# Patient Record
Sex: MALE | Race: WHITE | NOT HISPANIC OR LATINO | Employment: OTHER | URBAN - METROPOLITAN AREA
[De-identification: names, ages, dates, MRNs, and addresses within clinical notes are randomized per-mention and may not be internally consistent; named-entity substitution may affect disease eponyms.]

---

## 2018-12-14 ENCOUNTER — CONVERSION ENCOUNTER (OUTPATIENT)
Dept: FAMILY MEDICINE CLINIC | Facility: CLINIC | Age: 63
End: 2018-12-14

## 2018-12-14 ENCOUNTER — OFFICE VISIT CONVERTED (OUTPATIENT)
Dept: FAMILY MEDICINE CLINIC | Facility: CLINIC | Age: 63
End: 2018-12-14
Attending: NURSE PRACTITIONER

## 2019-05-02 ENCOUNTER — OFFICE VISIT CONVERTED (OUTPATIENT)
Dept: FAMILY MEDICINE CLINIC | Facility: CLINIC | Age: 64
End: 2019-05-02
Attending: NURSE PRACTITIONER

## 2019-05-02 ENCOUNTER — HOSPITAL ENCOUNTER (OUTPATIENT)
Dept: FAMILY MEDICINE CLINIC | Facility: CLINIC | Age: 64
Discharge: HOME | End: 2019-05-02
Attending: NURSE PRACTITIONER

## 2019-05-02 LAB
ALBUMIN SERPL-MCNC: 4.6 G/DL (ref 3.5–5)
ALBUMIN/GLOB SERPL: 1.5 {RATIO} (ref 1.4–2.6)
ALP SERPL-CCNC: 76 U/L (ref 56–155)
ALT SERPL-CCNC: 34 U/L (ref 10–40)
ANION GAP SERPL CALC-SCNC: 25 MMOL/L (ref 8–19)
APPEARANCE UR: CLEAR
AST SERPL-CCNC: 34 U/L (ref 15–50)
BASOPHILS # BLD AUTO: 0.06 10*3/UL (ref 0–0.2)
BASOPHILS NFR BLD AUTO: 0.8 % (ref 0–3)
BILIRUB SERPL-MCNC: 0.81 MG/DL (ref 0.2–1.3)
BILIRUB UR QL: NEGATIVE
BUN SERPL-MCNC: 10 MG/DL (ref 5–25)
BUN/CREAT SERPL: 12 {RATIO} (ref 6–20)
CALCIUM SERPL-MCNC: 9.9 MG/DL (ref 8.7–10.4)
CHLORIDE SERPL-SCNC: 98 MMOL/L (ref 99–111)
CHOLEST SERPL-MCNC: 245 MG/DL (ref 107–200)
CHOLEST/HDLC SERPL: 2.5 {RATIO} (ref 3–6)
COLOR UR: YELLOW
CONV ABS IMM GRAN: 0.05 10*3/UL (ref 0–0.2)
CONV CO2: 19 MMOL/L (ref 22–32)
CONV COLLECTION SOURCE (UA): NORMAL
CONV IMMATURE GRAN: 0.7 % (ref 0–1.8)
CONV TOTAL PROTEIN: 7.7 G/DL (ref 6.3–8.2)
CONV UROBILINOGEN IN URINE BY AUTOMATED TEST STRIP: 0.2 {EHRLICHU}/DL (ref 0.1–1)
CREAT UR-MCNC: 0.85 MG/DL (ref 0.7–1.2)
DEPRECATED RDW RBC AUTO: 47.8 FL (ref 35.1–43.9)
EOSINOPHIL # BLD AUTO: 0.14 10*3/UL (ref 0–0.7)
EOSINOPHIL # BLD AUTO: 1.8 % (ref 0–7)
ERYTHROCYTE [DISTWIDTH] IN BLOOD BY AUTOMATED COUNT: 14 % (ref 11.6–14.4)
GFR SERPLBLD BASED ON 1.73 SQ M-ARVRAT: >60 ML/MIN/{1.73_M2}
GLOBULIN UR ELPH-MCNC: 3.1 G/DL (ref 2–3.5)
GLUCOSE SERPL-MCNC: 101 MG/DL (ref 70–99)
GLUCOSE UR QL: NEGATIVE MG/DL
HBA1C MFR BLD: 14.7 G/DL (ref 14–18)
HCT VFR BLD AUTO: 44 % (ref 42–52)
HDLC SERPL-MCNC: 97 MG/DL (ref 40–60)
HGB UR QL STRIP: NEGATIVE
KETONES UR QL STRIP: NEGATIVE MG/DL
LDLC SERPL CALC-MCNC: 131 MG/DL (ref 70–100)
LEUKOCYTE ESTERASE UR QL STRIP: NEGATIVE
LYMPHOCYTES # BLD AUTO: 2.01 10*3/UL (ref 1–5)
MCH RBC QN AUTO: 31 PG (ref 27–31)
MCHC RBC AUTO-ENTMCNC: 33.4 G/DL (ref 33–37)
MCV RBC AUTO: 92.8 FL (ref 80–96)
MONOCYTES # BLD AUTO: 0.78 10*3/UL (ref 0.2–1.2)
MONOCYTES NFR BLD AUTO: 10.2 % (ref 3–10)
NEUTROPHILS # BLD AUTO: 4.64 10*3/UL (ref 2–8)
NEUTROPHILS NFR BLD AUTO: 60.3 % (ref 30–85)
NITRITE UR QL STRIP: NEGATIVE
NRBC CBCN: 0 % (ref 0–0.7)
OSMOLALITY SERPL CALC.SUM OF ELEC: 283 MOSM/KG (ref 273–304)
PH UR STRIP.AUTO: 6 [PH] (ref 5–8)
PLATELET # BLD AUTO: 295 10*3/UL (ref 130–400)
PMV BLD AUTO: 11.5 FL (ref 9.4–12.4)
POTASSIUM SERPL-SCNC: 4.8 MMOL/L (ref 3.5–5.3)
PROT UR QL: NEGATIVE MG/DL
PSA SERPL-MCNC: 1.43 NG/ML (ref 0–4)
RBC # BLD AUTO: 4.74 10*6/UL (ref 4.7–6.1)
SODIUM SERPL-SCNC: 137 MMOL/L (ref 135–147)
SP GR UR: 1.01 (ref 1–1.03)
TRIGL SERPL-MCNC: 86 MG/DL (ref 40–150)
VARIANT LYMPHS NFR BLD MANUAL: 26.2 % (ref 20–45)
VLDLC SERPL-MCNC: 17 MG/DL (ref 5–37)
WBC # BLD AUTO: 7.68 10*3/UL (ref 4.8–10.8)

## 2019-06-26 ENCOUNTER — OFFICE VISIT CONVERTED (OUTPATIENT)
Dept: FAMILY MEDICINE CLINIC | Facility: CLINIC | Age: 64
End: 2019-06-26
Attending: NURSE PRACTITIONER

## 2019-06-26 ENCOUNTER — CONVERSION ENCOUNTER (OUTPATIENT)
Dept: FAMILY MEDICINE CLINIC | Facility: CLINIC | Age: 64
End: 2019-06-26

## 2019-09-10 ENCOUNTER — OFFICE VISIT CONVERTED (OUTPATIENT)
Dept: FAMILY MEDICINE CLINIC | Facility: CLINIC | Age: 64
End: 2019-09-10
Attending: NURSE PRACTITIONER

## 2019-09-10 ENCOUNTER — HOSPITAL ENCOUNTER (OUTPATIENT)
Dept: FAMILY MEDICINE CLINIC | Facility: CLINIC | Age: 64
Discharge: HOME | End: 2019-09-10
Attending: NURSE PRACTITIONER

## 2019-09-10 LAB
ALBUMIN SERPL-MCNC: 4.6 G/DL (ref 3.5–5)
ALBUMIN/GLOB SERPL: 1.7 {RATIO} (ref 1.4–2.6)
ALP SERPL-CCNC: 76 U/L (ref 56–155)
ALT SERPL-CCNC: 50 U/L (ref 10–40)
ANION GAP SERPL CALC-SCNC: 19 MMOL/L (ref 8–19)
AST SERPL-CCNC: 51 U/L (ref 15–50)
BILIRUB SERPL-MCNC: 0.99 MG/DL (ref 0.2–1.3)
BUN SERPL-MCNC: 9 MG/DL (ref 5–25)
BUN/CREAT SERPL: 9 {RATIO} (ref 6–20)
CALCIUM SERPL-MCNC: 10.1 MG/DL (ref 8.7–10.4)
CHLORIDE SERPL-SCNC: 95 MMOL/L (ref 99–111)
CONV CO2: 26 MMOL/L (ref 22–32)
CONV TOTAL PROTEIN: 7.3 G/DL (ref 6.3–8.2)
CREAT UR-MCNC: 1.03 MG/DL (ref 0.7–1.2)
GFR SERPLBLD BASED ON 1.73 SQ M-ARVRAT: >60 ML/MIN/{1.73_M2}
GLOBULIN UR ELPH-MCNC: 2.7 G/DL (ref 2–3.5)
GLUCOSE SERPL-MCNC: 110 MG/DL (ref 70–99)
OSMOLALITY SERPL CALC.SUM OF ELEC: 279 MOSM/KG (ref 273–304)
POTASSIUM SERPL-SCNC: 4.7 MMOL/L (ref 3.5–5.3)
SODIUM SERPL-SCNC: 135 MMOL/L (ref 135–147)
TSH SERPL-ACNC: 0.82 M[IU]/L (ref 0.27–4.2)

## 2019-10-28 ENCOUNTER — HOSPITAL ENCOUNTER (OUTPATIENT)
Dept: FAMILY MEDICINE CLINIC | Facility: CLINIC | Age: 64
Discharge: HOME | End: 2019-10-28
Attending: NURSE PRACTITIONER

## 2019-10-28 ENCOUNTER — CONVERSION ENCOUNTER (OUTPATIENT)
Dept: FAMILY MEDICINE CLINIC | Facility: CLINIC | Age: 64
End: 2019-10-28

## 2019-10-28 ENCOUNTER — OFFICE VISIT CONVERTED (OUTPATIENT)
Dept: FAMILY MEDICINE CLINIC | Facility: CLINIC | Age: 64
End: 2019-10-28
Attending: NURSE PRACTITIONER

## 2019-10-28 LAB
ALBUMIN SERPL-MCNC: 4.7 G/DL (ref 3.5–5)
ALBUMIN/GLOB SERPL: 1.9 {RATIO} (ref 1.4–2.6)
ALP SERPL-CCNC: 75 U/L (ref 56–155)
ALT SERPL-CCNC: 45 U/L (ref 10–40)
ANION GAP SERPL CALC-SCNC: 21 MMOL/L (ref 8–19)
AST SERPL-CCNC: 41 U/L (ref 15–50)
BASOPHILS # BLD AUTO: 0.09 10*3/UL (ref 0–0.2)
BASOPHILS NFR BLD AUTO: 1.2 % (ref 0–3)
BILIRUB SERPL-MCNC: 0.96 MG/DL (ref 0.2–1.3)
BUN SERPL-MCNC: 9 MG/DL (ref 5–25)
BUN/CREAT SERPL: 12 {RATIO} (ref 6–20)
CALCIUM SERPL-MCNC: 9.9 MG/DL (ref 8.7–10.4)
CHLORIDE SERPL-SCNC: 91 MMOL/L (ref 99–111)
CHOLEST SERPL-MCNC: 216 MG/DL (ref 107–200)
CHOLEST/HDLC SERPL: 2.5 {RATIO} (ref 3–6)
CONV ABS IMM GRAN: 0.07 10*3/UL (ref 0–0.2)
CONV CO2: 24 MMOL/L (ref 22–32)
CONV IMMATURE GRAN: 0.9 % (ref 0–1.8)
CONV TOTAL PROTEIN: 7.2 G/DL (ref 6.3–8.2)
CREAT UR-MCNC: 0.76 MG/DL (ref 0.7–1.2)
DEPRECATED RDW RBC AUTO: 45.9 FL (ref 35.1–43.9)
EOSINOPHIL # BLD AUTO: 0.12 10*3/UL (ref 0–0.7)
EOSINOPHIL # BLD AUTO: 1.5 % (ref 0–7)
ERYTHROCYTE [DISTWIDTH] IN BLOOD BY AUTOMATED COUNT: 13.5 % (ref 11.6–14.4)
GFR SERPLBLD BASED ON 1.73 SQ M-ARVRAT: >60 ML/MIN/{1.73_M2}
GLOBULIN UR ELPH-MCNC: 2.5 G/DL (ref 2–3.5)
GLUCOSE SERPL-MCNC: 96 MG/DL (ref 70–99)
HCT VFR BLD AUTO: 43.8 % (ref 42–52)
HDLC SERPL-MCNC: 87 MG/DL (ref 40–60)
HGB BLD-MCNC: 15.1 G/DL (ref 14–18)
LDLC SERPL CALC-MCNC: 115 MG/DL (ref 70–100)
LYMPHOCYTES # BLD AUTO: 1.72 10*3/UL (ref 1–5)
LYMPHOCYTES NFR BLD AUTO: 22.1 % (ref 20–45)
MCH RBC QN AUTO: 31.5 PG (ref 27–31)
MCHC RBC AUTO-ENTMCNC: 34.5 G/DL (ref 33–37)
MCV RBC AUTO: 91.3 FL (ref 80–96)
MONOCYTES # BLD AUTO: 0.75 10*3/UL (ref 0.2–1.2)
MONOCYTES NFR BLD AUTO: 9.6 % (ref 3–10)
NEUTROPHILS # BLD AUTO: 5.03 10*3/UL (ref 2–8)
NEUTROPHILS NFR BLD AUTO: 64.7 % (ref 30–85)
NRBC CBCN: 0 % (ref 0–0.7)
OSMOLALITY SERPL CALC.SUM OF ELEC: 271 MOSM/KG (ref 273–304)
PLATELET # BLD AUTO: 307 10*3/UL (ref 130–400)
PMV BLD AUTO: 11 FL (ref 9.4–12.4)
POTASSIUM SERPL-SCNC: 4.6 MMOL/L (ref 3.5–5.3)
RBC # BLD AUTO: 4.8 10*6/UL (ref 4.7–6.1)
SODIUM SERPL-SCNC: 131 MMOL/L (ref 135–147)
TRIGL SERPL-MCNC: 68 MG/DL (ref 40–150)
VLDLC SERPL-MCNC: 14 MG/DL (ref 5–37)
WBC # BLD AUTO: 7.78 10*3/UL (ref 4.8–10.8)

## 2019-10-29 LAB
APPEARANCE UR: CLEAR
BILIRUB UR QL: NEGATIVE
COLOR UR: YELLOW
CONV COLLECTION SOURCE (UA): ABNORMAL
CONV UROBILINOGEN IN URINE BY AUTOMATED TEST STRIP: 0.2 {EHRLICHU}/DL (ref 0.1–1)
GLUCOSE UR QL: NEGATIVE MG/DL
HGB UR QL STRIP: NEGATIVE
KETONES UR QL STRIP: 15 MG/DL
LEUKOCYTE ESTERASE UR QL STRIP: NEGATIVE
NITRITE UR QL STRIP: NEGATIVE
PH UR STRIP.AUTO: 7 [PH] (ref 5–8)
PROT UR QL: NEGATIVE MG/DL
SP GR UR: 1.01 (ref 1–1.03)

## 2019-12-05 ENCOUNTER — OFFICE VISIT CONVERTED (OUTPATIENT)
Dept: CARDIOLOGY | Facility: CLINIC | Age: 64
End: 2019-12-05
Attending: INTERNAL MEDICINE

## 2019-12-12 ENCOUNTER — CONVERSION ENCOUNTER (OUTPATIENT)
Dept: CARDIOLOGY | Facility: CLINIC | Age: 64
End: 2019-12-12
Attending: INTERNAL MEDICINE

## 2020-04-15 ENCOUNTER — TELEPHONE CONVERTED (OUTPATIENT)
Dept: FAMILY MEDICINE CLINIC | Facility: CLINIC | Age: 65
End: 2020-04-15
Attending: NURSE PRACTITIONER

## 2020-06-02 ENCOUNTER — HOSPITAL ENCOUNTER (OUTPATIENT)
Dept: FAMILY MEDICINE CLINIC | Facility: CLINIC | Age: 65
Discharge: HOME | End: 2020-06-02
Attending: NURSE PRACTITIONER

## 2020-06-02 LAB
25(OH)D3 SERPL-MCNC: 76.2 NG/ML (ref 30–100)
ALBUMIN SERPL-MCNC: 4.8 G/DL (ref 3.5–5)
ALBUMIN/GLOB SERPL: 1.8 {RATIO} (ref 1.4–2.6)
ALP SERPL-CCNC: 81 U/L (ref 56–155)
ALT SERPL-CCNC: 46 U/L (ref 10–40)
ANION GAP SERPL CALC-SCNC: 25 MMOL/L (ref 8–19)
APPEARANCE UR: CLEAR
AST SERPL-CCNC: 45 U/L (ref 15–50)
BASOPHILS # BLD AUTO: 0.07 10*3/UL (ref 0–0.2)
BASOPHILS NFR BLD AUTO: 0.9 % (ref 0–3)
BILIRUB SERPL-MCNC: 0.69 MG/DL (ref 0.2–1.3)
BILIRUB UR QL: NEGATIVE
BUN SERPL-MCNC: 10 MG/DL (ref 5–25)
BUN/CREAT SERPL: 11 {RATIO} (ref 6–20)
CALCIUM SERPL-MCNC: 10.5 MG/DL (ref 8.7–10.4)
CHLORIDE SERPL-SCNC: 97 MMOL/L (ref 99–111)
CHOLEST SERPL-MCNC: 221 MG/DL (ref 107–200)
CHOLEST/HDLC SERPL: 2.1 {RATIO} (ref 3–6)
COLOR UR: YELLOW
CONV ABS IMM GRAN: 0.05 10*3/UL (ref 0–0.2)
CONV CO2: 20 MMOL/L (ref 22–32)
CONV COLLECTION SOURCE (UA): NORMAL
CONV IMMATURE GRAN: 0.6 % (ref 0–1.8)
CONV TOTAL PROTEIN: 7.4 G/DL (ref 6.3–8.2)
CONV UROBILINOGEN IN URINE BY AUTOMATED TEST STRIP: 0.2 {EHRLICHU}/DL (ref 0.1–1)
CREAT UR-MCNC: 0.88 MG/DL (ref 0.7–1.2)
DEPRECATED RDW RBC AUTO: 52.2 FL (ref 35.1–43.9)
EOSINOPHIL # BLD AUTO: 0.27 10*3/UL (ref 0–0.7)
EOSINOPHIL # BLD AUTO: 3.4 % (ref 0–7)
ERYTHROCYTE [DISTWIDTH] IN BLOOD BY AUTOMATED COUNT: 15 % (ref 11.6–14.4)
GFR SERPLBLD BASED ON 1.73 SQ M-ARVRAT: >60 ML/MIN/{1.73_M2}
GLOBULIN UR ELPH-MCNC: 2.6 G/DL (ref 2–3.5)
GLUCOSE SERPL-MCNC: 86 MG/DL (ref 70–99)
GLUCOSE UR QL: NEGATIVE MG/DL
HCT VFR BLD AUTO: 44.6 % (ref 42–52)
HDLC SERPL-MCNC: 104 MG/DL (ref 40–60)
HGB BLD-MCNC: 14.8 G/DL (ref 14–18)
HGB UR QL STRIP: NEGATIVE
KETONES UR QL STRIP: NEGATIVE MG/DL
LDLC SERPL CALC-MCNC: 101 MG/DL (ref 70–100)
LEUKOCYTE ESTERASE UR QL STRIP: NEGATIVE
LYMPHOCYTES # BLD AUTO: 2.39 10*3/UL (ref 1–5)
LYMPHOCYTES NFR BLD AUTO: 29.9 % (ref 20–45)
MCH RBC QN AUTO: 31.4 PG (ref 27–31)
MCHC RBC AUTO-ENTMCNC: 33.2 G/DL (ref 33–37)
MCV RBC AUTO: 94.5 FL (ref 80–96)
MONOCYTES # BLD AUTO: 0.92 10*3/UL (ref 0.2–1.2)
MONOCYTES NFR BLD AUTO: 11.5 % (ref 3–10)
NEUTROPHILS # BLD AUTO: 4.29 10*3/UL (ref 2–8)
NEUTROPHILS NFR BLD AUTO: 53.7 % (ref 30–85)
NITRITE UR QL STRIP: NEGATIVE
NRBC CBCN: 0 % (ref 0–0.7)
OSMOLALITY SERPL CALC.SUM OF ELEC: 282 MOSM/KG (ref 273–304)
PH UR STRIP.AUTO: 6.5 [PH] (ref 5–8)
PLATELET # BLD AUTO: 303 10*3/UL (ref 130–400)
PMV BLD AUTO: 11 FL (ref 9.4–12.4)
POTASSIUM SERPL-SCNC: 5.1 MMOL/L (ref 3.5–5.3)
PROT UR QL: NEGATIVE MG/DL
RBC # BLD AUTO: 4.72 10*6/UL (ref 4.7–6.1)
SODIUM SERPL-SCNC: 137 MMOL/L (ref 135–147)
SP GR UR: 1.01 (ref 1–1.03)
TRIGL SERPL-MCNC: 81 MG/DL (ref 40–150)
VLDLC SERPL-MCNC: 16 MG/DL (ref 5–37)
WBC # BLD AUTO: 7.99 10*3/UL (ref 4.8–10.8)

## 2020-07-30 ENCOUNTER — OFFICE VISIT CONVERTED (OUTPATIENT)
Dept: FAMILY MEDICINE CLINIC | Facility: CLINIC | Age: 65
End: 2020-07-30
Attending: NURSE PRACTITIONER

## 2020-12-29 ENCOUNTER — OFFICE VISIT CONVERTED (OUTPATIENT)
Dept: FAMILY MEDICINE CLINIC | Facility: CLINIC | Age: 65
End: 2020-12-29
Attending: NURSE PRACTITIONER

## 2020-12-29 ENCOUNTER — HOSPITAL ENCOUNTER (OUTPATIENT)
Dept: FAMILY MEDICINE CLINIC | Facility: CLINIC | Age: 65
Discharge: HOME | End: 2020-12-29
Attending: NURSE PRACTITIONER

## 2020-12-29 LAB
ALBUMIN SERPL-MCNC: 4.6 G/DL (ref 3.5–5)
ALBUMIN/GLOB SERPL: 1.7 {RATIO} (ref 1.4–2.6)
ALP SERPL-CCNC: 70 U/L (ref 56–155)
ALT SERPL-CCNC: 72 U/L (ref 10–40)
ANION GAP SERPL CALC-SCNC: 18 MMOL/L (ref 8–19)
AST SERPL-CCNC: 51 U/L (ref 15–50)
BASOPHILS # BLD AUTO: 0.09 10*3/UL (ref 0–0.2)
BASOPHILS NFR BLD AUTO: 1 % (ref 0–3)
BILIRUB SERPL-MCNC: 1.05 MG/DL (ref 0.2–1.3)
BUN SERPL-MCNC: 8 MG/DL (ref 5–25)
BUN/CREAT SERPL: 8 {RATIO} (ref 6–20)
CALCIUM SERPL-MCNC: 9.9 MG/DL (ref 8.7–10.4)
CHLORIDE SERPL-SCNC: 88 MMOL/L (ref 99–111)
CONV ABS IMM GRAN: 0.07 10*3/UL (ref 0–0.2)
CONV CO2: 24 MMOL/L (ref 22–32)
CONV IMMATURE GRAN: 0.8 % (ref 0–1.8)
CONV TOTAL PROTEIN: 7.3 G/DL (ref 6.3–8.2)
CREAT UR-MCNC: 0.96 MG/DL (ref 0.7–1.2)
DEPRECATED RDW RBC AUTO: 45.7 FL (ref 35.1–43.9)
EOSINOPHIL # BLD AUTO: 0.09 10*3/UL (ref 0–0.7)
EOSINOPHIL # BLD AUTO: 1 % (ref 0–7)
ERYTHROCYTE [DISTWIDTH] IN BLOOD BY AUTOMATED COUNT: 14.1 % (ref 11.6–14.4)
FERRITIN SERPL-MCNC: 509 NG/ML (ref 30–300)
GFR SERPLBLD BASED ON 1.73 SQ M-ARVRAT: >60 ML/MIN/{1.73_M2}
GLOBULIN UR ELPH-MCNC: 2.7 G/DL (ref 2–3.5)
GLUCOSE SERPL-MCNC: 101 MG/DL (ref 70–99)
HCT VFR BLD AUTO: 41.8 % (ref 42–52)
HGB BLD-MCNC: 14.7 G/DL (ref 14–18)
IRON SATN MFR SERPL: 31 % (ref 20–55)
IRON SERPL-MCNC: 106 UG/DL (ref 70–180)
LYMPHOCYTES # BLD AUTO: 1.51 10*3/UL (ref 1–5)
LYMPHOCYTES NFR BLD AUTO: 16.3 % (ref 20–45)
MCH RBC QN AUTO: 31.2 PG (ref 27–31)
MCHC RBC AUTO-ENTMCNC: 35.2 G/DL (ref 33–37)
MCV RBC AUTO: 88.7 FL (ref 80–96)
MONOCYTES # BLD AUTO: 0.87 10*3/UL (ref 0.2–1.2)
MONOCYTES NFR BLD AUTO: 9.4 % (ref 3–10)
NEUTROPHILS # BLD AUTO: 6.64 10*3/UL (ref 2–8)
NEUTROPHILS NFR BLD AUTO: 71.5 % (ref 30–85)
NRBC CBCN: 0 % (ref 0–0.7)
OSMOLALITY SERPL CALC.SUM OF ELEC: 260 MOSM/KG (ref 273–304)
PLATELET # BLD AUTO: 277 10*3/UL (ref 130–400)
PMV BLD AUTO: 10.5 FL (ref 9.4–12.4)
POTASSIUM SERPL-SCNC: 4.4 MMOL/L (ref 3.5–5.3)
RBC # BLD AUTO: 4.71 10*6/UL (ref 4.7–6.1)
SODIUM SERPL-SCNC: 126 MMOL/L (ref 135–147)
TIBC SERPL-MCNC: 343 UG/DL (ref 245–450)
TRANSFERRIN SERPL-MCNC: 240 MG/DL (ref 215–365)
WBC # BLD AUTO: 9.27 10*3/UL (ref 4.8–10.8)

## 2020-12-30 LAB
CERULOPLASMIN SERPL-MCNC: 17 MG/DL (ref 16–31)
CONV HEPATITIS B SURFACE AG W CONFIRMATION RE: NEGATIVE
DEPRECATED MITOCHONDRIA M2 IGG SER-ACNC: <20 UNITS (ref 0–20)
DSDNA AB SER-ACNC: NEGATIVE [IU]/ML
ENA AB SER IA-ACNC: NEGATIVE {RATIO}
HAV IGM SERPL QL IA: NEGATIVE
HBV CORE IGM SERPL QL IA: NEGATIVE
HCV AB SER DONR QL: <0.1 S/CO RATIO (ref 0–0.9)
SMOOTH MUSCLE F-ACTIN AB IGG: 5 UNITS (ref 0–19)

## 2021-01-05 LAB
A1AT SERPL-MCNC: 129 MG/DL (ref 101–187)
PHENOTYPE: NORMAL

## 2021-01-11 ENCOUNTER — HOSPITAL ENCOUNTER (OUTPATIENT)
Dept: OTHER | Facility: HOSPITAL | Age: 66
Discharge: HOME | End: 2021-01-11
Attending: NURSE PRACTITIONER

## 2021-02-17 ENCOUNTER — OFFICE VISIT CONVERTED (OUTPATIENT)
Dept: FAMILY MEDICINE CLINIC | Facility: CLINIC | Age: 66
End: 2021-02-17
Attending: NURSE PRACTITIONER

## 2021-02-17 ENCOUNTER — HOSPITAL ENCOUNTER (OUTPATIENT)
Dept: FAMILY MEDICINE CLINIC | Facility: CLINIC | Age: 66
Discharge: HOME | End: 2021-02-17
Attending: NURSE PRACTITIONER

## 2021-02-17 LAB
ALBUMIN SERPL-MCNC: 4.2 G/DL (ref 3.5–5)
ALBUMIN/GLOB SERPL: 1.7 {RATIO} (ref 1.4–2.6)
ALP SERPL-CCNC: 89 U/L (ref 56–155)
ALT SERPL-CCNC: 33 U/L (ref 10–40)
ANION GAP SERPL CALC-SCNC: 15 MMOL/L (ref 8–19)
APPEARANCE UR: CLEAR
AST SERPL-CCNC: 29 U/L (ref 15–50)
BASOPHILS # BLD AUTO: 0.09 10*3/UL (ref 0–0.2)
BASOPHILS NFR BLD AUTO: 1.1 % (ref 0–3)
BILIRUB SERPL-MCNC: 0.84 MG/DL (ref 0.2–1.3)
BILIRUB UR QL: NEGATIVE
BUN SERPL-MCNC: 6 MG/DL (ref 5–25)
BUN/CREAT SERPL: 8 {RATIO} (ref 6–20)
CALCIUM SERPL-MCNC: 9.2 MG/DL (ref 8.7–10.4)
CHLORIDE SERPL-SCNC: 94 MMOL/L (ref 99–111)
CHOLEST SERPL-MCNC: 250 MG/DL (ref 107–200)
CHOLEST/HDLC SERPL: 3.2 {RATIO} (ref 3–6)
COLOR UR: YELLOW
CONV ABS IMM GRAN: 0.06 10*3/UL (ref 0–0.2)
CONV BACTERIA: NEGATIVE
CONV CO2: 25 MMOL/L (ref 22–32)
CONV COLLECTION SOURCE (UA): ABNORMAL
CONV IMMATURE GRAN: 0.7 % (ref 0–1.8)
CONV TOTAL PROTEIN: 6.7 G/DL (ref 6.3–8.2)
CONV UROBILINOGEN IN URINE BY AUTOMATED TEST STRIP: 0.2 {EHRLICHU}/DL (ref 0.1–1)
CREAT UR-MCNC: 0.75 MG/DL (ref 0.7–1.2)
DEPRECATED RDW RBC AUTO: 48.7 FL (ref 35.1–43.9)
EOSINOPHIL # BLD AUTO: 0.13 10*3/UL (ref 0–0.7)
EOSINOPHIL # BLD AUTO: 1.6 % (ref 0–7)
ERYTHROCYTE [DISTWIDTH] IN BLOOD BY AUTOMATED COUNT: 14.4 % (ref 11.6–14.4)
GFR SERPLBLD BASED ON 1.73 SQ M-ARVRAT: >60 ML/MIN/{1.73_M2}
GLOBULIN UR ELPH-MCNC: 2.5 G/DL (ref 2–3.5)
GLUCOSE SERPL-MCNC: 101 MG/DL (ref 70–99)
GLUCOSE UR QL: NEGATIVE MG/DL
HCT VFR BLD AUTO: 42.6 % (ref 42–52)
HDLC SERPL-MCNC: 77 MG/DL (ref 40–60)
HGB BLD-MCNC: 14.8 G/DL (ref 14–18)
HGB UR QL STRIP: NEGATIVE
KETONES UR QL STRIP: NEGATIVE MG/DL
LDLC SERPL CALC-MCNC: 147 MG/DL (ref 70–100)
LEUKOCYTE ESTERASE UR QL STRIP: ABNORMAL
LYMPHOCYTES # BLD AUTO: 2.07 10*3/UL (ref 1–5)
LYMPHOCYTES NFR BLD AUTO: 24.8 % (ref 20–45)
MCH RBC QN AUTO: 32.2 PG (ref 27–31)
MCHC RBC AUTO-ENTMCNC: 34.7 G/DL (ref 33–37)
MCV RBC AUTO: 92.6 FL (ref 80–96)
MONOCYTES # BLD AUTO: 0.86 10*3/UL (ref 0.2–1.2)
MONOCYTES NFR BLD AUTO: 10.3 % (ref 3–10)
NEUTROPHILS # BLD AUTO: 5.14 10*3/UL (ref 2–8)
NEUTROPHILS NFR BLD AUTO: 61.5 % (ref 30–85)
NITRITE UR QL STRIP: NEGATIVE
NRBC CBCN: 0 % (ref 0–0.7)
OSMOLALITY SERPL CALC.SUM OF ELEC: 268 MOSM/KG (ref 273–304)
PH UR STRIP.AUTO: 7.5 [PH] (ref 5–8)
PLATELET # BLD AUTO: 355 10*3/UL (ref 130–400)
PMV BLD AUTO: 10.7 FL (ref 9.4–12.4)
POTASSIUM SERPL-SCNC: 4.4 MMOL/L (ref 3.5–5.3)
PROT UR QL: NEGATIVE MG/DL
RBC # BLD AUTO: 4.6 10*6/UL (ref 4.7–6.1)
RBC #/AREA URNS HPF: ABNORMAL /[HPF]
SODIUM SERPL-SCNC: 130 MMOL/L (ref 135–147)
SP GR UR: 1.01 (ref 1–1.03)
TRIGL SERPL-MCNC: 128 MG/DL (ref 40–150)
TSH SERPL-ACNC: 1.14 M[IU]/L (ref 0.27–4.2)
VLDLC SERPL-MCNC: 26 MG/DL (ref 5–37)
WBC # BLD AUTO: 8.35 10*3/UL (ref 4.8–10.8)
WBC #/AREA URNS HPF: ABNORMAL /[HPF]

## 2021-04-27 ENCOUNTER — OFFICE VISIT CONVERTED (OUTPATIENT)
Dept: FAMILY MEDICINE CLINIC | Facility: CLINIC | Age: 66
End: 2021-04-27
Attending: FAMILY MEDICINE

## 2021-04-27 ENCOUNTER — CONVERSION ENCOUNTER (OUTPATIENT)
Dept: FAMILY MEDICINE CLINIC | Facility: CLINIC | Age: 66
End: 2021-04-27

## 2021-04-30 ENCOUNTER — OFFICE VISIT CONVERTED (OUTPATIENT)
Dept: FAMILY MEDICINE CLINIC | Facility: CLINIC | Age: 66
End: 2021-04-30
Attending: NURSE PRACTITIONER

## 2021-05-07 ENCOUNTER — OFFICE VISIT CONVERTED (OUTPATIENT)
Dept: FAMILY MEDICINE CLINIC | Facility: CLINIC | Age: 66
End: 2021-05-07
Attending: NURSE PRACTITIONER

## 2021-05-07 ENCOUNTER — CONVERSION ENCOUNTER (OUTPATIENT)
Dept: FAMILY MEDICINE CLINIC | Facility: CLINIC | Age: 66
End: 2021-05-07

## 2021-05-09 VITALS
DIASTOLIC BLOOD PRESSURE: 80 MMHG | HEIGHT: 71 IN | HEART RATE: 114 BPM | SYSTOLIC BLOOD PRESSURE: 140 MMHG | WEIGHT: 226.25 LBS | OXYGEN SATURATION: 97 % | TEMPERATURE: 98 F | BODY MASS INDEX: 31.68 KG/M2

## 2021-05-09 VITALS
OXYGEN SATURATION: 95 % | HEIGHT: 71 IN | HEART RATE: 110 BPM | TEMPERATURE: 97.6 F | WEIGHT: 238 LBS | SYSTOLIC BLOOD PRESSURE: 130 MMHG | DIASTOLIC BLOOD PRESSURE: 80 MMHG | BODY MASS INDEX: 33.32 KG/M2

## 2021-05-09 VITALS
DIASTOLIC BLOOD PRESSURE: 76 MMHG | TEMPERATURE: 97.8 F | OXYGEN SATURATION: 93 % | SYSTOLIC BLOOD PRESSURE: 122 MMHG | WEIGHT: 237.37 LBS | HEART RATE: 111 BPM

## 2021-05-09 VITALS
SYSTOLIC BLOOD PRESSURE: 150 MMHG | HEART RATE: 118 BPM | BODY MASS INDEX: 32.2 KG/M2 | DIASTOLIC BLOOD PRESSURE: 80 MMHG | WEIGHT: 230 LBS | HEIGHT: 71 IN | OXYGEN SATURATION: 95 % | TEMPERATURE: 98 F

## 2021-05-09 VITALS
HEART RATE: 102 BPM | BODY MASS INDEX: 31.4 KG/M2 | TEMPERATURE: 97.7 F | OXYGEN SATURATION: 96 % | SYSTOLIC BLOOD PRESSURE: 140 MMHG | DIASTOLIC BLOOD PRESSURE: 72 MMHG | WEIGHT: 224.31 LBS | HEIGHT: 71 IN

## 2021-05-09 VITALS
DIASTOLIC BLOOD PRESSURE: 78 MMHG | BODY MASS INDEX: 32.35 KG/M2 | SYSTOLIC BLOOD PRESSURE: 138 MMHG | HEART RATE: 124 BPM | WEIGHT: 231.12 LBS | OXYGEN SATURATION: 96 % | HEIGHT: 71 IN | TEMPERATURE: 96.9 F

## 2021-05-09 VITALS
BODY MASS INDEX: 33.06 KG/M2 | TEMPERATURE: 98.2 F | DIASTOLIC BLOOD PRESSURE: 70 MMHG | HEART RATE: 57 BPM | SYSTOLIC BLOOD PRESSURE: 122 MMHG | HEIGHT: 71 IN | WEIGHT: 236.12 LBS | OXYGEN SATURATION: 96 %

## 2021-05-09 VITALS
HEART RATE: 111 BPM | HEIGHT: 71 IN | WEIGHT: 239.25 LBS | OXYGEN SATURATION: 93 % | TEMPERATURE: 98 F | BODY MASS INDEX: 33.49 KG/M2 | SYSTOLIC BLOOD PRESSURE: 134 MMHG | DIASTOLIC BLOOD PRESSURE: 72 MMHG

## 2021-05-09 VITALS
BODY MASS INDEX: 31.66 KG/M2 | TEMPERATURE: 97.9 F | SYSTOLIC BLOOD PRESSURE: 140 MMHG | HEIGHT: 71 IN | DIASTOLIC BLOOD PRESSURE: 74 MMHG | HEART RATE: 122 BPM | WEIGHT: 226.12 LBS | OXYGEN SATURATION: 95 %

## 2021-05-09 VITALS
TEMPERATURE: 98.8 F | WEIGHT: 239.12 LBS | HEIGHT: 71 IN | BODY MASS INDEX: 33.48 KG/M2 | HEART RATE: 128 BPM | DIASTOLIC BLOOD PRESSURE: 70 MMHG | OXYGEN SATURATION: 94 % | SYSTOLIC BLOOD PRESSURE: 120 MMHG

## 2021-05-15 VITALS
SYSTOLIC BLOOD PRESSURE: 148 MMHG | BODY MASS INDEX: 33.74 KG/M2 | HEART RATE: 110 BPM | WEIGHT: 241 LBS | HEIGHT: 71 IN | DIASTOLIC BLOOD PRESSURE: 86 MMHG

## 2021-06-05 NOTE — PROGRESS NOTES
Progress Note      Patient Name: Paulo Leiva   Patient ID: [id number]   Sex: Male   YOB: 1955    Primary Care Provider: Vilma VELÁZQUEZ   Referring Provider: Vilma VELÁZQUEZ    Visit Date: May 7, 2021    Provider: MELANIA Sunshine   Location: [facility]   Location Address: [address] [place] [address]   [place]ON[place] Medina  [id number]   Location Phone: [phone]          Chief Complaint     follow up on arm laceration       History Of Present Illness  Paulo Leiva is a 65 year old /White male who presents for evaluation and treatment of:      laceration to left forearm as noted in previous note - here for suture removed 10 days post injury/placement of sutures       Past Medical History  Disease Name Date Onset Notes   COPD (chronic obstructive pulmonary disease) 12/14/2018 --    Essential hypertension 12/14/2018 --    Hyperlipidemia --  --    Hyperlipidemia 12/14/2018 --    Hypertension, Benign Essential --  --    Hyponatremia --  --    Vertigo 02/17/2021 --          Past Surgical History  Procedure Name Date Notes   *Denies any surgical procedures --  --          Medication List  Name Date Started Instructions   hydrochlorothiazide 12.5 mg oral tablet 02/17/2021 take 1 tablet (12.5 mg) by oral route once daily for 90 days   Lotrel 5-20 mg oral capsule 02/17/2021 take 1 capsule by oral route once daily for 90 days   meclizine 25 mg oral tablet 04/22/2021 take 1 tablet by oral route 4 times a day as needed for 90 days   meloxicam 15 mg oral tablet 02/17/2021 take 1 tablet (15 mg) by oral route once daily for 90 days   simvastatin 20 mg oral tablet 02/17/2021 take 1 tablet (20 mg) by oral route once daily in the evening for 90 days   Spiriva Respimat 1.25 mcg/actuation inhalation mist 02/17/2021 inhale 2 puffs (2.5 mcg) by inhalation route once daily for 90 days         Allergy List  Allergen Name Date Reaction Notes   NO KNOWN DRUG ALLERGIES --  --  --        Allergies  Reconciled  Family Medical History  Disease Name Relative/Age Notes   *No Significant Family Medical History  --          Social History  Finding Status Start/Stop Quantity Notes   Alcohol Current every day --/-- --  drinks alcohol, 8-14 drinks per week   Exercises regularly --  --/-- --  occasionally   Recreational Drug Use Never --/-- --  never used   Second hand smoke exposure Unknown --/-- --  no   Tobacco Former --/-- 2.5 PPD former smoker, smokes more than 2 packs per day, for 35 years, never uses other tobacco products   Uses seatbelts --  --/-- --  yes         Immunizations  NameDate Admin Mfg Trade Name Lot Number Route Inj VIS Given VIS Publication   Zfbmcmtef89/03/2020 PMC FLUZONE [id number] IM LD 10/03/2020 08/15/2019   Comments: [id number]#[id number]   Qpuzwrg8071/04/2014 [facility] PREVNAR 13 [id number] NE NE 02/17/2021    Comments:    Gufbndep94/04/2014 NE Not Entered  NE NE 02/17/2021    Comments:    Tdap03/14/2017 [facility] ADACEL [id number] NE NE 02/17/2021    Comments:          Review of Systems  · Constitutional  o Denies  o : fever, chills  · Cardiovascular  o Denies  o : chest pain  · Integument  o * See HPI      Vitals  Date Time BP Position Site L\R Cuff Size HR RR TEMP (F) WT  HT  BMI kg/m2 BSA m2 O2 Sat FR L/min FiO2 HC       05/07/2021 02:04 /90 Sitting    116 - R  98 227lbs 0oz    95 %            Physical Examination  · Constitutional  o Appearance  o : well developed, well-nourished, in no acute distress  · Eyes  o Conjunctivae  o : conjunctivae normal  · Musculoskeletal  o General  o :   § General Musculoskeletal  § : No joint swelling or deformity. Muscle tone, strength, and development grossly normal.  · Skin and Subcutaneous Tissue  o General Inspection  o : linear laceration to the left forearm near elbow with sutures noted and well approximated without s/s of infection; v/x shaped laceration to left lateral forearm with multiple interrupted and running sutures with appropriate healing - sutures cleansed  and multiple sutures removed without complication - steri-strips applied  · Neurologic  o Gait and Station  o :   § Gait Screening  § : normal gait  · Psychiatric  o Mood and Affect  o : mood normal, affect appropriate              Assessment  · Encounter for removal of sutures     V58.32/Z48.02      Plan  · Orders  o ACO-39: Current medications updated and reviewed (1159F, ) - - 05/07/2021  · Medications  o Medications have been Reconciled  o Transition of Care or Provider Policy  · Instructions  o Patient was educated/instructed on their diagnosis, treatment and medications prior to discharge from the clinic today.  o May allow smaller linear laceration to be open to air and apply abx ointment daily. Keep v/x shaped laceration covered and steri-strips in place until fall off - monitor for s/s of infection.   · Disposition  o Follow up as needed.            Electronically Signed by: MELANIA Sunshine -Author on May 7, 2021 04:36:10 PM

## 2021-07-15 VITALS
HEART RATE: 116 BPM | WEIGHT: 227 LBS | DIASTOLIC BLOOD PRESSURE: 90 MMHG | BODY MASS INDEX: 31.66 KG/M2 | SYSTOLIC BLOOD PRESSURE: 160 MMHG | TEMPERATURE: 98 F | OXYGEN SATURATION: 95 %

## 2021-08-25 ENCOUNTER — OFFICE VISIT (OUTPATIENT)
Dept: FAMILY MEDICINE CLINIC | Facility: CLINIC | Age: 66
End: 2021-08-25

## 2021-08-25 VITALS
WEIGHT: 228 LBS | DIASTOLIC BLOOD PRESSURE: 78 MMHG | TEMPERATURE: 97.3 F | SYSTOLIC BLOOD PRESSURE: 141 MMHG | HEIGHT: 70 IN | OXYGEN SATURATION: 96 % | HEART RATE: 100 BPM | BODY MASS INDEX: 32.64 KG/M2

## 2021-08-25 DIAGNOSIS — J44.9 CHRONIC OBSTRUCTIVE PULMONARY DISEASE, UNSPECIFIED COPD TYPE (HCC): ICD-10-CM

## 2021-08-25 DIAGNOSIS — I10 ESSENTIAL HYPERTENSION: Primary | ICD-10-CM

## 2021-08-25 DIAGNOSIS — E78.5 HYPERLIPIDEMIA, UNSPECIFIED HYPERLIPIDEMIA TYPE: ICD-10-CM

## 2021-08-25 DIAGNOSIS — M19.90 ARTHRITIS: ICD-10-CM

## 2021-08-25 DIAGNOSIS — R42 VERTIGO: ICD-10-CM

## 2021-08-25 PROBLEM — E87.1 HYPONATREMIA: Status: ACTIVE | Noted: 2021-08-25

## 2021-08-25 LAB
BASOPHILS # BLD AUTO: 0.09 10*3/MM3 (ref 0–0.2)
BASOPHILS NFR BLD AUTO: 1.2 % (ref 0–1.5)
BILIRUB UR QL STRIP: NEGATIVE
CHOLEST SERPL-MCNC: 221 MG/DL (ref 0–200)
CLARITY UR: CLEAR
COLOR UR: YELLOW
DEPRECATED RDW RBC AUTO: 43 FL (ref 37–54)
EOSINOPHIL # BLD AUTO: 0.11 10*3/MM3 (ref 0–0.4)
EOSINOPHIL NFR BLD AUTO: 1.5 % (ref 0.3–6.2)
ERYTHROCYTE [DISTWIDTH] IN BLOOD BY AUTOMATED COUNT: 13 % (ref 12.3–15.4)
GLUCOSE UR STRIP-MCNC: NEGATIVE MG/DL
HCT VFR BLD AUTO: 42.2 % (ref 37.5–51)
HDLC SERPL-MCNC: 123 MG/DL (ref 40–60)
HGB BLD-MCNC: 14.8 G/DL (ref 13–17.7)
HGB UR QL STRIP.AUTO: NEGATIVE
IMM GRANULOCYTES # BLD AUTO: 0.05 10*3/MM3 (ref 0–0.05)
IMM GRANULOCYTES NFR BLD AUTO: 0.7 % (ref 0–0.5)
KETONES UR QL STRIP: NEGATIVE
LDLC SERPL CALC-MCNC: 90 MG/DL (ref 0–100)
LDLC/HDLC SERPL: 0.72 {RATIO}
LEUKOCYTE ESTERASE UR QL STRIP.AUTO: NEGATIVE
LYMPHOCYTES # BLD AUTO: 1.43 10*3/MM3 (ref 0.7–3.1)
LYMPHOCYTES NFR BLD AUTO: 19.3 % (ref 19.6–45.3)
MCH RBC QN AUTO: 32.2 PG (ref 26.6–33)
MCHC RBC AUTO-ENTMCNC: 35.1 G/DL (ref 31.5–35.7)
MCV RBC AUTO: 91.9 FL (ref 79–97)
MONOCYTES # BLD AUTO: 1 10*3/MM3 (ref 0.1–0.9)
MONOCYTES NFR BLD AUTO: 13.5 % (ref 5–12)
NEUTROPHILS NFR BLD AUTO: 4.72 10*3/MM3 (ref 1.7–7)
NEUTROPHILS NFR BLD AUTO: 63.8 % (ref 42.7–76)
NITRITE UR QL STRIP: NEGATIVE
NRBC BLD AUTO-RTO: 0 /100 WBC (ref 0–0.2)
PH UR STRIP.AUTO: 7 [PH] (ref 5–8)
PLATELET # BLD AUTO: 337 10*3/MM3 (ref 140–450)
PMV BLD AUTO: 10.3 FL (ref 6–12)
PROT UR QL STRIP: NEGATIVE
RBC # BLD AUTO: 4.59 10*6/MM3 (ref 4.14–5.8)
SP GR UR STRIP: 1.01 (ref 1–1.03)
TRIGL SERPL-MCNC: 46 MG/DL (ref 0–150)
UROBILINOGEN UR QL STRIP: NORMAL
VLDLC SERPL-MCNC: 8 MG/DL (ref 5–40)
WBC # BLD AUTO: 7.4 10*3/MM3 (ref 3.4–10.8)

## 2021-08-25 PROCEDURE — 81003 URINALYSIS AUTO W/O SCOPE: CPT | Performed by: NURSE PRACTITIONER

## 2021-08-25 PROCEDURE — 85025 COMPLETE CBC W/AUTO DIFF WBC: CPT | Performed by: NURSE PRACTITIONER

## 2021-08-25 PROCEDURE — 80061 LIPID PANEL: CPT | Performed by: NURSE PRACTITIONER

## 2021-08-25 PROCEDURE — 99214 OFFICE O/P EST MOD 30 MIN: CPT | Performed by: NURSE PRACTITIONER

## 2021-08-25 PROCEDURE — 80053 COMPREHEN METABOLIC PANEL: CPT | Performed by: NURSE PRACTITIONER

## 2021-08-25 RX ORDER — HYDROCHLOROTHIAZIDE 12.5 MG/1
12.5 TABLET ORAL DAILY
Qty: 90 TABLET | Refills: 1 | Status: SHIPPED | OUTPATIENT
Start: 2021-08-25 | End: 2022-04-04 | Stop reason: SDUPTHER

## 2021-08-25 RX ORDER — MECLIZINE HYDROCHLORIDE 25 MG/1
1 TABLET ORAL DAILY
COMMUNITY
Start: 2021-04-22 | End: 2021-08-25 | Stop reason: SDUPTHER

## 2021-08-25 RX ORDER — SIMVASTATIN 20 MG
20 TABLET ORAL DAILY
Qty: 90 TABLET | Refills: 1 | Status: SHIPPED | OUTPATIENT
Start: 2021-08-25 | End: 2022-04-04 | Stop reason: SDUPTHER

## 2021-08-25 RX ORDER — AMLODIPINE BESYLATE AND BENAZEPRIL HYDROCHLORIDE 5; 20 MG/1; MG/1
1 CAPSULE ORAL DAILY
Qty: 90 CAPSULE | Refills: 1 | Status: SHIPPED | OUTPATIENT
Start: 2021-08-25 | End: 2022-04-04 | Stop reason: SDUPTHER

## 2021-08-25 RX ORDER — MECLIZINE HYDROCHLORIDE 25 MG/1
50 TABLET ORAL DAILY
Qty: 180 TABLET | Refills: 1 | Status: SHIPPED | OUTPATIENT
Start: 2021-08-25 | End: 2022-04-04 | Stop reason: SDUPTHER

## 2021-08-25 RX ORDER — SIMVASTATIN 20 MG
1 TABLET ORAL DAILY
COMMUNITY
Start: 2021-05-26 | End: 2021-08-25 | Stop reason: SDUPTHER

## 2021-08-25 RX ORDER — MELOXICAM 15 MG/1
1 TABLET ORAL DAILY
COMMUNITY
Start: 2021-05-26 | End: 2021-08-25 | Stop reason: SDUPTHER

## 2021-08-25 RX ORDER — AMLODIPINE BESYLATE AND BENAZEPRIL HYDROCHLORIDE 5; 20 MG/1; MG/1
1 CAPSULE ORAL DAILY
COMMUNITY
Start: 2021-05-26 | End: 2021-08-25 | Stop reason: SDUPTHER

## 2021-08-25 RX ORDER — TIOTROPIUM BROMIDE INHALATION SPRAY 1.56 UG/1
2 SPRAY, METERED RESPIRATORY (INHALATION) DAILY
Qty: 3 EACH | Refills: 1 | Status: SHIPPED | OUTPATIENT
Start: 2021-08-25 | End: 2022-04-04 | Stop reason: SDUPTHER

## 2021-08-25 RX ORDER — MELOXICAM 15 MG/1
15 TABLET ORAL DAILY
Qty: 90 TABLET | Refills: 1 | Status: SHIPPED | OUTPATIENT
Start: 2021-08-25 | End: 2022-04-04

## 2021-08-25 RX ORDER — TIOTROPIUM BROMIDE INHALATION SPRAY 1.56 UG/1
2 SPRAY, METERED RESPIRATORY (INHALATION) DAILY
COMMUNITY
Start: 2021-05-26 | End: 2021-08-25 | Stop reason: SDUPTHER

## 2021-08-25 RX ORDER — HYDROCHLOROTHIAZIDE 12.5 MG/1
1 TABLET ORAL DAILY
COMMUNITY
Start: 2021-05-26 | End: 2021-08-25 | Stop reason: SDUPTHER

## 2021-08-25 NOTE — PROGRESS NOTES
Chief Complaint  Hyperlipidemia (Refill meds and fasting labs ) and Hypertension    PHQ-2 Total Score: 0    Subjective        Past Medical History:   Diagnosis Date   • Benign essential hypertension    • COPD (chronic obstructive pulmonary disease) (CMS/[facility])    • Essential hypertension    • Hyperlipidemia 12/14/2018   • Hyponatremia    • Vertigo 02/17/2021     Social History     Tobacco Use   • Smoking status: Former Smoker   • Tobacco comment: SMOKES MORE THAN 2 PPD FOR 35 YEARS   Substance Use Topics   • Alcohol use: Yes     Comment: 8-14 DRINKS PER WEEK   • Drug use: Never      Current Outpatient Medications on File Prior to Visit   Medication Sig   • [DISCONTINUED] amLODIPine-benazepril (LOTREL 5-20) 5-20 MG per capsule Take 1 capsule by mouth Daily.   • [DISCONTINUED] hydroCHLOROthiazide (HYDRODIURIL) 12.5 MG tablet Take 1 tablet by mouth Daily.   • [DISCONTINUED] meclizine (ANTIVERT) 25 MG tablet Take 1 tablet by mouth Daily.   • [DISCONTINUED] meloxicam (MOBIC) 15 MG tablet Take 1 tablet by mouth Daily.   • [DISCONTINUED] simvastatin (ZOCOR) 20 MG tablet Take 1 tablet by mouth Daily.   • [DISCONTINUED] Spiriva Respimat 1.25 MCG/ACT aerosol solution inhaler Inhale 2 puffs Daily.     No current facility-administered medications on file prior to visit.      No Known Allergies   Health Maintenance Due   Topic Date Due   • COLORECTAL CANCER SCREENING  Never done   • ANNUAL PHYSICAL  Never done   • COVID-19 Vaccine (1) Never done   • Pneumococcal Vaccine 0-64 (1 of 2 - PPSV23) 01/29/2015   • ZOSTER VACCINE (2 of 2) 01/29/2015      Paulo A Cali presents to [facility]  HTN: slightly elevated in office at 141/78 - monitors at home and usually similar readings to in office - follows an unrestricted diet, tries to avoid large portions    Hyperlipidemia: denies muscle aches or cramps    Arthritis: pain is tolerable with Mobic    Vertigo: takes once in the morning to keep symptoms from  "occurring    COPD: denies coughing or wheezing, symptoms are well controlled      Objective   Vital Signs:   /78   Pulse 100   Temp 97.3 °F (36.3 °C)   Ht 177.8 cm (70\")   Wt 103 kg (228 lb)   SpO2 96%   BMI 32.71 kg/m²     Review of Systems   Cardiovascular: Positive for leg swelling. Negative for chest pain and palpitations (intermittent).   Neurological: Negative for headache.      Physical Exam  Vitals reviewed.   Constitutional:       General: He is not in acute distress.     Appearance: Normal appearance. He is well-developed.   HENT:      Head: Normocephalic and atraumatic.      Right Ear: External ear normal.      Left Ear: External ear normal.   Eyes:      Conjunctiva/sclera: Conjunctivae normal.      Pupils: Pupils are equal, round, and reactive to light.   Cardiovascular:      Rate and Rhythm: Normal rate and regular rhythm.      Heart sounds: Normal heart sounds. No murmur heard.     Pulmonary:      Effort: Pulmonary effort is normal.      Breath sounds: Normal breath sounds. No wheezing or rhonchi.   Musculoskeletal:      Cervical back: Neck supple.      Right lower leg: No edema.      Left lower leg: No edema.   Skin:     General: Skin is warm and dry.   Neurological:      Mental Status: He is alert and oriented to person, place, and time.   Psychiatric:         Mood and Affect: Mood and affect normal.         Behavior: Behavior normal.         Thought Content: Thought content normal.         Judgment: Judgment normal.        Result Review :   The following data was reviewed by: MELANIA Santiago on 08/25/2021:  CMP    CMP 12/24/20 12/29/20 2/17/21   Glucose 116 (A) 101 (A) 101 (A)   BUN 8 8 6   Creatinine 1.06 0.96 0.75   Sodium 125 (A) 126 (A) 130 (A)   Potassium 4.3 4.4 4.4   Chloride 88 (A) 88 (A) 94 (A)   Calcium 9.3 9.9 9.2   Albumin 4.2 4.6 4.2   Total Bilirubin 0.80 1.05 0.84   Alkaline Phosphatase 76 70 89   AST (SGOT) 87 (A) 51 (A) 29   ALT (SGPT) 100 (A) 72 (A) 33   (A) " Abnormal value            Lipid Panel    Lipid Panel 2/17/21   Total Cholesterol 250 (A)   Triglycerides 128   HDL Cholesterol 77 (A)   VLDL Cholesterol 26   LDL Cholesterol  147 (A)   (A) Abnormal value       Comments are available for some flowsheets but are not being displayed.                     Assessment and Plan    Diagnoses and all orders for this visit:    1. Essential hypertension (Primary)  -     CBC & Differential  -     Comprehensive Metabolic Panel  -     Urinalysis With Culture If Indicated - Urine, Clean Catch  -     Lipid Panel  -     amLODIPine-benazepril (LOTREL 5-20) 5-20 MG per capsule; Take 1 capsule by mouth Daily.  Dispense: 90 capsule; Refill: 1  -     hydroCHLOROthiazide (HYDRODIURIL) 12.5 MG tablet; Take 1 tablet by mouth Daily.  Dispense: 90 tablet; Refill: 1    2. Hyperlipidemia, unspecified hyperlipidemia type  -     simvastatin (ZOCOR) 20 MG tablet; Take 1 tablet by mouth Daily.  Dispense: 90 tablet; Refill: 1    3. Vertigo  -     meclizine (ANTIVERT) 25 MG tablet; Take 2 tablets by mouth Daily.  Dispense: 180 tablet; Refill: 1    4. Chronic obstructive pulmonary disease, unspecified COPD type (CMS/[facility])  -     Spiriva Respimat 1.25 MCG/ACT aerosol solution inhaler; Inhale 2 puffs Daily.  Dispense: 3 each; Refill: 1    5. Arthritis  -     meloxicam (MOBIC) 15 MG tablet; Take 1 tablet by mouth Daily.  Dispense: 90 tablet; Refill: 1        Follow Up   Return in about 6 months (around 2/25/2022) for Next scheduled follow up.  Patient was given instructions and counseling regarding his condition or for health maintenance advice. Please see specific information pulled into the AVS if appropriate.

## 2021-08-26 LAB
ALBUMIN SERPL-MCNC: 5 G/DL (ref 3.5–5.2)
ALBUMIN/GLOB SERPL: 1.9 G/DL
ALP SERPL-CCNC: 68 U/L (ref 39–117)
ALT SERPL W P-5'-P-CCNC: 24 U/L (ref 1–41)
ANION GAP SERPL CALCULATED.3IONS-SCNC: 13.3 MMOL/L (ref 5–15)
AST SERPL-CCNC: 29 U/L (ref 1–40)
BILIRUB SERPL-MCNC: 0.8 MG/DL (ref 0–1.2)
BUN SERPL-MCNC: 10 MG/DL (ref 8–23)
BUN/CREAT SERPL: 10.9 (ref 7–25)
CALCIUM SPEC-SCNC: 10 MG/DL (ref 8.6–10.5)
CHLORIDE SERPL-SCNC: 92 MMOL/L (ref 98–107)
CO2 SERPL-SCNC: 25.7 MMOL/L (ref 22–29)
CREAT SERPL-MCNC: 0.92 MG/DL (ref 0.76–1.27)
GFR SERPL CREATININE-BSD FRML MDRD: 83 ML/MIN/1.73
GLOBULIN UR ELPH-MCNC: 2.7 GM/DL
GLUCOSE SERPL-MCNC: 96 MG/DL (ref 65–99)
POTASSIUM SERPL-SCNC: 4.7 MMOL/L (ref 3.5–5.2)
PROT SERPL-MCNC: 7.7 G/DL (ref 6–8.5)
SODIUM SERPL-SCNC: 131 MMOL/L (ref 136–145)

## 2021-11-05 ENCOUNTER — CLINICAL SUPPORT (OUTPATIENT)
Dept: FAMILY MEDICINE CLINIC | Facility: CLINIC | Age: 66
End: 2021-11-05

## 2021-11-05 DIAGNOSIS — Z23 NEED FOR INFLUENZA VACCINATION: Primary | ICD-10-CM

## 2021-11-05 PROCEDURE — 90471 IMMUNIZATION ADMIN: CPT | Performed by: FAMILY MEDICINE

## 2021-11-05 PROCEDURE — 90686 IIV4 VACC NO PRSV 0.5 ML IM: CPT | Performed by: FAMILY MEDICINE

## 2022-04-04 ENCOUNTER — OFFICE VISIT (OUTPATIENT)
Dept: FAMILY MEDICINE CLINIC | Facility: CLINIC | Age: 67
End: 2022-04-04

## 2022-04-04 VITALS
DIASTOLIC BLOOD PRESSURE: 80 MMHG | HEIGHT: 70 IN | SYSTOLIC BLOOD PRESSURE: 148 MMHG | HEART RATE: 119 BPM | TEMPERATURE: 98.2 F | OXYGEN SATURATION: 96 % | BODY MASS INDEX: 32.21 KG/M2 | WEIGHT: 225 LBS

## 2022-04-04 DIAGNOSIS — I10 ESSENTIAL HYPERTENSION: Primary | ICD-10-CM

## 2022-04-04 DIAGNOSIS — E78.5 HYPERLIPIDEMIA, UNSPECIFIED HYPERLIPIDEMIA TYPE: ICD-10-CM

## 2022-04-04 DIAGNOSIS — Z12.11 ENCOUNTER FOR SCREENING FOR MALIGNANT NEOPLASM OF COLON: ICD-10-CM

## 2022-04-04 DIAGNOSIS — R42 VERTIGO: ICD-10-CM

## 2022-04-04 DIAGNOSIS — J44.9 CHRONIC OBSTRUCTIVE PULMONARY DISEASE, UNSPECIFIED COPD TYPE: ICD-10-CM

## 2022-04-04 LAB
ALBUMIN SERPL-MCNC: 4.9 G/DL (ref 3.5–5.2)
ALBUMIN/GLOB SERPL: 2 G/DL
ALP SERPL-CCNC: 69 U/L (ref 39–117)
ALT SERPL W P-5'-P-CCNC: 42 U/L (ref 1–41)
ANION GAP SERPL CALCULATED.3IONS-SCNC: 12 MMOL/L (ref 5–15)
AST SERPL-CCNC: 39 U/L (ref 1–40)
BASOPHILS # BLD AUTO: 0.1 10*3/MM3 (ref 0–0.2)
BASOPHILS NFR BLD AUTO: 1.5 % (ref 0–1.5)
BILIRUB SERPL-MCNC: 0.6 MG/DL (ref 0–1.2)
BILIRUB UR QL STRIP: NEGATIVE
BUN SERPL-MCNC: 5 MG/DL (ref 8–23)
BUN/CREAT SERPL: 5.6 (ref 7–25)
CALCIUM SPEC-SCNC: 10.3 MG/DL (ref 8.6–10.5)
CHLORIDE SERPL-SCNC: 92 MMOL/L (ref 98–107)
CHOLEST SERPL-MCNC: 297 MG/DL (ref 0–200)
CLARITY UR: CLEAR
CO2 SERPL-SCNC: 27 MMOL/L (ref 22–29)
COLOR UR: YELLOW
CREAT SERPL-MCNC: 0.9 MG/DL (ref 0.76–1.27)
DEPRECATED RDW RBC AUTO: 44.6 FL (ref 37–54)
EGFRCR SERPLBLD CKD-EPI 2021: 94.8 ML/MIN/1.73
EOSINOPHIL # BLD AUTO: 0.1 10*3/MM3 (ref 0–0.4)
EOSINOPHIL NFR BLD AUTO: 1.5 % (ref 0.3–6.2)
ERYTHROCYTE [DISTWIDTH] IN BLOOD BY AUTOMATED COUNT: 13.3 % (ref 12.3–15.4)
GLOBULIN UR ELPH-MCNC: 2.4 GM/DL
GLUCOSE SERPL-MCNC: 99 MG/DL (ref 65–99)
GLUCOSE UR STRIP-MCNC: NEGATIVE MG/DL
HCT VFR BLD AUTO: 42.9 % (ref 37.5–51)
HDLC SERPL-MCNC: 102 MG/DL (ref 40–60)
HGB BLD-MCNC: 14.8 G/DL (ref 13–17.7)
HGB UR QL STRIP.AUTO: NEGATIVE
IMM GRANULOCYTES # BLD AUTO: 0.04 10*3/MM3 (ref 0–0.05)
IMM GRANULOCYTES NFR BLD AUTO: 0.6 % (ref 0–0.5)
KETONES UR QL STRIP: NEGATIVE
LDLC SERPL CALC-MCNC: 187 MG/DL (ref 0–100)
LDLC/HDLC SERPL: 1.8 {RATIO}
LEUKOCYTE ESTERASE UR QL STRIP.AUTO: NEGATIVE
LYMPHOCYTES # BLD AUTO: 1.82 10*3/MM3 (ref 0.7–3.1)
LYMPHOCYTES NFR BLD AUTO: 28.1 % (ref 19.6–45.3)
MCH RBC QN AUTO: 32.2 PG (ref 26.6–33)
MCHC RBC AUTO-ENTMCNC: 34.5 G/DL (ref 31.5–35.7)
MCV RBC AUTO: 93.5 FL (ref 79–97)
MONOCYTES # BLD AUTO: 0.87 10*3/MM3 (ref 0.1–0.9)
MONOCYTES NFR BLD AUTO: 13.4 % (ref 5–12)
NEUTROPHILS NFR BLD AUTO: 3.54 10*3/MM3 (ref 1.7–7)
NEUTROPHILS NFR BLD AUTO: 54.9 % (ref 42.7–76)
NITRITE UR QL STRIP: NEGATIVE
NRBC BLD AUTO-RTO: 0 /100 WBC (ref 0–0.2)
PH UR STRIP.AUTO: 8 [PH] (ref 5–8)
PLATELET # BLD AUTO: 340 10*3/MM3 (ref 140–450)
PMV BLD AUTO: 10.9 FL (ref 6–12)
POTASSIUM SERPL-SCNC: 4.7 MMOL/L (ref 3.5–5.2)
PROT SERPL-MCNC: 7.3 G/DL (ref 6–8.5)
PROT UR QL STRIP: NEGATIVE
RBC # BLD AUTO: 4.59 10*6/MM3 (ref 4.14–5.8)
SODIUM SERPL-SCNC: 131 MMOL/L (ref 136–145)
SP GR UR STRIP: 1.01 (ref 1–1.03)
TRIGL SERPL-MCNC: 58 MG/DL (ref 0–150)
TSH SERPL DL<=0.05 MIU/L-ACNC: 0.75 UIU/ML (ref 0.27–4.2)
UROBILINOGEN UR QL STRIP: NORMAL
VLDLC SERPL-MCNC: 8 MG/DL (ref 5–40)
WBC NRBC COR # BLD: 6.47 10*3/MM3 (ref 3.4–10.8)

## 2022-04-04 PROCEDURE — 36415 COLL VENOUS BLD VENIPUNCTURE: CPT | Performed by: NURSE PRACTITIONER

## 2022-04-04 PROCEDURE — 81003 URINALYSIS AUTO W/O SCOPE: CPT | Performed by: NURSE PRACTITIONER

## 2022-04-04 PROCEDURE — 80061 LIPID PANEL: CPT | Performed by: NURSE PRACTITIONER

## 2022-04-04 PROCEDURE — 84443 ASSAY THYROID STIM HORMONE: CPT | Performed by: NURSE PRACTITIONER

## 2022-04-04 PROCEDURE — 80053 COMPREHEN METABOLIC PANEL: CPT | Performed by: NURSE PRACTITIONER

## 2022-04-04 PROCEDURE — 99214 OFFICE O/P EST MOD 30 MIN: CPT | Performed by: NURSE PRACTITIONER

## 2022-04-04 PROCEDURE — 85025 COMPLETE CBC W/AUTO DIFF WBC: CPT | Performed by: NURSE PRACTITIONER

## 2022-04-04 RX ORDER — SIMVASTATIN 20 MG
20 TABLET ORAL DAILY
Qty: 90 TABLET | Refills: 1 | Status: SHIPPED | OUTPATIENT
Start: 2022-04-04 | End: 2022-10-07 | Stop reason: SDUPTHER

## 2022-04-04 RX ORDER — AMLODIPINE BESYLATE AND BENAZEPRIL HYDROCHLORIDE 5; 20 MG/1; MG/1
1 CAPSULE ORAL DAILY
Qty: 90 CAPSULE | Refills: 1 | Status: SHIPPED | OUTPATIENT
Start: 2022-04-04 | End: 2022-10-07 | Stop reason: SDUPTHER

## 2022-04-04 RX ORDER — HYDROCHLOROTHIAZIDE 12.5 MG/1
12.5 TABLET ORAL DAILY
Qty: 90 TABLET | Refills: 1 | Status: SHIPPED | OUTPATIENT
Start: 2022-04-04 | End: 2022-10-07 | Stop reason: SDUPTHER

## 2022-04-04 RX ORDER — TIOTROPIUM BROMIDE INHALATION SPRAY 1.56 UG/1
2 SPRAY, METERED RESPIRATORY (INHALATION) DAILY
Qty: 3 EACH | Refills: 1 | Status: SHIPPED | OUTPATIENT
Start: 2022-04-04 | End: 2022-10-07 | Stop reason: SDUPTHER

## 2022-04-04 RX ORDER — MECLIZINE HYDROCHLORIDE 25 MG/1
50 TABLET ORAL DAILY
Qty: 180 TABLET | Refills: 1 | Status: SHIPPED | OUTPATIENT
Start: 2022-04-04 | End: 2022-10-07 | Stop reason: SDUPTHER

## 2022-04-04 NOTE — PROGRESS NOTES
Venipuncture Blood Specimen Collection  Venipuncture performed in left arm by Janelle Chen with good hemostasis. Patient tolerated the procedure well without complications.   04/04/22   Janelle Chen

## 2022-04-04 NOTE — PROGRESS NOTES
Chief Complaint  Hyperlipidemia (Refills and labs, patient is fasting) and Hypertension    PHQ-2 Total Score: 0    Subjective        Past Medical History:   Diagnosis Date   • Benign essential hypertension    • COPD (chronic obstructive pulmonary disease) (HCC)    • Essential hypertension    • Hyperlipidemia 12/14/2018   • Hyponatremia    • Vertigo 02/17/2021     Social History     Tobacco Use   • Smoking status: Former Smoker   • Smokeless tobacco: Never Used   • Tobacco comment: SMOKES MORE THAN 2 PPD FOR 35 YEARS   Substance Use Topics   • Alcohol use: Yes     Comment: 8-14 DRINKS PER WEEK   • Drug use: Never      Current Outpatient Medications on File Prior to Visit   Medication Sig   • [DISCONTINUED] amLODIPine-benazepril (LOTREL 5-20) 5-20 MG per capsule Take 1 capsule by mouth Daily.   • [DISCONTINUED] hydroCHLOROthiazide (HYDRODIURIL) 12.5 MG tablet Take 1 tablet by mouth Daily.   • [DISCONTINUED] meclizine (ANTIVERT) 25 MG tablet Take 2 tablets by mouth Daily.   • [DISCONTINUED] simvastatin (ZOCOR) 20 MG tablet Take 1 tablet by mouth Daily.   • [DISCONTINUED] Spiriva Respimat 1.25 MCG/ACT aerosol solution inhaler Inhale 2 puffs Daily.   • [DISCONTINUED] meloxicam (MOBIC) 15 MG tablet Take 1 tablet by mouth Daily.     No current facility-administered medications on file prior to visit.      No Known Allergies   Health Maintenance Due   Topic Date Due   • COLORECTAL CANCER SCREENING  Never done   • ANNUAL PHYSICAL  Never done   • ZOSTER VACCINE (2 of 2) 01/29/2015   • Pneumococcal Vaccine 65+ (1 of 1 - PPSV23) 12/02/2021      Paulo Leiva Jr. presents to [facility]  Here to follow up on chronic health conditions and obtain refills.    Pt recently returned from [place]; he was gone with a group of men for 1 month. Went down for bike week.    HTN: checks BP occasionally at home. Pt states he ran out of medication about 2 days ago. Denies HA, CP, palpitations.  "    Hyperlipidemia: no issues with medication.     COPD: breathing is stable. Avoids over exertion.     Colon cancer screening: willing to have completed.       Objective   Vital Signs:   /80 (BP Location: Left arm)   Pulse 119   Temp 98.2 °F (36.8 °C)   Ht 177.8 cm (70\")   Wt 102 kg (225 lb)   SpO2 96%   BMI 32.28 kg/m²     Review of Systems   Gastrointestinal: Negative for blood in stool.      Physical Exam  Vitals reviewed.   Constitutional:       General: He is not in acute distress.     Appearance: Normal appearance. He is well-developed.   Eyes:      Conjunctiva/sclera: Conjunctivae normal.      Pupils: Pupils are equal, round, and reactive to light.   Neck:      Vascular: No carotid bruit.   Cardiovascular:      Rate and Rhythm: Normal rate and regular rhythm.      Heart sounds: Normal heart sounds.   Pulmonary:      Effort: Pulmonary effort is normal.      Breath sounds: Normal breath sounds.   Musculoskeletal:      Cervical back: Neck supple.      Right lower leg: No edema.      Left lower leg: No edema.   Skin:     General: Skin is warm and dry.   Neurological:      Mental Status: He is alert and oriented to person, place, and time.   Psychiatric:         Mood and Affect: Mood and affect normal.         Behavior: Behavior normal.         Thought Content: Thought content normal.         Judgment: Judgment normal.        Result Review :   The following data was reviewed by: MELANIA Santiago on 04/04/2022:  Common labs    Common Labsle 8/25/21 8/25/21 8/25/21    1148 1148 1148   Glucose   96   BUN   10   Creatinine   0.92   eGFR Non African Am   83   Sodium   131 (A)   Potassium   4.7   Chloride   92 (A)   Calcium   10.0   Albumin   5.00   Total Bilirubin   0.8   Alkaline Phosphatase   68   AST (SGOT)   29   ALT (SGPT)   24   WBC 7.40     Hemoglobin 14.8     Hematocrit 42.2     Platelets 337     Total Cholesterol  221 (A)    Triglycerides  46    HDL Cholesterol  123 (A)    LDL Cholesterol   " 90    (A) Abnormal value                      Assessment and Plan    Diagnoses and all orders for this visit:    1. Essential hypertension (Primary)  -     CBC & Differential  -     Comprehensive Metabolic Panel  -     Urinalysis With Culture If Indicated - Urine, Clean Catch  -     Lipid Panel  -     TSH Rfx On Abnormal To Free T4  -     amLODIPine-benazepril (LOTREL 5-20) 5-20 MG per capsule; Take 1 capsule by mouth Daily.  Dispense: 90 capsule; Refill: 1  -     hydroCHLOROthiazide (HYDRODIURIL) 12.5 MG tablet; Take 1 tablet by mouth Daily.  Dispense: 90 tablet; Refill: 1    2. Hyperlipidemia, unspecified hyperlipidemia type  -     simvastatin (ZOCOR) 20 MG tablet; Take 1 tablet by mouth Daily.  Dispense: 90 tablet; Refill: 1    3. Chronic obstructive pulmonary disease, unspecified COPD type (HCC)  -     Spiriva Respimat 1.25 MCG/ACT aerosol solution inhaler; Inhale 2 puffs Daily.  Dispense: 3 each; Refill: 1    4. Vertigo  -     meclizine (ANTIVERT) 25 MG tablet; Take 2 tablets by mouth Daily.  Dispense: 180 tablet; Refill: 1    5. Encounter for screening for malignant neoplasm of colon  -     Ambulatory Referral For Screening Colonoscopy    Ask pharmacy about Prevnar 13 and Shingrix.     Follow Up   Return in about 6 months (around 10/4/2022) for Refills and fasting labs.  Patient was given instructions and counseling regarding his condition or for health maintenance advice. Please see specific information pulled into the AVS if appropriate.

## 2022-06-27 ENCOUNTER — OFFICE VISIT (OUTPATIENT)
Dept: FAMILY MEDICINE CLINIC | Facility: CLINIC | Age: 67
End: 2022-06-27

## 2022-06-27 VITALS
TEMPERATURE: 98 F | HEART RATE: 110 BPM | OXYGEN SATURATION: 95 % | SYSTOLIC BLOOD PRESSURE: 158 MMHG | DIASTOLIC BLOOD PRESSURE: 90 MMHG | BODY MASS INDEX: 30.92 KG/M2 | WEIGHT: 216 LBS | HEIGHT: 70 IN

## 2022-06-27 DIAGNOSIS — Z00.00 ENCOUNTER FOR SUBSEQUENT ANNUAL WELLNESS VISIT (AWV) IN MEDICARE PATIENT: Primary | ICD-10-CM

## 2022-06-27 DIAGNOSIS — Z71.85 IMMUNIZATION COUNSELING: ICD-10-CM

## 2022-06-27 PROCEDURE — 1126F AMNT PAIN NOTED NONE PRSNT: CPT | Performed by: NURSE PRACTITIONER

## 2022-06-27 PROCEDURE — 1170F FXNL STATUS ASSESSED: CPT | Performed by: NURSE PRACTITIONER

## 2022-06-27 PROCEDURE — 1159F MED LIST DOCD IN RCRD: CPT | Performed by: NURSE PRACTITIONER

## 2022-06-27 PROCEDURE — G0402 INITIAL PREVENTIVE EXAM: HCPCS | Performed by: NURSE PRACTITIONER

## 2022-06-27 NOTE — PROGRESS NOTES
The ABCs of the Annual Wellness Visit  Subsequent Medicare Wellness Visit    Chief Complaint   Patient presents with   • Medicare Wellness-Initial Visit     Medicare wellness      Subjective    History of Present Illness:  Paulo Leiva . is a 66 y.o. male who presents for a Subsequent Medicare Wellness Visit.    The following portions of the patient's history were reviewed and   updated as appropriate: allergies, current medications, past family history, past medical history, past social history, past surgical history and problem list.    Compared to one year ago, the patient feels his physical   health is better.    Compared to one year ago, the patient feels his mental   health is the same.    HPI  Recent Hospitalizations:  He was not admitted to the hospital during the last year.       Current Medical Providers:  Patient Care Team:  Vilma Rolon APRN as PCP - General (Nurse Practitioner)    Outpatient Medications Prior to Visit   Medication Sig Dispense Refill   • amLODIPine-benazepril (LOTREL 5-20) 5-20 MG per capsule Take 1 capsule by mouth Daily. 90 capsule 1   • hydroCHLOROthiazide (HYDRODIURIL) 12.5 MG tablet Take 1 tablet by mouth Daily. 90 tablet 1   • meclizine (ANTIVERT) 25 MG tablet Take 2 tablets by mouth Daily. 180 tablet 1   • simvastatin (ZOCOR) 20 MG tablet Take 1 tablet by mouth Daily. 90 tablet 1   • Spiriva Respimat 1.25 MCG/ACT aerosol solution inhaler Inhale 2 puffs Daily. 3 each 1     No facility-administered medications prior to visit.       No opioid medication identified on active medication list. I have reviewed chart for other potential  high risk medication/s and harmful drug interactions in the elderly.          Aspirin is not on active medication list.  Aspirin use is not indicated based on review of current medical condition/s. Risk of harm outweighs potential benefits.  .    Patient Active Problem List   Diagnosis   • COPD (chronic obstructive pulmonary disease) (HCC)   •  "Essential hypertension   • Hyperlipidemia   • Hyponatremia   • Vertigo     Advance Care Planning  Advance Directive is not on file.  ACP discussion was held with the patient during this visit. Patient has an advance directive (not in EMR), copy requested.          Objective    Vitals:    06/27/22 0957   BP: 158/90   BP Location: Left arm   Pulse: 110   Temp: 98 °F (36.7 °C)   SpO2: 95%   Weight: 98 kg (216 lb)   Height: 177.8 cm (70\")     Estimated body mass index is 30.99 kg/m² as calculated from the following:    Height as of this encounter: 177.8 cm (70\").    Weight as of this encounter: 98 kg (216 lb).    BMI is >= 30 and <35. (Class 1 Obesity). The following options were offered after discussion;: weight loss educational material (shared in after visit summary)  Pt was 240lb about 6 months ago and has been reducing intake and is down to 214. Also reduced alcohol intake, usually no alcohol before 4pm.     Does the patient have evidence of cognitive impairment? No    Physical Exam  Vitals reviewed.   Constitutional:       General: He is not in acute distress.     Appearance: Normal appearance. He is well-developed.   HENT:      Head: Normocephalic and atraumatic.   Eyes:      Conjunctiva/sclera: Conjunctivae normal.      Pupils: Pupils are equal, round, and reactive to light.   Cardiovascular:      Rate and Rhythm: Normal rate and regular rhythm.      Heart sounds: Normal heart sounds.   Pulmonary:      Effort: Pulmonary effort is normal.      Breath sounds: Normal breath sounds.   Musculoskeletal:      Cervical back: Neck supple.   Skin:     General: Skin is warm and dry.   Neurological:      Mental Status: He is alert and oriented to person, place, and time.   Psychiatric:         Mood and Affect: Mood and affect normal.         Behavior: Behavior normal.         Thought Content: Thought content normal.         Judgment: Judgment normal.       Lab Results   Component Value Date    TRIG 58 04/04/2022     " (H) 04/04/2022     (H) 04/04/2022    VLDL 8 04/04/2022            HEALTH RISK ASSESSMENT    Smoking Status:  Social History     Tobacco Use   Smoking Status Former Smoker   Smokeless Tobacco Never Used   Tobacco Comment    SMOKES MORE THAN 2 PPD FOR 35 YEARS     Alcohol Consumption:  Social History     Substance and Sexual Activity   Alcohol Use Yes    Comment: 8-14 DRINKS PER WEEK     Fall Risk Screen:    ARMANIADI Fall Risk Assessment was completed, and patient is at HIGH risk for falls. Assessment completed on:4/4/2022    Depression Screening:  PHQ-2/PHQ-9 Depression Screening 4/4/2022   Retired PHQ-9 Total Score -   Retired Total Score -   Little Interest or Pleasure in Doing Things 0-->not at all   Feeling Down, Depressed or Hopeless 0-->not at all   PHQ-9: Brief Depression Severity Measure Score 0       Health Habits and Functional and Cognitive Screening:  Functional & Cognitive Status 6/27/2022   Do you have difficulty preparing food and eating? No   Do you have difficulty bathing yourself, getting dressed or grooming yourself? No   Do you have difficulty using the toilet? No   Do you have difficulty moving around from place to place? No   Do you have trouble with steps or getting out of a bed or a chair? No   Current Diet Unhealthy Diet   Dental Exam Up to date   Eye Exam Up to date   Exercise (times per week) 0 times per week   Do you need help using the phone?  No   Do you need help with transportation? No   Do you need help shopping? No   Do you need help preparing meals?  No   Do you need help with housework?  No   Do you need help with laundry? No   Do you need help taking your medications? No   Do you need help managing money? No   Do you ever drive or ride in a car without wearing a seat belt? No   Have you felt unusual stress, anger or loneliness in the last month? No   Who do you live with? Spouse   If you need help, do you have trouble finding someone available to you? No   Have you been  bothered in the last four weeks by sexual problems? No   Do you have difficulty concentrating, remembering or making decisions? No       Age-appropriate Screening Schedule:  Refer to the list below for future screening recommendations based on patient's age, sex and/or medical conditions. Orders for these recommended tests are listed in the plan section. The patient has been provided with a written plan.    Health Maintenance   Topic Date Due   • ZOSTER VACCINE (2 of 2) 01/29/2015   • INFLUENZA VACCINE  10/01/2022   • LIPID PANEL  04/04/2023   • TDAP/TD VACCINES (2 - Td or Tdap) 03/14/2027              Assessment & Plan   CMS Preventative Services Quick Reference  Risk Factors Identified During Encounter  Alcohol Misuse  Immunizations Discussed/Encouraged (specific Immunizations; Prevnar 20 (Pneumococcal 20-valent conjugate) and Shingrix  Obesity/Overweight   The above risks/problems have been discussed with the patient.  Follow up actions/plans if indicated are seen below in the Assessment/Plan Section.  Pertinent information has been shared with the patient in the After Visit Summary.    Diagnoses and all orders for this visit:    1. Encounter for subsequent annual wellness visit (AWV) in Medicare patient (Primary)    2. Immunization counseling  Comments:  Prevnar 20, Shingrix        Follow Up:   Return in about 1 year (around 6/27/2023) for Medicare Wellness; 3 months for refills.     An After Visit Summary and PPPS were made available to the patient.

## 2022-07-14 ENCOUNTER — OFFICE VISIT (OUTPATIENT)
Dept: FAMILY MEDICINE CLINIC | Facility: CLINIC | Age: 67
End: 2022-07-14

## 2022-07-14 VITALS
WEIGHT: 209 LBS | DIASTOLIC BLOOD PRESSURE: 80 MMHG | SYSTOLIC BLOOD PRESSURE: 130 MMHG | BODY MASS INDEX: 29.99 KG/M2 | HEART RATE: 140 BPM | TEMPERATURE: 98 F | OXYGEN SATURATION: 95 %

## 2022-07-14 DIAGNOSIS — E87.1 HYPONATREMIA: ICD-10-CM

## 2022-07-14 DIAGNOSIS — R55 NEAR SYNCOPE: Primary | ICD-10-CM

## 2022-07-14 DIAGNOSIS — Z86.16 HISTORY OF COVID-19: ICD-10-CM

## 2022-07-14 PROCEDURE — 36415 COLL VENOUS BLD VENIPUNCTURE: CPT | Performed by: NURSE PRACTITIONER

## 2022-07-14 PROCEDURE — 80053 COMPREHEN METABOLIC PANEL: CPT | Performed by: NURSE PRACTITIONER

## 2022-07-14 PROCEDURE — 99213 OFFICE O/P EST LOW 20 MIN: CPT | Performed by: NURSE PRACTITIONER

## 2022-07-14 NOTE — PROGRESS NOTES
Venipuncture Blood Specimen Collection  Venipuncture performed in left arm by Janelle Chen with good hemostasis. Patient tolerated the procedure well without complications.   07/14/22   Janelle Chen

## 2022-07-14 NOTE — PROGRESS NOTES
Chief Complaint  Hospital Follow Up Visit (Tested positive for covid on July 5th in the ER )    Subjective        Past Medical History:   Diagnosis Date   • Benign essential hypertension    • COPD (chronic obstructive pulmonary disease) (HCC)    • Erectile dysfunction Apprx: 5 yrs ago   • Essential hypertension    • Hyperlipidemia 12/14/2018   • Hyponatremia    • Vertigo 02/17/2021     Social History     Tobacco Use   • Smoking status: Former Smoker     Packs/day: 2.00     Years: 15.00     Pack years: 30.00     Types: Cigarettes   • Smokeless tobacco: Never Used   • Tobacco comment: SMOKES MORE THAN 2 PPD FOR 35 YEARS   Substance Use Topics   • Alcohol use: Yes     Comment: 8-14 DRINKS PER WEEK   • Drug use: Never      Current Outpatient Medications on File Prior to Visit   Medication Sig   • amLODIPine-benazepril (LOTREL 5-20) 5-20 MG per capsule Take 1 capsule by mouth Daily.   • hydroCHLOROthiazide (HYDRODIURIL) 12.5 MG tablet Take 1 tablet by mouth Daily.   • meclizine (ANTIVERT) 25 MG tablet Take 2 tablets by mouth Daily.   • simvastatin (ZOCOR) 20 MG tablet Take 1 tablet by mouth Daily.   • Spiriva Respimat 1.25 MCG/ACT aerosol solution inhaler Inhale 2 puffs Daily.     No current facility-administered medications on file prior to visit.      No Known Allergies   Health Maintenance Due   Topic Date Due   • COLORECTAL CANCER SCREENING  Never done   • ZOSTER VACCINE (2 of 2) 01/29/2015   • Pneumococcal Vaccine 65+ (2 - PPSV23 or PCV20) 12/04/2015      Paulo Leiva Jr. presents to [facility]  Here to follow from ER visit in [place], pt states he had a sore throat and cough x 2 days with negative home Covid swab and then had a near syncope episode where he slumped over the counter was not responding to others. Pt states he had been drinking plenty of water. Pt received antibody infusion while in the ER and started to feel better. He was discharged without any new  prescriptions. Pt states he continues to have a lingering cough. Home test on Monday and yesterday were negative. He went 3 days without alcohol and he has reduced his intake.   Denies worsening of SOB and wheezing. Pt has been resting and no strenuous activity.       Objective   Vital Signs:   /80   Pulse (!) 140   Temp 98 °F (36.7 °C)   Wt 94.8 kg (209 lb)   SpO2 95%   BMI 29.99 kg/m²     Review of Systems   Respiratory: Positive for cough. Negative for shortness of breath and wheezing.    Cardiovascular: Negative for chest pain and palpitations.      Physical Exam  Vitals reviewed.   Constitutional:       General: He is not in acute distress.     Appearance: Normal appearance. He is well-developed.   Eyes:      Conjunctiva/sclera: Conjunctivae normal.      Pupils: Pupils are equal, round, and reactive to light.   Cardiovascular:      Rate and Rhythm: Normal rate and regular rhythm.      Heart sounds: Normal heart sounds.   Pulmonary:      Effort: Pulmonary effort is normal.      Breath sounds: Normal breath sounds.   Musculoskeletal:      Cervical back: Neck supple.   Skin:     General: Skin is warm and dry.   Neurological:      Mental Status: He is alert and oriented to person, place, and time.   Psychiatric:         Mood and Affect: Mood and affect normal.         Behavior: Behavior normal.         Thought Content: Thought content normal.         Judgment: Judgment normal.        Result Review :{Labs  Result Review  Imaging  Med Tab  Media  Procedures :23}   The following data was reviewed by: MELANIA Santiago on 07/14/2022:    Data reviewed: Recent hospitalization notes ER notes from [place]          Assessment and Plan    Diagnoses and all orders for this visit:    1. Near syncope (Primary)  -     Ambulatory Referral to Cardiology  -     MRI Brain With & Without Contrast; Future  -     Comprehensive Metabolic Panel    2. Hyponatremia  -     Comprehensive Metabolic Panel    3. History  of COVID-19    Recommend patient to drink electrolyte infused water, avoid alcohol.    Follow Up   Return if symptoms worsen or fail to improve.  Patient was given instructions and counseling regarding his condition or for health maintenance advice. Please see specific information pulled into the AVS if appropriate.

## 2022-07-15 LAB
ALBUMIN SERPL-MCNC: 4.3 G/DL (ref 3.5–5.2)
ALBUMIN/GLOB SERPL: 1.4 G/DL
ALP SERPL-CCNC: 66 U/L (ref 39–117)
ALT SERPL W P-5'-P-CCNC: 36 U/L (ref 1–41)
ANION GAP SERPL CALCULATED.3IONS-SCNC: 13.6 MMOL/L (ref 5–15)
AST SERPL-CCNC: 27 U/L (ref 1–40)
BILIRUB SERPL-MCNC: 0.8 MG/DL (ref 0–1.2)
BUN SERPL-MCNC: 7 MG/DL (ref 8–23)
BUN/CREAT SERPL: 7.3 (ref 7–25)
CALCIUM SPEC-SCNC: 9.5 MG/DL (ref 8.6–10.5)
CHLORIDE SERPL-SCNC: 90 MMOL/L (ref 98–107)
CO2 SERPL-SCNC: 24.4 MMOL/L (ref 22–29)
CREAT SERPL-MCNC: 0.96 MG/DL (ref 0.76–1.27)
EGFRCR SERPLBLD CKD-EPI 2021: 87.7 ML/MIN/1.73
GLOBULIN UR ELPH-MCNC: 3 GM/DL
GLUCOSE SERPL-MCNC: 138 MG/DL (ref 65–99)
POTASSIUM SERPL-SCNC: 3.8 MMOL/L (ref 3.5–5.2)
PROT SERPL-MCNC: 7.3 G/DL (ref 6–8.5)
SODIUM SERPL-SCNC: 128 MMOL/L (ref 136–145)

## 2022-07-20 ENCOUNTER — OFFICE VISIT (OUTPATIENT)
Dept: CARDIOLOGY | Facility: CLINIC | Age: 67
End: 2022-07-20

## 2022-07-20 VITALS
DIASTOLIC BLOOD PRESSURE: 78 MMHG | WEIGHT: 212 LBS | HEIGHT: 70 IN | HEART RATE: 108 BPM | SYSTOLIC BLOOD PRESSURE: 140 MMHG | BODY MASS INDEX: 30.35 KG/M2

## 2022-07-20 DIAGNOSIS — R55 SYNCOPE AND COLLAPSE: Primary | ICD-10-CM

## 2022-07-20 PROCEDURE — 99213 OFFICE O/P EST LOW 20 MIN: CPT | Performed by: NURSE PRACTITIONER

## 2022-07-20 NOTE — PROGRESS NOTES
"Chief Complaint  Hypertension and Hyperlipidemia    Subjective            History of Present Illness  Paulo ESTHER Leiva Jr. is a 66-year-old white/ male patient who presents to the office today for complaints of syncope.  He saw his PCP on 7/14/2022 with these complaints and was referred to cardiology for follow-up.  He reports that he has been \"passing out for a minute or 2\".  His sister is present today and admits that there have been a few times where he has not been able to respond to her.  He reports that in the last 2 years he has had about 5 episodes and they are all similar in nature.  He reports that a commonality between all the episodes is that he is usually in the heat.  He reports that he gets hot and dizzy before passing out.  He explains that he also has been diagnosed with vertigo and takes meclizine twice daily which for the most part manages his symptoms pretty well. He denies any other associated cardiac symptoms, chest pain, shortness of breath, palpitations, or edema.  He takes medicine for high blood pressure and high cholesterol.  He reports that his PCP has also ordered some labs and an MRI with and without contrast of his brain.    PMH  Past Medical History:   Diagnosis Date   • Benign essential hypertension    • COPD (chronic obstructive pulmonary disease) (HCC)    • Erectile dysfunction Apprx: 5 yrs ago   • Essential hypertension    • Hyperlipidemia 12/14/2018   • Hyponatremia    • Vertigo 02/17/2021         ALLERGY  No Known Allergies       SURGICALHX  History reviewed. No pertinent surgical history.       SOC  Social History     Socioeconomic History   • Marital status:    Tobacco Use   • Smoking status: Former Smoker     Packs/day: 2.00     Years: 15.00     Pack years: 30.00     Types: Cigarettes   • Smokeless tobacco: Never Used   • Tobacco comment: SMOKES MORE THAN 2 PPD FOR 35 YEARS   Substance and Sexual Activity   • Alcohol use: Yes     Comment: 8-14 DRINKS PER WEEK   • " "Drug use: Never   • Sexual activity: Not Currently     Partners: Female         FAMHX  Family History   Problem Relation Age of Onset   • Dementia Mother    • No Known Problems Father           MEDSIGONLY  Current Outpatient Medications on File Prior to Visit   Medication Sig   • amLODIPine-benazepril (LOTREL 5-20) 5-20 MG per capsule Take 1 capsule by mouth Daily.   • hydroCHLOROthiazide (HYDRODIURIL) 12.5 MG tablet Take 1 tablet by mouth Daily.   • meclizine (ANTIVERT) 25 MG tablet Take 2 tablets by mouth Daily.   • simvastatin (ZOCOR) 20 MG tablet Take 1 tablet by mouth Daily.   • Spiriva Respimat 1.25 MCG/ACT aerosol solution inhaler Inhale 2 puffs Daily.     No current facility-administered medications on file prior to visit.       Objective   /78   Pulse 108   Ht 177.8 cm (70\")   Wt 96.2 kg (212 lb)   BMI 30.42 kg/m²       Physical Exam  HENT:      Head: Normocephalic.   Neck:      Vascular: No carotid bruit.   Cardiovascular:      Rate and Rhythm: Normal rate and regular rhythm.      Pulses: Normal pulses.      Heart sounds: Normal heart sounds. No murmur heard.  Pulmonary:      Effort: Pulmonary effort is normal.      Breath sounds: Normal breath sounds.   Musculoskeletal:      Cervical back: Neck supple.      Right lower leg: No edema.      Left lower leg: No edema.   Skin:     General: Skin is dry.      Capillary Refill: Capillary refill takes less than 2 seconds.   Neurological:      Mental Status: He is alert and oriented to person, place, and time.   Psychiatric:         Behavior: Behavior normal.       Result Review :   The following data was reviewed by: MELANIA Bustillos on 07/20/2022:  No results found for: PROBNP  CMP    CMP 7/14/22   Glucose 138 (A)   BUN 7 (A)   Creatinine 0.96   eGFR Non African Am    Sodium 128 (A)   Potassium 3.8   Chloride 90 (A)   Calcium 9.5   Albumin 4.30   Total Bilirubin 0.8   Alkaline Phosphatase 66   AST (SGOT) 27   ALT (SGPT) 36   (A) Abnormal value     "        CBC w/diff    CBC w/Diff 4/4/22   WBC 6.47   RBC 4.59   Hemoglobin 14.8   Hematocrit 42.9   MCV 93.5   MCH 32.2   MCHC 34.5   RDW 13.3   Platelets 340   Neutrophil Rel % 54.9   Immature Granulocyte Rel % 0.6 (A)   Lymphocyte Rel % 28.1   Monocyte Rel % 13.4 (A)   Eosinophil Rel % 1.5   Basophil Rel % 1.5   (A) Abnormal value             Lab Results   Component Value Date    TSH 0.752 04/04/2022      No results found for: FREET4   No results found for: DDIMERQUANT  No results found for: MG   No results found for: DIGOXIN   No results found for: TROPONINT        Lipid Panel    Lipid Panel 4/4/22   Total Cholesterol 297 (A)   Triglycerides 58   HDL Cholesterol 102 (A)   VLDL Cholesterol 8   LDL Cholesterol  187 (A)   LDL/HDL Ratio 1.80   (A) Abnormal value               Assessment and Plan    Diagnoses and all orders for this visit:    1. Syncope and collapse (Primary)  Recurring syncope with collapse over the last 2 years.  Obtain 30-day event monitor to assess for arrhythmia or sinus pause.  Obtain echocardiogram to assess left ventricular systolic function.  Obtain carotid ultrasound to assess for carotid stenosis.  Check BMP to assess sodium level.  His HCTZ may need to be stopped, he said that his PCP also had mentioned this, will wait to see what some of the diagnostic results show. Check blood pressure twice a day for the next two weeks, blood pressure log provided for patient.  -     Cardiac Event Monitor; Future  -     Adult Transthoracic Echo Complete W/ Cont if Necessary Per Protocol; Future  -     Duplex Carotid Ultrasound CAR; Future  -     Basic Metabolic Panel; Future    Follow Up   Return in about 6 months (around 1/20/2023) for Follow up with Dr Casiano.    Patient was given instructions and counseling regarding his condition or for health maintenance advice. Please see specific information pulled into the AVS if appropriate.     Paulo Leiva Jr.  reports that he has quit smoking. His smoking use  included cigarettes. He has a 30.00 pack-year smoking history. He has never used smokeless tobacco.           Jenni Crawley, APRN  07/20/22  15:28 EDT    Dictated Utilizing Dragon Dictation

## 2022-07-22 ENCOUNTER — PREP FOR SURGERY (OUTPATIENT)
Dept: OTHER | Facility: HOSPITAL | Age: 67
End: 2022-07-22

## 2022-07-22 ENCOUNTER — CLINICAL SUPPORT (OUTPATIENT)
Dept: GASTROENTEROLOGY | Facility: CLINIC | Age: 67
End: 2022-07-22

## 2022-07-22 DIAGNOSIS — Z12.11 COLON CANCER SCREENING: Primary | ICD-10-CM

## 2022-07-22 NOTE — PROGRESS NOTES
Paulo Leiva Jr.  REASON FOR CALL: Colon screening call, last colon 2007  SENT IN PREP: [place]   DOS: 10.26.2022    Past Medical History:   Diagnosis Date   • Benign essential hypertension    • COPD (chronic obstructive pulmonary disease) (HCC)    • Erectile dysfunction Apprx: 5 yrs ago   • Essential hypertension    • Hyperlipidemia 12/14/2018   • Hyponatremia    • Vertigo 02/17/2021     No Known Allergies  Past Surgical History:   Procedure Laterality Date   • COLONOSCOPY  2007     Social History     Socioeconomic History   • Marital status:    Tobacco Use   • Smoking status: Former Smoker     Packs/day: 2.00     Years: 15.00     Pack years: 30.00     Types: Cigarettes   • Smokeless tobacco: Never Used   • Tobacco comment: SMOKES MORE THAN 2 PPD FOR 35 YEARS   Vaping Use   • Vaping Use: Never used   Substance and Sexual Activity   • Alcohol use: Yes     Comment: 8-14 DRINKS PER WEEK   • Drug use: Never   • Sexual activity: Not Currently     Partners: Female     Family History   Problem Relation Age of Onset   • Dementia Mother    • No Known Problems Father    • Colon cancer Neg Hx        Current Outpatient Medications:   •  amLODIPine-benazepril (LOTREL 5-20) 5-20 MG per capsule, Take 1 capsule by mouth Daily., Disp: 90 capsule, Rfl: 1  •  hydroCHLOROthiazide (HYDRODIURIL) 12.5 MG tablet, Take 1 tablet by mouth Daily., Disp: 90 tablet, Rfl: 1  •  meclizine (ANTIVERT) 25 MG tablet, Take 2 tablets by mouth Daily., Disp: 180 tablet, Rfl: 1  •  simvastatin (ZOCOR) 20 MG tablet, Take 1 tablet by mouth Daily., Disp: 90 tablet, Rfl: 1  •  Spiriva Respimat 1.25 MCG/ACT aerosol solution inhaler, Inhale 2 puffs Daily., Disp: 3 each, Rfl: 1

## 2022-07-28 ENCOUNTER — TELEPHONE (OUTPATIENT)
Dept: GASTROENTEROLOGY | Facility: CLINIC | Age: 67
End: 2022-07-28

## 2022-07-28 RX ORDER — PEG-3350, SODIUM SULFATE, SODIUM CHLORIDE, POTASSIUM CHLORIDE, SODIUM ASCORBATE AND ASCORBIC ACID 7.5-2.691G
1000 KIT ORAL TAKE AS DIRECTED
Qty: 1000 ML | Refills: 0 | Status: ON HOLD | OUTPATIENT
Start: 2022-07-28 | End: 2022-10-26

## 2022-07-28 NOTE — TELEPHONE ENCOUNTER
I received a vm from Mr Leiva, he went to [facility] to get prep, but it was not there. I sent movi prep to [facility]. Mr Leiva voiced understanding.[email or web address]

## 2022-08-12 ENCOUNTER — APPOINTMENT (OUTPATIENT)
Dept: MRI IMAGING | Facility: HOSPITAL | Age: 67
End: 2022-08-12

## 2022-08-17 ENCOUNTER — TELEPHONE (OUTPATIENT)
Dept: CARDIOLOGY | Facility: CLINIC | Age: 67
End: 2022-08-17

## 2022-08-17 ENCOUNTER — CLINICAL SUPPORT (OUTPATIENT)
Dept: FAMILY MEDICINE CLINIC | Facility: CLINIC | Age: 67
End: 2022-08-17

## 2022-08-17 DIAGNOSIS — I47.20 VENTRICULAR TACHYCARDIA: ICD-10-CM

## 2022-08-17 DIAGNOSIS — I47.20 VENTRICULAR TACHYCARDIA: Primary | ICD-10-CM

## 2022-08-17 LAB — MAGNESIUM SERPL-MCNC: 1.9 MG/DL (ref 1.6–2.4)

## 2022-08-17 PROCEDURE — 80048 BASIC METABOLIC PNL TOTAL CA: CPT | Performed by: NURSE PRACTITIONER

## 2022-08-17 PROCEDURE — 36415 COLL VENOUS BLD VENIPUNCTURE: CPT | Performed by: NURSE PRACTITIONER

## 2022-08-17 PROCEDURE — 83735 ASSAY OF MAGNESIUM: CPT | Performed by: NURSE PRACTITIONER

## 2022-08-17 NOTE — PROGRESS NOTES
Venipuncture Blood Specimen Collection  Venipuncture performed in left arm  by Cadence Mayer with good hemostasis. Patient tolerated the procedure well without complications.   08/17/22   Cadence Mayer

## 2022-08-17 NOTE — TELEPHONE ENCOUNTER
SW patient. Patient denies any symptoms. Patient states he has been sitting in his recliner this AM watching TV feeling fine.

## 2022-08-17 NOTE — TELEPHONE ENCOUNTER
Office received a call from [employer], the company we use for holter monitoring. The said this patient was having increased periods of Vtach and maximum HR of 194. [employer] had tried to reach the patient but they were unsuccessful. I called patient and he answered. I explained the message from [employer] and asked if he was feeling ok. He said he felt perfectly fine and that he was sitting watching TV. I instructed him to call us with any increased symptoms. He was agreeable. No questions or concerns.

## 2022-08-17 NOTE — TELEPHONE ENCOUNTER
"Received phone call with notification of critical event for patient occurring on 8/17/2022 at 8:50 AM. Event is identified as \"Ventricular Tachycardia\" with a duration of 2 minutes, 47 seconds.    Indication for the study is syncope. The patient did not trigger the event or identify any symptoms at the time on the monitor.  "

## 2022-08-18 ENCOUNTER — TELEPHONE (OUTPATIENT)
Dept: CARDIOLOGY | Facility: CLINIC | Age: 67
End: 2022-08-18

## 2022-08-18 LAB
ANION GAP SERPL CALCULATED.3IONS-SCNC: 14 MMOL/L (ref 5–15)
BUN SERPL-MCNC: 6 MG/DL (ref 8–23)
BUN/CREAT SERPL: 7.2 (ref 7–25)
CALCIUM SPEC-SCNC: 9.3 MG/DL (ref 8.6–10.5)
CHLORIDE SERPL-SCNC: 96 MMOL/L (ref 98–107)
CO2 SERPL-SCNC: 24 MMOL/L (ref 22–29)
CREAT SERPL-MCNC: 0.83 MG/DL (ref 0.76–1.27)
EGFRCR SERPLBLD CKD-EPI 2021: 97.1 ML/MIN/1.73
GLUCOSE SERPL-MCNC: 82 MG/DL (ref 65–99)
POTASSIUM SERPL-SCNC: 4.2 MMOL/L (ref 3.5–5.2)
SODIUM SERPL-SCNC: 134 MMOL/L (ref 136–145)

## 2022-08-18 NOTE — TELEPHONE ENCOUNTER
I spoke to patient and gave results. No questions or concerns. He wanted to thank everyone for the great care yesterday!

## 2022-08-18 NOTE — TELEPHONE ENCOUNTER
----- Message from MELANIA Butler sent at 8/18/2022  9:05 AM EDT -----  Notify pt sodium level is improved, renal function is stable and potassium is good, continue current meds

## 2022-08-19 ENCOUNTER — TELEPHONE (OUTPATIENT)
Dept: CARDIOLOGY | Facility: CLINIC | Age: 67
End: 2022-08-19

## 2022-08-19 DIAGNOSIS — I47.20 VENTRICULAR TACHYCARDIA: Primary | ICD-10-CM

## 2022-08-19 DIAGNOSIS — R94.31 ABNORMAL ELECTROCARDIOGRAM (ECG) (EKG): ICD-10-CM

## 2022-08-19 NOTE — TELEPHONE ENCOUNTER
Received notification of serious events for patient. All events occurred on 8/19/2022 and are identified as afib. Longest duration occurred at 10:22 AM, lasting for 1 minute, 28 seconds. Indication for exam is syncope. The events were not patient triggered.

## 2022-08-22 NOTE — TELEPHONE ENCOUNTER
"Discussed with Dr Casiano  He believes a lot of this is \"artifact\". Previous labs were in normal limits. He wants the patient to have a baseline rhythm transmitted and continue to monitor for symptoms. He wants the patient to have a stress test in addition to the already ordered echocardiogram it patient agreeable.   "

## 2022-08-23 ENCOUNTER — TELEPHONE (OUTPATIENT)
Dept: CARDIOLOGY | Facility: CLINIC | Age: 67
End: 2022-08-23

## 2022-09-02 ENCOUNTER — TELEPHONE (OUTPATIENT)
Dept: CARDIOLOGY | Facility: CLINIC | Age: 67
End: 2022-09-02

## 2022-09-02 ENCOUNTER — PREP FOR SURGERY (OUTPATIENT)
Dept: OTHER | Facility: HOSPITAL | Age: 67
End: 2022-09-02

## 2022-09-02 ENCOUNTER — HOSPITAL ENCOUNTER (OUTPATIENT)
Dept: MRI IMAGING | Facility: HOSPITAL | Age: 67
Discharge: HOME | End: 2022-09-02
Admitting: NURSE PRACTITIONER

## 2022-09-02 DIAGNOSIS — R55 SYNCOPE AND COLLAPSE: Primary | ICD-10-CM

## 2022-09-02 DIAGNOSIS — I47.20 VENTRICULAR TACHYCARDIA: ICD-10-CM

## 2022-09-02 DIAGNOSIS — R55 NEAR SYNCOPE: ICD-10-CM

## 2022-09-02 PROCEDURE — A9577 INJ MULTIHANCE: HCPCS | Performed by: NURSE PRACTITIONER

## 2022-09-02 PROCEDURE — 70553 MRI BRAIN STEM W/O & W/DYE: CPT

## 2022-09-02 PROCEDURE — 0 GADOBENATE DIMEGLUMINE 529 MG/ML SOLUTION: Performed by: NURSE PRACTITIONER

## 2022-09-02 RX ORDER — SODIUM CHLORIDE 9 MG/ML
40 INJECTION, SOLUTION INTRAVENOUS AS NEEDED
Status: CANCELLED | OUTPATIENT
Start: 2022-09-02

## 2022-09-02 RX ORDER — SODIUM CHLORIDE 0.9 % (FLUSH) 0.9 %
10 SYRINGE (ML) INJECTION EVERY 12 HOURS SCHEDULED
Status: CANCELLED | OUTPATIENT
Start: 2022-09-02

## 2022-09-02 RX ORDER — SODIUM CHLORIDE 0.9 % (FLUSH) 0.9 %
10 SYRINGE (ML) INJECTION AS NEEDED
Status: CANCELLED | OUTPATIENT
Start: 2022-09-02

## 2022-09-02 RX ADMIN — GADOBENATE DIMEGLUMINE 20 ML: 529 INJECTION, SOLUTION INTRAVENOUS at 08:23

## 2022-09-02 NOTE — TELEPHONE ENCOUNTER
Spoke with patient regarding Dr Casiano recommendations as entailed below. He is agreeable to loop recorder insertion and also agreeable to left cardiac catheterization. Risks and benefits of procedures were discussed with patient and he verbalizes understanding at this time. All questions answered.

## 2022-09-02 NOTE — TELEPHONE ENCOUNTER
----- Message from Randell Casiano MD sent at 9/2/2022 11:01 AM EDT -----  Left message for patient to call back after reviewing his event monitor cannot decide if tachycardic episodes or proximal A. fib, polymorphic ventricular tachycardia or artifact in nature.  Recommend starting on a beta-blocker as well as Eliquis.  In addition would discontinue stress test and does proceed with heart catheterization to exclude ischemic heart disease and recommend proceeding with implantable loop recorder

## 2022-09-07 ENCOUNTER — HOSPITAL ENCOUNTER (OUTPATIENT)
Dept: CARDIOLOGY | Facility: HOSPITAL | Age: 67
Discharge: HOME | End: 2022-09-07

## 2022-09-07 ENCOUNTER — TELEPHONE (OUTPATIENT)
Dept: CARDIOLOGY | Facility: CLINIC | Age: 67
End: 2022-09-07

## 2022-09-07 DIAGNOSIS — R55 SYNCOPE AND COLLAPSE: ICD-10-CM

## 2022-09-07 LAB
BH CV XLRA MEAS LEFT CAROTID BULB EDV: 9 CM/SEC
BH CV XLRA MEAS LEFT CAROTID BULB PSV: 24 CM/SEC
BH CV XLRA MEAS LEFT DIST CCA EDV: 19 CM/SEC
BH CV XLRA MEAS LEFT DIST CCA PSV: 72 CM/SEC
BH CV XLRA MEAS LEFT DIST ICA EDV: 21 CM/SEC
BH CV XLRA MEAS LEFT DIST ICA PSV: 65 CM/SEC
BH CV XLRA MEAS LEFT MID ICA EDV: 26 CM/SEC
BH CV XLRA MEAS LEFT MID ICA PSV: 75 CM/SEC
BH CV XLRA MEAS LEFT PROX CCA EDV: 21 CM/SEC
BH CV XLRA MEAS LEFT PROX CCA PSV: 76 CM/SEC
BH CV XLRA MEAS LEFT PROX ECA EDV: 18 CM/SEC
BH CV XLRA MEAS LEFT PROX ECA PSV: 100 CM/SEC
BH CV XLRA MEAS LEFT PROX ICA EDV: 19 CM/SEC
BH CV XLRA MEAS LEFT PROX ICA PSV: 51 CM/SEC
BH CV XLRA MEAS LEFT VERTEBRAL A EDV: 13 CM/SEC
BH CV XLRA MEAS LEFT VERTEBRAL A PSV: 42 CM/SEC
BH CV XLRA MEAS RIGHT CAROTID BULB EDV: 12 CM/SEC
BH CV XLRA MEAS RIGHT CAROTID BULB PSV: 45 CM/SEC
BH CV XLRA MEAS RIGHT DIST CCA EDV: 19 CM/SEC
BH CV XLRA MEAS RIGHT DIST CCA PSV: 60 CM/SEC
BH CV XLRA MEAS RIGHT DIST ICA EDV: 13 CM/SEC
BH CV XLRA MEAS RIGHT DIST ICA PSV: 41 CM/SEC
BH CV XLRA MEAS RIGHT MID ICA EDV: 16 CM/SEC
BH CV XLRA MEAS RIGHT MID ICA PSV: 60 CM/SEC
BH CV XLRA MEAS RIGHT PROX CCA EDV: 18 CM/SEC
BH CV XLRA MEAS RIGHT PROX CCA PSV: 79 CM/SEC
BH CV XLRA MEAS RIGHT PROX ECA EDV: 21 CM/SEC
BH CV XLRA MEAS RIGHT PROX ECA PSV: 97 CM/SEC
BH CV XLRA MEAS RIGHT PROX ICA EDV: 17 CM/SEC
BH CV XLRA MEAS RIGHT PROX ICA PSV: 51 CM/SEC
BH CV XLRA MEAS RIGHT VERTEBRAL A EDV: 12 CM/SEC
BH CV XLRA MEAS RIGHT VERTEBRAL A PSV: 58 CM/SEC
LEFT ARM BP: NORMAL MMHG
MAXIMAL PREDICTED HEART RATE: 155 BPM
RIGHT ARM BP: NORMAL MMHG
STRESS TARGET HR: 132 BPM

## 2022-09-07 PROCEDURE — 93306 TTE W/DOPPLER COMPLETE: CPT

## 2022-09-07 PROCEDURE — 93880 EXTRACRANIAL BILAT STUDY: CPT | Performed by: SURGERY

## 2022-09-07 PROCEDURE — 93880 EXTRACRANIAL BILAT STUDY: CPT

## 2022-09-07 PROCEDURE — 93306 TTE W/DOPPLER COMPLETE: CPT | Performed by: INTERNAL MEDICINE

## 2022-09-07 NOTE — TELEPHONE ENCOUNTER
----- Message from MELANIA Butler sent at 9/7/2022  1:06 PM EDT -----  Notify pt carotid ultrasound looks good, no blockage noted

## 2022-09-09 ENCOUNTER — TELEPHONE (OUTPATIENT)
Dept: CARDIOLOGY | Facility: CLINIC | Age: 67
End: 2022-09-09

## 2022-09-09 LAB
BH CV ECHO MEAS - AO ROOT DIAM: 2.9 CM
BH CV ECHO MEAS - EF(MOD-BP): 52.3 %
BH CV ECHO MEAS - IVSD: 1.2 CM
BH CV ECHO MEAS - LA DIMENSION: 4.4 CM
BH CV ECHO MEAS - LAT PEAK E' VEL: 11.7 CM/SEC
BH CV ECHO MEAS - LVIDD: 6 CM
BH CV ECHO MEAS - LVIDS: 4.1 CM
BH CV ECHO MEAS - LVOT DIAM: 2.6 CM
BH CV ECHO MEAS - LVPWD: 1.2 CM
BH CV ECHO MEAS - MED PEAK E' VEL: 8.49 CM/SEC
BH CV ECHO MEAS - MV A MAX VEL: 116 CM/SEC
BH CV ECHO MEAS - MV DEC TIME: 172 MSEC
BH CV ECHO MEAS - MV E MAX VEL: 93.3 CM/SEC
BH CV ECHO MEAS - MV E/A: 0.8
BH CV ECHO MEAS - RVDD: 3.7 CM
BH CV ECHO MEAS - TR MAX PG: 18 MMHG
BH CV ECHO MEAS - TR MAX VEL: 210 CM/SEC
BH CV ECHO MEASUREMENTS AVERAGE E/E' RATIO: 9.24
IVRT: 50 MSEC
LEFT ATRIUM VOLUME INDEX: 28 ML/M2
MAXIMAL PREDICTED HEART RATE: 155 BPM
STRESS TARGET HR: 132 BPM

## 2022-09-09 RX ORDER — METOPROLOL SUCCINATE 25 MG/1
25 TABLET, EXTENDED RELEASE ORAL DAILY
Qty: 90 TABLET | Refills: 3 | Status: SHIPPED | OUTPATIENT
Start: 2022-09-09

## 2022-09-09 NOTE — TELEPHONE ENCOUNTER
----- Message from MELANIA Butler sent at 9/9/2022  2:55 PM EDT -----  Notify pt echo result: EF 52% and no valve disease noted. Follow up as scheduled

## 2022-09-09 NOTE — TELEPHONE ENCOUNTER
Patient called regarding if beta blocker had been sent. No prescription sent. Clarified with Jenni. Per Jenni start Toprol 25 mg daily and Eliquis 5 mg BID.  Patient also to stop Eliquis 2 days prior to [facility] on 9/26.    Detailed VM left for patient. Advised to stop Eliquis on 9/24. Requested patient to call back to verify understanding.

## 2022-09-19 ENCOUNTER — APPOINTMENT (OUTPATIENT)
Dept: NUCLEAR MEDICINE | Facility: HOSPITAL | Age: 67
End: 2022-09-19

## 2022-09-26 ENCOUNTER — HOSPITAL ENCOUNTER (OUTPATIENT)
Facility: HOSPITAL | Age: 67
Setting detail: HOSPITAL OUTPATIENT SURGERY
Discharge: HOME | End: 2022-09-26
Attending: INTERNAL MEDICINE | Admitting: INTERNAL MEDICINE

## 2022-09-26 VITALS
WEIGHT: 210 LBS | BODY MASS INDEX: 29.4 KG/M2 | TEMPERATURE: 98.9 F | SYSTOLIC BLOOD PRESSURE: 134 MMHG | RESPIRATION RATE: 16 BRPM | HEART RATE: 98 BPM | DIASTOLIC BLOOD PRESSURE: 77 MMHG | OXYGEN SATURATION: 95 % | HEIGHT: 71 IN

## 2022-09-26 DIAGNOSIS — I47.20 VENTRICULAR TACHYCARDIA: ICD-10-CM

## 2022-09-26 DIAGNOSIS — R55 SYNCOPE AND COLLAPSE: ICD-10-CM

## 2022-09-26 PROBLEM — Z95.818 STATUS POST PLACEMENT OF IMPLANTABLE LOOP RECORDER: Status: ACTIVE | Noted: 2022-09-26

## 2022-09-26 LAB
ANION GAP SERPL CALCULATED.3IONS-SCNC: 12 MMOL/L (ref 5–15)
BUN SERPL-MCNC: 7 MG/DL (ref 8–23)
BUN/CREAT SERPL: 8.9 (ref 7–25)
CALCIUM SPEC-SCNC: 9.3 MG/DL (ref 8.6–10.5)
CHLORIDE SERPL-SCNC: 94 MMOL/L (ref 98–107)
CO2 SERPL-SCNC: 24 MMOL/L (ref 22–29)
CREAT SERPL-MCNC: 0.79 MG/DL (ref 0.76–1.27)
DEPRECATED RDW RBC AUTO: 46.3 FL (ref 37–54)
EGFRCR SERPLBLD CKD-EPI 2021: 98.6 ML/MIN/1.73
ERYTHROCYTE [DISTWIDTH] IN BLOOD BY AUTOMATED COUNT: 13.8 % (ref 12.3–15.4)
GLUCOSE SERPL-MCNC: 107 MG/DL (ref 65–99)
HCT VFR BLD AUTO: 37.8 % (ref 37.5–51)
HGB BLD-MCNC: 13.5 G/DL (ref 13–17.7)
INR PPP: 1 (ref 0.86–1.15)
MCH RBC QN AUTO: 32.3 PG (ref 26.6–33)
MCHC RBC AUTO-ENTMCNC: 35.7 G/DL (ref 31.5–35.7)
MCV RBC AUTO: 90.4 FL (ref 79–97)
PLATELET # BLD AUTO: 288 10*3/MM3 (ref 140–450)
PMV BLD AUTO: 9.9 FL (ref 6–12)
POTASSIUM SERPL-SCNC: 4.4 MMOL/L (ref 3.5–5.2)
PROTHROMBIN TIME: 13.3 SECONDS (ref 11.8–14.9)
QT INTERVAL: 356 MS
RBC # BLD AUTO: 4.18 10*6/MM3 (ref 4.14–5.8)
SODIUM SERPL-SCNC: 130 MMOL/L (ref 136–145)
WBC NRBC COR # BLD: 7.48 10*3/MM3 (ref 3.4–10.8)

## 2022-09-26 PROCEDURE — 93010 ELECTROCARDIOGRAM REPORT: CPT | Performed by: INTERNAL MEDICINE

## 2022-09-26 PROCEDURE — 0 MEPERIDINE PER 100 MG: Performed by: INTERNAL MEDICINE

## 2022-09-26 PROCEDURE — 93458 L HRT ARTERY/VENTRICLE ANGIO: CPT | Performed by: INTERNAL MEDICINE

## 2022-09-26 PROCEDURE — 25010000002 MIDAZOLAM PER 1 MG: Performed by: INTERNAL MEDICINE

## 2022-09-26 PROCEDURE — 99152 MOD SED SAME PHYS/QHP 5/>YRS: CPT | Performed by: INTERNAL MEDICINE

## 2022-09-26 PROCEDURE — C1764 EVENT RECORDER, CARDIAC: HCPCS | Performed by: INTERNAL MEDICINE

## 2022-09-26 PROCEDURE — 25010000002 FENTANYL CITRATE (PF) 50 MCG/ML SOLUTION: Performed by: INTERNAL MEDICINE

## 2022-09-26 PROCEDURE — 0 IOPAMIDOL PER 1 ML: Performed by: INTERNAL MEDICINE

## 2022-09-26 PROCEDURE — C1894 INTRO/SHEATH, NON-LASER: HCPCS | Performed by: INTERNAL MEDICINE

## 2022-09-26 PROCEDURE — 85610 PROTHROMBIN TIME: CPT | Performed by: NURSE PRACTITIONER

## 2022-09-26 PROCEDURE — 33285 INSJ SUBQ CAR RHYTHM MNTR: CPT | Performed by: INTERNAL MEDICINE

## 2022-09-26 PROCEDURE — 25010000002 HEPARIN (PORCINE) PER 1000 UNITS: Performed by: INTERNAL MEDICINE

## 2022-09-26 PROCEDURE — C1769 GUIDE WIRE: HCPCS | Performed by: INTERNAL MEDICINE

## 2022-09-26 PROCEDURE — 80048 BASIC METABOLIC PNL TOTAL CA: CPT | Performed by: NURSE PRACTITIONER

## 2022-09-26 PROCEDURE — 85027 COMPLETE CBC AUTOMATED: CPT | Performed by: NURSE PRACTITIONER

## 2022-09-26 PROCEDURE — 93005 ELECTROCARDIOGRAM TRACING: CPT | Performed by: NURSE PRACTITIONER

## 2022-09-26 DEVICE — LUX-DX™ INSERTABLE CARDIAC MONITOR
Type: IMPLANTABLE DEVICE | Status: FUNCTIONAL
Brand: LUX-DX™ INSERTABLE CARDIAC MONITOR

## 2022-09-26 RX ORDER — MIDAZOLAM HYDROCHLORIDE 1 MG/ML
INJECTION INTRAMUSCULAR; INTRAVENOUS AS NEEDED
Status: DISCONTINUED | OUTPATIENT
Start: 2022-09-26 | End: 2022-09-26 | Stop reason: HOSPADM

## 2022-09-26 RX ORDER — FENTANYL CITRATE 50 UG/ML
INJECTION, SOLUTION INTRAMUSCULAR; INTRAVENOUS AS NEEDED
Status: DISCONTINUED | OUTPATIENT
Start: 2022-09-26 | End: 2022-09-26 | Stop reason: HOSPADM

## 2022-09-26 RX ORDER — SODIUM CHLORIDE 9 MG/ML
40 INJECTION, SOLUTION INTRAVENOUS AS NEEDED
Status: DISCONTINUED | OUTPATIENT
Start: 2022-09-26 | End: 2022-09-26 | Stop reason: HOSPADM

## 2022-09-26 RX ORDER — MEPERIDINE HYDROCHLORIDE 25 MG/ML
INJECTION INTRAMUSCULAR; INTRAVENOUS; SUBCUTANEOUS AS NEEDED
Status: DISCONTINUED | OUTPATIENT
Start: 2022-09-26 | End: 2022-09-26 | Stop reason: HOSPADM

## 2022-09-26 RX ORDER — SODIUM CHLORIDE 0.9 % (FLUSH) 0.9 %
10 SYRINGE (ML) INJECTION EVERY 12 HOURS SCHEDULED
Status: DISCONTINUED | OUTPATIENT
Start: 2022-09-26 | End: 2022-09-26 | Stop reason: HOSPADM

## 2022-09-26 RX ORDER — SODIUM CHLORIDE 0.9 % (FLUSH) 0.9 %
10 SYRINGE (ML) INJECTION AS NEEDED
Status: DISCONTINUED | OUTPATIENT
Start: 2022-09-26 | End: 2022-09-26 | Stop reason: HOSPADM

## 2022-09-26 RX ORDER — HEPARIN SODIUM 1000 [USP'U]/ML
INJECTION, SOLUTION INTRAVENOUS; SUBCUTANEOUS AS NEEDED
Status: DISCONTINUED | OUTPATIENT
Start: 2022-09-26 | End: 2022-09-26 | Stop reason: HOSPADM

## 2022-09-26 RX ORDER — LIDOCAINE HYDROCHLORIDE 20 MG/ML
INJECTION, SOLUTION INFILTRATION; PERINEURAL AS NEEDED
Status: DISCONTINUED | OUTPATIENT
Start: 2022-09-26 | End: 2022-09-26 | Stop reason: HOSPADM

## 2022-10-04 ENCOUNTER — CLINICAL SUPPORT NO REQUIREMENTS (OUTPATIENT)
Dept: CARDIOLOGY | Facility: CLINIC | Age: 67
End: 2022-10-04

## 2022-10-04 DIAGNOSIS — Z95.818 IMPLANTABLE LOOP RECORDER PRESENT: ICD-10-CM

## 2022-10-04 DIAGNOSIS — R55 SYNCOPE AND COLLAPSE: Primary | ICD-10-CM

## 2022-10-04 PROCEDURE — 93285 PRGRMG DEV EVAL SCRMS IP: CPT | Performed by: INTERNAL MEDICINE

## 2022-10-04 NOTE — PROGRESS NOTES
Normal BSC Loop Recorder interrogation and download.  Implanted for Syncope, he has not had any episodes or alerts since implanted.  The incision is intact and healing very well, there are no signs of infection and it is cool to touch.

## 2022-10-07 ENCOUNTER — OFFICE VISIT (OUTPATIENT)
Dept: FAMILY MEDICINE CLINIC | Facility: CLINIC | Age: 67
End: 2022-10-07

## 2022-10-07 VITALS
BODY MASS INDEX: 30.38 KG/M2 | DIASTOLIC BLOOD PRESSURE: 74 MMHG | SYSTOLIC BLOOD PRESSURE: 120 MMHG | HEIGHT: 71 IN | WEIGHT: 217 LBS | HEART RATE: 81 BPM | TEMPERATURE: 97.3 F | OXYGEN SATURATION: 97 %

## 2022-10-07 DIAGNOSIS — I10 ESSENTIAL HYPERTENSION: Primary | ICD-10-CM

## 2022-10-07 DIAGNOSIS — J44.9 CHRONIC OBSTRUCTIVE PULMONARY DISEASE, UNSPECIFIED COPD TYPE: ICD-10-CM

## 2022-10-07 DIAGNOSIS — Z23 FLU VACCINE NEED: ICD-10-CM

## 2022-10-07 DIAGNOSIS — E78.5 HYPERLIPIDEMIA, UNSPECIFIED HYPERLIPIDEMIA TYPE: ICD-10-CM

## 2022-10-07 DIAGNOSIS — R42 VERTIGO: ICD-10-CM

## 2022-10-07 LAB
ALBUMIN SERPL-MCNC: 4.6 G/DL (ref 3.5–5.2)
ALBUMIN/GLOB SERPL: 1.9 G/DL
ALP SERPL-CCNC: 74 U/L (ref 39–117)
ALT SERPL W P-5'-P-CCNC: 20 U/L (ref 1–41)
ANION GAP SERPL CALCULATED.3IONS-SCNC: 12 MMOL/L (ref 5–15)
AST SERPL-CCNC: 26 U/L (ref 1–40)
BASOPHILS # BLD AUTO: 0.09 10*3/MM3 (ref 0–0.2)
BASOPHILS NFR BLD AUTO: 1.2 % (ref 0–1.5)
BILIRUB SERPL-MCNC: 0.9 MG/DL (ref 0–1.2)
BILIRUB UR QL STRIP: NEGATIVE
BUN SERPL-MCNC: 7 MG/DL (ref 8–23)
BUN/CREAT SERPL: 9.6 (ref 7–25)
CALCIUM SPEC-SCNC: 9.6 MG/DL (ref 8.6–10.5)
CHLORIDE SERPL-SCNC: 94 MMOL/L (ref 98–107)
CHOLEST SERPL-MCNC: 221 MG/DL (ref 0–200)
CLARITY UR: CLEAR
CO2 SERPL-SCNC: 25 MMOL/L (ref 22–29)
COLOR UR: YELLOW
CREAT SERPL-MCNC: 0.73 MG/DL (ref 0.76–1.27)
DEPRECATED RDW RBC AUTO: 44.4 FL (ref 37–54)
EGFRCR SERPLBLD CKD-EPI 2021: 101 ML/MIN/1.73
EOSINOPHIL # BLD AUTO: 0.2 10*3/MM3 (ref 0–0.4)
EOSINOPHIL NFR BLD AUTO: 2.6 % (ref 0.3–6.2)
ERYTHROCYTE [DISTWIDTH] IN BLOOD BY AUTOMATED COUNT: 13.3 % (ref 12.3–15.4)
GLOBULIN UR ELPH-MCNC: 2.4 GM/DL
GLUCOSE SERPL-MCNC: 85 MG/DL (ref 65–99)
GLUCOSE UR STRIP-MCNC: NEGATIVE MG/DL
HCT VFR BLD AUTO: 42.1 % (ref 37.5–51)
HDLC SERPL-MCNC: 96 MG/DL (ref 40–60)
HGB BLD-MCNC: 15.1 G/DL (ref 13–17.7)
HGB UR QL STRIP.AUTO: NEGATIVE
IMM GRANULOCYTES # BLD AUTO: 0.07 10*3/MM3 (ref 0–0.05)
IMM GRANULOCYTES NFR BLD AUTO: 0.9 % (ref 0–0.5)
KETONES UR QL STRIP: NEGATIVE
LDLC SERPL CALC-MCNC: 112 MG/DL (ref 0–100)
LDLC/HDLC SERPL: 1.14 {RATIO}
LEUKOCYTE ESTERASE UR QL STRIP.AUTO: NEGATIVE
LYMPHOCYTES # BLD AUTO: 1.65 10*3/MM3 (ref 0.7–3.1)
LYMPHOCYTES NFR BLD AUTO: 21.3 % (ref 19.6–45.3)
MCH RBC QN AUTO: 32.7 PG (ref 26.6–33)
MCHC RBC AUTO-ENTMCNC: 35.9 G/DL (ref 31.5–35.7)
MCV RBC AUTO: 91.1 FL (ref 79–97)
MONOCYTES # BLD AUTO: 0.77 10*3/MM3 (ref 0.1–0.9)
MONOCYTES NFR BLD AUTO: 9.9 % (ref 5–12)
NEUTROPHILS NFR BLD AUTO: 4.96 10*3/MM3 (ref 1.7–7)
NEUTROPHILS NFR BLD AUTO: 64.1 % (ref 42.7–76)
NITRITE UR QL STRIP: NEGATIVE
NRBC BLD AUTO-RTO: 0 /100 WBC (ref 0–0.2)
PH UR STRIP.AUTO: 7 [PH] (ref 5–8)
PLATELET # BLD AUTO: 326 10*3/MM3 (ref 140–450)
PMV BLD AUTO: 10.6 FL (ref 6–12)
POTASSIUM SERPL-SCNC: 4.4 MMOL/L (ref 3.5–5.2)
PROT SERPL-MCNC: 7 G/DL (ref 6–8.5)
PROT UR QL STRIP: NEGATIVE
RBC # BLD AUTO: 4.62 10*6/MM3 (ref 4.14–5.8)
SODIUM SERPL-SCNC: 131 MMOL/L (ref 136–145)
SP GR UR STRIP: 1.01 (ref 1–1.03)
TRIGL SERPL-MCNC: 77 MG/DL (ref 0–150)
UROBILINOGEN UR QL STRIP: NORMAL
VLDLC SERPL-MCNC: 13 MG/DL (ref 5–40)
WBC NRBC COR # BLD: 7.74 10*3/MM3 (ref 3.4–10.8)

## 2022-10-07 PROCEDURE — 99214 OFFICE O/P EST MOD 30 MIN: CPT | Performed by: NURSE PRACTITIONER

## 2022-10-07 PROCEDURE — 90662 IIV NO PRSV INCREASED AG IM: CPT | Performed by: NURSE PRACTITIONER

## 2022-10-07 PROCEDURE — 85025 COMPLETE CBC W/AUTO DIFF WBC: CPT | Performed by: NURSE PRACTITIONER

## 2022-10-07 PROCEDURE — 81003 URINALYSIS AUTO W/O SCOPE: CPT | Performed by: NURSE PRACTITIONER

## 2022-10-07 PROCEDURE — 80053 COMPREHEN METABOLIC PANEL: CPT | Performed by: NURSE PRACTITIONER

## 2022-10-07 PROCEDURE — G0008 ADMIN INFLUENZA VIRUS VAC: HCPCS | Performed by: NURSE PRACTITIONER

## 2022-10-07 PROCEDURE — 80061 LIPID PANEL: CPT | Performed by: NURSE PRACTITIONER

## 2022-10-07 PROCEDURE — 36415 COLL VENOUS BLD VENIPUNCTURE: CPT | Performed by: NURSE PRACTITIONER

## 2022-10-07 RX ORDER — SIMVASTATIN 20 MG
20 TABLET ORAL DAILY
Qty: 90 TABLET | Refills: 1 | Status: SHIPPED | OUTPATIENT
Start: 2022-10-07

## 2022-10-07 RX ORDER — TIOTROPIUM BROMIDE INHALATION SPRAY 1.56 UG/1
2 SPRAY, METERED RESPIRATORY (INHALATION) DAILY
Qty: 3 EACH | Refills: 1 | Status: SHIPPED | OUTPATIENT
Start: 2022-10-07 | End: 2023-03-03

## 2022-10-07 RX ORDER — AMLODIPINE BESYLATE AND BENAZEPRIL HYDROCHLORIDE 5; 20 MG/1; MG/1
1 CAPSULE ORAL DAILY
Qty: 90 CAPSULE | Refills: 1 | Status: SHIPPED | OUTPATIENT
Start: 2022-10-07 | End: 2023-03-21

## 2022-10-07 RX ORDER — MECLIZINE HYDROCHLORIDE 25 MG/1
50 TABLET ORAL DAILY
Qty: 180 TABLET | Refills: 1 | Status: SHIPPED | OUTPATIENT
Start: 2022-10-07

## 2022-10-07 RX ORDER — HYDROCHLOROTHIAZIDE 12.5 MG/1
12.5 TABLET ORAL DAILY
Qty: 90 TABLET | Refills: 1 | Status: SHIPPED | OUTPATIENT
Start: 2022-10-07 | End: 2023-03-21

## 2022-10-07 NOTE — PROGRESS NOTES
Answers for HPI/ROS submitted by the patient on 10/5/2022  What is the primary reason for your visit?: Other  Please describe your symptoms.: med renewal  Have you had these symptoms before?: No  How long have you been having these symptoms?: 1-4 days    Chief Complaint  Hypertension (REFILLS )      Subjective        Past Medical History:   Diagnosis Date   • Benign essential hypertension    • COPD (chronic obstructive pulmonary disease) (HCC)    • Erectile dysfunction Apprx: 5 yrs ago   • Essential hypertension    • Hyperlipidemia 2018   • Hyponatremia    • Vertigo 2021     Social History     Tobacco Use   • Smoking status: Former Smoker     Packs/day: 2.00     Years: 15.00     Pack years: 30.00     Types: Cigarettes     Quit date: 2010     Years since quittin.7   • Smokeless tobacco: Never Used   • Tobacco comment: SMOKES MORE THAN 2 PPD FOR 35 YEARS   Vaping Use   • Vaping Use: Never used   Substance Use Topics   • Alcohol use: Yes     Comment: 8-14 DRINKS PER WEEK   • Drug use: Never      Current Outpatient Medications on File Prior to Visit   Medication Sig   • apixaban (ELIQUIS) 5 MG tablet tablet Take 1 tablet by mouth 2 (Two) Times a Day.   • metoprolol succinate XL (TOPROL-XL) 25 MG 24 hr tablet Take 1 tablet by mouth Daily.   • [DISCONTINUED] amLODIPine-benazepril (LOTREL 5-20) 5-20 MG per capsule Take 1 capsule by mouth Daily.   • [DISCONTINUED] hydroCHLOROthiazide (HYDRODIURIL) 12.5 MG tablet Take 1 tablet by mouth Daily.   • [DISCONTINUED] meclizine (ANTIVERT) 25 MG tablet Take 2 tablets by mouth Daily.   • [DISCONTINUED] simvastatin (ZOCOR) 20 MG tablet Take 1 tablet by mouth Daily.   • [DISCONTINUED] Spiriva Respimat 1.25 MCG/ACT aerosol solution inhaler Inhale 2 puffs Daily.   • PEG-KCl-NaCl-NaSulf-Na Asc-C (MoviPrep) 100 g reconstituted solution powder Take 1,000 mL by mouth Take As Directed.     No current facility-administered medications on file prior to visit.      No Known  "Allergies   Health Maintenance Due   Topic Date Due   • COLORECTAL CANCER SCREENING  Never done   • ZOSTER VACCINE (2 of 2) 01/29/2015   • Pneumococcal Vaccine 65+ (2 - PPSV23 or PCV20) 12/04/2015      Paulo SANTANA Cali Finch presents to [facility]  History of Present Illness  Here to follow up on chronic conditions and obtain refills of medication.     HTN: stable in office. Pt is established with Cardiology and recently had Loop recorder placed. Pt reports no episodes since recorder placed about 9/26/22. Pt was also started on beta blocker due to elevated HR.     Vertigo: takes Meclizine daily to control symptoms.     COPD: stable on current inhalers and will rest as needed with activity to prevent acute symptoms.       Objective   Vital Signs:   /74   Pulse 81   Temp 97.3 °F (36.3 °C)   Ht 180.3 cm (71\")   Wt 98.4 kg (217 lb)   SpO2 97%   BMI 30.27 kg/m²     Review of Systems   Respiratory: Negative for cough, shortness of breath and wheezing.    Cardiovascular: Negative for chest pain and palpitations.   Neurological: Negative for dizziness, light-headedness and headache.      Physical Exam  Vitals reviewed.   Constitutional:       General: He is not in acute distress.     Appearance: Normal appearance. He is well-developed.   Eyes:      Conjunctiva/sclera: Conjunctivae normal.      Pupils: Pupils are equal, round, and reactive to light.   Cardiovascular:      Rate and Rhythm: Normal rate and regular rhythm.      Heart sounds: Normal heart sounds.   Pulmonary:      Effort: Pulmonary effort is normal.      Breath sounds: Normal breath sounds.   Musculoskeletal:      Cervical back: Neck supple.   Skin:     General: Skin is warm and dry.   Neurological:      Mental Status: He is alert and oriented to person, place, and time.   Psychiatric:         Mood and Affect: Mood and affect normal.         Behavior: Behavior normal.         Thought Content: Thought content normal.       "   Judgment: Judgment normal.        Result Review :   The following data was reviewed by: MELANIA Santiago on 10/07/2022:  Common labs    Common Labs 7/14/22 8/17/22 9/26/22 9/26/22      0736 0736   Glucose 138 (A) 82  107 (A)   BUN 7 (A) 6 (A)  7 (A)   Creatinine 0.96 0.83  0.79   Sodium 128 (A) 134 (A)  130 (A)   Potassium 3.8 4.2  4.4   Chloride 90 (A) 96 (A)  94 (A)   Calcium 9.5 9.3  9.3   Albumin 4.30      Total Bilirubin 0.8      Alkaline Phosphatase 66      AST (SGOT) 27      ALT (SGPT) 36      WBC   7.48    Hemoglobin   13.5    Hematocrit   37.8    Platelets   288    (A) Abnormal value                      Assessment and Plan    Diagnoses and all orders for this visit:    1. Essential hypertension (Primary)  -     amLODIPine-benazepril (LOTREL 5-20) 5-20 MG per capsule; Take 1 capsule by mouth Daily.  Dispense: 90 capsule; Refill: 1  -     hydroCHLOROthiazide (HYDRODIURIL) 12.5 MG tablet; Take 1 tablet by mouth Daily.  Dispense: 90 tablet; Refill: 1  -     CBC & Differential  -     Comprehensive Metabolic Panel  -     Urinalysis With Culture If Indicated - Urine, Clean Catch    2. Hyperlipidemia, unspecified hyperlipidemia type  -     simvastatin (ZOCOR) 20 MG tablet; Take 1 tablet by mouth Daily.  Dispense: 90 tablet; Refill: 1  -     Lipid Panel    3. Chronic obstructive pulmonary disease, unspecified COPD type (HCC)  -     Spiriva Respimat 1.25 MCG/ACT aerosol solution inhaler; Inhale 2 puffs Daily.  Dispense: 3 each; Refill: 1    4. Vertigo  -     meclizine (ANTIVERT) 25 MG tablet; Take 2 tablets by mouth Daily.  Dispense: 180 tablet; Refill: 1    5. Flu vaccine need  -     Fluzone High-Dose 65+yrs (0823-1110)        Follow Up   Return in about 6 months (around 4/7/2023) for Refills and fasting labs.  Patient was given instructions and counseling regarding his condition or for health maintenance advice. Please see specific information pulled into the AVS if appropriate.

## 2022-10-19 ENCOUNTER — TELEPHONE (OUTPATIENT)
Dept: GASTROENTEROLOGY | Facility: CLINIC | Age: 67
End: 2022-10-19

## 2022-10-19 NOTE — TELEPHONE ENCOUNTER
Procedure: Colonoscopy    Med Directive: Eliquis    PMH: PAF, HTN, hyperlipidemia    Last Seen: 7/20/22

## 2022-10-19 NOTE — TELEPHONE ENCOUNTER
I left  reminding mr loving of his scheduled colonoscopy on 10/26/22, arrival time of 8:00am.. Reminded of liquid diet the day prior. Reminded of bowel prep and instructions.  Also saw he is on Eliquis, I will also send for cardiac clearance to hold.  Bg    10/19/2022    Dear MELANIA Curran,     Patient: Paulo Loving Jr.   YOB: 1955        This patient is waiting to have a Colonoscopy  which I will perform at [facility] on 10.26.22 .     Our records indicate this patient is currently taking ELIQUIS. This procedure requires the patient to suspend their anticoagulant medication prior to surgery.     Please respond to this request noting your recommendations. You may contact our office at [phone] Option 1 with any questions. I appreciate your prompt response in this matter.     Please return this form to our office as soon as possible to [phone]    ____ I approve my patient to stop taking their ELIQUIS medication 2 days prior to the scheduled procedure.    ____ I do NOT approve my patient to stop taking their ELIQUIS medication at this time.    ____ I approve my patient from a cardiac standpoint.    ____ I do NOT approve my patient from a cardiac standpoint at this time.    Approving physician name (please print):     _____________________________________________    Approving physician signature:     ________________________________    Date:________________      Sincerely,  [facility]   Gastroenterology - Dr. Whitlock

## 2022-10-20 NOTE — TELEPHONE ENCOUNTER
Please nani surgical clearance with moderate perioperative risk.  Hold Eliquis for 2 days prior to procedure.

## 2022-10-21 ENCOUNTER — TELEPHONE (OUTPATIENT)
Dept: GASTROENTEROLOGY | Facility: CLINIC | Age: 67
End: 2022-10-21

## 2022-10-21 NOTE — TELEPHONE ENCOUNTER
I spoke with Mr Cali, informed him we did get the approval to hold his Eliquis for 2 day prior. Voiced understanding. [email or web address]

## 2022-10-24 ENCOUNTER — TELEPHONE (OUTPATIENT)
Dept: CARDIOLOGY | Facility: CLINIC | Age: 67
End: 2022-10-24

## 2022-10-26 ENCOUNTER — HOSPITAL ENCOUNTER (OUTPATIENT)
Facility: HOSPITAL | Age: 67
Setting detail: HOSPITAL OUTPATIENT SURGERY
Discharge: HOME | End: 2022-10-26
Attending: INTERNAL MEDICINE | Admitting: INTERNAL MEDICINE

## 2022-10-26 ENCOUNTER — ANESTHESIA EVENT (OUTPATIENT)
Dept: GASTROENTEROLOGY | Facility: HOSPITAL | Age: 67
End: 2022-10-26

## 2022-10-26 ENCOUNTER — ANESTHESIA (OUTPATIENT)
Dept: GASTROENTEROLOGY | Facility: HOSPITAL | Age: 67
End: 2022-10-26

## 2022-10-26 VITALS
WEIGHT: 212.08 LBS | RESPIRATION RATE: 19 BRPM | HEART RATE: 84 BPM | HEIGHT: 71 IN | OXYGEN SATURATION: 97 % | DIASTOLIC BLOOD PRESSURE: 85 MMHG | SYSTOLIC BLOOD PRESSURE: 154 MMHG | TEMPERATURE: 98.5 F | BODY MASS INDEX: 29.69 KG/M2

## 2022-10-26 PROCEDURE — 25010000002 PROPOFOL 10 MG/ML EMULSION: Performed by: NURSE ANESTHETIST, CERTIFIED REGISTERED

## 2022-10-26 PROCEDURE — G0121 COLON CA SCRN NOT HI RSK IND: HCPCS | Performed by: INTERNAL MEDICINE

## 2022-10-26 RX ORDER — PROPOFOL 10 MG/ML
VIAL (ML) INTRAVENOUS AS NEEDED
Status: DISCONTINUED | OUTPATIENT
Start: 2022-10-26 | End: 2022-10-26 | Stop reason: SURG

## 2022-10-26 RX ORDER — LIDOCAINE HYDROCHLORIDE 20 MG/ML
INJECTION, SOLUTION EPIDURAL; INFILTRATION; INTRACAUDAL; PERINEURAL AS NEEDED
Status: DISCONTINUED | OUTPATIENT
Start: 2022-10-26 | End: 2022-10-26 | Stop reason: SURG

## 2022-10-26 RX ORDER — SODIUM CHLORIDE, SODIUM LACTATE, POTASSIUM CHLORIDE, CALCIUM CHLORIDE 600; 310; 30; 20 MG/100ML; MG/100ML; MG/100ML; MG/100ML
30 INJECTION, SOLUTION INTRAVENOUS CONTINUOUS
Status: DISCONTINUED | OUTPATIENT
Start: 2022-10-26 | End: 2022-10-26 | Stop reason: HOSPADM

## 2022-10-26 RX ADMIN — SODIUM CHLORIDE, POTASSIUM CHLORIDE, SODIUM LACTATE AND CALCIUM CHLORIDE: 600; 310; 30; 20 INJECTION, SOLUTION INTRAVENOUS at 09:05

## 2022-10-26 RX ADMIN — PROPOFOL 50 MG: 10 INJECTION, EMULSION INTRAVENOUS at 09:13

## 2022-10-26 RX ADMIN — LIDOCAINE HYDROCHLORIDE 30 MG: 20 INJECTION, SOLUTION EPIDURAL; INFILTRATION; INTRACAUDAL; PERINEURAL at 09:07

## 2022-10-26 RX ADMIN — PROPOFOL 150 MCG/KG/MIN: 10 INJECTION, EMULSION INTRAVENOUS at 09:07

## 2022-10-26 RX ADMIN — PROPOFOL 100 MG: 10 INJECTION, EMULSION INTRAVENOUS at 09:07

## 2022-10-26 NOTE — ANESTHESIA POSTPROCEDURE EVALUATION
Patient: Paulo Leiva Jr.    Procedure Summary     Date: 10/26/22 Room / Location: [facility] ENDOSCOPY 2 / [facility] ENDOSCOPY    Anesthesia Start: 0906 Anesthesia Stop: 0935    Procedure: COLONOSCOPY FOR SCREENING Diagnosis:       Colon cancer screening      (Colon cancer screening [Z12.11])    Surgeons: Roque Whitlock MD Provider: Reyes, Mirabelle, DO    Anesthesia Type: general ASA Status: 2          Anesthesia Type: general    Vitals  Vitals Value Taken Time   /85 10/26/22 0949   Temp 36.9 °C (98.5 °F) 10/26/22 0949   Pulse 84 10/26/22 0949   Resp 19 10/26/22 0949   SpO2 97 % 10/26/22 0949           Post Anesthesia Care and Evaluation    Patient location during evaluation: bedside  Patient participation: complete - patient participated  Level of consciousness: awake  Pain management: adequate    Airway patency: patent  Anesthetic complications: No anesthetic complications  PONV Status: none  Cardiovascular status: acceptable and stable  Respiratory status: acceptable  Hydration status: acceptable    Comments: An Anesthesiologist personally participated in the most demanding procedures (including induction and emergence if applicable) in the anesthesia plan, monitored the course of anesthesia administration at frequent intervals and remained physically present and available for immediate diagnosis and treatment of emergencies.

## 2022-10-26 NOTE — ANESTHESIA PREPROCEDURE EVALUATION
Anesthesia Evaluation     Patient summary reviewed and Nursing notes reviewed   no history of anesthetic complications:  NPO Solid Status: > 8 hours  NPO Liquid Status: > 2 hours           Airway   Mallampati: II  TM distance: >3 FB  Neck ROM: full  No difficulty expected  Dental    (+) edentulous    Pulmonary - normal exam    breath sounds clear to auscultation  (+) a smoker Former, COPD,   Cardiovascular - normal exam  Exercise tolerance: good (4-7 METS)    ECG reviewed  PT is on anticoagulation therapy  Patient on routine beta blocker and Beta blocker not taken-may be given intraoperatively  Rhythm: regular  Rate: normal    (+) hypertension well controlled 2 medications or greater, hyperlipidemia,     ROS comment: - OTHERWI SE NORMAL ECG -  Sinus rhythm  Borderline low voltage, extremity leads    EF55%    Neuro/Psych  (+) dizziness/light headedness, syncope (possibly cardiogenic- has a loop recorder),    GI/Hepatic/Renal/Endo - negative ROS     Musculoskeletal (-) negative ROS    Abdominal    Substance History - negative use     OB/GYN negative ob/gyn ROS         Other - negative ROS       ROS/Med Hx Other: PAT Nursing Notes unavailable.                 Anesthesia Plan    ASA 2     general     (Patient understands anesthesia not responsible for dental damage.)  intravenous induction     Anesthetic plan, risks, benefits, and alternatives have been provided, discussed and informed consent has been obtained with: patient.  Pre-procedure education provided  Use of blood products discussed with patient .   Plan discussed with CRNA.        CODE STATUS:

## 2022-10-28 ENCOUNTER — TELEPHONE (OUTPATIENT)
Dept: GASTROENTEROLOGY | Facility: CLINIC | Age: 67
End: 2022-10-28

## 2022-10-28 NOTE — TELEPHONE ENCOUNTER
I have reviewed the patients colonoscopy report. The report showed a normal colonoscopy.  No polyps removed or specimens collected.  Repeat colonoscopy in 10 years. Please place in recall.

## 2022-11-07 ENCOUNTER — HOSPITAL ENCOUNTER (EMERGENCY)
Facility: HOSPITAL | Age: 67
Discharge: HOME | End: 2022-11-08
Attending: EMERGENCY MEDICINE | Admitting: EMERGENCY MEDICINE

## 2022-11-07 VITALS
TEMPERATURE: 98 F | SYSTOLIC BLOOD PRESSURE: 151 MMHG | OXYGEN SATURATION: 94 % | HEIGHT: 71 IN | RESPIRATION RATE: 21 BRPM | DIASTOLIC BLOOD PRESSURE: 76 MMHG | BODY MASS INDEX: 31.36 KG/M2 | HEART RATE: 81 BPM | WEIGHT: 223.99 LBS
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
## 2022-11-07 DIAGNOSIS — S09.93XA FACIAL TRAUMA, INITIAL ENCOUNTER: ICD-10-CM

## 2022-11-07 DIAGNOSIS — S01.21XA: ICD-10-CM

## 2022-11-07 DIAGNOSIS — S01.21XA NASAL LACERATION, INITIAL ENCOUNTER: Primary | ICD-10-CM

## 2022-11-07 PROCEDURE — 96365 THER/PROPH/DIAG IV INF INIT: CPT

## 2022-11-07 PROCEDURE — 99284 EMERGENCY DEPT VISIT MOD MDM: CPT

## 2022-11-07 PROCEDURE — 99283 EMERGENCY DEPT VISIT LOW MDM: CPT

## 2022-11-08 ENCOUNTER — APPOINTMENT (OUTPATIENT)
Dept: CT IMAGING | Facility: HOSPITAL | Age: 67
End: 2022-11-08

## 2022-11-08 PROCEDURE — 70450 CT HEAD/BRAIN W/O DYE: CPT

## 2022-11-08 PROCEDURE — 70486 CT MAXILLOFACIAL W/O DYE: CPT

## 2022-11-08 PROCEDURE — 13152 CMPLX RPR E/N/E/L 2.6-7.5 CM: CPT | Performed by: OTOLARYNGOLOGY

## 2022-11-08 PROCEDURE — 99283 EMERGENCY DEPT VISIT LOW MDM: CPT | Performed by: OTOLARYNGOLOGY

## 2022-11-08 PROCEDURE — 25010000002 CEFAZOLIN 1-4 GM/50ML-% SOLUTION

## 2022-11-08 PROCEDURE — 96365 THER/PROPH/DIAG IV INF INIT: CPT

## 2022-11-08 RX ORDER — CIPROFLOXACIN 500 MG/1
500 TABLET, FILM COATED ORAL EVERY 12 HOURS
Qty: 14 TABLET | Refills: 0 | Status: SHIPPED | OUTPATIENT
Start: 2022-11-08 | End: 2023-02-06

## 2022-11-08 RX ORDER — LIDOCAINE HYDROCHLORIDE AND EPINEPHRINE 10; 10 MG/ML; UG/ML
10 INJECTION, SOLUTION INFILTRATION; PERINEURAL ONCE
Status: COMPLETED | OUTPATIENT
Start: 2022-11-08 | End: 2022-11-08

## 2022-11-08 RX ORDER — DIAPER,BRIEF,INFANT-TODD,DISP
1 EACH MISCELLANEOUS EVERY 12 HOURS SCHEDULED
Status: DISCONTINUED | OUTPATIENT
Start: 2022-11-08 | End: 2022-11-08 | Stop reason: HOSPADM

## 2022-11-08 RX ORDER — CEFAZOLIN SODIUM 1 G/50ML
1 INJECTION, SOLUTION INTRAVENOUS ONCE
Status: COMPLETED | OUTPATIENT
Start: 2022-11-08 | End: 2022-11-08

## 2022-11-08 RX ADMIN — CEFAZOLIN SODIUM 1 G: 1 INJECTION, SOLUTION INTRAVENOUS at 02:58

## 2022-11-08 RX ADMIN — Medication 1 APPLICATION: at 02:58

## 2022-11-08 RX ADMIN — LIDOCAINE HYDROCHLORIDE AND EPINEPHRINE 10 ML: 10; 10 INJECTION, SOLUTION INFILTRATION; PERINEURAL at 02:58

## 2022-11-08 NOTE — ED PROVIDER NOTES
"Room number: 16/16    Chief Complaint: Fall/nasal laceration     Time: 11:54 PM EST  Arrived by: EMS  History provided by: Pt  History is limited by: N/A     History of Present Illness:  Patient is a 66 y.o. year old male that presents to the emergency department with facial trauma s/p fall. Pt reports that he tripped in fell smacking the ground with his face.  He denies being dizzy or lightheadedness prior to the fall but admits to drinking alcohol.  Patient states that he is a daily drinker.  When asked how much he drinks he says \" may be 2, 3, 4, 5, 6, 7, or 8 it all depends on what we are doing.\"  Pt is a daily drinker. He denies any loss of consciousness at time of fall.  He did knock the front 2 teeth out of his dentures at time of fall.  He denies any current pain at this time and states that he is up-to-date on his tetanus - last tetanus was administered last year when he received stitches from different fall.      HPI    Similar Symptoms Previously: pt has had previous falls.   Recently seen: Yes      Patient Care Team  Primary Care Provider: Vilma Rolon APRN    Past Medical History:   No Known Allergies  Past Medical History:   Diagnosis Date   • Benign essential hypertension    • COPD (chronic obstructive pulmonary disease) (HCC)    • Erectile dysfunction Apprx: 5 yrs ago   • Hyperlipidemia 12/14/2018   • Hyponatremia    • Vertigo 02/17/2021     Past Surgical History:   Procedure Laterality Date   • CARDIAC CATHETERIZATION N/A 9/26/2022    Procedure: Left Heart Cath;  Surgeon: Randell Casiano MD;  Location: [facility] CATH INVASIVE LOCATION;  Service: Cardiovascular;  Laterality: N/A;   • CARDIAC ELECTROPHYSIOLOGY PROCEDURE N/A 9/26/2022    Procedure: Loop insertion;  Surgeon: Randell Casiano MD;  Location: [facility] CATH INVASIVE LOCATION;  Service: Cardiovascular;  Laterality: N/A;   • COLONOSCOPY  2007   • COLONOSCOPY N/A 10/26/2022    Procedure: COLONOSCOPY FOR SCREENING;  Surgeon: Roque Whitlock, " MD;  Location: [facility] ENDOSCOPY;  Service: Gastroenterology;  Laterality: N/A;  NORMAL COLON     Family History   Problem Relation Age of Onset   • Dementia Mother    • No Known Problems Father    • Colon cancer Neg Hx    • Malig Hyperthermia Neg Hx        Home Medications:  Prior to Admission medications    Medication Sig Start Date End Date Taking? Authorizing Provider   amLODIPine-benazepril (LOTREL 5-20) 5-20 MG per capsule Take 1 capsule by mouth Daily. 10/7/22   Vilma Rolon APRN   apixaban (ELIQUIS) 5 MG tablet tablet Take 1 tablet by mouth 2 (Two) Times a Day. 10/24/22   Randell Casiano MD   hydroCHLOROthiazide (HYDRODIURIL) 12.5 MG tablet Take 1 tablet by mouth Daily. 10/7/22   Vilma Rolon APRN   meclizine (ANTIVERT) 25 MG tablet Take 2 tablets by mouth Daily. 10/7/22   Vilma Rolon APRN   metoprolol succinate XL (TOPROL-XL) 25 MG 24 hr tablet Take 1 tablet by mouth Daily. 22   Jenni Crawley APRN   simvastatin (ZOCOR) 20 MG tablet Take 1 tablet by mouth Daily. 10/7/22   Vilma Rolon APRN   Spiriva Respimat 1.25 MCG/ACT aerosol solution inhaler Inhale 2 puffs Daily. 10/7/22   Vilma Rolon APRN        Social History:   Social History     Tobacco Use   • Smoking status: Former     Packs/day: 2.00     Years: 15.00     Pack years: 30.00     Types: Cigarettes     Quit date: 2010     Years since quittin.8   • Smokeless tobacco: Never   • Tobacco comments:     SMOKES MORE THAN 2 PPD FOR 35 YEARS   Vaping Use   • Vaping Use: Never used   Substance Use Topics   • Alcohol use: Yes     Comment: 8-14 DRINKS PER WEEK   • Drug use: Never       Review of Systems  Review of Systems   Constitutional: Negative for chills and fever.   HENT: Negative for congestion, ear pain and sore throat.    Eyes: Negative for pain.   Respiratory: Negative for cough, chest tightness, shortness of breath, wheezing and stridor.    Cardiovascular: Negative for chest pain.   Gastrointestinal: Negative  "for abdominal pain, diarrhea, nausea and vomiting.   Genitourinary: Negative for flank pain and hematuria.   Musculoskeletal: Negative for joint swelling.   Skin: Positive for wound. Negative for pallor.        Laceration to nose    Neurological: Negative for seizures and headaches.   All other systems reviewed and are negative.       Physical Exam:   /76 (BP Location: Right arm, Patient Position: Lying)   Pulse 81   Temp 98 °F (36.7 °C) (Oral)   Resp 21   Ht 180.3 cm (71\")   Wt 102 kg (223 lb 15.8 oz)   SpO2 94%   BMI 31.24 kg/m²     Physical Exam  Vitals and nursing note reviewed.   Constitutional:       General: He is not in acute distress.     Appearance: Normal appearance. He is not ill-appearing, toxic-appearing or diaphoretic.   HENT:      Head: Normocephalic and atraumatic.      Comments: Pt has upper dentures. Front 2 teeth of dentures were knocked out at time of fall. There is no orbital ecchymosis or step-offs appreciated on exam.     Mouth/Throat:      Mouth: Mucous membranes are moist.   Eyes:      General: No scleral icterus.     Extraocular Movements: Extraocular movements intact.      Pupils: Pupils are equal, round, and reactive to light.   Cardiovascular:      Rate and Rhythm: Normal rate and regular rhythm.      Pulses: Normal pulses.      Heart sounds: Normal heart sounds.   Pulmonary:      Effort: Pulmonary effort is normal. No respiratory distress.      Breath sounds: Normal breath sounds.   Abdominal:      General: Abdomen is flat. There is no distension.      Palpations: Abdomen is soft.      Tenderness: There is no abdominal tenderness.   Musculoskeletal:         General: Deformity and signs of injury (laceration to nose involving septum and upper lip. Please see clinical photos that have been placed in pt's chart with pt's permission. ) present. Normal range of motion.      Cervical back: Normal range of motion and neck supple.   Skin:     General: Skin is warm and dry.      " Coloration: Skin is not jaundiced or pale.      Findings: No bruising, erythema, lesion or rash.   Neurological:      Mental Status: He is alert and oriented to person, place, and time. Mental status is at baseline.      Sensory: No sensory deficit.   Psychiatric:         Mood and Affect: Mood normal.         Behavior: Behavior normal.         Thought Content: Thought content normal.         Judgment: Judgment normal.                Medications in the Emergency Department:  Medications   lidocaine 1% - EPINEPHrine 1:719247 (XYLOCAINE W/EPI) 1 %-1:893445 injection 10 mL (10 mL Injection Given 11/8/22 0258)   ceFAZolin (ANCEF) IVPB 1 g (0 g Intravenous Stopped 11/8/22 0352)        Labs  Lab Results (last 24 hours)     ** No results found for the last 24 hours. **           Imaging:  No Radiology Exams Resulted Within Past 24 Hours    Procedures:  Procedures    Progress  ED Course as of 11/10/22 1518   Tue Nov 08, 2022   0103 Pictures taken of patient's wound using the haiku iwona with patient's permission and placed in patient's chart under media tab [MS]   0103 I have discussed this patient with ER attending Dr. Long who went to bedside to also examine patient [MS]   0104 On-call ENT specialist was consulted by Dr. Long and will be coming to bedside to assess pt.  [MS]      ED Course User Index  [MS] Flaquita Castellano, MELANIA                            Medical Decision Making:  MDM  Number of Diagnoses or Management Options  Facial trauma, initial encounter: new and does not require workup  Fall, initial encounter: new and does not require workup  Laceration of structure of nasal cavity: new and does not require workup  Nasal laceration, initial encounter: new and does not require workup  Diagnosis management comments: Patient is a 66-year-old male that presented to the emergency department with a and nasal laceration and wound.  Bleeding to wound/laceration was minimal and oozing however the size and nature of wound  is extensive.  Head CT as well as facial CT was completed and there were no obvious dislocations or fractures identified.  However patient's wound required extensive repair.  Based on the injury, and the amount of repair needed, ER attending Dr. Long was consulted.  On-call ENT specialist Dr. Carrasco was also consulted who came to bedside to repair the wound.  Please see his note for details.  Pictures prior to repair were taken using the haiku iwona, with patient's permission, and placed in patient's chart under media tab.  Patient had no active uncontrolled bleeding while in the emergency department and there was no airway involvement.  Patient could control his own secretions, there is no swelling of his lips, tongue, or throat.  Additionally patient reported minimal pain while in the emergency department.  He was given IV antibiotics due to nature of injury.  Patient was prescribed antibiotics to take at home and will follow-up with Dr. Carrasco.       Amount and/or Complexity of Data Reviewed  Clinical lab tests: reviewed and ordered  Tests in the radiology section of CPT®: ordered and reviewed  Review and summarize past medical records: yes (I have personally reviewed patient's previous medical encounters.  )  Discuss the patient with other providers: yes (I consulted with ER attending Dr. Long as well as on-call ENT specialist Dr. Carrasco.  Dr. Carrasco came to bedside to complete wound repair and will also follow-up with patient in his clinic.)    Risk of Complications, Morbidity, and/or Mortality  Presenting problems: moderate  Diagnostic procedures: low  Management options: low    Patient Progress  Patient progress: stable       Final diagnoses:   Nasal laceration, initial encounter   Laceration of structure of nasal cavity   Facial trauma, initial encounter   Fall, initial encounter        Disposition:  ED Disposition     ED Disposition   Discharge    Condition   Stable    Comment   --             Prescriptions:        Medication List      START taking these medications    ciprofloxacin 500 MG tablet  Commonly known as: CIPRO  Take 1 tablet by mouth Every 12 (Twelve) Hours.        CONTINUE taking these medications    amLODIPine-benazepril 5-20 MG per capsule  Commonly known as: LOTREL 5-20  Take 1 capsule by mouth Daily.     apixaban 5 MG tablet tablet  Commonly known as: ELIQUIS  Take 1 tablet by mouth 2 (Two) Times a Day.     hydroCHLOROthiazide 12.5 MG tablet  Commonly known as: HYDRODIURIL  Take 1 tablet by mouth Daily.     meclizine 25 MG tablet  Commonly known as: ANTIVERT  Take 2 tablets by mouth Daily.     metoprolol succinate XL 25 MG 24 hr tablet  Commonly known as: TOPROL-XL  Take 1 tablet by mouth Daily.     simvastatin 20 MG tablet  Commonly known as: ZOCOR  Take 1 tablet by mouth Daily.     Spiriva Respimat 1.25 MCG/ACT aerosol solution inhaler  Generic drug: Tiotropium Bromide Monohydrate  Inhale 2 puffs Daily.           Where to Get Your Medications      These medications were sent to [facility] -  [place] - [address] [facility] [address] [facility] - [phone]  - [phone]   [address] [place] [address]    Phone: [phone]   · ciprofloxacin 500 MG tablet         This medical record created using voice recognition software and may contain unintended errors.     Flaquita Castellano, APRN  11/10/22 0156

## 2022-11-08 NOTE — DISCHARGE INSTRUCTIONS
Please complete wound care per Dr. Carrasco's instructions.  You will also need to follow-up with him in the office, his contact information has been provided for you.  Dr. Carrasco's office will call you with an appointment for follow-up and suture removal.  Please take the antibiotics as directed.  You may apply bacitracin ointment to the wound to keep it moist.  Avoid rubbing or scrubbing your nose.  If you develop complications, a fever, or signs symptoms of infection before you are able to follow-up with Dr. Carrasco please return to the ER.

## 2022-11-08 NOTE — ED PROVIDER NOTES
"Patient is 66 y.o. year old male that presents to the ED for evaluation of facial injury with complex nasal laceration    Physical Exam    ED Course:    /76 (BP Location: Right arm, Patient Position: Lying)   Pulse 81   Temp 98 °F (36.7 °C) (Oral)   Resp 21   Ht 180.3 cm (71\")   Wt 102 kg (223 lb 15.8 oz)   SpO2 94%   BMI 31.24 kg/m²   Results for orders placed or performed in visit on 10/07/22   Comprehensive Metabolic Panel    Specimen: Blood   Result Value Ref Range    Glucose 85 65 - 99 mg/dL    BUN 7 (L) 8 - 23 mg/dL    Creatinine 0.73 (L) 0.76 - 1.27 mg/dL    Sodium 131 (L) 136 - 145 mmol/L    Potassium 4.4 3.5 - 5.2 mmol/L    Chloride 94 (L) 98 - 107 mmol/L    CO2 25.0 22.0 - 29.0 mmol/L    Calcium 9.6 8.6 - 10.5 mg/dL    Total Protein 7.0 6.0 - 8.5 g/dL    Albumin 4.60 3.50 - 5.20 g/dL    ALT (SGPT) 20 1 - 41 U/L    AST (SGOT) 26 1 - 40 U/L    Alkaline Phosphatase 74 39 - 117 U/L    Total Bilirubin 0.9 0.0 - 1.2 mg/dL    Globulin 2.4 gm/dL    A/G Ratio 1.9 g/dL    BUN/Creatinine Ratio 9.6 7.0 - 25.0    Anion Gap 12.0 5.0 - 15.0 mmol/L    eGFR 101.0 >60.0 mL/min/1.73   Lipid Panel    Specimen: Blood   Result Value Ref Range    Total Cholesterol 221 (H) 0 - 200 mg/dL    Triglycerides 77 0 - 150 mg/dL    HDL Cholesterol 96 (H) 40 - 60 mg/dL    LDL Cholesterol  112 (H) 0 - 100 mg/dL    VLDL Cholesterol 13 5 - 40 mg/dL    LDL/HDL Ratio 1.14    Urinalysis With Culture If Indicated - Urine, Clean Catch    Specimen: Urine, Clean Catch   Result Value Ref Range    Color, UA Yellow Yellow, Straw    Appearance, UA Clear Clear    pH, UA 7.0 5.0 - 8.0    Specific Gravity, UA 1.006 1.005 - 1.030    Glucose, UA Negative Negative    Ketones, UA Negative Negative    Bilirubin, UA Negative Negative    Blood, UA Negative Negative    Protein, UA Negative Negative    Leuk Esterase, UA Negative Negative    Nitrite, UA Negative Negative    Urobilinogen, UA 0.2 E.U./dL 0.2 - 1.0 E.U./dL   CBC Auto Differential    " Specimen: Blood   Result Value Ref Range    WBC 7.74 3.40 - 10.80 10*3/mm3    RBC 4.62 4.14 - 5.80 10*6/mm3    Hemoglobin 15.1 13.0 - 17.7 g/dL    Hematocrit 42.1 37.5 - 51.0 %    MCV 91.1 79.0 - 97.0 fL    MCH 32.7 26.6 - 33.0 pg    MCHC 35.9 (H) 31.5 - 35.7 g/dL    RDW 13.3 12.3 - 15.4 %    RDW-SD 44.4 37.0 - 54.0 fl    MPV 10.6 6.0 - 12.0 fL    Platelets 326 140 - 450 10*3/mm3    Neutrophil % 64.1 42.7 - 76.0 %    Lymphocyte % 21.3 19.6 - 45.3 %    Monocyte % 9.9 5.0 - 12.0 %    Eosinophil % 2.6 0.3 - 6.2 %    Basophil % 1.2 0.0 - 1.5 %    Immature Grans % 0.9 (H) 0.0 - 0.5 %    Neutrophils, Absolute 4.96 1.70 - 7.00 10*3/mm3    Lymphocytes, Absolute 1.65 0.70 - 3.10 10*3/mm3    Monocytes, Absolute 0.77 0.10 - 0.90 10*3/mm3    Eosinophils, Absolute 0.20 0.00 - 0.40 10*3/mm3    Basophils, Absolute 0.09 0.00 - 0.20 10*3/mm3    Immature Grans, Absolute 0.07 (H) 0.00 - 0.05 10*3/mm3    nRBC 0.0 0.0 - 0.2 /100 WBC     Medications   lidocaine 1% - EPINEPHrine 1:886101 (XYLOCAINE W/EPI) 1 %-1:704456 injection 10 mL (has no administration in time range)     CT Head Without Contrast    Result Date: 11/8/2022  Narrative: PROCEDURE: CT HEAD WO CONTRAST  COMPARISONS: 11/8/2022; 9/2/2022.  INDICATIONS: fall with facial/nose trauma  PROTOCOL:   Standard imaging protocol performed    RADIATION:   MA and/or KV were/was adjusted to minimize radiation dose.    TECHNIQUE: After obtaining the patient's consent, 139 CT images were obtained without non-ionic intravenous contrast material.  DISCUSSION:  A routine nonenhanced head CT was performed. No acute brain abnormality is identified. No acute intracranial hemorrhage. No acute infarction. No acute skull fracture. No midline shift or acute intracranial mass effect is seen.  Minimal chronic small vessel ischemia/infarction is suspected. There are arterial calcifications. The extra-axial spaces and the ventricular system are mildly prominent.  There is a prominent retrocerebellar  cystic structure, which probably represents benign arachnoid cysts, likely of doubtful clinical significance.      Impression:   No acute brain abnormality is seen.  No acute intracranial hemorrhage.  No acute skull fracture.    Please note that portions of this note were completed with a voice recognition program.  ASHLEY ARNETT JR, MD       Electronically Signed and Approved By: ASHLEY ARNETT JR, MD on 11/08/2022 at 1:37              CT Facial Bones Without Contrast    Result Date: 11/8/2022  Narrative: PROCEDURE: CT FACIAL BONES WO CONTRAST  COMPARISONS: 9/2/2022; 11/8/2022.  INDICATIONS: fall with facial trauma  PROTOCOL:   Standard imaging protocol performed    RADIATION:   Automated exposure control was utilized to minimize radiation dose.  TECHNIQUE: After obtaining the patient's consent, 547 CT images were created without non-ionic intravenous contrast.   EXAM FINDINGS: A routine nonenhanced maxillofacial CT was performed.  Sagittal and coronal two-dimensional reformations are provided for review.  No acute displaced fracture or acute malalignment is identified.  Mild acute soft tissue contusions are suspected involving the nasal dorsum, especially on the left, with minimal subcutaneous emphysema, suggesting laceration.  Acute contusion involves the left parasymphyseal region (overlying the left anterior mandible).  There is suspected chronic leftward nasal septal deviation.  There may be mild chronic sinus disease.  No air-fluid interfaces are seen within the imaged paranasal sinuses.  Benign external auditory canal debris is suggested.  There are degenerative changes of partially imaged cervical spine.  Arterial calcifications are present.  No significant acute findings are seen with regard to the imaged brain, orbits, paranasal sinuses, or middle ear clefts.      Impression:   No acute displaced fracture is seen.    Please note that portions of this note were completed with a voice recognition program.   ASHLEY ARNETT JR, MD       Electronically Signed and Approved By: ASHLEY ARNETT JR, MD on 11/08/2022 at 1:28                MDM:  I consult with Dr. Major Carrasco, ENT due to complex laceration.  Patient was seen in the ER by Dr. Carrasco.  Procedures      The case was discussed between the BOO and myself. Patient  care including, but not limited to ordered imaging, medications, and lab results were reviewed. I then performed the substantive portion of the visit including all aspects of the medical decision making.        Vaibhav Long DO  01:45 EST  11/08/22       Vaibhav Long DO  11/08/22 0145

## 2022-11-08 NOTE — CONSULTS
" [facility]   CONSULT NOTE    Patient Name: Paulo Leiva Jr.  : 1955  MRN: [id number]  Primary Care Physician:  Vilma Rolon APRN  Date of admission: 2022    Subjective   Subjective     Chief Complaint: \"I fell earlier\"    HPI:    Paulo Leiva Jr. is a 66 y.o. male who presents to the emergency department after falling at the bar striking his nose.  He tells me that it bled initially and that has been a little sore.  He is breathing well through both sides.  He reports a remote history of nasal trauma but denies any prior nasal surgery.  He has not had any further epistaxis.  He denies any trismus, malocclusion, diplopia, or neck pain.  Smoker who quit approximately 12 years ago.  CT scan of the face without contrast on 2022 was unremarkable aside from mild left nasal septal deviation and nasal soft tissue defect.  He is scheduled to receive a dose of Ancef in the emergency department.    Review of Systems   The following systems were reviewed and negative;  constitution, eyes, ENT, respiratory, cardiovascular, gastrointestinal, genitourinary, integument, hematologic / lymphatic, musculoskeletal, neurological, behavioral/psych, endocrine, and allergies / immunologic.    Personal History     Past Medical History:   Diagnosis Date   • Benign essential hypertension    • COPD (chronic obstructive pulmonary disease) (HCC)    • Erectile dysfunction Apprx: 5 yrs ago   • Hyperlipidemia 2018   • Hyponatremia    • Vertigo 2021       Past Surgical History:   Procedure Laterality Date   • CARDIAC CATHETERIZATION N/A 2022    Procedure: Left Heart Cath;  Surgeon: Randell Casiano MD;  Location: [facility] CATH INVASIVE LOCATION;  Service: Cardiovascular;  Laterality: N/A;   • CARDIAC ELECTROPHYSIOLOGY PROCEDURE N/A 2022    Procedure: Loop insertion;  Surgeon: Randell Casiano MD;  Location: [facility] CATH INVASIVE LOCATION;  Service: Cardiovascular;  Laterality: N/A;   • COLONOSCOPY     • " COLONOSCOPY N/A 10/26/2022    Procedure: COLONOSCOPY FOR SCREENING;  Surgeon: Roque Whitlock MD;  Location: [facility] ENDOSCOPY;  Service: Gastroenterology;  Laterality: N/A;  NORMAL COLON       Family History: family history includes Dementia in his mother; No Known Problems in his father. Otherwise pertinent FHx was reviewed and not pertinent to current issue.    Social History:  reports that he quit smoking about 12 years ago. His smoking use included cigarettes. He has a 30.00 pack-year smoking history. He has never used smokeless tobacco. He reports current alcohol use. He reports that he does not use drugs.    Home Medications:  Tiotropium Bromide Monohydrate, amLODIPine-benazepril, apixaban, hydroCHLOROthiazide, meclizine, metoprolol succinate XL, and simvastatin      Allergies:  No Known Allergies    Objective   Objective     Vitals:   Temp:  [98 °F (36.7 °C)] 98 °F (36.7 °C)  Heart Rate:  [81] 81  Resp:  [21] 21  BP: (151)/(76) 151/76  Physical Exam   General: Well developed, well nourished patient of stated age in no acute distress. Voice is strong and clear.   Head: Normocephalic and atraumatic.   Face: No lesions. House-Brackmann I/VI bilaterally.   Eyes: PERRLA, sclerae anicteric, no conjunctival injection. Extra ocular movements are intact and full.   Ears: Auricles are normal in appearance. Bilateral external auditory canals are unremarkable. Bilateral tympanic membranes are clear and without effusion.   Nose: Starting in the midportion of the nasal dorsum there is a jagged laceration extending through to the nasal septal cartilage and into the left nasal vestibule.  There is evidence of a nasal septal cartilage fracture which is exposed to the wound.  Bilateral nares are patent with normal appearing mucosa. Septum midline. Turbinates are unremarkable. No lesions.   Oral Cavity: Upper lip is edematous.  Lower lip is unremarkable. Oral mucosa is unremarkable. Gingiva is unremarkable.  Edentulous.  Tongue is unremarkable with good movement. Hard palate is unremarkable.   Oropharynx: Soft palate is unremarkable with full movement. Uvula is unremarkable. Bilateral tonsils are unremarkable. Posterior oropharynx is unremarkable.    Neck: Supple, no thyromegaly, no lymphadenopathy, trachea midline   Respiratory: Clear to auscultation bilaterally, nonlabored respirations    Cardiovascular: RRR, no murmurs, rubs, or gallops, palpable pedal pulses bilaterally   Psychiatric: Appropriate affect, cooperative   Neurologic: Oriented x 3, strength symmetric in all extremities, Cranial Nerves II-XII are grossly intact to confrontation   Skin: No rashes       Washout and closure of complex laceration involving the nose measuring 4 cm in length:    The tissue surrounding the laceration was anesthetized through infiltration of 9 mL of 1% lidocaine with 1 100,000 epinephrine.  After giving the medication ample time to take effect the wound was irrigated profusely with sterile saline and prepped in sterile fashion using Betadine.  The wound was explored revealing the above findings.  The complex laceration was closed and layered fashion using 4-0 Vicryl deep sutures and 6-0 Prolene for the skin layer in horizontal mattress fashion.  Bacitracin ointment was applied.  The patient tolerated the procedure well.    Result Review    Result Review:  I have personally reviewed the results from the time of this admission to 11/8/2022 02:51 EST and agree with these findings:  []  Laboratory  []  Microbiology  [x]  Radiology  []  EKG/Telemetry   []  Cardiology/Vascular   []  Pathology  []  Old records  []  Other    Assessment & Plan   Assessment / Plan     Brief Patient Summary and plan:  Paulo Leiva Jr. is a 66 y.o. male who presents with a complex laceration involving the nose which is full-thickness and involving the septal cartilage.  This was cleaned and closed successfully in the emergency department.  We reviewed and discussed the  results from his CT scan of the face without contrast which did not reveal any facial fracture.  We discussed continued postoperative care and he will follow-up with me in 1 week for suture removal.  I have also recommended that he be discharged home on a course of Cipro and he understands to keep the lacerations covered with bacitracin ointment.  He was given ample time to ask questions, all of which were answered to satisfaction.    Active Hospital Problems:  There are no active hospital problems to display for this patient.          CODE STATUS:         Electronically signed by Mal Carrasco MD, 11/08/22, 2:51 AM EST.

## 2022-11-16 ENCOUNTER — OFFICE VISIT (OUTPATIENT)
Dept: OTOLARYNGOLOGY | Facility: CLINIC | Age: 67
End: 2022-11-16

## 2022-11-16 VITALS — HEIGHT: 71 IN | BODY MASS INDEX: 29.82 KG/M2 | WEIGHT: 213 LBS | TEMPERATURE: 98.2 F

## 2022-11-16 DIAGNOSIS — S01.21XD NASAL LACERATION, SUBSEQUENT ENCOUNTER: Primary | ICD-10-CM

## 2022-11-16 PROCEDURE — 99024 POSTOP FOLLOW-UP VISIT: CPT | Performed by: OTOLARYNGOLOGY

## 2022-11-16 NOTE — PROGRESS NOTES
Patient Name: Paulo Leiva Jr.   Visit Date: 11/16/2022   Patient ID: [id number]  Provider: Mal Carrasco MD    Sex: male  Location: [facility]   YOB: 1955  Location Address: [address] [place] [address], [address],?[place]?[id number]    Primary Care Provider Vilma Rolon APRN  Location Phone: [phone]    Referring Provider: GIOVANNY Rock        Chief Complaint  Suture removal-ER follow up    History of Present Illness  Paulo Leiva Jr. is a 66 y.o. male who presents today for follow-up of a complicated nasal laceration.  He was seen in the emergency department on 11/8/2022 where this was cleaned and sutured. He tells me that his pain is improving.  He has not had any bloody or purulent drainage.  He is finishing up an oral antibiotic currently and keeping antibiotic ointment on the laceration.    Past Medical History:   Diagnosis Date   • Benign essential hypertension    • COPD (chronic obstructive pulmonary disease) (HCC)    • Erectile dysfunction Apprx: 5 yrs ago   • Hyperlipidemia 12/14/2018   • Hyponatremia    • Vertigo 02/17/2021       Past Surgical History:   Procedure Laterality Date   • CARDIAC CATHETERIZATION N/A 9/26/2022    Procedure: Left Heart Cath;  Surgeon: Randell Casiano MD;  Location: [facility] CATH INVASIVE LOCATION;  Service: Cardiovascular;  Laterality: N/A;   • CARDIAC ELECTROPHYSIOLOGY PROCEDURE N/A 9/26/2022    Procedure: Loop insertion;  Surgeon: Randell Casiano MD;  Location: [facility] CATH INVASIVE LOCATION;  Service: Cardiovascular;  Laterality: N/A;   • COLONOSCOPY  2007   • COLONOSCOPY N/A 10/26/2022    Procedure: COLONOSCOPY FOR SCREENING;  Surgeon: Roque Whitlock MD;  Location: [facility] ENDOSCOPY;  Service: Gastroenterology;  Laterality: N/A;  NORMAL COLON         Current Outpatient Medications:   •  amLODIPine-benazepril (LOTREL 5-20) 5-20 MG per capsule, Take 1 capsule by mouth Daily., Disp: 90 capsule, Rfl: 1  •  apixaban (ELIQUIS)  "5 MG tablet tablet, Take 1 tablet by mouth 2 (Two) Times a Day., Disp: 180 tablet, Rfl: 3  •  ciprofloxacin (CIPRO) 500 MG tablet, Take 1 tablet by mouth Every 12 (Twelve) Hours., Disp: 14 tablet, Rfl: 0  •  hydroCHLOROthiazide (HYDRODIURIL) 12.5 MG tablet, Take 1 tablet by mouth Daily., Disp: 90 tablet, Rfl: 1  •  meclizine (ANTIVERT) 25 MG tablet, Take 2 tablets by mouth Daily., Disp: 180 tablet, Rfl: 1  •  metoprolol succinate XL (TOPROL-XL) 25 MG 24 hr tablet, Take 1 tablet by mouth Daily., Disp: 90 tablet, Rfl: 3  •  simvastatin (ZOCOR) 20 MG tablet, Take 1 tablet by mouth Daily., Disp: 90 tablet, Rfl: 1  •  Spiriva Respimat 1.25 MCG/ACT aerosol solution inhaler, Inhale 2 puffs Daily., Disp: 3 each, Rfl: 1     No Known Allergies    Social History     Tobacco Use   • Smoking status: Former     Packs/day: 2.00     Years: 15.00     Pack years: 30.00     Types: Cigarettes     Start date: 6/15/1974     Quit date: 10/20/2010     Years since quittin.0   • Smokeless tobacco: Never   • Tobacco comments:     SMOKES MORE THAN 2 PPD FOR 35 YEARS   Vaping Use   • Vaping Use: Never used   Substance Use Topics   • Alcohol use: Yes     Alcohol/week: 6.0 - 8.0 standard drinks     Types: 6 - 8 Cans of beer per week     Comment: 8-14 DRINKS PER WEEK   • Drug use: Not Currently        Objective     Vital Signs:   Temp 98.2 °F (36.8 °C) (Temporal)   Ht 180.3 cm (71\")   Wt 96.6 kg (213 lb)   BMI 29.71 kg/m²       Physical Exam    General: Well developed, well nourished patient of stated age in no acute distress. Voice is strong and clear.   Head: Normocephalic and atraumatic.  Face: No lesions.  Bilateral parotid and submandibular glands are unremarkable.  Stensen's and Warthin's ducts are productive of clear saliva bilaterally.  House-Brackmann I/VI     bilaterally.   muscles and temporomandibular joint nontender to palpation.  No TMJ crepitus.  Eyes: PERRLA, sclerae anicteric, no conjunctival injection. " Extraocular movements are intact and full. No nystagmus.   Ears: Auricles are normal in appearance. Bilateral external auditory canals are unremarkable. Bilateral tympanic membranes are clear and without effusion. Hearing normal to conversational voice.   Nose: Nasal dorsum, tip, and columellar laceration is healing well with sutures in place.  These were removed along with some of the crusting.. Bilateral nares are patent with normal appearing mucosa. Septum midline. Turbinates are unremarkable. No lesions.   Oral Cavity: Lips are normal in appearance. Oral mucosa is unremarkable. Gingiva is unremarkable.  Edentulous. Tongue is unremarkable with good movement. Hard palate is unremarkable.   Oropharynx: Soft palate is unremarkable with full movement. Uvula is unremarkable. Bilateral tonsils are unremarkable. Posterior oropharynx is unremarkable.    Larynx and hypopharynx: Deferred secondary to gag reflex.  Neck: Supple.  No mass.  Nontender to palpation.  Trachea midline. Thyroid normal size and without nodules to palpation.   Lymphatic: No lymphadenopathy upon palpation.   Psychiatric: Appropriate affect, cooperative   Neurologic: Oriented x 3, strength symmetric in all extremities, Cranial Nerves II-XII are grossly intact to confrontation   Skin: Warm and dry. No rashes.    Procedures           Result Review :               Assessment and Plan    Diagnoses and all orders for this visit:    1. Nasal laceration, subsequent encounter (Primary)    Impressions and findings were discussed.  His sutures were removed today and we discussed continued wound care.  He was given ample time to ask questions, all of which were answered to his satisfaction.      Follow Up   Return if symptoms worsen or fail to improve.  Patient was given instructions and counseling regarding his condition or for health maintenance advice. Please see specific information pulled into the AVS if appropriate.

## 2023-01-19 ENCOUNTER — TELEPHONE (OUTPATIENT)
Dept: CARDIOLOGY | Facility: CLINIC | Age: 68
End: 2023-01-19
Payer: MEDICARE

## 2023-01-19 NOTE — TELEPHONE ENCOUNTER
Patient had an episode of SVT on 01/17/23 at 11:54.  Loop Recorder Implanted for Syncope.    His cell phone is not in working order and when I call the [phone] number, someone answers but hangs up before I can speak.  This happened 4 times in a role.  [phone]

## 2023-02-06 ENCOUNTER — OFFICE VISIT (OUTPATIENT)
Dept: CARDIOLOGY | Facility: CLINIC | Age: 68
End: 2023-02-06
Payer: MEDICARE

## 2023-02-06 VITALS
HEIGHT: 71 IN | HEART RATE: 88 BPM | DIASTOLIC BLOOD PRESSURE: 77 MMHG | SYSTOLIC BLOOD PRESSURE: 138 MMHG | WEIGHT: 221 LBS | BODY MASS INDEX: 30.94 KG/M2

## 2023-02-06 DIAGNOSIS — R00.0 WIDE-COMPLEX TACHYCARDIA: ICD-10-CM

## 2023-02-06 DIAGNOSIS — R55 SYNCOPE AND COLLAPSE: Primary | ICD-10-CM

## 2023-02-06 DIAGNOSIS — Z95.818 STATUS POST PLACEMENT OF IMPLANTABLE LOOP RECORDER: ICD-10-CM

## 2023-02-06 PROCEDURE — 99214 OFFICE O/P EST MOD 30 MIN: CPT | Performed by: INTERNAL MEDICINE

## 2023-02-06 NOTE — ASSESSMENT & PLAN NOTE
Patient with no recurrent event since implantable loop recorder was in place continue with remote monitoring

## 2023-02-06 NOTE — ASSESSMENT & PLAN NOTE
Patient previous Ananda seen with wide-complex tachycardic episode on monitor with question of possible artifact, versus ventricular tachycardia versus A-fib with aberrancy.  His heart catheterization did not reveal any ischemic disease and his EF is normal making artifact possibly more likely.  He currently has an implantable loop recorder and has not seen any dysrhythmias so far continue with remote monitoring as well as treatment with Toprol 25 mg once a day

## 2023-02-06 NOTE — PROGRESS NOTES
Chief Complaint  Cardiac implants/ grafts and Loss of Consciousness    Subjective    Patient follows up without any presyncopal or syncopal episodes since last visit.  He denies any kim tachycardic episodes    Past Medical History:   Diagnosis Date   • Benign essential hypertension    • COPD (chronic obstructive pulmonary disease) (HCC)    • Erectile dysfunction Apprx: 5 yrs ago   • Hyperlipidemia 2018   • Hyponatremia    • Vertigo 2021         Current Outpatient Medications:   •  amLODIPine-benazepril (LOTREL 5-20) 5-20 MG per capsule, Take 1 capsule by mouth Daily., Disp: 90 capsule, Rfl: 1  •  apixaban (ELIQUIS) 5 MG tablet tablet, Take 1 tablet by mouth 2 (Two) Times a Day., Disp: 180 tablet, Rfl: 3  •  hydroCHLOROthiazide (HYDRODIURIL) 12.5 MG tablet, Take 1 tablet by mouth Daily., Disp: 90 tablet, Rfl: 1  •  meclizine (ANTIVERT) 25 MG tablet, Take 2 tablets by mouth Daily., Disp: 180 tablet, Rfl: 1  •  metoprolol succinate XL (TOPROL-XL) 25 MG 24 hr tablet, Take 1 tablet by mouth Daily., Disp: 90 tablet, Rfl: 3  •  simvastatin (ZOCOR) 20 MG tablet, Take 1 tablet by mouth Daily., Disp: 90 tablet, Rfl: 1  •  Spiriva Respimat 1.25 MCG/ACT aerosol solution inhaler, Inhale 2 puffs Daily., Disp: 3 each, Rfl: 1    Medications Discontinued During This Encounter   Medication Reason   • ciprofloxacin (CIPRO) 500 MG tablet *Therapy completed     No Known Allergies     Social History     Tobacco Use   • Smoking status: Former     Packs/day: 2.00     Years: 15.00     Pack years: 30.00     Types: Cigarettes     Start date: 6/15/1974     Quit date: 10/20/2010     Years since quittin.3   • Smokeless tobacco: Never   • Tobacco comments:     SMOKES MORE THAN 2 PPD FOR 35 YEARS   Vaping Use   • Vaping Use: Never used   Substance Use Topics   • Alcohol use: Yes     Alcohol/week: 6.0 - 8.0 standard drinks     Types: 6 - 8 Cans of beer per week     Comment: 8-14 DRINKS PER WEEK   • Drug use: Not Currently  "      Family History   Problem Relation Age of Onset   • Dementia Mother    • No Known Problems Father    • Colon cancer Neg Hx    • Malig Hyperthermia Neg Hx         Objective     /77   Pulse 88   Ht 180.3 cm (71\")   Wt 100 kg (221 lb)   BMI 30.82 kg/m²       Physical Exam    General Appearance:   · no acute distress  · Alert and oriented x3  HENT:   · lips not cyanotic  · Atraumatic  Neck:  · No jvd   · supple  Respiratory:  · no respiratory distress  · normal breath sounds  · no rales  Cardiovascular:  · Regular rate and rhythm  · no S3, no S4   · no murmur  · no rub  Extremities  · No cyanosis  · lower extremity edema: none    Skin:   · warm, dry  · No rashes      Result Review :     No results found for: PROBNP  CMP    CMP 8/17/22 9/26/22 10/7/22   Glucose 82 107 (A) 85   BUN 6 (A) 7 (A) 7 (A)   Creatinine 0.83 0.79 0.73 (A)   eGFR 97.1 98.6 101.0   Sodium 134 (A) 130 (A) 131 (A)   Potassium 4.2 4.4 4.4   Chloride 96 (A) 94 (A) 94 (A)   Calcium 9.3 9.3 9.6   Total Protein   7.0   Albumin   4.60   Globulin   2.4   Total Bilirubin   0.9   Alkaline Phosphatase   74   AST (SGOT)   26   ALT (SGPT)   20   Albumin/Globulin Ratio   1.9   BUN/Creatinine Ratio 7.2 8.9 9.6   Anion Gap 14.0 12.0 12.0   (A) Abnormal value       Comments are available for some flowsheets but are not being displayed.           CBC w/diff    CBC w/Diff 4/4/22 9/26/22 10/7/22   WBC 6.47 7.48 7.74   RBC 4.59 4.18 4.62   Hemoglobin 14.8 13.5 15.1   Hematocrit 42.9 37.8 42.1   MCV 93.5 90.4 91.1   MCH 32.2 32.3 32.7   MCHC 34.5 35.7 35.9 (A)   RDW 13.3 13.8 13.3   Platelets 340 288 326   Neutrophil Rel % 54.9  64.1   Immature Granulocyte Rel % 0.6 (A)  0.9 (A)   Lymphocyte Rel % 28.1  21.3   Monocyte Rel % 13.4 (A)  9.9   Eosinophil Rel % 1.5  2.6   Basophil Rel % 1.5  1.2   (A) Abnormal value             Lab Results   Component Value Date    TSH 0.752 04/04/2022      No results found for: FREET4   No results found for: " DDIMERQUANT  Magnesium   Date Value Ref Range Status   08/17/2022 1.9 1.6 - 2.4 mg/dL Final      No results found for: DIGOXIN   No results found for: TROPONINT        Lipid Panel    Lipid Panel 4/4/22 10/7/22   Total Cholesterol 297 (A) 221 (A)   Triglycerides 58 77   HDL Cholesterol 102 (A) 96 (A)   VLDL Cholesterol 8 13   LDL Cholesterol  187 (A) 112 (A)   LDL/HDL Ratio 1.80 1.14   (A) Abnormal value            No results found for: POCTROP    Results for orders placed during the hospital encounter of 09/07/22    Adult Transthoracic Echo Complete W/ Cont if Necessary Per Protocol    Interpretation Summary  · Calculated left ventricular EF = 52.3% Estimated left ventricular EF was in agreement with the calculated left ventricular EF.  · The left ventricular cavity is mildly dilated.  · Left atrial dilation mild to moderate            Implantable loop recorder has just shown/tachycardic spells    Diagnoses and all orders for this visit:    1. Syncope and collapse (Primary)  Assessment & Plan:  Patient with no recurrent event since implantable loop recorder was in place continue with remote monitoring      2. Wide-complex tachycardia  Assessment & Plan:  Patient previous Ananda seen with wide-complex tachycardic episode on monitor with question of possible artifact, versus ventricular tachycardia versus A-fib with aberrancy.  His heart catheterization did not reveal any ischemic disease and his EF is normal making artifact possibly more likely.  He currently has an implantable loop recorder and has not seen any dysrhythmias so far continue with remote monitoring as well as treatment with Toprol 25 mg once a day      3. Status post placement of implantable loop recorder          Follow Up     Return in about 6 months (around 8/6/2023).          Patient was given instructions and counseling regarding his condition or for health maintenance advice. Please see specific information pulled into the AVS if appropriate.

## 2023-02-14 ENCOUNTER — OFFICE VISIT (OUTPATIENT)
Dept: FAMILY MEDICINE CLINIC | Facility: CLINIC | Age: 68
End: 2023-02-14
Payer: MEDICARE

## 2023-02-14 VITALS
DIASTOLIC BLOOD PRESSURE: 84 MMHG | BODY MASS INDEX: 30.82 KG/M2 | TEMPERATURE: 98.2 F | SYSTOLIC BLOOD PRESSURE: 140 MMHG | WEIGHT: 221 LBS | HEART RATE: 87 BPM | OXYGEN SATURATION: 94 %

## 2023-02-14 DIAGNOSIS — D17.0 LIPOMA OF FACE: ICD-10-CM

## 2023-02-14 DIAGNOSIS — D17.24 LIPOMA OF LEFT LOWER EXTREMITY: Primary | ICD-10-CM

## 2023-02-14 PROCEDURE — 99213 OFFICE O/P EST LOW 20 MIN: CPT | Performed by: NURSE PRACTITIONER

## 2023-02-14 NOTE — PROGRESS NOTES
Chief Complaint  Skin Lesion (Has 2 skin tags he wants evaluated)    PHQ-2 Total Score: 0    Subjective        Past Medical History:   Diagnosis Date   • Benign essential hypertension    • COPD (chronic obstructive pulmonary disease) (HCC)    • Erectile dysfunction Apprx: 5 yrs ago   • Hyperlipidemia 2018   • Hyponatremia    • Vertigo 2021     Social History     Tobacco Use   • Smoking status: Former     Packs/day: 2.00     Years: 15.00     Pack years: 30.00     Types: Cigarettes     Start date: 6/15/1974     Quit date: 10/20/2010     Years since quittin.3   • Smokeless tobacco: Never   • Tobacco comments:     SMOKES MORE THAN 2 PPD FOR 35 YEARS   Vaping Use   • Vaping Use: Never used   Substance Use Topics   • Alcohol use: Yes     Alcohol/week: 6.0 - 8.0 standard drinks     Types: 6 - 8 Cans of beer per week     Comment: 8-14 DRINKS PER WEEK   • Drug use: Not Currently      Current Outpatient Medications on File Prior to Visit   Medication Sig   • amLODIPine-benazepril (LOTREL 5-20) 5-20 MG per capsule Take 1 capsule by mouth Daily.   • apixaban (ELIQUIS) 5 MG tablet tablet Take 1 tablet by mouth 2 (Two) Times a Day.   • hydroCHLOROthiazide (HYDRODIURIL) 12.5 MG tablet Take 1 tablet by mouth Daily.   • meclizine (ANTIVERT) 25 MG tablet Take 2 tablets by mouth Daily.   • metoprolol succinate XL (TOPROL-XL) 25 MG 24 hr tablet Take 1 tablet by mouth Daily.   • simvastatin (ZOCOR) 20 MG tablet Take 1 tablet by mouth Daily.   • Spiriva Respimat 1.25 MCG/ACT aerosol solution inhaler Inhale 2 puffs Daily.     No current facility-administered medications on file prior to visit.      No Known Allergies   Health Maintenance Due   Topic Date Due   • LUNG CANCER SCREENING  Never done   • ZOSTER VACCINE (2 of 2) 2015   • Pneumococcal Vaccine 65+ (2 - PPSV23 if available, else PCV20) 2015      Paulo Leiva Jr. presents to [facility]  History of Present Illness  Here  for a skin lesion on the face and to the leg. Pt notes the facial lesion is not enlarging but he can see in his peripheral vision and is bothersome.     Follow up with Cardiology was good and will follow up again in 6 months.       Objective   Vital Signs:   /84   Pulse 87   Temp 98.2 °F (36.8 °C)   Wt 100 kg (221 lb)   SpO2 94%   BMI 30.82 kg/m²     Review of Systems   Physical Exam  Vitals reviewed.   Constitutional:       General: He is not in acute distress.     Appearance: Normal appearance. He is well-developed.   HENT:      Head: Normocephalic and atraumatic.     Eyes:      Conjunctiva/sclera: Conjunctivae normal.      Pupils: Pupils are equal, round, and reactive to light.   Pulmonary:      Effort: Pulmonary effort is normal.      Breath sounds: Normal breath sounds.   Musculoskeletal:      Right lower leg: No edema.      Left lower leg: No edema.   Skin:     General: Skin is warm and dry.   Neurological:      Mental Status: He is alert and oriented to person, place, and time.   Psychiatric:         Mood and Affect: Mood and affect normal.         Behavior: Behavior normal.         Thought Content: Thought content normal.         Judgment: Judgment normal.        Result Review :                 Assessment and Plan    Diagnoses and all orders for this visit:    1. Lipoma of left lower extremity (Primary)  -     Ambulatory Referral to Dermatology    2. Lipoma of face  -     Ambulatory Referral to Dermatology        Follow Up   Return if symptoms worsen or fail to improve.  Patient was given instructions and counseling regarding his condition or for health maintenance advice. Please see specific information pulled into the AVS if appropriate.       Answers for HPI/ROS submitted by the patient on 2/7/2023  What is the primary reason for your visit?: Other  Please describe your symptoms.: Believe it's a syst  on left side of face and left lower leg  Have you had these symptoms before?: No  How long  have you been having these symptoms?: Greater than 2 weeks

## 2023-03-03 DIAGNOSIS — J44.9 CHRONIC OBSTRUCTIVE PULMONARY DISEASE, UNSPECIFIED COPD TYPE: ICD-10-CM

## 2023-03-03 RX ORDER — TIOTROPIUM BROMIDE INHALATION SPRAY 1.56 UG/1
SPRAY, METERED RESPIRATORY (INHALATION)
Qty: 12 G | Refills: 0 | Status: SHIPPED | OUTPATIENT
Start: 2023-03-03

## 2023-03-09 NOTE — PROGRESS NOTES
Venipuncture Blood Specimen Collection  Venipuncture performed in left arm by Janelle Chen with good hemostasis. Patient tolerated the procedure well without complications.   10/07/22   Janelle Chen     Otezla Counseling: The side effects of Otezla were discussed with the patient, including but not limited to worsening or new depression, weight loss, diarrhea, nausea, upper respiratory tract infection, and headache. Patient instructed to call the office should any adverse effect occur.  The patient verbalized understanding of the proper use and possible adverse effects of Otezla.  All the patient's questions and concerns were addressed.

## 2023-03-21 DIAGNOSIS — I10 ESSENTIAL HYPERTENSION: ICD-10-CM

## 2023-03-21 RX ORDER — AMLODIPINE BESYLATE AND BENAZEPRIL HYDROCHLORIDE 5; 20 MG/1; MG/1
CAPSULE ORAL
Qty: 90 CAPSULE | Refills: 3 | Status: SHIPPED | OUTPATIENT
Start: 2023-03-21

## 2023-03-21 RX ORDER — HYDROCHLOROTHIAZIDE 12.5 MG/1
TABLET ORAL
Qty: 90 TABLET | Refills: 3 | Status: SHIPPED | OUTPATIENT
Start: 2023-03-21

## 2023-03-23 ENCOUNTER — TELEPHONE (OUTPATIENT)
Dept: CARDIOLOGY | Facility: CLINIC | Age: 68
End: 2023-03-23
Payer: MEDICARE

## 2023-03-23 NOTE — TELEPHONE ENCOUNTER
I tried to call him again and left message on Home and cell phone.  Just incase he calls back and someone gets the message.

## 2023-03-23 NOTE — TELEPHONE ENCOUNTER
Per Dr Casiano: Lets see if the patient was symptomatic during any of the spells otherwise he can just continue with his current Toprol dose and will continue with monitoring.     SW patient. Patient denies any symptoms. Advised we will continue to monitor per Dr Casiano recommendations.

## 2023-03-23 NOTE — TELEPHONE ENCOUNTER
Patient was implanted with a [employer] loop recorder for Syncope.    He had 1 episodes of SVT, 1 episode was 1 minute 3 seconds and sencond 1 Sinus Tach was 19 seconds.  They were at 10:19 am    I called and left him a message to see if he was symptomatic.

## 2023-04-12 ENCOUNTER — OFFICE VISIT (OUTPATIENT)
Dept: FAMILY MEDICINE CLINIC | Facility: CLINIC | Age: 68
End: 2023-04-12
Payer: MEDICARE

## 2023-04-12 VITALS
DIASTOLIC BLOOD PRESSURE: 72 MMHG | WEIGHT: 213 LBS | BODY MASS INDEX: 29.82 KG/M2 | HEIGHT: 71 IN | HEART RATE: 108 BPM | TEMPERATURE: 97.7 F | SYSTOLIC BLOOD PRESSURE: 132 MMHG | OXYGEN SATURATION: 95 %

## 2023-04-12 DIAGNOSIS — Z87.891 HISTORY OF NICOTINE DEPENDENCE: ICD-10-CM

## 2023-04-12 DIAGNOSIS — R05.8 PRODUCTIVE COUGH: Primary | ICD-10-CM

## 2023-04-12 DIAGNOSIS — J44.1 CHRONIC OBSTRUCTIVE PULMONARY DISEASE WITH ACUTE EXACERBATION: ICD-10-CM

## 2023-04-12 DIAGNOSIS — R73.09 ELEVATED GLUCOSE LEVEL: ICD-10-CM

## 2023-04-12 DIAGNOSIS — E78.5 HYPERLIPIDEMIA, UNSPECIFIED HYPERLIPIDEMIA TYPE: ICD-10-CM

## 2023-04-12 DIAGNOSIS — I10 ESSENTIAL HYPERTENSION: ICD-10-CM

## 2023-04-12 DIAGNOSIS — R42 VERTIGO: ICD-10-CM

## 2023-04-12 DIAGNOSIS — Z12.2 ENCOUNTER FOR SCREENING FOR LUNG CANCER: ICD-10-CM

## 2023-04-12 LAB
ALBUMIN SERPL-MCNC: 4.3 G/DL (ref 3.5–5.2)
ALBUMIN/GLOB SERPL: 1.2 G/DL
ALP SERPL-CCNC: 109 U/L (ref 39–117)
ALT SERPL W P-5'-P-CCNC: 18 U/L (ref 1–41)
ANION GAP SERPL CALCULATED.3IONS-SCNC: 10 MMOL/L (ref 5–15)
AST SERPL-CCNC: 22 U/L (ref 1–40)
BILIRUB SERPL-MCNC: 0.8 MG/DL (ref 0–1.2)
BILIRUB UR QL STRIP: NEGATIVE
BUN SERPL-MCNC: 5 MG/DL (ref 8–23)
BUN/CREAT SERPL: 5.7 (ref 7–25)
BURR CELLS BLD QL SMEAR: ABNORMAL
CALCIUM SPEC-SCNC: 9.8 MG/DL (ref 8.6–10.5)
CHLORIDE SERPL-SCNC: 88 MMOL/L (ref 98–107)
CHOLEST SERPL-MCNC: 153 MG/DL (ref 0–200)
CLARITY UR: CLEAR
CO2 SERPL-SCNC: 28 MMOL/L (ref 22–29)
COLOR UR: YELLOW
CREAT SERPL-MCNC: 0.88 MG/DL (ref 0.76–1.27)
DEPRECATED RDW RBC AUTO: 42.7 FL (ref 37–54)
EGFRCR SERPLBLD CKD-EPI 2021: 94.8 ML/MIN/1.73
ERYTHROCYTE [DISTWIDTH] IN BLOOD BY AUTOMATED COUNT: 12.8 % (ref 12.3–15.4)
GLOBULIN UR ELPH-MCNC: 3.5 GM/DL
GLUCOSE SERPL-MCNC: 103 MG/DL (ref 65–99)
GLUCOSE UR STRIP-MCNC: NEGATIVE MG/DL
HBA1C MFR BLD: 5.7 % (ref 4.8–5.6)
HCT VFR BLD AUTO: 43.6 % (ref 37.5–51)
HDLC SERPL-MCNC: 58 MG/DL (ref 40–60)
HGB BLD-MCNC: 15.3 G/DL (ref 13–17.7)
HGB UR QL STRIP.AUTO: NEGATIVE
KETONES UR QL STRIP: NEGATIVE
LDLC SERPL CALC-MCNC: 76 MG/DL (ref 0–100)
LDLC/HDLC SERPL: 1.29 {RATIO}
LEUKOCYTE ESTERASE UR QL STRIP.AUTO: NEGATIVE
LYMPHOCYTES # BLD MANUAL: 1.88 10*3/MM3 (ref 0.7–3.1)
LYMPHOCYTES NFR BLD MANUAL: 15.2 % (ref 5–12)
MCH RBC QN AUTO: 32.5 PG (ref 26.6–33)
MCHC RBC AUTO-ENTMCNC: 35.1 G/DL (ref 31.5–35.7)
MCV RBC AUTO: 92.6 FL (ref 79–97)
METAMYELOCYTES NFR BLD MANUAL: 1 % (ref 0–0)
MONOCYTES # BLD: 1.88 10*3/MM3 (ref 0.1–0.9)
MYELOCYTES NFR BLD MANUAL: 1 % (ref 0–0)
NEUTROPHILS # BLD AUTO: 8.37 10*3/MM3 (ref 1.7–7)
NEUTROPHILS NFR BLD MANUAL: 67.7 % (ref 42.7–76)
NITRITE UR QL STRIP: NEGATIVE
PH UR STRIP.AUTO: 7.5 [PH] (ref 5–8)
PLAT MORPH BLD: NORMAL
PLATELET # BLD AUTO: 456 10*3/MM3 (ref 140–450)
PMV BLD AUTO: 10 FL (ref 6–12)
POIKILOCYTOSIS BLD QL SMEAR: ABNORMAL
POTASSIUM SERPL-SCNC: 4.1 MMOL/L (ref 3.5–5.2)
PROT SERPL-MCNC: 7.8 G/DL (ref 6–8.5)
PROT UR QL STRIP: NEGATIVE
RBC # BLD AUTO: 4.71 10*6/MM3 (ref 4.14–5.8)
SODIUM SERPL-SCNC: 126 MMOL/L (ref 136–145)
SP GR UR STRIP: 1.01 (ref 1–1.03)
TRIGL SERPL-MCNC: 102 MG/DL (ref 0–150)
TSH SERPL DL<=0.05 MIU/L-ACNC: 0.45 UIU/ML (ref 0.27–4.2)
UROBILINOGEN UR QL STRIP: NORMAL
VARIANT LYMPHS NFR BLD MANUAL: 15.2 % (ref 19.6–45.3)
VLDLC SERPL-MCNC: 19 MG/DL (ref 5–40)
WBC MORPH BLD: NORMAL
WBC NRBC COR # BLD: 12.36 10*3/MM3 (ref 3.4–10.8)

## 2023-04-12 PROCEDURE — 80061 LIPID PANEL: CPT | Performed by: NURSE PRACTITIONER

## 2023-04-12 PROCEDURE — 83036 HEMOGLOBIN GLYCOSYLATED A1C: CPT | Performed by: NURSE PRACTITIONER

## 2023-04-12 PROCEDURE — 81003 URINALYSIS AUTO W/O SCOPE: CPT | Performed by: NURSE PRACTITIONER

## 2023-04-12 PROCEDURE — 3078F DIAST BP <80 MM HG: CPT | Performed by: NURSE PRACTITIONER

## 2023-04-12 PROCEDURE — 85025 COMPLETE CBC W/AUTO DIFF WBC: CPT | Performed by: NURSE PRACTITIONER

## 2023-04-12 PROCEDURE — 84443 ASSAY THYROID STIM HORMONE: CPT | Performed by: NURSE PRACTITIONER

## 2023-04-12 PROCEDURE — 1159F MED LIST DOCD IN RCRD: CPT | Performed by: NURSE PRACTITIONER

## 2023-04-12 PROCEDURE — 85007 BL SMEAR W/DIFF WBC COUNT: CPT | Performed by: NURSE PRACTITIONER

## 2023-04-12 PROCEDURE — 3075F SYST BP GE 130 - 139MM HG: CPT | Performed by: NURSE PRACTITIONER

## 2023-04-12 PROCEDURE — 1160F RVW MEDS BY RX/DR IN RCRD: CPT | Performed by: NURSE PRACTITIONER

## 2023-04-12 PROCEDURE — 99214 OFFICE O/P EST MOD 30 MIN: CPT | Performed by: NURSE PRACTITIONER

## 2023-04-12 PROCEDURE — 36415 COLL VENOUS BLD VENIPUNCTURE: CPT | Performed by: NURSE PRACTITIONER

## 2023-04-12 PROCEDURE — 80053 COMPREHEN METABOLIC PANEL: CPT | Performed by: NURSE PRACTITIONER

## 2023-04-12 RX ORDER — BENZONATATE 100 MG/1
100 CAPSULE ORAL 3 TIMES DAILY PRN
Qty: 30 CAPSULE | Refills: 0 | Status: SHIPPED | OUTPATIENT
Start: 2023-04-12

## 2023-04-12 RX ORDER — PREDNISONE 5 MG/1
TABLET ORAL
Qty: 1 EACH | Refills: 0 | Status: SHIPPED | OUTPATIENT
Start: 2023-04-12

## 2023-04-12 RX ORDER — AMOXICILLIN AND CLAVULANATE POTASSIUM 875; 125 MG/1; MG/1
1 TABLET, FILM COATED ORAL 2 TIMES DAILY
Qty: 20 TABLET | Refills: 0 | Status: SHIPPED | OUTPATIENT
Start: 2023-04-12 | End: 2023-04-12 | Stop reason: SDUPTHER

## 2023-04-12 RX ORDER — PREDNISONE 5 MG/1
TABLET ORAL
Qty: 1 EACH | Refills: 0 | Status: SHIPPED | OUTPATIENT
Start: 2023-04-12 | End: 2023-04-12 | Stop reason: SDUPTHER

## 2023-04-12 RX ORDER — AMLODIPINE BESYLATE AND BENAZEPRIL HYDROCHLORIDE 5; 20 MG/1; MG/1
1 CAPSULE ORAL DAILY
Qty: 90 CAPSULE | Refills: 1 | Status: SHIPPED | OUTPATIENT
Start: 2023-04-12

## 2023-04-12 RX ORDER — HYDROCHLOROTHIAZIDE 12.5 MG/1
12.5 TABLET ORAL DAILY
Qty: 90 TABLET | Refills: 1 | Status: SHIPPED | OUTPATIENT
Start: 2023-04-12 | End: 2023-04-13 | Stop reason: SINTOL

## 2023-04-12 RX ORDER — BENZONATATE 100 MG/1
100 CAPSULE ORAL 3 TIMES DAILY PRN
Qty: 30 CAPSULE | Refills: 0 | Status: SHIPPED | OUTPATIENT
Start: 2023-04-12 | End: 2023-04-12 | Stop reason: SDUPTHER

## 2023-04-12 RX ORDER — SIMVASTATIN 20 MG
20 TABLET ORAL DAILY
Qty: 90 TABLET | Refills: 1 | Status: SHIPPED | OUTPATIENT
Start: 2023-04-12

## 2023-04-12 RX ORDER — MECLIZINE HYDROCHLORIDE 25 MG/1
50 TABLET ORAL DAILY
Qty: 180 TABLET | Refills: 1 | Status: SHIPPED | OUTPATIENT
Start: 2023-04-12

## 2023-04-12 RX ORDER — AMOXICILLIN AND CLAVULANATE POTASSIUM 875; 125 MG/1; MG/1
1 TABLET, FILM COATED ORAL 2 TIMES DAILY
Qty: 20 TABLET | Refills: 0 | Status: SHIPPED | OUTPATIENT
Start: 2023-04-12

## 2023-04-12 NOTE — PROGRESS NOTES
Venipuncture Blood Specimen Collection  Venipuncture performed in left arm  by Cadence Mayer with good hemostasis. Patient tolerated the procedure well without complications.   04/12/23   Cadence Mayer

## 2023-04-13 DIAGNOSIS — D72.829 LEUKOCYTOSIS, UNSPECIFIED TYPE: ICD-10-CM

## 2023-04-13 DIAGNOSIS — I10 ESSENTIAL HYPERTENSION: Primary | ICD-10-CM

## 2023-04-13 DIAGNOSIS — E87.1 HYPONATREMIA: ICD-10-CM

## 2023-04-13 RX ORDER — AMLODIPINE BESYLATE 5 MG/1
5 TABLET ORAL DAILY
Qty: 90 TABLET | Refills: 0 | Status: SHIPPED | OUTPATIENT
Start: 2023-04-13

## 2023-05-05 ENCOUNTER — CLINICAL SUPPORT (OUTPATIENT)
Dept: FAMILY MEDICINE CLINIC | Facility: CLINIC | Age: 68
End: 2023-05-05
Payer: MEDICARE

## 2023-05-05 DIAGNOSIS — D72.829 LEUKOCYTOSIS, UNSPECIFIED TYPE: ICD-10-CM

## 2023-05-05 DIAGNOSIS — E87.1 HYPONATREMIA: ICD-10-CM

## 2023-05-05 LAB
ALBUMIN SERPL-MCNC: 4.2 G/DL (ref 3.5–5.2)
ALBUMIN/GLOB SERPL: 1.4 G/DL
ALP SERPL-CCNC: 91 U/L (ref 39–117)
ALT SERPL W P-5'-P-CCNC: 18 U/L (ref 1–41)
ANION GAP SERPL CALCULATED.3IONS-SCNC: 10 MMOL/L (ref 5–15)
AST SERPL-CCNC: 23 U/L (ref 1–40)
BASOPHILS # BLD AUTO: 0.13 10*3/MM3 (ref 0–0.2)
BASOPHILS NFR BLD AUTO: 1.4 % (ref 0–1.5)
BILIRUB SERPL-MCNC: 1 MG/DL (ref 0–1.2)
BUN SERPL-MCNC: 6 MG/DL (ref 8–23)
BUN/CREAT SERPL: 7.6 (ref 7–25)
CALCIUM SPEC-SCNC: 9.6 MG/DL (ref 8.6–10.5)
CHLORIDE SERPL-SCNC: 93 MMOL/L (ref 98–107)
CO2 SERPL-SCNC: 25 MMOL/L (ref 22–29)
CREAT SERPL-MCNC: 0.79 MG/DL (ref 0.76–1.27)
DEPRECATED RDW RBC AUTO: 48.1 FL (ref 37–54)
EGFRCR SERPLBLD CKD-EPI 2021: 98 ML/MIN/1.73
EOSINOPHIL # BLD AUTO: 0.7 10*3/MM3 (ref 0–0.4)
EOSINOPHIL NFR BLD AUTO: 7.8 % (ref 0.3–6.2)
ERYTHROCYTE [DISTWIDTH] IN BLOOD BY AUTOMATED COUNT: 13.8 % (ref 12.3–15.4)
GLOBULIN UR ELPH-MCNC: 2.9 GM/DL
GLUCOSE SERPL-MCNC: 80 MG/DL (ref 65–99)
HCT VFR BLD AUTO: 45.4 % (ref 37.5–51)
HGB BLD-MCNC: 15.6 G/DL (ref 13–17.7)
IMM GRANULOCYTES # BLD AUTO: 0.05 10*3/MM3 (ref 0–0.05)
IMM GRANULOCYTES NFR BLD AUTO: 0.6 % (ref 0–0.5)
LYMPHOCYTES # BLD AUTO: 1.76 10*3/MM3 (ref 0.7–3.1)
LYMPHOCYTES NFR BLD AUTO: 19.5 % (ref 19.6–45.3)
MCH RBC QN AUTO: 32.6 PG (ref 26.6–33)
MCHC RBC AUTO-ENTMCNC: 34.4 G/DL (ref 31.5–35.7)
MCV RBC AUTO: 95 FL (ref 79–97)
MONOCYTES # BLD AUTO: 0.85 10*3/MM3 (ref 0.1–0.9)
MONOCYTES NFR BLD AUTO: 9.4 % (ref 5–12)
NEUTROPHILS NFR BLD AUTO: 5.54 10*3/MM3 (ref 1.7–7)
NEUTROPHILS NFR BLD AUTO: 61.3 % (ref 42.7–76)
NRBC BLD AUTO-RTO: 0 /100 WBC (ref 0–0.2)
PLATELET # BLD AUTO: 281 10*3/MM3 (ref 140–450)
PMV BLD AUTO: 10.5 FL (ref 6–12)
POTASSIUM SERPL-SCNC: 4.8 MMOL/L (ref 3.5–5.2)
PROT SERPL-MCNC: 7.1 G/DL (ref 6–8.5)
RBC # BLD AUTO: 4.78 10*6/MM3 (ref 4.14–5.8)
SODIUM SERPL-SCNC: 128 MMOL/L (ref 136–145)
WBC NRBC COR # BLD: 9.03 10*3/MM3 (ref 3.4–10.8)

## 2023-05-05 PROCEDURE — 85025 COMPLETE CBC W/AUTO DIFF WBC: CPT | Performed by: NURSE PRACTITIONER

## 2023-05-05 PROCEDURE — 36415 COLL VENOUS BLD VENIPUNCTURE: CPT | Performed by: NURSE PRACTITIONER

## 2023-05-05 PROCEDURE — 80053 COMPREHEN METABOLIC PANEL: CPT | Performed by: NURSE PRACTITIONER

## 2023-05-05 NOTE — PROGRESS NOTES
Venipuncture Blood Specimen Collection  Venipuncture performed in left arm by Janelle Ding with good hemostasis. Patient tolerated the procedure well without complications.   05/05/23   Janelle Ding

## 2023-05-10 ENCOUNTER — HOSPITAL ENCOUNTER (OUTPATIENT)
Dept: CT IMAGING | Facility: HOSPITAL | Age: 68
Discharge: HOME | End: 2023-05-10
Admitting: NURSE PRACTITIONER
Payer: MEDICARE

## 2023-05-10 DIAGNOSIS — Z12.2 ENCOUNTER FOR SCREENING FOR LUNG CANCER: ICD-10-CM

## 2023-05-10 DIAGNOSIS — Z87.891 HISTORY OF NICOTINE DEPENDENCE: ICD-10-CM

## 2023-05-10 PROCEDURE — 71271 CT THORAX LUNG CANCER SCR C-: CPT

## 2023-08-30 ENCOUNTER — OFFICE VISIT (OUTPATIENT)
Dept: CARDIOLOGY | Facility: CLINIC | Age: 68
End: 2023-08-30
Payer: MEDICARE

## 2023-08-30 VITALS
HEART RATE: 78 BPM | DIASTOLIC BLOOD PRESSURE: 67 MMHG | SYSTOLIC BLOOD PRESSURE: 147 MMHG | BODY MASS INDEX: 30.24 KG/M2 | HEIGHT: 71 IN | WEIGHT: 216 LBS

## 2023-08-30 DIAGNOSIS — Z95.818 STATUS POST PLACEMENT OF IMPLANTABLE LOOP RECORDER: ICD-10-CM

## 2023-08-30 DIAGNOSIS — R55 SYNCOPE AND COLLAPSE: Primary | ICD-10-CM

## 2023-08-30 DIAGNOSIS — R00.0 WIDE-COMPLEX TACHYCARDIA: ICD-10-CM

## 2023-08-30 PROCEDURE — 3077F SYST BP >= 140 MM HG: CPT | Performed by: INTERNAL MEDICINE

## 2023-08-30 PROCEDURE — 3078F DIAST BP <80 MM HG: CPT | Performed by: INTERNAL MEDICINE

## 2023-08-30 PROCEDURE — 99214 OFFICE O/P EST MOD 30 MIN: CPT | Performed by: INTERNAL MEDICINE

## 2023-08-30 NOTE — ASSESSMENT & PLAN NOTE
Patient with no recurrent syncopal episodes since loop recorder implanted has had some tachycardic spells brief in duration this could just be sinus also paroxysmal SVT as well but the rate would not explain any syncopal episodes.

## 2023-08-30 NOTE — ASSESSMENT & PLAN NOTE
No recurrent episodes seen on implantable loop recorder atrial fibrillation or any other wide-complex tachycardic spells continue with his Eliquis 5 twice daily for now continue monitoring may be able to discontinue in the future if no confirmed atrial fibrillation episodes

## 2023-08-30 NOTE — PROGRESS NOTES
Chief Complaint  Follow-up (6 month f/u)    Subjective    Patient follows up today with no recurrent syncopal episodes occasional tachycardic symptoms but overall is feeling good.  Past Medical History:   Diagnosis Date    Benign essential hypertension     COPD (chronic obstructive pulmonary disease)     Erectile dysfunction Apprx: 5 yrs ago    Hyperlipidemia 2018    Hyponatremia     Vertigo 2021         Current Outpatient Medications:     amLODIPine (NORVASC) 5 MG tablet, Take 1 tablet by mouth Daily., Disp: 90 tablet, Rfl: 0    apixaban (ELIQUIS) 5 MG tablet tablet, Take 1 tablet by mouth 2 (Two) Times a Day., Disp: 180 tablet, Rfl: 3    meclizine (ANTIVERT) 25 MG tablet, Take 2 tablets by mouth Daily., Disp: 180 tablet, Rfl: 1    metoprolol succinate XL (TOPROL-XL) 50 MG 24 hr tablet, Take 1 tablet by mouth Daily., Disp: 90 tablet, Rfl: 3    simvastatin (ZOCOR) 20 MG tablet, Take 1 tablet by mouth Daily., Disp: 90 tablet, Rfl: 1    Spiriva Respimat 1.25 MCG/ACT aerosol solution inhaler, USE 2 INHALATIONS DAILY, Disp: 12 g, Rfl: 0    Medications Discontinued During This Encounter   Medication Reason    amoxicillin-clavulanate (Augmentin) 875-125 MG per tablet     benzonatate (Tessalon Perles) 100 MG capsule     predniSONE 5 MG (21) tablet therapy pack dose pack     amLODIPine-benazepril (LOTREL 5-20) 5-20 MG per capsule Duplicate order     No Known Allergies     Social History     Tobacco Use    Smoking status: Former     Packs/day: 2.00     Years: 15.00     Pack years: 30.00     Types: Cigarettes     Start date: 6/15/1974     Quit date: 10/20/2010     Years since quittin.8     Passive exposure: Past    Smokeless tobacco: Never    Tobacco comments:     SMOKES MORE THAN 2 PPD FOR 35 YEARS   Vaping Use    Vaping Use: Never used   Substance Use Topics    Alcohol use: Yes     Alcohol/week: 6.0 - 8.0 standard drinks     Types: 6 - 8 Cans of beer per week     Comment: 8-14 DRINKS PER WEEK    Drug use:  "Not Currently       Family History   Problem Relation Age of Onset    Dementia Mother     No Known Problems Father     Colon cancer Neg Hx     Malig Hyperthermia Neg Hx         Objective     /67 (BP Location: Left arm, Patient Position: Sitting)   Pulse 78   Ht 180.3 cm (71\")   Wt 98 kg (216 lb) Comment: Previous weight - 213  BMI 30.13 kg/mý       Physical Exam    General Appearance:   no acute distress  Alert and oriented x3  HENT:   lips not cyanotic  Atraumatic  Neck:  No jvd   supple  Respiratory:  no respiratory distress  normal breath sounds  no rales  Cardiovascular:  Regular rate and rhythm  no S3, no S4   no murmur  no rub  Extremities  No cyanosis  lower extremity edema: none    Skin:   warm, dry  No rashes      Result Review :     No results found for: PROBNP  CMP          10/7/2022    10:25 4/12/2023    09:51 5/5/2023    10:24   CMP   Glucose 85  103  80    BUN 7  5  6    Creatinine 0.73  0.88  0.79    EGFR 101.0  94.8  98.0    Sodium 131  126  128    Potassium 4.4  4.1  4.8    Chloride 94  88  93    Calcium 9.6  9.8  9.6    Total Protein 7.0  7.8  7.1    Albumin 4.60  4.3  4.2    Globulin 2.4  3.5  2.9    Total Bilirubin 0.9  0.8  1.0    Alkaline Phosphatase 74  109  91    AST (SGOT) 26  22  23    ALT (SGPT) 20  18  18    Albumin/Globulin Ratio 1.9  1.2  1.4    BUN/Creatinine Ratio 9.6  5.7  7.6    Anion Gap 12.0  10.0  10.0      CBC w/diff          10/7/2022    10:25 4/12/2023    09:51 5/5/2023    10:24   CBC w/Diff   WBC 7.74  12.36  9.03    RBC 4.62  4.71  4.78    Hemoglobin 15.1  15.3  15.6    Hematocrit 42.1  43.6  45.4    MCV 91.1  92.6  95.0    MCH 32.7  32.5  32.6    MCHC 35.9  35.1  34.4    RDW 13.3  12.8  13.8    Platelets 326  456  281    Neutrophil Rel % 64.1   61.3    Immature Granulocyte Rel % 0.9   0.6    Lymphocyte Rel % 21.3   19.5    Monocyte Rel % 9.9   9.4    Eosinophil Rel % 2.6   7.8    Basophil Rel % 1.2   1.4       Lab Results   Component Value Date    TSH 0.446 " 04/12/2023      No results found for: FREET4   No results found for: DDIMERQUANT  Magnesium   Date Value Ref Range Status   08/17/2022 1.9 1.6 - 2.4 mg/dL Final      No results found for: DIGOXIN   No results found for: TROPONINT        Lipid Panel          10/7/2022    10:25 4/12/2023    09:51   Lipid Panel   Total Cholesterol 221  153    Triglycerides 77  102    HDL Cholesterol 96  58    VLDL Cholesterol 13  19    LDL Cholesterol  112  76    LDL/HDL Ratio 1.14  1.29      No results found for: POCTROP    Results for orders placed during the hospital encounter of 09/07/22    Adult Transthoracic Echo Complete W/ Cont if Necessary Per Protocol    Interpretation Summary  ú Calculated left ventricular EF = 52.3% Estimated left ventricular EF was in agreement with the calculated left ventricular EF.  ú The left ventricular cavity is mildly dilated.  ú Left atrial dilation mild to moderate                 Diagnoses and all orders for this visit:    1. Syncope and collapse (Primary)  Assessment & Plan:  Patient with no recurrent syncopal episodes since loop recorder implanted has had some tachycardic spells brief in duration this could just be sinus also paroxysmal SVT as well but the rate would not explain any syncopal episodes.      2. Status post placement of implantable loop recorder  Assessment & Plan:  Continue with remote monitoring      3. Wide-complex tachycardia  Assessment & Plan:  No recurrent episodes seen on implantable loop recorder atrial fibrillation or any other wide-complex tachycardic spells continue with his Eliquis 5 twice daily for now continue monitoring may be able to discontinue in the future if no confirmed atrial fibrillation episodes              Follow Up     Return in about 6 months (around 2/29/2024) for Follow with Jenni Crawley.          Patient was given instructions and counseling regarding his condition or for health maintenance advice. Please see specific information pulled into the  AVS if appropriate.

## 2023-09-13 NOTE — PROGRESS NOTES
Answers for HPI/ROS submitted by the patient on 2023  What is the primary reason for your visit?: Other  Please describe your symptoms.: MED RENEWEL  Have you had these symptoms before?: No  How long have you been having these symptoms?: 1-4 days    Chief Complaint  Hyperlipidemia (Refills and labs, patient is fasting), Hypertension, and Cough (Since the beginning of April)    Subjective        Past Medical History:   Diagnosis Date   • Benign essential hypertension    • COPD (chronic obstructive pulmonary disease)    • Erectile dysfunction Apprx: 5 yrs ago   • Hyperlipidemia 2018   • Hyponatremia    • Vertigo 2021     Social History     Tobacco Use   • Smoking status: Former     Packs/day: 2.00     Years: 15.00     Pack years: 30.00     Types: Cigarettes     Start date: 6/15/1974     Quit date: 10/20/2010     Years since quittin.4     Passive exposure: Past   • Smokeless tobacco: Never   • Tobacco comments:     SMOKES MORE THAN 2 PPD FOR 35 YEARS   Vaping Use   • Vaping Use: Never used   Substance Use Topics   • Alcohol use: Yes     Alcohol/week: 6.0 - 8.0 standard drinks     Types: 6 - 8 Cans of beer per week     Comment: 8-14 DRINKS PER WEEK   • Drug use: Not Currently      Current Outpatient Medications on File Prior to Visit   Medication Sig   • apixaban (ELIQUIS) 5 MG tablet tablet Take 1 tablet by mouth 2 (Two) Times a Day.   • metoprolol succinate XL (TOPROL-XL) 25 MG 24 hr tablet Take 1 tablet by mouth Daily.   • Spiriva Respimat 1.25 MCG/ACT aerosol solution inhaler USE 2 INHALATIONS DAILY   • [DISCONTINUED] amLODIPine-benazepril (LOTREL 5-20) 5-20 MG per capsule TAKE 1 CAPSULE DAILY   • [DISCONTINUED] hydroCHLOROthiazide (HYDRODIURIL) 12.5 MG tablet TAKE 1 TABLET DAILY   • [DISCONTINUED] meclizine (ANTIVERT) 25 MG tablet Take 2 tablets by mouth Daily.   • [DISCONTINUED] simvastatin (ZOCOR) 20 MG tablet Take 1 tablet by mouth Daily.     No current facility-administered medications on  "file prior to visit.      No Known Allergies   Health Maintenance Due   Topic Date Due   • LUNG CANCER SCREENING  Never done   • ZOSTER VACCINE (2 of 2) 01/29/2015   • Pneumococcal Vaccine 65+ (2 - PPSV23 if available, else PCV20) 12/04/2015      Paulo SANTANA Cali Finch presents to [facility]  History of Present Illness  Patient presents to the office today to follow-up on chronic health conditions and obtain refills of medications.  Patient is fasting for labs.      HTN: Normotensive in office.  Patient does not follow dietary restrictions.  Unknown control at home.    Hyperlipidemia: Denies side effects of medication.    Review of labs reveals previous elevated glucose readings will order A1c.    Here with continued cough x 11 days. Pt notes feeling like he can't get over a cough that started once he returned home from [place]. Pt states it was worse last week. Notes production with cough. He does note a headache but contributes to coughing. Pt states he diet has been poor and he hasn't been eating as much. Denies wheezing but feels like raspy at times. If he sleeps on left side he will cough more.       Objective   Vital Signs:   /72 (BP Location: Left arm)   Pulse 108   Temp 97.7 °F (36.5 °C)   Ht 180.3 cm (71\")   Wt 96.6 kg (213 lb)   SpO2 95%   BMI 29.71 kg/m²     Review of Systems   Physical Exam   Result Review :   The following data was reviewed by: MELANIA Santiago on 04/12/2023:  Common labs        8/17/2022    13:12 9/26/2022    07:36 10/7/2022    10:25   Common Labs   Glucose 82   107   85     BUN 6   7   7     Creatinine 0.83   0.79   0.73     Sodium 134   130   131     Potassium 4.2   4.4   4.4     Chloride 96   94   94     Calcium 9.3   9.3   9.6     Albumin   4.60     Total Bilirubin   0.9     Alkaline Phosphatase   74     AST (SGOT)   26     ALT (SGPT)   20     WBC  7.48   7.74     Hemoglobin  13.5   15.1     Hematocrit  37.8   42.1     Platelets  288   " 326     Total Cholesterol   221     Triglycerides   77     HDL Cholesterol   96     LDL Cholesterol    112           Data reviewed: Radiologic studies CXR          Assessment and Plan    Diagnoses and all orders for this visit:    1. Productive cough (Primary)  -     XR Chest PA & Lateral (In Office)    2. Essential hypertension  -     amLODIPine-benazepril (LOTREL 5-20) 5-20 MG per capsule; Take 1 capsule by mouth Daily.  Dispense: 90 capsule; Refill: 1  -     hydroCHLOROthiazide (HYDRODIURIL) 12.5 MG tablet; Take 1 tablet by mouth Daily.  Dispense: 90 tablet; Refill: 1  -     CBC & Differential  -     Comprehensive Metabolic Panel  -     TSH Rfx On Abnormal To Free T4  -     Urinalysis With Culture If Indicated - Urine, Clean Catch    3. Hyperlipidemia, unspecified hyperlipidemia type  -     simvastatin (ZOCOR) 20 MG tablet; Take 1 tablet by mouth Daily.  Dispense: 90 tablet; Refill: 1  -     Lipid Panel    4. Chronic obstructive pulmonary disease with acute exacerbation  -     Discontinue: amoxicillin-clavulanate (Augmentin) 875-125 MG per tablet; Take 1 tablet by mouth 2 (Two) Times a Day.  Dispense: 20 tablet; Refill: 0  -     Discontinue: predniSONE 5 MG (21) tablet therapy pack dose pack; Take as directed on package instructions.  Dispense: 1 each; Refill: 0  -     Discontinue: benzonatate (Tessalon Perles) 100 MG capsule; Take 1 capsule by mouth 3 (Three) Times a Day As Needed for Cough.  Dispense: 30 capsule; Refill: 0  -     amoxicillin-clavulanate (Augmentin) 875-125 MG per tablet; Take 1 tablet by mouth 2 (Two) Times a Day.  Dispense: 20 tablet; Refill: 0  -     predniSONE 5 MG (21) tablet therapy pack dose pack; Take as directed on package instructions.  Dispense: 1 each; Refill: 0  -     benzonatate (Tessalon Perles) 100 MG capsule; Take 1 capsule by mouth 3 (Three) Times a Day As Needed for Cough.  Dispense: 30 capsule; Refill: 0    5. Vertigo  -     meclizine (ANTIVERT) 25 MG tablet; Take 2 tablets  by mouth Daily.  Dispense: 180 tablet; Refill: 1    6. Elevated glucose level  -     Hemoglobin A1c    7. Encounter for screening for lung cancer  -      CT Chest Low Dose Cancer Screening WO; Future    8. History of nicotine dependence  -      CT Chest Low Dose Cancer Screening WO; Future        Follow Up   Return in about 6 months (around 10/12/2023) for Refills and fasting labs.  Patient was given instructions and counseling regarding his condition or for health maintenance advice. Please see specific information pulled into the AVS if appropriate.        Helical Rim Text: The closure involved the helical rim.

## 2023-09-25 DIAGNOSIS — E78.5 HYPERLIPIDEMIA, UNSPECIFIED HYPERLIPIDEMIA TYPE: ICD-10-CM

## 2023-09-25 RX ORDER — SIMVASTATIN 20 MG
TABLET ORAL
Qty: 30 TABLET | Refills: 0 | Status: SHIPPED | OUTPATIENT
Start: 2023-09-25

## 2023-09-27 RX ORDER — METOPROLOL SUCCINATE 50 MG/1
50 TABLET, EXTENDED RELEASE ORAL DAILY
Qty: 90 TABLET | Refills: 3 | Status: SHIPPED | OUTPATIENT
Start: 2023-09-27

## 2023-10-03 ENCOUNTER — OFFICE VISIT (OUTPATIENT)
Dept: FAMILY MEDICINE CLINIC | Facility: CLINIC | Age: 68
End: 2023-10-03
Payer: MEDICARE

## 2023-10-03 VITALS
OXYGEN SATURATION: 97 % | SYSTOLIC BLOOD PRESSURE: 138 MMHG | DIASTOLIC BLOOD PRESSURE: 80 MMHG | BODY MASS INDEX: 30.52 KG/M2 | HEIGHT: 71 IN | HEART RATE: 92 BPM | WEIGHT: 218 LBS | TEMPERATURE: 97.3 F

## 2023-10-03 DIAGNOSIS — J44.9 CHRONIC OBSTRUCTIVE PULMONARY DISEASE, UNSPECIFIED COPD TYPE: ICD-10-CM

## 2023-10-03 DIAGNOSIS — E78.5 HYPERLIPIDEMIA, UNSPECIFIED HYPERLIPIDEMIA TYPE: ICD-10-CM

## 2023-10-03 DIAGNOSIS — Z00.00 ENCOUNTER FOR SUBSEQUENT ANNUAL WELLNESS VISIT (AWV) IN MEDICARE PATIENT: Primary | ICD-10-CM

## 2023-10-03 DIAGNOSIS — R42 VERTIGO: ICD-10-CM

## 2023-10-03 DIAGNOSIS — Z23 IMMUNIZATION DUE: ICD-10-CM

## 2023-10-03 DIAGNOSIS — R73.03 PREDIABETES: ICD-10-CM

## 2023-10-03 DIAGNOSIS — E66.09 CLASS 1 OBESITY DUE TO EXCESS CALORIES WITHOUT SERIOUS COMORBIDITY WITH BODY MASS INDEX (BMI) OF 30.0 TO 30.9 IN ADULT: ICD-10-CM

## 2023-10-03 DIAGNOSIS — I10 ESSENTIAL HYPERTENSION: ICD-10-CM

## 2023-10-03 PROBLEM — E66.811 CLASS 1 OBESITY DUE TO EXCESS CALORIES WITHOUT SERIOUS COMORBIDITY WITH BODY MASS INDEX (BMI) OF 30.0 TO 30.9 IN ADULT: Status: ACTIVE | Noted: 2023-10-03

## 2023-10-03 LAB
ALBUMIN SERPL-MCNC: 4.6 G/DL (ref 3.5–5.2)
ALBUMIN/GLOB SERPL: 1.6 G/DL
ALP SERPL-CCNC: 117 U/L (ref 39–117)
ALT SERPL W P-5'-P-CCNC: 27 U/L (ref 1–41)
ANION GAP SERPL CALCULATED.3IONS-SCNC: 11 MMOL/L (ref 5–15)
AST SERPL-CCNC: 30 U/L (ref 1–40)
BASOPHILS # BLD AUTO: 0.07 10*3/MM3 (ref 0–0.2)
BASOPHILS NFR BLD AUTO: 0.9 % (ref 0–1.5)
BILIRUB SERPL-MCNC: 1.5 MG/DL (ref 0–1.2)
BILIRUB UR QL STRIP: NEGATIVE
BUN SERPL-MCNC: 9 MG/DL (ref 8–23)
BUN/CREAT SERPL: 10.1 (ref 7–25)
CALCIUM SPEC-SCNC: 10.2 MG/DL (ref 8.6–10.5)
CHLORIDE SERPL-SCNC: 91 MMOL/L (ref 98–107)
CHOLEST SERPL-MCNC: 187 MG/DL (ref 0–200)
CLARITY UR: CLEAR
CO2 SERPL-SCNC: 27 MMOL/L (ref 22–29)
COLOR UR: YELLOW
CREAT SERPL-MCNC: 0.89 MG/DL (ref 0.76–1.27)
DEPRECATED RDW RBC AUTO: 46 FL (ref 37–54)
EGFRCR SERPLBLD CKD-EPI 2021: 94.5 ML/MIN/1.73
EOSINOPHIL # BLD AUTO: 0.11 10*3/MM3 (ref 0–0.4)
EOSINOPHIL NFR BLD AUTO: 1.4 % (ref 0.3–6.2)
ERYTHROCYTE [DISTWIDTH] IN BLOOD BY AUTOMATED COUNT: 13.7 % (ref 12.3–15.4)
GLOBULIN UR ELPH-MCNC: 2.9 GM/DL
GLUCOSE SERPL-MCNC: 91 MG/DL (ref 65–99)
GLUCOSE UR STRIP-MCNC: NEGATIVE MG/DL
HBA1C MFR BLD: 5.8 % (ref 4.8–5.6)
HCT VFR BLD AUTO: 51.6 % (ref 37.5–51)
HDLC SERPL-MCNC: 108 MG/DL (ref 40–60)
HGB BLD-MCNC: 17.9 G/DL (ref 13–17.7)
HGB UR QL STRIP.AUTO: NEGATIVE
HOLD SPECIMEN: NORMAL
IMM GRANULOCYTES # BLD AUTO: 0.07 10*3/MM3 (ref 0–0.05)
IMM GRANULOCYTES NFR BLD AUTO: 0.9 % (ref 0–0.5)
KETONES UR QL STRIP: NEGATIVE
LDLC SERPL CALC-MCNC: 68 MG/DL (ref 0–100)
LDLC/HDLC SERPL: 0.62 {RATIO}
LEUKOCYTE ESTERASE UR QL STRIP.AUTO: NEGATIVE
LYMPHOCYTES # BLD AUTO: 1.51 10*3/MM3 (ref 0.7–3.1)
LYMPHOCYTES NFR BLD AUTO: 19 % (ref 19.6–45.3)
MCH RBC QN AUTO: 32 PG (ref 26.6–33)
MCHC RBC AUTO-ENTMCNC: 34.7 G/DL (ref 31.5–35.7)
MCV RBC AUTO: 92.1 FL (ref 79–97)
MONOCYTES # BLD AUTO: 1.1 10*3/MM3 (ref 0.1–0.9)
MONOCYTES NFR BLD AUTO: 13.8 % (ref 5–12)
NEUTROPHILS NFR BLD AUTO: 5.1 10*3/MM3 (ref 1.7–7)
NEUTROPHILS NFR BLD AUTO: 64 % (ref 42.7–76)
NITRITE UR QL STRIP: NEGATIVE
NRBC BLD AUTO-RTO: 0 /100 WBC (ref 0–0.2)
PH UR STRIP.AUTO: 7 [PH] (ref 5–8)
PLATELET # BLD AUTO: 285 10*3/MM3 (ref 140–450)
PMV BLD AUTO: 10.2 FL (ref 6–12)
POTASSIUM SERPL-SCNC: 4.8 MMOL/L (ref 3.5–5.2)
PROT SERPL-MCNC: 7.5 G/DL (ref 6–8.5)
PROT UR QL STRIP: NEGATIVE
RBC # BLD AUTO: 5.6 10*6/MM3 (ref 4.14–5.8)
SODIUM SERPL-SCNC: 129 MMOL/L (ref 136–145)
SP GR UR STRIP: <=1.005 (ref 1–1.03)
TRIGL SERPL-MCNC: 58 MG/DL (ref 0–150)
TSH SERPL DL<=0.05 MIU/L-ACNC: 0.61 UIU/ML (ref 0.27–4.2)
UROBILINOGEN UR QL STRIP: NORMAL
VLDLC SERPL-MCNC: 11 MG/DL (ref 5–40)
WBC NRBC COR # BLD: 7.96 10*3/MM3 (ref 3.4–10.8)

## 2023-10-03 PROCEDURE — 84443 ASSAY THYROID STIM HORMONE: CPT | Performed by: NURSE PRACTITIONER

## 2023-10-03 PROCEDURE — 80061 LIPID PANEL: CPT | Performed by: NURSE PRACTITIONER

## 2023-10-03 PROCEDURE — 83036 HEMOGLOBIN GLYCOSYLATED A1C: CPT | Performed by: NURSE PRACTITIONER

## 2023-10-03 PROCEDURE — 81003 URINALYSIS AUTO W/O SCOPE: CPT | Performed by: NURSE PRACTITIONER

## 2023-10-03 PROCEDURE — 80053 COMPREHEN METABOLIC PANEL: CPT | Performed by: NURSE PRACTITIONER

## 2023-10-03 PROCEDURE — 85025 COMPLETE CBC W/AUTO DIFF WBC: CPT | Performed by: NURSE PRACTITIONER

## 2023-10-03 RX ORDER — AMLODIPINE BESYLATE 5 MG/1
5 TABLET ORAL DAILY
Qty: 90 TABLET | Refills: 1 | Status: SHIPPED | OUTPATIENT
Start: 2023-10-03

## 2023-10-03 RX ORDER — MECLIZINE HYDROCHLORIDE 25 MG/1
50 TABLET ORAL DAILY
Qty: 180 TABLET | Refills: 1 | Status: SHIPPED | OUTPATIENT
Start: 2023-10-03

## 2023-10-03 RX ORDER — AMLODIPINE BESYLATE AND BENAZEPRIL HYDROCHLORIDE 5; 20 MG/1; MG/1
1 CAPSULE ORAL DAILY
COMMUNITY
Start: 2023-09-25 | End: 2023-10-03

## 2023-10-03 RX ORDER — SIMVASTATIN 20 MG
20 TABLET ORAL DAILY
Qty: 90 TABLET | Refills: 1 | Status: SHIPPED | OUTPATIENT
Start: 2023-10-03

## 2023-10-03 NOTE — PROGRESS NOTES
Chief Complaint  Hyperlipidemia (Refills and labs, patient is fasting)    PHQ-2 Total Score:    PHQ-9 Total Score:      Subjective     {Problem List  Visit Diagnosis   Encounters  Notes  Medications  Labs  Result Review Imaging  Media :23}   Past Medical History:   Diagnosis Date    Benign essential hypertension     COPD (chronic obstructive pulmonary disease)     Erectile dysfunction Apprx: 5 yrs ago    Hyperlipidemia 2018    Hyponatremia     Vertigo 2021     Social History     Tobacco Use    Smoking status: Former     Packs/day: 2.00     Years: 15.00     Pack years: 30.00     Types: Cigarettes     Start date: 6/15/1974     Quit date: 10/20/2010     Years since quittin.9     Passive exposure: Past    Smokeless tobacco: Never    Tobacco comments:     SMOKES MORE THAN 2 PPD FOR 35 YEARS   Vaping Use    Vaping Use: Never used   Substance Use Topics    Alcohol use: Yes     Alcohol/week: 6.0 - 8.0 standard drinks     Types: 6 - 8 Cans of beer per week     Comment: 8-14 DRINKS PER WEEK    Drug use: Not Currently      Current Outpatient Medications on File Prior to Visit   Medication Sig    amLODIPine (NORVASC) 5 MG tablet Take 1 tablet by mouth Daily.    amLODIPine-benazepril (LOTREL 5-20) 5-20 MG per capsule Take 1 capsule by mouth Daily.    apixaban (ELIQUIS) 5 MG tablet tablet Take 1 tablet by mouth 2 (Two) Times a Day.    meclizine (ANTIVERT) 25 MG tablet Take 2 tablets by mouth Daily.    metoprolol succinate XL (TOPROL-XL) 50 MG 24 hr tablet Take 1 tablet by mouth Daily.    simvastatin (ZOCOR) 20 MG tablet TAKE 1 TABLET DAILY    Spiriva Respimat 1.25 MCG/ACT aerosol solution inhaler USE 2 INHALATIONS DAILY     No current facility-administered medications on file prior to visit.      No Known Allergies   Health Maintenance Due   Topic Date Due    BMI FOLLOWUP  Never done    COVID-19 Vaccine (1) Never done    ZOSTER VACCINE (2 of 2) 2015    ANNUAL WELLNESS VISIT  2023      Paulo SANTANA  "Cali Finch presents to [facility]  History of Present Illness    Objective   Vital Signs:   /80 (BP Location: Left arm)   Pulse 92   Temp 97.3 °F (36.3 °C)   Ht 180.3 cm (71\")   Wt 98.9 kg (218 lb)   SpO2 97%   BMI 30.40 kg/m²     Review of Systems   Physical Exam   Result Review :{Labs  Result Review  Imaging  Med Tab  Media  Procedures :23}   {The following data was reviewed by (Optional):79104}  {Ambulatory Labs (Optional):81417}  {Data reviewed (Optional):25776:::1}          Assessment and Plan {CC Problem List  Visit Diagnosis   ROS  Review (Popup)  Health Maintenance  Quality  BestPractice  Medications  SmartSets  SnapShot Encounters  Media :23}   Diagnoses and all orders for this visit:    1. Immunization due (Primary)  -     Fluzone High-Dose 65+yrs  -     Pneumococcal Conjugate Vaccine 20-Valent All    2. Class 1 obesity due to excess calories without serious comorbidity with body mass index (BMI) of 30.0 to 30.9 in adult      {Time Spent (Optional):58434}    {BMI is >= 30 and <35. (Class 1 Obesity). The following options were offered after discussion;:[id number]}       Follow Up {Instructions Charge Capture  Follow-up Communications :23}  No follow-ups on file.  Patient was given instructions and counseling regarding his condition or for health maintenance advice. Please see specific information pulled into the AVS if appropriate.       "

## 2023-10-03 NOTE — PROGRESS NOTES
The ABCs of the Annual Wellness Visit  Subsequent Medicare Wellness Visit    Subjective    Paulo Leiva Jr. is a 67 y.o. male who presents for a Subsequent Medicare Wellness Visit.    The following portions of the patient's history were reviewed and   updated as appropriate: allergies, current medications, past family history, past medical history, past social history, past surgical history, and problem list.    Compared to one year ago, the patient feels his physical   health is better.    Compared to one year ago, the patient feels his mental   health is the same.    Recent Hospitalizations:  He was admitted within the past 365 days at [facility], due to weakness related to Covid.       Current Medical Providers:  Patient Care Team:  Vilma Rolon APRN as PCP - General (Nurse Practitioner)  Randell Casiano MD as Consulting Physician (Cardiology)    Outpatient Medications Prior to Visit   Medication Sig Dispense Refill    apixaban (ELIQUIS) 5 MG tablet tablet Take 1 tablet by mouth 2 (Two) Times a Day. 180 tablet 3    metoprolol succinate XL (TOPROL-XL) 50 MG 24 hr tablet Take 1 tablet by mouth Daily. 90 tablet 3    Spiriva Respimat 1.25 MCG/ACT aerosol solution inhaler USE 2 INHALATIONS DAILY 12 g 0    amLODIPine (NORVASC) 5 MG tablet Take 1 tablet by mouth Daily. 90 tablet 0    amLODIPine-benazepril (LOTREL 5-20) 5-20 MG per capsule Take 1 capsule by mouth Daily.      meclizine (ANTIVERT) 25 MG tablet Take 2 tablets by mouth Daily. 180 tablet 1    simvastatin (ZOCOR) 20 MG tablet TAKE 1 TABLET DAILY 30 tablet 0     No facility-administered medications prior to visit.       No opioid medication identified on active medication list. I have reviewed chart for other potential  high risk medication/s and harmful drug interactions in the elderly.        Aspirin is not on active medication list.  Aspirin use is contraindicated for this patient due to: current use of Eliquis.  .    Patient Active Problem  "List   Diagnosis    COPD (chronic obstructive pulmonary disease)    Essential hypertension    Hyperlipidemia    Hyponatremia    Vertigo    Colon cancer screening    Syncope and collapse    Wide-complex tachycardia    Status post placement of implantable loop recorder    Class 1 obesity due to excess calories without serious comorbidity with body mass index (BMI) of 30.0 to 30.9 in adult     Advance Care Planning   Advance Care Planning     Advance Directive is not on file.  ACP discussion was held with the patient during this visit. Patient has an advance directive (not in EMR), copy requested.     Objective    Vitals:    10/03/23 0927   BP: 138/80   BP Location: Left arm   Pulse: 92   Temp: 97.3 °F (36.3 °C)   SpO2: 97%   Weight: 98.9 kg (218 lb)   Height: 180.3 cm (71\")     Estimated body mass index is 30.4 kg/m² as calculated from the following:    Height as of this encounter: 180.3 cm (71\").    Weight as of this encounter: 98.9 kg (218 lb).    BMI is >= 30 and <35. (Class 1 Obesity). The following options were offered after discussion;: weight loss educational material (shared in after visit summary)      Does the patient have evidence of cognitive impairment? No          HEALTH RISK ASSESSMENT    Smoking Status:  Social History     Tobacco Use   Smoking Status Former    Packs/day: 2.00    Years: 15.00    Pack years: 30.00    Types: Cigarettes    Start date: 6/15/1974    Quit date: 10/20/2010    Years since quittin.9    Passive exposure: Past   Smokeless Tobacco Never   Tobacco Comments    SMOKES MORE THAN 2 PPD FOR 35 YEARS     Alcohol Consumption:  Social History     Substance and Sexual Activity   Alcohol Use Yes    Alcohol/week: 6.0 - 8.0 standard drinks    Types: 6 - 8 Cans of beer per week    Comment: 8-14 DRINKS PER WEEK     Fall Risk Screen:    MARTA Fall Risk Assessment was completed, and patient is at LOW risk for falls.Assessment completed on:10/3/2023    Depression Screening:      10/3/2023    " 10:04 AM   PHQ-2/PHQ-9 Depression Screening   Little Interest or Pleasure in Doing Things 0-->not at all   Feeling Down, Depressed or Hopeless 0-->not at all   PHQ-9: Brief Depression Severity Measure Score 0       Health Habits and Functional and Cognitive Screening:      10/3/2023    10:02 AM   Functional & Cognitive Status   Do you have difficulty preparing food and eating? No   Do you have difficulty bathing yourself, getting dressed or grooming yourself? No   Do you have difficulty using the toilet? No   Do you have difficulty moving around from place to place? No   Do you have trouble with steps or getting out of a bed or a chair? No   Current Diet Unhealthy Diet   Dental Exam Not up to date   Eye Exam Not up to date   Exercise (times per week) 0 times per week   Current Exercises Include No Regular Exercise   Do you need help using the phone?  No   Are you deaf or do you have serious difficulty hearing?  No   Do you need help to go to places out of walking distance? No   Do you need help shopping? No   Do you need help preparing meals?  No   Do you need help with housework?  No   Do you need help with laundry? No   Do you need help taking your medications? No   Do you need help managing money? No   Do you ever drive or ride in a car without wearing a seat belt? No   Have you felt unusual stress, anger or loneliness in the last month? No   Who do you live with? Spouse   If you need help, do you have trouble finding someone available to you? No   Do you have difficulty concentrating, remembering or making decisions? No       Age-appropriate Screening Schedule:  Refer to the list below for future screening recommendations based on patient's age, sex and/or medical conditions. Orders for these recommended tests are listed in the plan section. The patient has been provided with a written plan.    Health Maintenance   Topic Date Due    BMI FOLLOWUP  Never done    COVID-19 Vaccine (1) Never done    ZOSTER VACCINE (2  "of 2) 01/29/2015    LIPID PANEL  04/12/2024    LUNG CANCER SCREENING  05/10/2024    ANNUAL WELLNESS VISIT  10/03/2024    TDAP/TD VACCINES (3 - Td or Tdap) 04/27/2031    COLORECTAL CANCER SCREENING  10/26/2032    HEPATITIS C SCREENING  Completed    INFLUENZA VACCINE  Completed    Pneumococcal Vaccine 65+  Completed    AAA SCREEN (ONE-TIME)  Completed                  CMS Preventative Services Quick Reference  Risk Factors Identified During Encounter  Alcohol Misuse: Patient encouraged to limit alcohol use to no more than 1 standard alcoholic beverage per day. (12 ounce beer, 6 ounce wine, one shot liquor)  Immunizations Discussed/Encouraged: Shingrix  The above risks/problems have been discussed with the patient.  Pertinent information has been shared with the patient in the After Visit Summary.  An After Visit Summary and PPPS were made available to the patient.    Follow Up:   Next Medicare Wellness visit to be scheduled in 1 year.       Additional E&M Note during same encounter follows:  Patient has multiple medical problems which are significant and separately identifiable that require additional work above and beyond the Medicare Wellness Visit.      Chief Complaint  Hyperlipidemia (Refills and labs, patient is fasting)    Subjective        HPI  Paulo Leiva Jr. is also being seen today for   Chronic conditions    HTN: normotensive in office. Established with Cardiology, last appointment was about 6 weeks ago. Loop recorder in place.     Pt notes infrequent dizzy spells.     HLD: does not eat out frequently. Denies SE of medication.         Review of Systems   Respiratory:  Positive for shortness of breath (chronic). Negative for cough and wheezing.    Cardiovascular:  Negative for chest pain and palpitations.   Endocrine: Negative for polydipsia and polyuria.     Objective   Vital Signs:  /80 (BP Location: Left arm)   Pulse 92   Temp 97.3 °F (36.3 °C)   Ht 180.3 cm (71\")   Wt 98.9 kg (218 lb)   SpO2 " 97%   BMI 30.40 kg/m²     Physical Exam  Vitals reviewed.   Constitutional:       General: He is not in acute distress.     Appearance: Normal appearance. He is well-developed.   HENT:      Head: Normocephalic and atraumatic.   Eyes:      Conjunctiva/sclera: Conjunctivae normal.      Pupils: Pupils are equal, round, and reactive to light.   Cardiovascular:      Rate and Rhythm: Normal rate and regular rhythm.      Heart sounds: Normal heart sounds.   Pulmonary:      Effort: Pulmonary effort is normal.      Breath sounds: Normal breath sounds.   Musculoskeletal:      Cervical back: Neck supple.      Right lower leg: No edema.      Left lower leg: No edema.   Skin:     General: Skin is warm and dry.   Neurological:      Mental Status: He is alert and oriented to person, place, and time.   Psychiatric:         Mood and Affect: Mood and affect normal.         Behavior: Behavior normal.         Thought Content: Thought content normal.         Judgment: Judgment normal.        The following data was reviewed by: MELANIA Santiago on 10/03/2023:  Common labs          4/12/2023    09:51 5/5/2023    10:24   Common Labs   Glucose 103  80    BUN 5  6    Creatinine 0.88  0.79    Sodium 126  128    Potassium 4.1  4.8    Chloride 88  93    Calcium 9.8  9.6    Albumin 4.3  4.2    Total Bilirubin 0.8  1.0    Alkaline Phosphatase 109  91    AST (SGOT) 22  23    ALT (SGPT) 18  18    WBC 12.36  9.03    Hemoglobin 15.3  15.6    Hematocrit 43.6  45.4    Platelets 456  281    Total Cholesterol 153     Triglycerides 102     HDL Cholesterol 58     LDL Cholesterol  76     Hemoglobin A1C 5.70       TSH          4/12/2023    09:51   TSH   TSH 0.446                 Assessment and Plan   Diagnoses and all orders for this visit:    1. Encounter for subsequent annual wellness visit (AWV) in Medicare patient (Primary)    2. Essential hypertension  -     amLODIPine (NORVASC) 5 MG tablet; Take 1 tablet by mouth Daily.  Dispense: 90 tablet;  Refill: 1  -     CBC & Differential  -     Comprehensive Metabolic Panel  -     TSH Rfx On Abnormal To Free T4  -     Urinalysis With Culture If Indicated - Urine, Clean Catch    3. Hyperlipidemia, unspecified hyperlipidemia type  -     simvastatin (ZOCOR) 20 MG tablet; Take 1 tablet by mouth Daily.  Dispense: 90 tablet; Refill: 1  -     Lipid Panel    4. Vertigo  -     meclizine (ANTIVERT) 25 MG tablet; Take 2 tablets by mouth Daily.  Dispense: 180 tablet; Refill: 1    5. Chronic obstructive pulmonary disease, unspecified COPD type    6. Prediabetes  -     Hemoglobin A1c    7. Class 1 obesity due to excess calories without serious comorbidity with body mass index (BMI) of 30.0 to 30.9 in adult    8. Immunization due  -     Fluzone High-Dose 65+yrs  -     Pneumococcal Conjugate Vaccine 20-Valent All             Follow Up   Return in about 6 months (around 4/3/2024) for Refills and fasting labs.  Patient was given instructions and counseling regarding his condition or for health maintenance advice. Please see specific information pulled into the AVS if appropriate.         Answers submitted by the patient for this visit:  Primary Reason for Visit (Submitted on 9/26/2023)  What is the primary reason for your visit?: Shortness of Breath

## 2023-10-03 NOTE — PROGRESS NOTES
..  Venipuncture Blood Specimen Collection  Venipuncture performed in LT arm by Cadence Mayer with good hemostasis. Patient tolerated the procedure well without complications.   10/03/23   Kiera Aguillon MA

## 2023-10-13 ENCOUNTER — OFFICE VISIT (OUTPATIENT)
Dept: FAMILY MEDICINE CLINIC | Facility: CLINIC | Age: 68
End: 2023-10-13
Payer: MEDICARE

## 2023-10-13 VITALS
BODY MASS INDEX: 30.24 KG/M2 | WEIGHT: 216 LBS | TEMPERATURE: 97.8 F | HEIGHT: 71 IN | OXYGEN SATURATION: 97 % | SYSTOLIC BLOOD PRESSURE: 140 MMHG | HEART RATE: 110 BPM | DIASTOLIC BLOOD PRESSURE: 80 MMHG

## 2023-10-13 DIAGNOSIS — R56.9 SEIZURE-LIKE ACTIVITY: ICD-10-CM

## 2023-10-13 DIAGNOSIS — R55 SYNCOPE, UNSPECIFIED SYNCOPE TYPE: Primary | ICD-10-CM

## 2023-10-13 LAB
ALBUMIN SERPL-MCNC: 4.5 G/DL (ref 3.5–5.2)
ALBUMIN/GLOB SERPL: 1.5 G/DL
ALP SERPL-CCNC: 98 U/L (ref 39–117)
ALT SERPL W P-5'-P-CCNC: 30 U/L (ref 1–41)
ANION GAP SERPL CALCULATED.3IONS-SCNC: 15.4 MMOL/L (ref 5–15)
AST SERPL-CCNC: 37 U/L (ref 1–40)
BASOPHILS # BLD AUTO: 0.09 10*3/MM3 (ref 0–0.2)
BASOPHILS NFR BLD AUTO: 1.1 % (ref 0–1.5)
BILIRUB SERPL-MCNC: 1.8 MG/DL (ref 0–1.2)
BUN SERPL-MCNC: 5 MG/DL (ref 8–23)
BUN/CREAT SERPL: 6.2 (ref 7–25)
CALCIUM SPEC-SCNC: 10 MG/DL (ref 8.6–10.5)
CHLORIDE SERPL-SCNC: 92 MMOL/L (ref 98–107)
CO2 SERPL-SCNC: 22.6 MMOL/L (ref 22–29)
CREAT SERPL-MCNC: 0.81 MG/DL (ref 0.76–1.27)
DEPRECATED RDW RBC AUTO: 46.3 FL (ref 37–54)
EGFRCR SERPLBLD CKD-EPI 2021: 97.2 ML/MIN/1.73
EOSINOPHIL # BLD AUTO: 0.08 10*3/MM3 (ref 0–0.4)
EOSINOPHIL NFR BLD AUTO: 1 % (ref 0.3–6.2)
ERYTHROCYTE [DISTWIDTH] IN BLOOD BY AUTOMATED COUNT: 13.9 % (ref 12.3–15.4)
GLOBULIN UR ELPH-MCNC: 3 GM/DL
GLUCOSE SERPL-MCNC: 96 MG/DL (ref 65–99)
HCT VFR BLD AUTO: 50.8 % (ref 37.5–51)
HGB BLD-MCNC: 17.8 G/DL (ref 13–17.7)
IMM GRANULOCYTES # BLD AUTO: 0.06 10*3/MM3 (ref 0–0.05)
IMM GRANULOCYTES NFR BLD AUTO: 0.7 % (ref 0–0.5)
LYMPHOCYTES # BLD AUTO: 1.37 10*3/MM3 (ref 0.7–3.1)
LYMPHOCYTES NFR BLD AUTO: 16.6 % (ref 19.6–45.3)
MAGNESIUM SERPL-MCNC: 2.1 MG/DL (ref 1.6–2.4)
MCH RBC QN AUTO: 32 PG (ref 26.6–33)
MCHC RBC AUTO-ENTMCNC: 35 G/DL (ref 31.5–35.7)
MCV RBC AUTO: 91.4 FL (ref 79–97)
MONOCYTES # BLD AUTO: 1.09 10*3/MM3 (ref 0.1–0.9)
MONOCYTES NFR BLD AUTO: 13.2 % (ref 5–12)
NEUTROPHILS NFR BLD AUTO: 5.58 10*3/MM3 (ref 1.7–7)
NEUTROPHILS NFR BLD AUTO: 67.4 % (ref 42.7–76)
NRBC BLD AUTO-RTO: 0 /100 WBC (ref 0–0.2)
PLATELET # BLD AUTO: 269 10*3/MM3 (ref 140–450)
PMV BLD AUTO: 10.6 FL (ref 6–12)
POTASSIUM SERPL-SCNC: 4.6 MMOL/L (ref 3.5–5.2)
PROT SERPL-MCNC: 7.5 G/DL (ref 6–8.5)
RBC # BLD AUTO: 5.56 10*6/MM3 (ref 4.14–5.8)
SODIUM SERPL-SCNC: 130 MMOL/L (ref 136–145)
WBC NRBC COR # BLD: 8.27 10*3/MM3 (ref 3.4–10.8)

## 2023-10-13 PROCEDURE — 83735 ASSAY OF MAGNESIUM: CPT | Performed by: NURSE PRACTITIONER

## 2023-10-13 PROCEDURE — 80053 COMPREHEN METABOLIC PANEL: CPT | Performed by: NURSE PRACTITIONER

## 2023-10-13 PROCEDURE — 3079F DIAST BP 80-89 MM HG: CPT | Performed by: NURSE PRACTITIONER

## 2023-10-13 PROCEDURE — 3077F SYST BP >= 140 MM HG: CPT | Performed by: NURSE PRACTITIONER

## 2023-10-13 PROCEDURE — 1160F RVW MEDS BY RX/DR IN RCRD: CPT | Performed by: NURSE PRACTITIONER

## 2023-10-13 PROCEDURE — 85025 COMPLETE CBC W/AUTO DIFF WBC: CPT | Performed by: NURSE PRACTITIONER

## 2023-10-13 PROCEDURE — 1159F MED LIST DOCD IN RCRD: CPT | Performed by: NURSE PRACTITIONER

## 2023-10-13 PROCEDURE — 99214 OFFICE O/P EST MOD 30 MIN: CPT | Performed by: NURSE PRACTITIONER

## 2023-10-13 PROCEDURE — 36415 COLL VENOUS BLD VENIPUNCTURE: CPT | Performed by: NURSE PRACTITIONER

## 2023-10-13 RX ORDER — MELOXICAM 15 MG/1
15 TABLET ORAL
COMMUNITY
End: 2023-10-13

## 2023-10-13 NOTE — PROGRESS NOTES
Venipuncture Blood Specimen Collection  Venipuncture performed in left arm  by Cadence Mayer with good hemostasis. Patient tolerated the procedure well without complications.   10/13/23   Cadence Mayer

## 2023-10-13 NOTE — PROGRESS NOTES
Chief Complaint  Loss of Consciousness (Passing out for 1-2 minutes, eyes roll back, shakes, clammy skin, and losing control over bladder and colon.)    Subjective        Past Medical History:   Diagnosis Date    Benign essential hypertension     COPD (chronic obstructive pulmonary disease)     Erectile dysfunction Apprx: 5 yrs ago    Hyperlipidemia 2018    Hyponatremia     Vertigo 2021     Social History     Tobacco Use    Smoking status: Former     Packs/day: 2.00     Years: 15.00     Additional pack years: 0.00     Total pack years: 30.00     Types: Cigarettes     Start date: 6/15/1974     Quit date: 10/20/2010     Years since quittin.9     Passive exposure: Past    Smokeless tobacco: Never    Tobacco comments:     SMOKES MORE THAN 2 PPD FOR 35 YEARS   Vaping Use    Vaping Use: Never used   Substance Use Topics    Alcohol use: Yes     Alcohol/week: 6.0 - 8.0 standard drinks of alcohol     Types: 6 - 8 Cans of beer per week     Comment: 8-14 DRINKS PER WEEK    Drug use: Not Currently      Current Outpatient Medications on File Prior to Visit   Medication Sig    amLODIPine (NORVASC) 5 MG tablet Take 1 tablet by mouth Daily.    apixaban (ELIQUIS) 5 MG tablet tablet Take 1 tablet by mouth 2 (Two) Times a Day.    meclizine (ANTIVERT) 25 MG tablet Take 2 tablets by mouth Daily.    metoprolol succinate XL (TOPROL-XL) 50 MG 24 hr tablet Take 1 tablet by mouth Daily.    simvastatin (ZOCOR) 20 MG tablet Take 1 tablet by mouth Daily.    Spiriva Respimat 1.25 MCG/ACT aerosol solution inhaler USE 2 INHALATIONS DAILY    [DISCONTINUED] meloxicam (MOBIC) 15 MG tablet 1 tablet.     No current facility-administered medications on file prior to visit.      No Known Allergies   Health Maintenance Due   Topic Date Due    COVID-19 Vaccine (1) Never done    ZOSTER VACCINE (2 of 2) 2015      Paulo Leiva Jr. presents to [facility]  History of Present Illness  Here due to  "recurrent episodes of syncope and states his last episode was 3 days ago. Pt states he was trying to put ice in his cooler and got hot head started feeling tingly and then he slide off the fender and passed out, eyes rolled back, uncontrolled shaking, BP was 80/60, then up to 100/?? After about 30 minutes. He will lose control of his bowel and bladder. Witnesses say it appears similar to a seizure. He was told that his heart was fine by EMS. Pt has intermittent syncope x 2-3 years. He was hospitalized July 2022 in [place] and dx'd with Covid at that time. No association with eating, but does note that he had drank more than normal on Monday. He did have [employer] and 2 bottles of water with granola bar on Tuesday morning. Glucose on Tuesday was 103. Notes syncopal episode happed about 11-12pm.     Pt states he picked up and started amlodipine 5mg on Saturday and has been taking along with amlodipine5mg-benzapril 20mg.     Pt has a loop recorder and had a heart cath and is established with Cardiology. Cardiology is aware of his syncopal episodes    Objective   Vital Signs:   /80 (BP Location: Left arm)   Pulse 110   Temp 97.8 øF (36.6 øC)   Ht 180.3 cm (71\")   Wt 98 kg (216 lb)   SpO2 97%   BMI 30.13 kg/mý     Review of Systems   Physical Exam  Vitals reviewed.   Constitutional:       General: He is not in acute distress.     Appearance: Normal appearance. He is well-developed.   HENT:      Head: Normocephalic and atraumatic.   Eyes:      Conjunctiva/sclera: Conjunctivae normal.      Pupils: Pupils are equal, round, and reactive to light.   Cardiovascular:      Rate and Rhythm: Normal rate and regular rhythm.      Heart sounds: Normal heart sounds.   Pulmonary:      Effort: Pulmonary effort is normal.      Breath sounds: Normal breath sounds.   Musculoskeletal:      Cervical back: Neck supple.      Right lower leg: No edema.      Left lower leg: No edema.   Skin:     General: Skin is warm and dry.   Neurological: "      Mental Status: He is alert and oriented to person, place, and time.   Psychiatric:         Mood and Affect: Mood and affect normal.         Behavior: Behavior normal.         Thought Content: Thought content normal.         Judgment: Judgment normal.        Result Review :   The following data was reviewed by: MELANIA Santiago on 10/13/2023:  Common labs          4/12/2023    09:51 5/5/2023    10:24 10/3/2023    10:02   Common Labs   Glucose 103  80  91    BUN 5  6  9    Creatinine 0.88  0.79  0.89    Sodium 126  128  129    Potassium 4.1  4.8  4.8    Chloride 88  93  91    Calcium 9.8  9.6  10.2    Albumin 4.3  4.2  4.6    Total Bilirubin 0.8  1.0  1.5    Alkaline Phosphatase 109  91  117    AST (SGOT) 22  23  30    ALT (SGPT) 18  18  27    WBC 12.36  9.03  7.96    Hemoglobin 15.3  15.6  17.9    Hematocrit 43.6  45.4  51.6    Platelets 456  281  285    Total Cholesterol 153   187    Triglycerides 102   58    HDL Cholesterol 58   108    LDL Cholesterol  76   68    Hemoglobin A1C 5.70   5.80      TSH          4/12/2023    09:51 10/3/2023    10:02   TSH   TSH 0.446  0.615      Data reviewed : Radiologic studies CT head, MRI brain           Assessment and Plan    Diagnoses and all orders for this visit:    1. Syncope, unspecified syncope type (Primary)  -     CBC & Differential  -     Comprehensive Metabolic Panel  -     Ambulatory Referral to Neurology  -     Magnesium    2. Seizure-like activity  -     Ambulatory Referral to Neurology      Discussed with patient to stop amlodipine benazepril and take amlodipine 5 mg.  Patient to monitor blood pressure.  Increase electrolyte replacement to 2 drinks daily due to alcohol intake and likely dehydration.  Will refer to neurology for further evaluation due to possible seizure like activity.    Notified Jenni VELÁZQUEZ of recent syncopal episode.  She agrees that patient should not be on amlodipine/benazepril.  He should be on amlodipine 5 mg alone and should  monitor blood pressure.  Loop recorder only showed an episode on September 22, 2023 that showed a short episode of atrial flutter.       Follow Up   Return if symptoms worsen or fail to improve.  Patient was given instructions and counseling regarding his condition or for health maintenance advice. Please see specific information pulled into the AVS if appropriate.       Answers submitted by the patient for this visit:  Primary Reason for Visit (Submitted on 10/11/2023)  What is the primary reason for your visit?: High Blood Pressure

## 2023-12-27 ENCOUNTER — OFFICE VISIT (OUTPATIENT)
Dept: NEUROLOGY | Facility: CLINIC | Age: 68
End: 2023-12-27
Payer: MEDICARE

## 2023-12-27 ENCOUNTER — PATIENT ROUNDING (BHMG ONLY) (OUTPATIENT)
Dept: NEUROLOGY | Facility: CLINIC | Age: 68
End: 2023-12-27
Payer: MEDICARE

## 2023-12-27 VITALS
HEIGHT: 71 IN | SYSTOLIC BLOOD PRESSURE: 144 MMHG | DIASTOLIC BLOOD PRESSURE: 84 MMHG | BODY MASS INDEX: 31.08 KG/M2 | WEIGHT: 222 LBS | HEART RATE: 92 BPM

## 2023-12-27 DIAGNOSIS — R56.9 GENERALIZED CONVULSIVE SEIZURES: Primary | ICD-10-CM

## 2023-12-27 PROCEDURE — 3077F SYST BP >= 140 MM HG: CPT | Performed by: PSYCHIATRY & NEUROLOGY

## 2023-12-27 PROCEDURE — 99204 OFFICE O/P NEW MOD 45 MIN: CPT | Performed by: PSYCHIATRY & NEUROLOGY

## 2023-12-27 PROCEDURE — 1160F RVW MEDS BY RX/DR IN RCRD: CPT | Performed by: PSYCHIATRY & NEUROLOGY

## 2023-12-27 PROCEDURE — 1159F MED LIST DOCD IN RCRD: CPT | Performed by: PSYCHIATRY & NEUROLOGY

## 2023-12-27 PROCEDURE — 3079F DIAST BP 80-89 MM HG: CPT | Performed by: PSYCHIATRY & NEUROLOGY

## 2023-12-27 RX ORDER — LEVETIRACETAM 500 MG/1
TABLET ORAL
Qty: 360 TABLET | Refills: 1 | Status: SHIPPED | OUTPATIENT
Start: 2023-12-27

## 2023-12-27 RX ORDER — APIXABAN 5 MG/1
5 TABLET, FILM COATED ORAL 2 TIMES DAILY
Qty: 180 TABLET | Refills: 3 | Status: SHIPPED | OUTPATIENT
Start: 2023-12-27

## 2023-12-27 NOTE — ASSESSMENT & PLAN NOTE
I discussed with him that there is a high probability that these are withdrawal related seizures.  It happens more frequently when he is not drinking alcohol.  I discussed with him that he should abstain from drinking alcohol as an option.    I discussed with him that I will start him on levetiracetam 500 mg twice a day for 2 weeks then 2 tablets twice a day thereafter.  I discussed with him that respects including dizziness, lightheadedness, sleepiness and rarely psychiatric symptoms such as mood swings and depression.  I discussed with him regarding [place] driving laws.  I discussed with him that he should not drive for 3 months after the last seizure and he is to take precautions to avoid heights, baths, power equipment, moving machinery to prevent injury to himself or others.    I discussed with him that these are unlikely syncopal episodes.  He is seeing cardiology at this time and has a loop monitor.  He is also on Eliquis for an undetermined reason.  I will see him again in 2 to 3 months time for follow-up.

## 2023-12-27 NOTE — PROGRESS NOTES
"Chief Complaint  Neurologic Problem (Syncope vs Seizure)    Subjective          Paulo Leiva Jr. is a 68 y.o. male who presents to [facility]  History of Present Illness  68-year-old man evaluated for spells of the been ongoing for the last 2 years.  He states that for about 5 to 8 minutes he gets tingling sensation in his head, gets hot and sweaty and has to sit down and then he goes unconscious.  He shakes uncontrollably and he has had a loss of bowel control and urinary control.  I talked to one of the friends and he shakes for about 30 seconds to 45 seconds at the most 1 minute.  His eyes rolled up in the top of his head and he shakes.  Thereafter he is confused to some degree that he does not know what is happening and he will ask why everybody is looking at him.  He does not remember what happens to him.  He remembers the EMS coming to see him.  He states that all of this happens when he is standing up.  None has happen when he is sitting down although it can happen when he is standing up and he sits down and it will happen while he is sitting down.  His friends and I called 2 of them states that he gets to be throwing up and sick during that time when he is having the spell.    He states that he drinks at least on a daily basis sometimes 8 beers a day and he thinks that it is possible that he is not drinking for several hours to a day 2 days when the spells happen.    He has had an MRI of the brain last year which was unremarkable.  He did not have a history of anxiety or panic attacks or depression prior to 2 years ago.  He states that possibly the last one that occurred was a month and a half ago and they do not occur but every 3 to 6 months.    Objective   Vital Signs:   /84   Pulse 92   Ht 180.3 cm (70.98\")   Wt 101 kg (222 lb)   BMI 30.98 kg/m²     Physical Exam   He is alert, fluent, phasic, follows commands well.  Optic disc obscured by cataract on " the right normal on the left, visual fields full, EOMs full directions gaze, facial strength is full, soft palate elevation and tongue are normal.  There is no weakness of the upper or lower extremities on individual muscle testing.  Reflexes are symmetrically decreased.  Cerebellar testing is intact.  Station gait is able to ambulate without difficulty.  Heart is regular and rhythm normal in rate.        Assessment and Plan  Diagnoses and all orders for this visit:    1. Generalized convulsive seizures (Primary)  Assessment & Plan:  I discussed with him that there is a high probability that these are withdrawal related seizures.  It happens more frequently when he is not drinking alcohol.  I discussed with him that he should abstain from drinking alcohol as an option.    I discussed with him that I will start him on levetiracetam 500 mg twice a day for 2 weeks then 2 tablets twice a day thereafter.  I discussed with him that respects including dizziness, lightheadedness, sleepiness and rarely psychiatric symptoms such as mood swings and depression.  I discussed with him regarding [place] driving laws.  I discussed with him that he should not drive for 3 months after the last seizure and he is to take precautions to avoid heights, baths, power equipment, moving machinery to prevent injury to himself or others.    I discussed with him that these are unlikely syncopal episodes.  He is seeing cardiology at this time and has a loop monitor.  He is also on Eliquis for an undetermined reason.  I will see him again in 2 to 3 months time for follow-up.      Other orders  -     levETIRAcetam (KEPPRA) 500 MG tablet; 1 tablet twice a day for 2 weeks then 2 tablets twice a day thereafter.  Dispense: 360 tablet; Refill: 1         Total time spent with the patient and coordinating patient care was 45 minutes.    Follow Up  No follow-ups on file.  Patient was given instructions and counseling regarding his condition or for health  maintenance advice. Please see specific information pulled into the AVS if appropriate.

## 2024-01-11 ENCOUNTER — OFFICE VISIT (OUTPATIENT)
Dept: FAMILY MEDICINE CLINIC | Facility: CLINIC | Age: 69
End: 2024-01-11
Payer: MEDICARE

## 2024-01-11 VITALS
TEMPERATURE: 97.4 F | WEIGHT: 225 LBS | OXYGEN SATURATION: 95 % | HEIGHT: 70 IN | DIASTOLIC BLOOD PRESSURE: 82 MMHG | HEART RATE: 85 BPM | BODY MASS INDEX: 32.21 KG/M2 | SYSTOLIC BLOOD PRESSURE: 130 MMHG

## 2024-01-11 DIAGNOSIS — R56.9 SEIZURE-LIKE ACTIVITY: ICD-10-CM

## 2024-01-11 DIAGNOSIS — R56.9 SEIZURE-LIKE ACTIVITY: Primary | ICD-10-CM

## 2024-01-11 PROCEDURE — 3079F DIAST BP 80-89 MM HG: CPT | Performed by: NURSE PRACTITIONER

## 2024-01-11 PROCEDURE — 1160F RVW MEDS BY RX/DR IN RCRD: CPT | Performed by: NURSE PRACTITIONER

## 2024-01-11 PROCEDURE — 3075F SYST BP GE 130 - 139MM HG: CPT | Performed by: NURSE PRACTITIONER

## 2024-01-11 PROCEDURE — 99214 OFFICE O/P EST MOD 30 MIN: CPT | Performed by: NURSE PRACTITIONER

## 2024-01-11 PROCEDURE — 1159F MED LIST DOCD IN RCRD: CPT | Performed by: NURSE PRACTITIONER

## 2024-01-11 RX ORDER — DIVALPROEX SODIUM 250 MG/1
250 TABLET, DELAYED RELEASE ORAL 2 TIMES DAILY
Qty: 60 TABLET | Refills: 0 | Status: SHIPPED | OUTPATIENT
Start: 2024-01-11

## 2024-01-11 RX ORDER — DIVALPROEX SODIUM 250 MG/1
250 TABLET, DELAYED RELEASE ORAL 2 TIMES DAILY
Qty: 180 TABLET | OUTPATIENT
Start: 2024-01-11

## 2024-01-11 NOTE — PROGRESS NOTES
Answers submitted by the patient for this visit:  Primary Reason for Visit (Submitted on 1/10/2024)  What is the primary reason for your visit?: Other  Other (Submitted on 1/10/2024)  Please describe your symptoms.: Discussion of recent Medication given  Have you had these symptoms before?: No  How long have you been having these symptoms?: 5-7 days  Please list any medications you are currently taking for this condition.: Levetriacetam  Please describe any probable cause for these symptoms. : Constant dizziness , Can't sleep, feeling nervous all the time and headaches  Chief Complaint  Seizures (Discuss meds // Pt given keppra from neurologist, he has stopped taking them as did not like how they make him feel )    Subjective        Past Medical History:   Diagnosis Date    Benign essential hypertension     COPD (chronic obstructive pulmonary disease)     Erectile dysfunction Apprx: 5 yrs ago    Generalized convulsive seizures 2023    Hyperlipidemia 2018    Hyponatremia     Implantable loop recorder present     Syncope     Vertigo 2021    Vision loss      Social History     Tobacco Use    Smoking status: Former     Packs/day: 2.00     Years: 15.00     Additional pack years: 0.00     Total pack years: 30.00     Types: Cigarettes     Start date: 6/15/1974     Quit date: 10/20/2010     Years since quittin.2     Passive exposure: Past    Smokeless tobacco: Never    Tobacco comments:     SMOKES MORE THAN 2 PPD FOR 35 YEARS   Vaping Use    Vaping Use: Never used   Substance Use Topics    Alcohol use: Yes     Alcohol/week: 6.0 - 8.0 standard drinks of alcohol     Types: 6 - 8 Cans of beer per week     Comment: 8-14 DRINKS PER WEEK    Drug use: Not Currently      Current Outpatient Medications on File Prior to Visit   Medication Sig    amLODIPine (NORVASC) 5 MG tablet Take 1 tablet by mouth Daily.    Eliquis 5 MG tablet tablet TAKE 1 TABLET TWICE A DAY    meclizine (ANTIVERT) 25 MG tablet Take 2  "tablets by mouth Daily.    metoprolol succinate XL (TOPROL-XL) 50 MG 24 hr tablet Take 1 tablet by mouth Daily.    simvastatin (ZOCOR) 20 MG tablet Take 1 tablet by mouth Daily.    Spiriva Respimat 1.25 MCG/ACT aerosol solution inhaler USE 2 INHALATIONS DAILY    levETIRAcetam (KEPPRA) 500 MG tablet 1 tablet twice a day for 2 weeks then 2 tablets twice a day thereafter. (Patient not taking: Reported on 1/11/2024)     No current facility-administered medications on file prior to visit.      No Known Allergies   Health Maintenance Due   Topic Date Due    COVID-19 Vaccine (1) Never done    ZOSTER VACCINE (2 of 2) 01/29/2015      Paulo SANTANA Cali Finch presents to [facility]  History of Present Illness  Here to follow up on recent diagnosis of seizures - last seizure was in November. Neurologist, Dr. Christina started him on Keppra. Pt took medication for 1 week and states he was feeling off balance, dizzy, difficulty sleeping, constant headache. He was too nervous or jittery about walking due to fear of falling. Pt doesn't have a follow up with Neurology until April 2024. Pt states he rarely eats breakfast, he is trying to increase his frequency of eating breakfast.     HTN: normotensive in office today. Pt states normal home readings.     Objective   Vital Signs:   /82   Pulse 85   Temp 97.4 °F (36.3 °C)   Ht 177.8 cm (70\")   Wt 102 kg (225 lb)   SpO2 95%   BMI 32.28 kg/m²     Review of Systems   Physical Exam  Vitals reviewed.   Constitutional:       General: He is not in acute distress.     Appearance: Normal appearance. He is well-developed.   Eyes:      Conjunctiva/sclera: Conjunctivae normal.      Pupils: Pupils are equal, round, and reactive to light.   Cardiovascular:      Rate and Rhythm: Normal rate and regular rhythm.      Heart sounds: Normal heart sounds.   Pulmonary:      Effort: Pulmonary effort is normal.      Breath sounds: Normal breath sounds.   Musculoskeletal: "      Cervical back: Neck supple.   Skin:     General: Skin is warm and dry.   Neurological:      Mental Status: He is alert and oriented to person, place, and time.   Psychiatric:         Mood and Affect: Mood and affect normal.         Behavior: Behavior normal.         Thought Content: Thought content normal.         Judgment: Judgment normal.        Result Review :   The following data was reviewed by: MELANIA Santiago on 01/11/2024:  Common labs          5/5/2023    10:24 10/3/2023    10:02 10/13/2023    11:45   Common Labs   Glucose 80  91  96    BUN 6  9  5    Creatinine 0.79  0.89  0.81    Sodium 128  129  130    Potassium 4.8  4.8  4.6    Chloride 93  91  92    Calcium 9.6  10.2  10.0    Albumin 4.2  4.6  4.5    Total Bilirubin 1.0  1.5  1.8    Alkaline Phosphatase 91  117  98    AST (SGOT) 23  30  37    ALT (SGPT) 18  27  30    WBC 9.03  7.96  8.27    Hemoglobin 15.6  17.9  17.8    Hematocrit 45.4  51.6  50.8    Platelets 281  285  269    Total Cholesterol  187     Triglycerides  58     HDL Cholesterol  108     LDL Cholesterol   68     Hemoglobin A1C  5.80       TSH          4/12/2023    09:51 10/3/2023    10:02   TSH   TSH 0.446  0.615      Data reviewed : Radiologic studies MRI brain from 2022           Assessment and Plan    Diagnoses and all orders for this visit:    1. Seizure-like activity (Primary)  -     divalproex (Depakote) 250 MG DR tablet; Take 1 tablet by mouth 2 (Two) Times a Day.  Dispense: 60 tablet; Refill: 0       Will start patient on Depakote twice daily and patient to follow-up with Dr. Christina for further recommendation.  Patient to continue follow-ups with cardiology.    Follow Up   Return if symptoms worsen or fail to improve.  Patient was given instructions and counseling regarding his condition or for health maintenance advice. Please see specific information pulled into the AVS if appropriate.

## 2024-01-14 DIAGNOSIS — J44.9 CHRONIC OBSTRUCTIVE PULMONARY DISEASE, UNSPECIFIED COPD TYPE: ICD-10-CM

## 2024-01-15 RX ORDER — TIOTROPIUM BROMIDE INHALATION SPRAY 1.56 UG/1
SPRAY, METERED RESPIRATORY (INHALATION)
Qty: 12 G | Refills: 0 | Status: SHIPPED | OUTPATIENT
Start: 2024-01-15

## 2024-03-13 ENCOUNTER — TELEPHONE (OUTPATIENT)
Dept: CARDIOLOGY | Facility: CLINIC | Age: 69
End: 2024-03-13
Payer: MEDICARE

## 2024-03-13 NOTE — TELEPHONE ENCOUNTER
"Caller: Paulo Leiva Jr. \"Nain\"    Relationship to patient: Self    Best call back number: [phone]     Chief complaint: PT IS GOING TO BE OUT OF TOWEN TOMORROW AND WILL BE OUT UNITL FIRST OF APRIL. NOTHING AVAILABLE UNTIL SEPTEMEBER    Type of visit: FOLLOW UP    Requested date: AFTER 1ST OF APRIL     If rescheduling, when is the original appointment: 3.14.24     Additional notes:       "

## 2024-03-29 DIAGNOSIS — E78.5 HYPERLIPIDEMIA, UNSPECIFIED HYPERLIPIDEMIA TYPE: ICD-10-CM

## 2024-03-29 DIAGNOSIS — I10 ESSENTIAL HYPERTENSION: ICD-10-CM

## 2024-03-29 DIAGNOSIS — R42 VERTIGO: ICD-10-CM

## 2024-03-29 RX ORDER — SIMVASTATIN 20 MG
20 TABLET ORAL DAILY
Qty: 90 TABLET | Refills: 3 | OUTPATIENT
Start: 2024-03-29

## 2024-03-29 RX ORDER — AMLODIPINE BESYLATE 5 MG/1
5 TABLET ORAL DAILY
Qty: 90 TABLET | Refills: 3 | OUTPATIENT
Start: 2024-03-29

## 2024-03-29 RX ORDER — MECLIZINE HYDROCHLORIDE 25 MG/1
50 TABLET ORAL DAILY
Qty: 180 TABLET | Refills: 3 | OUTPATIENT
Start: 2024-03-29

## 2024-04-10 ENCOUNTER — OFFICE VISIT (OUTPATIENT)
Dept: FAMILY MEDICINE CLINIC | Facility: CLINIC | Age: 69
End: 2024-04-10
Payer: MEDICARE

## 2024-04-10 VITALS
HEART RATE: 94 BPM | DIASTOLIC BLOOD PRESSURE: 82 MMHG | SYSTOLIC BLOOD PRESSURE: 142 MMHG | WEIGHT: 220.6 LBS | BODY MASS INDEX: 31.65 KG/M2 | OXYGEN SATURATION: 98 % | TEMPERATURE: 97.7 F

## 2024-04-10 DIAGNOSIS — J44.9 CHRONIC OBSTRUCTIVE PULMONARY DISEASE, UNSPECIFIED COPD TYPE: ICD-10-CM

## 2024-04-10 DIAGNOSIS — E87.1 HYPONATREMIA: ICD-10-CM

## 2024-04-10 DIAGNOSIS — I10 ESSENTIAL HYPERTENSION: Primary | ICD-10-CM

## 2024-04-10 DIAGNOSIS — E78.5 HYPERLIPIDEMIA, UNSPECIFIED HYPERLIPIDEMIA TYPE: ICD-10-CM

## 2024-04-10 DIAGNOSIS — Z87.891 HISTORY OF NICOTINE DEPENDENCE: ICD-10-CM

## 2024-04-10 DIAGNOSIS — R73.03 PREDIABETES: ICD-10-CM

## 2024-04-10 LAB
ALBUMIN SERPL-MCNC: 4.1 G/DL (ref 3.5–5.2)
ALBUMIN/GLOB SERPL: 1.1 G/DL
ALP SERPL-CCNC: 134 U/L (ref 39–117)
ALT SERPL W P-5'-P-CCNC: 8 U/L (ref 1–41)
ANION GAP SERPL CALCULATED.3IONS-SCNC: 13.5 MMOL/L (ref 5–15)
AST SERPL-CCNC: 18 U/L (ref 1–40)
BASOPHILS # BLD AUTO: 0.09 10*3/MM3 (ref 0–0.2)
BASOPHILS NFR BLD AUTO: 1 % (ref 0–1.5)
BILIRUB SERPL-MCNC: 1.1 MG/DL (ref 0–1.2)
BUN SERPL-MCNC: 7 MG/DL (ref 8–23)
BUN/CREAT SERPL: 7.8 (ref 7–25)
CALCIUM SPEC-SCNC: 10.2 MG/DL (ref 8.6–10.5)
CHLORIDE SERPL-SCNC: 95 MMOL/L (ref 98–107)
CHOLEST SERPL-MCNC: 207 MG/DL (ref 0–200)
CO2 SERPL-SCNC: 25.5 MMOL/L (ref 22–29)
CREAT SERPL-MCNC: 0.9 MG/DL (ref 0.76–1.27)
DEPRECATED RDW RBC AUTO: 44.1 FL (ref 37–54)
EGFRCR SERPLBLD CKD-EPI 2021: 93.6 ML/MIN/1.73
EOSINOPHIL # BLD AUTO: 0.09 10*3/MM3 (ref 0–0.4)
EOSINOPHIL NFR BLD AUTO: 1 % (ref 0.3–6.2)
ERYTHROCYTE [DISTWIDTH] IN BLOOD BY AUTOMATED COUNT: 13.1 % (ref 12.3–15.4)
GLOBULIN UR ELPH-MCNC: 3.6 GM/DL
GLUCOSE SERPL-MCNC: 96 MG/DL (ref 65–99)
HBA1C MFR BLD: 5.8 % (ref 4.8–5.6)
HCT VFR BLD AUTO: 52.7 % (ref 37.5–51)
HDLC SERPL-MCNC: 74 MG/DL (ref 40–60)
HGB BLD-MCNC: 17.2 G/DL (ref 13–17.7)
IMM GRANULOCYTES # BLD AUTO: 0.06 10*3/MM3 (ref 0–0.05)
IMM GRANULOCYTES NFR BLD AUTO: 0.6 % (ref 0–0.5)
LDLC SERPL CALC-MCNC: 119 MG/DL (ref 0–100)
LDLC/HDLC SERPL: 1.58 {RATIO}
LYMPHOCYTES # BLD AUTO: 1.67 10*3/MM3 (ref 0.7–3.1)
LYMPHOCYTES NFR BLD AUTO: 17.8 % (ref 19.6–45.3)
MCH RBC QN AUTO: 29.6 PG (ref 26.6–33)
MCHC RBC AUTO-ENTMCNC: 32.6 G/DL (ref 31.5–35.7)
MCV RBC AUTO: 90.5 FL (ref 79–97)
MONOCYTES # BLD AUTO: 0.9 10*3/MM3 (ref 0.1–0.9)
MONOCYTES NFR BLD AUTO: 9.6 % (ref 5–12)
NEUTROPHILS NFR BLD AUTO: 6.57 10*3/MM3 (ref 1.7–7)
NEUTROPHILS NFR BLD AUTO: 70 % (ref 42.7–76)
NRBC BLD AUTO-RTO: 0 /100 WBC (ref 0–0.2)
PLATELET # BLD AUTO: 364 10*3/MM3 (ref 140–450)
PMV BLD AUTO: 10.8 FL (ref 6–12)
POTASSIUM SERPL-SCNC: 4.4 MMOL/L (ref 3.5–5.2)
PROT SERPL-MCNC: 7.7 G/DL (ref 6–8.5)
RBC # BLD AUTO: 5.82 10*6/MM3 (ref 4.14–5.8)
SODIUM SERPL-SCNC: 134 MMOL/L (ref 136–145)
TRIGL SERPL-MCNC: 80 MG/DL (ref 0–150)
VLDLC SERPL-MCNC: 14 MG/DL (ref 5–40)
WBC NRBC COR # BLD AUTO: 9.38 10*3/MM3 (ref 3.4–10.8)

## 2024-04-10 PROCEDURE — 36415 COLL VENOUS BLD VENIPUNCTURE: CPT | Performed by: NURSE PRACTITIONER

## 2024-04-10 PROCEDURE — 99214 OFFICE O/P EST MOD 30 MIN: CPT | Performed by: NURSE PRACTITIONER

## 2024-04-10 PROCEDURE — 80061 LIPID PANEL: CPT | Performed by: NURSE PRACTITIONER

## 2024-04-10 PROCEDURE — 1160F RVW MEDS BY RX/DR IN RCRD: CPT | Performed by: NURSE PRACTITIONER

## 2024-04-10 PROCEDURE — 1159F MED LIST DOCD IN RCRD: CPT | Performed by: NURSE PRACTITIONER

## 2024-04-10 PROCEDURE — 83036 HEMOGLOBIN GLYCOSYLATED A1C: CPT | Performed by: NURSE PRACTITIONER

## 2024-04-10 PROCEDURE — 3079F DIAST BP 80-89 MM HG: CPT | Performed by: NURSE PRACTITIONER

## 2024-04-10 PROCEDURE — 3077F SYST BP >= 140 MM HG: CPT | Performed by: NURSE PRACTITIONER

## 2024-04-10 PROCEDURE — 80053 COMPREHEN METABOLIC PANEL: CPT | Performed by: NURSE PRACTITIONER

## 2024-04-10 PROCEDURE — 85025 COMPLETE CBC W/AUTO DIFF WBC: CPT | Performed by: NURSE PRACTITIONER

## 2024-04-10 RX ORDER — SIMVASTATIN 20 MG
20 TABLET ORAL DAILY
Qty: 90 TABLET | Refills: 1 | Status: SHIPPED | OUTPATIENT
Start: 2024-04-10

## 2024-04-10 RX ORDER — TIOTROPIUM BROMIDE INHALATION SPRAY 1.56 UG/1
2 SPRAY, METERED RESPIRATORY (INHALATION)
Qty: 12 G | Refills: 1 | Status: SHIPPED | OUTPATIENT
Start: 2024-04-10

## 2024-04-10 NOTE — PROGRESS NOTES
Venipuncture Blood Specimen Collection  Venipuncture performed in left arm  by Cadence Mayer with good hemostasis. Patient tolerated the procedure well without complications.   04/10/24   Cadence Mayer

## 2024-04-10 NOTE — PROGRESS NOTES
Chief Complaint  Med Refill and Edema (Edema in the feet sometimes )    PHQ-2 Total Score: 0    Subjective        Past Medical History:   Diagnosis Date    Benign essential hypertension     COPD (chronic obstructive pulmonary disease)     Erectile dysfunction Apprx: 5 yrs ago    Generalized convulsive seizures 2023    Hyperlipidemia 2018    Hyponatremia     Implantable loop recorder present     Syncope     Vertigo 2021    Vision loss      Social History     Tobacco Use    Smoking status: Former     Current packs/day: 0.00     Average packs/day: 2.0 packs/day for 36.3 years (72.7 ttl pk-yrs)     Types: Cigarettes     Start date: 6/15/1974     Quit date: 10/20/2010     Years since quittin.4     Passive exposure: Past    Smokeless tobacco: Never    Tobacco comments:     SMOKES MORE THAN 2 PPD FOR 35 YEARS   Vaping Use    Vaping status: Never Used   Substance Use Topics    Alcohol use: Yes     Alcohol/week: 6.0 - 8.0 standard drinks of alcohol     Types: 6 - 8 Cans of beer per week     Comment: 8-14 DRINKS PER WEEK    Drug use: Not Currently      Current Outpatient Medications on File Prior to Visit   Medication Sig    Eliquis 5 MG tablet tablet TAKE 1 TABLET TWICE A DAY    meclizine (ANTIVERT) 25 MG tablet Take 2 tablets by mouth Daily.    metoprolol succinate XL (TOPROL-XL) 50 MG 24 hr tablet Take 1 tablet by mouth Daily.    [DISCONTINUED] simvastatin (ZOCOR) 20 MG tablet Take 1 tablet by mouth Daily.    [DISCONTINUED] Spiriva Respimat 1.25 MCG/ACT aerosol solution inhaler USE 2 INHALATIONS DAILY    [DISCONTINUED] amLODIPine (NORVASC) 5 MG tablet Take 1 tablet by mouth Daily.    [DISCONTINUED] divalproex (Depakote) 250 MG DR tablet Take 1 tablet by mouth 2 (Two) Times a Day.    [DISCONTINUED] levETIRAcetam (KEPPRA) 500 MG tablet 1 tablet twice a day for 2 weeks then 2 tablets twice a day thereafter.     No current facility-administered medications on file prior to visit.      No Known Allergies    Health Maintenance Due   Topic Date Due    ZOSTER VACCINE (2 of 2) 01/29/2015    RSV Vaccine - Adults (1 - 1-dose 60+ series) Never done    COVID-19 Vaccine (1 - 2023-24 season) Never done    LUNG CANCER SCREENING  05/10/2024      Paulo Leiva  presents to [facility]  History of Present Illness  Here to follow up on chronic conditions. He recently returned from a one month stay in [place].    HTN: notes home readings at about 150 systolic the past few days; using a home cuff monitor. Denies HA, CP, palpitations. He has increased Propel to twice daily to help with electrolytes/low sodium. Pt notes he has been having some swelling of the ankles. He started taking Krill oil and no swelling for the past 2 days.     Pt noted side effects with Keppra and Depakote so stopped both medications. Pt canceled his appointment with Dr. Christina. Pt has not episodes in the past 6 months; no medication in the past 2.5 months. Pt states he has been eating breakfast regularly and notes he has reduced his alcohol intake slightly. Pt notes he has not felt like something was going to happen either.     Objective   Vital Signs:   /82 (BP Location: Left arm, Patient Position: Sitting)   Pulse 94   Temp 97.7 °F (36.5 °C) (Temporal)   Wt 100 kg (220 lb 9.6 oz)   SpO2 98%   BMI 31.65 kg/m²     Review of Systems   Physical Exam  Vitals reviewed.   Constitutional:       General: He is not in acute distress.     Appearance: Normal appearance. He is well-developed.   Eyes:      Conjunctiva/sclera: Conjunctivae normal.      Pupils: Pupils are equal, round, and reactive to light.   Cardiovascular:      Rate and Rhythm: Normal rate and regular rhythm.      Heart sounds: Normal heart sounds.   Pulmonary:      Effort: Pulmonary effort is normal.      Breath sounds: Normal breath sounds.   Musculoskeletal:      Cervical back: Neck supple.      Right lower leg: No edema.      Left lower leg: No edema.    Skin:     General: Skin is warm and dry.   Neurological:      Mental Status: He is alert and oriented to person, place, and time.   Psychiatric:         Mood and Affect: Mood and affect normal.         Behavior: Behavior normal.         Thought Content: Thought content normal.         Judgment: Judgment normal.        Result Review :   The following data was reviewed by: MELANIA Santiago on 04/10/2024:  Common labs          5/5/2023    10:24 10/3/2023    10:02 10/13/2023    11:45   Common Labs   Glucose 80  91  96    BUN 6  9  5    Creatinine 0.79  0.89  0.81    Sodium 128  129  130    Potassium 4.8  4.8  4.6    Chloride 93  91  92    Calcium 9.6  10.2  10.0    Albumin 4.2  4.6  4.5    Total Bilirubin 1.0  1.5  1.8    Alkaline Phosphatase 91  117  98    AST (SGOT) 23  30  37    ALT (SGPT) 18  27  30    WBC 9.03  7.96  8.27    Hemoglobin 15.6  17.9  17.8    Hematocrit 45.4  51.6  50.8    Platelets 281  285  269    Total Cholesterol  187     Triglycerides  58     HDL Cholesterol  108     LDL Cholesterol   68     Hemoglobin A1C  5.80       TSH          10/3/2023    10:02   TSH   TSH 0.615                Assessment and Plan    Diagnoses and all orders for this visit:    1. Essential hypertension (Primary)  -     CBC & Differential  -     Comprehensive Metabolic Panel    2. Hyperlipidemia, unspecified hyperlipidemia type  -     Lipid Panel  -     simvastatin (ZOCOR) 20 MG tablet; Take 1 tablet by mouth Daily.  Dispense: 90 tablet; Refill: 1    3. Chronic obstructive pulmonary disease, unspecified COPD type  -     Spiriva Respimat 1.25 MCG/ACT aerosol solution inhaler; Inhale 2 puffs Daily.  Dispense: 12 g; Refill: 1    4. Hyponatremia    5. Prediabetes  -     Hemoglobin A1c    6. History of nicotine dependence  -      CT Chest Low Dose Cancer Screening WO; Future    Refill current medications, fasting labs pending.  No edema noted today on exam.  Patient to continue to monitor for signs seizure-like activity.   Continue to monitor blood pressure and bring cuff with him to next appointment.       Follow Up   Return in about 6 months (around 10/10/2024) for Refills and fasting labs.  Patient was given instructions and counseling regarding his condition or for health maintenance advice. Please see specific information pulled into the AVS if appropriate.       Answers submitted by the patient for this visit:  Primary Reason for Visit (Submitted on 4/3/2024)  What is the primary reason for your visit?: Other  Other (Submitted on 4/3/2024)  Please describe your symptoms.: Renewal of Medications and the swelling of legs and ankles  Have you had these symptoms before?: Yes  How long have you been having these symptoms?: Greater than 2 weeks

## 2024-04-21 DIAGNOSIS — R42 VERTIGO: ICD-10-CM

## 2024-04-22 RX ORDER — MECLIZINE HYDROCHLORIDE 25 MG/1
50 TABLET ORAL DAILY
Qty: 180 TABLET | Refills: 0 | Status: SHIPPED | OUTPATIENT
Start: 2024-04-22

## 2024-04-29 ENCOUNTER — OFFICE VISIT (OUTPATIENT)
Dept: CARDIOLOGY | Facility: CLINIC | Age: 69
End: 2024-04-29
Payer: MEDICARE

## 2024-04-29 VITALS
BODY MASS INDEX: 31.35 KG/M2 | DIASTOLIC BLOOD PRESSURE: 82 MMHG | HEIGHT: 70 IN | SYSTOLIC BLOOD PRESSURE: 178 MMHG | WEIGHT: 219 LBS | HEART RATE: 70 BPM

## 2024-04-29 DIAGNOSIS — E78.2 MIXED HYPERLIPIDEMIA: ICD-10-CM

## 2024-04-29 DIAGNOSIS — R55 SYNCOPE AND COLLAPSE: ICD-10-CM

## 2024-04-29 DIAGNOSIS — I10 ESSENTIAL HYPERTENSION: Primary | ICD-10-CM

## 2024-04-29 PROCEDURE — 3077F SYST BP >= 140 MM HG: CPT | Performed by: INTERNAL MEDICINE

## 2024-04-29 PROCEDURE — 99214 OFFICE O/P EST MOD 30 MIN: CPT | Performed by: INTERNAL MEDICINE

## 2024-04-29 PROCEDURE — 3079F DIAST BP 80-89 MM HG: CPT | Performed by: INTERNAL MEDICINE

## 2024-04-29 RX ORDER — VALSARTAN 80 MG/1
80 TABLET ORAL DAILY
Qty: 90 TABLET | Refills: 3 | Status: SHIPPED | OUTPATIENT
Start: 2024-04-29

## 2024-04-29 NOTE — ASSESSMENT & PLAN NOTE
No recurrent episodes Lipitor does not show any significant dysrhythmias at this point continue with monitoring

## 2024-04-29 NOTE — ASSESSMENT & PLAN NOTE
Not ideally controlled recommend addition of valsartan 80 mg daily encourage patient to get a home blood pressure monitor for the arm and start taking readings.  Will repeat BMP in 2 weeks

## 2024-04-29 NOTE — PROGRESS NOTES
Chief Complaint  Follow-up, Syncope and collapse, Hypertension, and Hyperlipidemia    Subjective    Patient has a doing well symptomatically he has not had any syncope or presyncopal episodes.  Past Medical History:   Diagnosis Date    Benign essential hypertension     COPD (chronic obstructive pulmonary disease)     Erectile dysfunction Apprx: 5 yrs ago    Generalized convulsive seizures 2023    Hyperlipidemia 2018    Hyponatremia     Implantable loop recorder present     Syncope     Vertigo 2021    Vision loss          Current Outpatient Medications:     Eliquis 5 MG tablet tablet, TAKE 1 TABLET TWICE A DAY, Disp: 180 tablet, Rfl: 3    meclizine (ANTIVERT) 25 MG tablet, TAKE 2 TABLETS DAILY, Disp: 180 tablet, Rfl: 0    metoprolol succinate XL (TOPROL-XL) 50 MG 24 hr tablet, Take 1 tablet by mouth Daily., Disp: 90 tablet, Rfl: 3    simvastatin (ZOCOR) 20 MG tablet, Take 1 tablet by mouth Daily., Disp: 90 tablet, Rfl: 1    Spiriva Respimat 1.25 MCG/ACT aerosol solution inhaler, Inhale 2 puffs Daily., Disp: 12 g, Rfl: 1    valsartan (DIOVAN) 80 MG tablet, Take 1 tablet by mouth Daily., Disp: 90 tablet, Rfl: 3    There are no discontinued medications.  No Known Allergies     Social History     Tobacco Use    Smoking status: Former     Current packs/day: 0.00     Average packs/day: 2.0 packs/day for 36.3 years (72.7 ttl pk-yrs)     Types: Cigarettes     Start date: 6/15/1974     Quit date: 10/20/2010     Years since quittin.5     Passive exposure: Past    Smokeless tobacco: Never    Tobacco comments:     SMOKES MORE THAN 2 PPD FOR 35 YEARS   Vaping Use    Vaping status: Never Used   Substance Use Topics    Alcohol use: Yes     Alcohol/week: 6.0 - 8.0 standard drinks of alcohol     Types: 6 - 8 Cans of beer per week     Comment: 8-14 DRINKS PER WEEK    Drug use: Not Currently       Family History   Problem Relation Age of Onset    Dementia Mother     No Known Problems Father     Colon cancer Neg  "Hx     Malig Hyperthermia Neg Hx         Objective     /82   Pulse 70   Ht 177.8 cm (70\")   Wt 99.3 kg (219 lb)   BMI 31.42 kg/m²       Physical Exam    General Appearance:   no acute distress  Alert and oriented x3  HENT:   lips not cyanotic  Atraumatic  Neck:  No jvd   supple  Respiratory:  no respiratory distress  normal breath sounds  no rales  Cardiovascular:  Regular rate and rhythm  no S3, no S4   no murmur  no rub  Extremities  No cyanosis  lower extremity edema: none    Skin:   warm, dry  No rashes      Result Review :     No results found for: \"PROBNP\"  CMP          10/3/2023    10:02 10/13/2023    11:45 4/10/2024    15:00   CMP   Glucose 91  96  96    BUN 9  5  7    Creatinine 0.89  0.81  0.90    EGFR 94.5  97.2  93.6    Sodium 129  130  134    Potassium 4.8  4.6  4.4    Chloride 91  92  95    Calcium 10.2  10.0  10.2    Total Protein 7.5  7.5  7.7    Albumin 4.6  4.5  4.1    Globulin 2.9  3.0  3.6    Total Bilirubin 1.5  1.8  1.1    Alkaline Phosphatase 117  98  134    AST (SGOT) 30  37  18    ALT (SGPT) 27  30  8    Albumin/Globulin Ratio 1.6  1.5  1.1    BUN/Creatinine Ratio 10.1  6.2  7.8    Anion Gap 11.0  15.4  13.5      CBC w/diff          10/3/2023    10:02 10/13/2023    11:45 4/10/2024    15:00   CBC w/Diff   WBC 7.96  8.27  9.38    RBC 5.60  5.56  5.82    Hemoglobin 17.9  17.8  17.2    Hematocrit 51.6  50.8  52.7    MCV 92.1  91.4  90.5    MCH 32.0  32.0  29.6    MCHC 34.7  35.0  32.6    RDW 13.7  13.9  13.1    Platelets 285  269  364    Neutrophil Rel % 64.0  67.4  70.0    Immature Granulocyte Rel % 0.9  0.7  0.6    Lymphocyte Rel % 19.0  16.6  17.8    Monocyte Rel % 13.8  13.2  9.6    Eosinophil Rel % 1.4  1.0  1.0    Basophil Rel % 0.9  1.1  1.0       Lab Results   Component Value Date    TSH 0.615 10/03/2023      No results found for: \"FREET4\"   No results found for: \"DDIMERQUANT\"  Magnesium   Date Value Ref Range Status   10/13/2023 2.1 1.6 - 2.4 mg/dL Final      No results found " "for: \"DIGOXIN\"   No results found for: \"TROPONINT\"        Lipid Panel          10/3/2023    10:02 4/10/2024    15:00   Lipid Panel   Total Cholesterol 187  207    Triglycerides 58  80    HDL Cholesterol 108  74    VLDL Cholesterol 11  14    LDL Cholesterol  68  119    LDL/HDL Ratio 0.62  1.58      No results found for: \"POCTROP\"    Results for orders placed during the hospital encounter of 09/07/22    Adult Transthoracic Echo Complete W/ Cont if Necessary Per Protocol    Interpretation Summary  · Calculated left ventricular EF = 52.3% Estimated left ventricular EF was in agreement with the calculated left ventricular EF.  · The left ventricular cavity is mildly dilated.  · Left atrial dilation mild to moderate                 Diagnoses and all orders for this visit:    1. Essential hypertension (Primary)  Assessment & Plan:  Not ideally controlled recommend addition of valsartan 80 mg daily encourage patient to get a home blood pressure monitor for the arm and start taking readings.  Will repeat BMP in 2 weeks     Orders:  -     valsartan (DIOVAN) 80 MG tablet; Take 1 tablet by mouth Daily.  Dispense: 90 tablet; Refill: 3  -     Basic Metabolic Panel; Future    2. Syncope and collapse  Assessment & Plan:  No recurrent episodes Lipitor does not show any significant dysrhythmias at this point continue with monitoring      3. Mixed hyperlipidemia            Follow Up     Return in about 6 months (around 10/29/2024) for Follow with Jenni Crawley.          Patient was given instructions and counseling regarding his condition or for health maintenance advice. Please see specific information pulled into the AVS if appropriate.       "

## 2024-05-11 ENCOUNTER — APPOINTMENT (OUTPATIENT)
Dept: CT IMAGING | Facility: HOSPITAL | Age: 69
End: 2024-05-11
Payer: MEDICARE

## 2024-05-11 ENCOUNTER — HOSPITAL ENCOUNTER (EMERGENCY)
Facility: HOSPITAL | Age: 69
Discharge: HOME | End: 2024-05-11
Attending: EMERGENCY MEDICINE
Payer: MEDICARE

## 2024-05-11 ENCOUNTER — APPOINTMENT (OUTPATIENT)
Dept: GENERAL RADIOLOGY | Facility: HOSPITAL | Age: 69
End: 2024-05-11
Payer: MEDICARE

## 2024-05-11 VITALS
HEART RATE: 82 BPM | SYSTOLIC BLOOD PRESSURE: 172 MMHG | OXYGEN SATURATION: 97 % | WEIGHT: 217.37 LBS | HEIGHT: 70 IN | BODY MASS INDEX: 31.12 KG/M2 | TEMPERATURE: 98.6 F | RESPIRATION RATE: 18 BRPM | DIASTOLIC BLOOD PRESSURE: 89 MMHG
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
## 2024-05-11 DIAGNOSIS — R42 DIZZINESS: Primary | ICD-10-CM

## 2024-05-11 DIAGNOSIS — S06.0X0A CONCUSSION WITHOUT LOSS OF CONSCIOUSNESS, INITIAL ENCOUNTER: ICD-10-CM

## 2024-05-11 LAB
ALBUMIN SERPL-MCNC: 3.8 G/DL (ref 3.5–5.2)
ALBUMIN/GLOB SERPL: 1 G/DL
ALP SERPL-CCNC: 115 U/L (ref 39–117)
ALT SERPL W P-5'-P-CCNC: 11 U/L (ref 1–41)
ANION GAP SERPL CALCULATED.3IONS-SCNC: 14.2 MMOL/L (ref 5–15)
AST SERPL-CCNC: 15 U/L (ref 1–40)
BASOPHILS # BLD AUTO: 0.1 10*3/MM3 (ref 0–0.2)
BASOPHILS NFR BLD AUTO: 0.9 % (ref 0–1.5)
BILIRUB SERPL-MCNC: 1 MG/DL (ref 0–1.2)
BILIRUB UR QL STRIP: NEGATIVE
BUN SERPL-MCNC: 5 MG/DL (ref 8–23)
BUN/CREAT SERPL: 5.7 (ref 7–25)
CALCIUM SPEC-SCNC: 10.2 MG/DL (ref 8.6–10.5)
CHLORIDE SERPL-SCNC: 94 MMOL/L (ref 98–107)
CLARITY UR: CLEAR
CO2 SERPL-SCNC: 25.8 MMOL/L (ref 22–29)
COLOR UR: YELLOW
CREAT SERPL-MCNC: 0.87 MG/DL (ref 0.76–1.27)
DEPRECATED RDW RBC AUTO: 47 FL (ref 37–54)
EGFRCR SERPLBLD CKD-EPI 2021: 94.6 ML/MIN/1.73
EOSINOPHIL # BLD AUTO: 0.11 10*3/MM3 (ref 0–0.4)
EOSINOPHIL NFR BLD AUTO: 1 % (ref 0.3–6.2)
ERYTHROCYTE [DISTWIDTH] IN BLOOD BY AUTOMATED COUNT: 14.6 % (ref 12.3–15.4)
GLOBULIN UR ELPH-MCNC: 3.7 GM/DL
GLUCOSE SERPL-MCNC: 92 MG/DL (ref 65–99)
GLUCOSE UR STRIP-MCNC: NEGATIVE MG/DL
HCT VFR BLD AUTO: 48.8 % (ref 37.5–51)
HGB BLD-MCNC: 16.6 G/DL (ref 13–17.7)
HGB UR QL STRIP.AUTO: NEGATIVE
HOLD SPECIMEN: NORMAL
HOLD SPECIMEN: NORMAL
IMM GRANULOCYTES # BLD AUTO: 0.07 10*3/MM3 (ref 0–0.05)
IMM GRANULOCYTES NFR BLD AUTO: 0.7 % (ref 0–0.5)
KETONES UR QL STRIP: ABNORMAL
LEUKOCYTE ESTERASE UR QL STRIP.AUTO: NEGATIVE
LYMPHOCYTES # BLD AUTO: 1.64 10*3/MM3 (ref 0.7–3.1)
LYMPHOCYTES NFR BLD AUTO: 15.5 % (ref 19.6–45.3)
MAGNESIUM SERPL-MCNC: 1.7 MG/DL (ref 1.6–2.4)
MCH RBC QN AUTO: 30.4 PG (ref 26.6–33)
MCHC RBC AUTO-ENTMCNC: 34 G/DL (ref 31.5–35.7)
MCV RBC AUTO: 89.4 FL (ref 79–97)
MONOCYTES # BLD AUTO: 1 10*3/MM3 (ref 0.1–0.9)
MONOCYTES NFR BLD AUTO: 9.5 % (ref 5–12)
NEUTROPHILS NFR BLD AUTO: 7.64 10*3/MM3 (ref 1.7–7)
NEUTROPHILS NFR BLD AUTO: 72.4 % (ref 42.7–76)
NITRITE UR QL STRIP: NEGATIVE
NRBC BLD AUTO-RTO: 0 /100 WBC (ref 0–0.2)
PH UR STRIP.AUTO: 7 [PH] (ref 5–8)
PLATELET # BLD AUTO: 370 10*3/MM3 (ref 140–450)
PMV BLD AUTO: 9.9 FL (ref 6–12)
POTASSIUM SERPL-SCNC: 4.8 MMOL/L (ref 3.5–5.2)
PROT SERPL-MCNC: 7.5 G/DL (ref 6–8.5)
PROT UR QL STRIP: ABNORMAL
RBC # BLD AUTO: 5.46 10*6/MM3 (ref 4.14–5.8)
SODIUM SERPL-SCNC: 134 MMOL/L (ref 136–145)
SP GR UR STRIP: 1.01 (ref 1–1.03)
TROPONIN T SERPL HS-MCNC: 9 NG/L
UROBILINOGEN UR QL STRIP: ABNORMAL
WBC NRBC COR # BLD AUTO: 10.56 10*3/MM3 (ref 3.4–10.8)
WHOLE BLOOD HOLD COAG: NORMAL
WHOLE BLOOD HOLD SPECIMEN: NORMAL

## 2024-05-11 PROCEDURE — 83735 ASSAY OF MAGNESIUM: CPT | Performed by: EMERGENCY MEDICINE

## 2024-05-11 PROCEDURE — 84484 ASSAY OF TROPONIN QUANT: CPT | Performed by: EMERGENCY MEDICINE

## 2024-05-11 PROCEDURE — 81003 URINALYSIS AUTO W/O SCOPE: CPT | Performed by: EMERGENCY MEDICINE

## 2024-05-11 PROCEDURE — 93005 ELECTROCARDIOGRAM TRACING: CPT

## 2024-05-11 PROCEDURE — 70450 CT HEAD/BRAIN W/O DYE: CPT

## 2024-05-11 PROCEDURE — 99284 EMERGENCY DEPT VISIT MOD MDM: CPT

## 2024-05-11 PROCEDURE — 85025 COMPLETE CBC W/AUTO DIFF WBC: CPT | Performed by: EMERGENCY MEDICINE

## 2024-05-11 PROCEDURE — 93005 ELECTROCARDIOGRAM TRACING: CPT | Performed by: EMERGENCY MEDICINE

## 2024-05-11 PROCEDURE — 80053 COMPREHEN METABOLIC PANEL: CPT | Performed by: EMERGENCY MEDICINE

## 2024-05-11 PROCEDURE — 36415 COLL VENOUS BLD VENIPUNCTURE: CPT

## 2024-05-11 PROCEDURE — 71045 X-RAY EXAM CHEST 1 VIEW: CPT

## 2024-05-11 RX ORDER — SODIUM CHLORIDE 0.9 % (FLUSH) 0.9 %
10 SYRINGE (ML) INJECTION AS NEEDED
Status: DISCONTINUED | OUTPATIENT
Start: 2024-05-11 | End: 2024-05-11 | Stop reason: HOSPADM

## 2024-05-11 NOTE — DISCHARGE INSTRUCTIONS
You may continue to have signs and symptoms of a concussion for the next week or 2.  Return to the emergency department for worsening symptoms, including increased confusion, vomiting, severe headache.  Follow-up with your primary care provider.

## 2024-05-13 LAB
QT INTERVAL: 379 MS
QTC INTERVAL: 431 MS

## 2024-05-14 ENCOUNTER — OFFICE VISIT (OUTPATIENT)
Dept: FAMILY MEDICINE CLINIC | Facility: CLINIC | Age: 69
End: 2024-05-14
Payer: MEDICARE

## 2024-05-14 VITALS
WEIGHT: 217 LBS | TEMPERATURE: 98.1 F | HEIGHT: 70 IN | BODY MASS INDEX: 31.07 KG/M2 | HEART RATE: 80 BPM | SYSTOLIC BLOOD PRESSURE: 152 MMHG | OXYGEN SATURATION: 97 % | DIASTOLIC BLOOD PRESSURE: 80 MMHG

## 2024-05-14 DIAGNOSIS — D72.821 MONOCYTOSIS: ICD-10-CM

## 2024-05-14 DIAGNOSIS — D72.9 NEUTROPHILIA: ICD-10-CM

## 2024-05-14 DIAGNOSIS — R79.89 ABNORMAL CBC: ICD-10-CM

## 2024-05-14 DIAGNOSIS — R55 SYNCOPE, UNSPECIFIED SYNCOPE TYPE: Primary | ICD-10-CM

## 2024-05-14 DIAGNOSIS — I10 ESSENTIAL HYPERTENSION: ICD-10-CM

## 2024-05-14 PROCEDURE — 1160F RVW MEDS BY RX/DR IN RCRD: CPT | Performed by: NURSE PRACTITIONER

## 2024-05-14 PROCEDURE — 1159F MED LIST DOCD IN RCRD: CPT | Performed by: NURSE PRACTITIONER

## 2024-05-14 PROCEDURE — 99214 OFFICE O/P EST MOD 30 MIN: CPT | Performed by: NURSE PRACTITIONER

## 2024-05-14 PROCEDURE — 3079F DIAST BP 80-89 MM HG: CPT | Performed by: NURSE PRACTITIONER

## 2024-05-14 PROCEDURE — 3077F SYST BP >= 140 MM HG: CPT | Performed by: NURSE PRACTITIONER

## 2024-05-14 RX ORDER — VALSARTAN 80 MG/1
80 TABLET ORAL 2 TIMES DAILY
Qty: 180 TABLET | Refills: 0 | Status: SHIPPED | OUTPATIENT
Start: 2024-05-14

## 2024-05-14 NOTE — PROGRESS NOTES
Chief Complaint  Hypertension (Running high for the past couple of weeks) and Hospital Follow Up Visit (Fell and hurt right arm, can not keep balance)    Subjective        Past Medical History:   Diagnosis Date    Benign essential hypertension     COPD (chronic obstructive pulmonary disease)     Erectile dysfunction Apprx: 5 yrs ago    Generalized convulsive seizures 2023    Hyperlipidemia 2018    Hyponatremia     Implantable loop recorder present     Syncope     Vertigo 2021    Vision loss      Social History     Tobacco Use    Smoking status: Former     Current packs/day: 0.00     Average packs/day: 2.0 packs/day for 36.3 years (72.7 ttl pk-yrs)     Types: Cigarettes     Start date: 6/15/1974     Quit date: 10/20/2010     Years since quittin.5     Passive exposure: Past    Smokeless tobacco: Never    Tobacco comments:     SMOKES MORE THAN 2 PPD FOR 35 YEARS   Vaping Use    Vaping status: Never Used   Substance Use Topics    Alcohol use: Yes     Alcohol/week: 6.0 - 8.0 standard drinks of alcohol     Types: 6 - 8 Cans of beer per week     Comment: 8-14 DRINKS PER WEEK    Drug use: Not Currently      Current Outpatient Medications on File Prior to Visit   Medication Sig    Eliquis 5 MG tablet tablet TAKE 1 TABLET TWICE A DAY    meclizine (ANTIVERT) 25 MG tablet TAKE 2 TABLETS DAILY    metoprolol succinate XL (TOPROL-XL) 50 MG 24 hr tablet Take 1 tablet by mouth Daily.    simvastatin (ZOCOR) 20 MG tablet Take 1 tablet by mouth Daily.    Spiriva Respimat 1.25 MCG/ACT aerosol solution inhaler Inhale 2 puffs Daily.    [DISCONTINUED] valsartan (DIOVAN) 80 MG tablet Take 1 tablet by mouth Daily.     No current facility-administered medications on file prior to visit.      No Known Allergies   Health Maintenance Due   Topic Date Due    ZOSTER VACCINE (2 of 2) 2015    RSV Vaccine - Adults (1 - 1-dose 60+ series) Never done    COVID-19 Vaccine (2023- season) Never done    LUNG CANCER  "SCREENING  05/10/2024      Paulo Leiva Jr. presents to [facility]  History of Present Illness  Here to follow up from ER visit for a fall onto the grass. No change in activity. Pt notes he was walking down his road and was trying to tell himself to slow down and then he decided to step into the grass incase he fell and ended up falling. He did not lose consciousness. Pt notes he had a headache the following day. Pt was also having confusion. Pt states that has gotten better but he has to think before speaking or may confuse the words.     He has purchased pedals to use while sitting at home.     Pt notes his home BP readings have been high. In office reading also high today but his home wrist cuff was about 30 points higher today than manual reading. Denies chest pain, palpitations, SOB, swelling.    Objective   Vital Signs:   /80 (BP Location: Left arm)   Pulse 80   Temp 98.1 °F (36.7 °C)   Ht 177.8 cm (70\")   Wt 98.4 kg (217 lb)   SpO2 97%   BMI 31.14 kg/m²     Review of Systems   Physical Exam  Vitals reviewed.   Constitutional:       General: He is not in acute distress.     Appearance: Normal appearance. He is well-developed.   HENT:      Head: Normocephalic and atraumatic.      Right Ear: Tympanic membrane, ear canal and external ear normal.      Left Ear: Tympanic membrane, ear canal and external ear normal.   Eyes:      Conjunctiva/sclera: Conjunctivae normal.      Pupils: Pupils are equal, round, and reactive to light.   Cardiovascular:      Rate and Rhythm: Normal rate and regular rhythm.      Heart sounds: Normal heart sounds.   Pulmonary:      Effort: Pulmonary effort is normal.      Breath sounds: Normal breath sounds.   Musculoskeletal:      Cervical back: Neck supple.      Right lower leg: No edema.      Left lower leg: No edema.   Skin:     General: Skin is warm and dry.   Neurological:      Mental Status: He is alert and oriented to person, place, and " time.      Coordination: Romberg sign negative.   Psychiatric:         Mood and Affect: Mood and affect normal.         Behavior: Behavior normal.         Thought Content: Thought content normal.         Judgment: Judgment normal.        Result Review :   The following data was reviewed by: MELANIA Santiago on 05/14/2024:  Common labs          10/13/2023    11:45 4/10/2024    15:00 5/11/2024    14:45 5/11/2024    14:46   Common Labs   Glucose 96  96  92     BUN 5  7  5     Creatinine 0.81  0.90  0.87     Sodium 130  134  134     Potassium 4.6  4.4  4.8     Chloride 92  95  94     Calcium 10.0  10.2  10.2     Albumin 4.5  4.1  3.8     Total Bilirubin 1.8  1.1  1.0     Alkaline Phosphatase 98  134  115     AST (SGOT) 37  18  15     ALT (SGPT) 30  8  11     WBC 8.27  9.38   10.56    Hemoglobin 17.8  17.2   16.6    Hematocrit 50.8  52.7   48.8    Platelets 269  364   370    Total Cholesterol  207      Triglycerides  80      HDL Cholesterol  74      LDL Cholesterol   119      Hemoglobin A1C  5.80        TSH          10/3/2023    10:02   TSH   TSH 0.615      Data reviewed : Radiologic studies CT head, Cardiology studies EKG, and Recent hospitalization notes Recent ER note           Assessment and Plan    Diagnoses and all orders for this visit:    1. Syncope, unspecified syncope type (Primary)  -     Ambulatory Referral to Hematology    2. Essential hypertension  -     valsartan (DIOVAN) 80 MG tablet; Take 1 tablet by mouth 2 (Two) Times a Day.  Dispense: 180 tablet; Refill: 0    3. Abnormal CBC  -     Ambulatory Referral to Hematology      Review of patient's labs and other notes will refer to hematology due to abnormal CBC whereas patient has been worked up by cardiology and neurology was unable to determine cause of syncopal/seizure-like episodes.  Patient to increase valsartan to 80 mg twice daily and monitor blood pressure.  Patient's blood pressure is about 20 points higher systolically with lying down and  then with standing.  Possible for postural hypotension triggering episodes.  Patient to keep appointment with cardiology.            Follow Up   Return if symptoms worsen or fail to improve.  Patient was given instructions and counseling regarding his condition or for health maintenance advice. Please see specific information pulled into the AVS if appropriate.

## 2024-05-22 ENCOUNTER — HOSPITAL ENCOUNTER (OUTPATIENT)
Dept: CT IMAGING | Facility: HOSPITAL | Age: 69
Discharge: HOME | End: 2024-05-22
Admitting: NURSE PRACTITIONER
Payer: MEDICARE

## 2024-05-22 DIAGNOSIS — Z87.891 HISTORY OF NICOTINE DEPENDENCE: ICD-10-CM

## 2024-05-22 PROCEDURE — 71271 CT THORAX LUNG CANCER SCR C-: CPT

## 2024-05-23 DIAGNOSIS — R93.89 ABNORMAL CT SCAN: ICD-10-CM

## 2024-05-23 DIAGNOSIS — N28.89 RENAL MASS, RIGHT: Primary | ICD-10-CM

## 2024-05-24 ENCOUNTER — HOSPITAL ENCOUNTER (OUTPATIENT)
Dept: CT IMAGING | Facility: HOSPITAL | Age: 69
Discharge: HOME | End: 2024-05-24
Payer: MEDICARE

## 2024-05-24 DIAGNOSIS — N28.89 RIGHT RENAL MASS: Primary | ICD-10-CM

## 2024-05-24 PROCEDURE — 25510000001 IOPAMIDOL PER 1 ML: Performed by: NURSE PRACTITIONER

## 2024-05-24 PROCEDURE — 74178 CT ABD&PLV WO CNTR FLWD CNTR: CPT

## 2024-05-24 RX ADMIN — IOPAMIDOL 100 ML: 755 INJECTION, SOLUTION INTRAVENOUS at 12:07

## 2024-05-28 ENCOUNTER — HOSPITAL ENCOUNTER (EMERGENCY)
Facility: HOSPITAL | Age: 69
Discharge: HOME | End: 2024-05-28
Attending: EMERGENCY MEDICINE | Admitting: EMERGENCY MEDICINE
Payer: MEDICARE

## 2024-05-28 ENCOUNTER — APPOINTMENT (OUTPATIENT)
Dept: GENERAL RADIOLOGY | Facility: HOSPITAL | Age: 69
End: 2024-05-28
Payer: MEDICARE

## 2024-05-28 VITALS
TEMPERATURE: 98 F | HEART RATE: 74 BPM | DIASTOLIC BLOOD PRESSURE: 77 MMHG | OXYGEN SATURATION: 94 % | SYSTOLIC BLOOD PRESSURE: 154 MMHG | WEIGHT: 210 LBS | RESPIRATION RATE: 16 BRPM | BODY MASS INDEX: 30.06 KG/M2 | HEIGHT: 70 IN

## 2024-05-28 DIAGNOSIS — S20.211A RIB CONTUSION, RIGHT, INITIAL ENCOUNTER: Primary | ICD-10-CM
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
## 2024-05-28 PROCEDURE — 99283 EMERGENCY DEPT VISIT LOW MDM: CPT

## 2024-05-28 PROCEDURE — 71101 X-RAY EXAM UNILAT RIBS/CHEST: CPT

## 2024-05-28 NOTE — ED PROVIDER NOTES
Time: 6:21 PM EDT  Date of encounter:  5/28/2024  Independent Historian/Clinical History and Information was obtained by:   Patient    History is limited by: N/A    Chief Complaint   Patient presents with    Fall    Rib Pain         History of Present Illness:  The patient is a 68 y.o. year old male who presents to the emergency department for evaluation of right rib pain after falling.  The patient was hanging a curtain kat when he lost his balance and fell.  The patient fell from a standing position he was not on a stepstool or ladder.  He denies striking head.  He has a skin tear on his left arm that is from a previous fall which he did seek medical care for here at this facility.  He states he has been falling a lot recently and that his primary care provider is aware.  He had no loss of consciousness.  On exam his breath sounds are clear.  His airway is patent.  He denies any other injuries.  There is no deformity, redness, bruising, or swelling to the right rib area.      Patient Care Team  Primary Care Provider: Vilma oRlon APRN    Past Medical History:     No Known Allergies  Past Medical History:   Diagnosis Date    Benign essential hypertension     COPD (chronic obstructive pulmonary disease)     Erectile dysfunction Apprx: 5 yrs ago    Generalized convulsive seizures 12/27/2023    Hyperlipidemia 12/14/2018    Hyponatremia     Implantable loop recorder present     Syncope     Vertigo 02/17/2021    Vision loss      Past Surgical History:   Procedure Laterality Date    CARDIAC CATHETERIZATION N/A 09/26/2022    Procedure: Left Heart Cath;  Surgeon: Randell Casiano MD;  Location: [facility] INVASIVE LOCATION;  Service: Cardiovascular;  Laterality: N/A;    CARDIAC ELECTROPHYSIOLOGY PROCEDURE N/A 09/26/2022    Procedure: Loop insertion;  Surgeon: Randell Casiano MD;  Location: [facility] INVASIVE LOCATION;  Service: Cardiovascular;  Laterality: N/A;    COLONOSCOPY  2007    COLONOSCOPY N/A 10/26/2022     Procedure: COLONOSCOPY FOR SCREENING;  Surgeon: Roque Whitlock MD;  Location: [facility] ENDOSCOPY;  Service: Gastroenterology;  Laterality: N/A;  NORMAL COLON     Family History   Problem Relation Age of Onset    Dementia Mother     No Known Problems Father     Colon cancer Neg Hx     Malig Hyperthermia Neg Hx        Home Medications:  Prior to Admission medications    Medication Sig Start Date End Date Taking? Authorizing Provider   Eliquis 5 MG tablet tablet TAKE 1 TABLET TWICE A DAY 23   Randell Casiano MD   meclizine (ANTIVERT) 25 MG tablet TAKE 2 TABLETS DAILY 24   Vilma Rolon APRN   metoprolol succinate XL (TOPROL-XL) 50 MG 24 hr tablet Take 1 tablet by mouth Daily. 23   Randell Casiano MD   simvastatin (ZOCOR) 20 MG tablet Take 1 tablet by mouth Daily. 4/10/24   Vilma Rolon APRN   Spiriva Respimat 1.25 MCG/ACT aerosol solution inhaler Inhale 2 puffs Daily. 4/10/24   Vilma Rolon APRN   valsartan (DIOVAN) 80 MG tablet Take 1 tablet by mouth 2 (Two) Times a Day. 24   Vilma Rolon APRN        Social History:   Social History     Tobacco Use    Smoking status: Former     Current packs/day: 0.00     Average packs/day: 2.0 packs/day for 36.3 years (72.7 ttl pk-yrs)     Types: Cigarettes     Start date: 6/15/1974     Quit date: 10/20/2010     Years since quittin.6     Passive exposure: Past    Smokeless tobacco: Never    Tobacco comments:     SMOKES MORE THAN 2 PPD FOR 35 YEARS   Vaping Use    Vaping status: Never Used   Substance Use Topics    Alcohol use: Yes     Alcohol/week: 6.0 - 8.0 standard drinks of alcohol     Types: 6 - 8 Cans of beer per week     Comment: 8-14 DRINKS PER WEEK    Drug use: Not Currently         Review of Systems:  Review of Systems   Constitutional:  Negative for chills and fever.   HENT:  Negative for congestion, ear pain and sore throat.    Eyes:  Negative for pain.   Respiratory:  Negative for cough, chest tightness, shortness of breath  "and wheezing.    Cardiovascular:  Negative for chest pain (Right rib tenderness).   Gastrointestinal:  Negative for abdominal pain, diarrhea, nausea and vomiting.   Genitourinary:  Negative for flank pain and hematuria.   Musculoskeletal:  Negative for joint swelling.   Skin:  Positive for wound. Negative for pallor.   Neurological:  Negative for seizures and headaches.   All other systems reviewed and are negative.       Physical Exam:  /77 (BP Location: Right arm, Patient Position: Sitting)   Pulse 74   Temp 98 °F (36.7 °C) (Oral)   Resp 16   Ht 177.8 cm (70\")   Wt 95.3 kg (210 lb)   SpO2 94%   BMI 30.13 kg/m²         Physical Exam  Vitals and nursing note reviewed.   Constitutional:       General: He is not in acute distress.     Appearance: Normal appearance. He is not ill-appearing or toxic-appearing.   HENT:      Head: Normocephalic and atraumatic.   Eyes:      General: No scleral icterus.     Conjunctiva/sclera: Conjunctivae normal.      Pupils: Pupils are equal, round, and reactive to light.   Cardiovascular:      Rate and Rhythm: Normal rate and regular rhythm.      Pulses: Normal pulses.   Pulmonary:      Effort: Pulmonary effort is normal. No respiratory distress.      Breath sounds: Normal breath sounds. No wheezing.   Abdominal:      General: Abdomen is flat.      Palpations: Abdomen is soft.      Tenderness: There is no abdominal tenderness. There is no guarding or rebound.   Musculoskeletal:         General: Normal range of motion.      Cervical back: Normal range of motion and neck supple. No rigidity or tenderness.   Lymphadenopathy:      Cervical: No cervical adenopathy.   Skin:     General: Skin is warm and dry.      Capillary Refill: Capillary refill takes less than 2 seconds.      Findings: Erythema present.   Neurological:      General: No focal deficit present.      Mental Status: He is alert and oriented to person, place, and time. Mental status is at baseline.   Psychiatric:    "      Mood and Affect: Mood normal.         Behavior: Behavior normal.                  Procedures:  Procedures      Medical Decision Making:      Comorbidities that affect care:    COPD, hypertension, vertigo, loop recorder, hyperlipidemia, hyponatremia, erectile dysfunction, syncope, seizure    External Notes reviewed:    None      The following orders were placed and all results were independently analyzed by me:  Orders Placed This Encounter   Procedures    XR Ribs Right With PA Chest       Medications Given in the Emergency Department:  Medications - No data to display     ED Course:    The patient was initially evaluated in the triage area where orders were placed. The patient was later dispositioned by MELANIA Morillo.      The patient was advised to stay for completion of workup which includes but is not limited to communication of labs and radiological results, reassessment and plan. The patient was advised that leaving prior to disposition by a provider could result in critical findings that are not communicated to the patient.     ED Course as of 05/28/24 2318   Tue May 28, 2024   1822   --- PROVIDER IN TRIAGE NOTE ---    Patient was seen and evaluated in triage by MELANIA Calderon.  Orders were written and the patient is currently awaiting disposition.   [MS]      ED Course User Index  [MS] Flaquita Castellano APRN       Labs:    Lab Results (last 24 hours)       ** No results found for the last 24 hours. **             Imaging:    XR Ribs Right With PA Chest    Result Date: 5/28/2024  XR RIBS RIGHT W PA CHEST-  Date of Exam: 5/28/2024 6:38 PM  Indication: fall  Comparison: CXR 5/11/2024  Findings: The heart is normal in size. The lungs are well-expanded and free of infiltrates.  Bony structures appear intact.  Precordial loop recorder is in unchanged position.      Impression: No active disease is seen.   Electronically Signed By-OLEG MATHEWS MD On:5/28/2024 6:55 PM         Differential  Diagnosis and Discussion:      Chest Pain:  Based on the patient's signs and symptoms, I considered aortic dissection, myocardial infaction, pulmonary embolism, cardiac tamponade, pericarditis, pneumothorax, musculoskeletal chest pain and other differential diagnosis as an etiology of the patient's chest pain.     All X-rays impressions were independently interpreted by me.    MDM  Number of Diagnoses or Management Options  Fall, initial encounter: minor  Rib contusion, right, initial encounter: new and requires workup     Amount and/or Complexity of Data Reviewed  Tests in the radiology section of CPT®: reviewed    Risk of Complications, Morbidity, and/or Mortality  Presenting problems: low  Diagnostic procedures: low  Management options: low    Patient Progress  Patient progress: stable         Patient Care Considerations:    LABS: I considered ordering labs, however considering the patient's complaint in stable condition I did not feel it was warranted at this time.      Consultants/Shared Management Plan:    None    Social Determinants of Health:    Patient is independent, reliable, and has access to care.       Disposition and Care Coordination:    Discharged: The patient is suitable and stable for discharge with no need for consideration of admission.    I have explained the patient´s condition, diagnoses and treatment plan based on the information available to me at this time. I have answered questions and addressed any concerns. The patient has a good  understanding of the patient´s diagnosis, condition, and treatment plan as can be expected at this point. The vital signs have been stable. The patient´s condition is stable and appropriate for discharge from the emergency department.      The patient will pursue further outpatient evaluation with the primary care physician or other designated or consulting physician as outlined in the discharge instructions. They are agreeable to this plan of care and  follow-up instructions have been explained in detail. The patient has received these instructions in written format and has expressed an understanding of the discharge instructions. The patient is aware that any significant change in condition or worsening of symptoms should prompt an immediate return to this or the closest emergency department or call to 911.  I have explained discharge medications and the need for follow up with the patient/caretakers. This was also printed in the discharge instructions. Patient was discharged with the following medications and follow up:      Medication List      No changes were made to your prescriptions during this visit.      Vilma Rolon, APRN  [address] [place] [address] [phone]    Call   FOR FOLLOW UP       Final diagnoses:   Rib contusion, right, initial encounter   Fall, initial encounter        ED Disposition       ED Disposition   Discharge    Condition   Stable    Comment   --               This medical record created using voice recognition software.             [place], APRN  05/28/24 [id number]

## 2024-05-29 NOTE — DISCHARGE INSTRUCTIONS
Rest, drink plenty of fluids.  Continue with your home medications as needed for pain.  You may use a small folded towel or small pillow to splint your right rib area for deep breathing and coughing for comfort.  Call MELANIA Sunshine in the morning advise her of your recent fall and follow-up with them as directed.  Take 10 deep breaths every hour while awake to help prevent any type of pneumonia.  Return to the emergency department immediately for any acutely developing respiratory distress, any persistent shortness of breath, any fevers of 101 or greater or any new or worse concerns.

## 2024-06-01 PROBLEM — N28.89 RENAL MASS: Status: ACTIVE | Noted: 2024-06-01

## 2024-06-01 NOTE — PROGRESS NOTES
Chief Complaint: Urologic complaint    Subjective         History of Present Illness  Paulo ESTHER Leiva Jr. is a 68 y.o. male       Renal mass    Mass found on routine surveillance chest CT    5/24/2024   CT abdomen/pelvis with and without - 11 cm enhancing marginated mass central necrosis inferior pole left kidney.  No metastatic disease identified.  5/11/2024 CT head without - negative  5/22/2024 CT chest -right kidney mass.  Emphysema.  Cholelithiasis      5/11/2024 UA-negative, 0.87, GFR 94    Voiding without issue.    No GH    No history of kidney  stone.    No urologic family history  No history of urologic surgery.    COPD  Former smoker  On Eliquis            Objective     Past Medical History:   Diagnosis Date    Benign essential hypertension     COPD (chronic obstructive pulmonary disease)     Erectile dysfunction Apprx: 5 yrs ago    Generalized convulsive seizures 12/27/2023    Hyperlipidemia 12/14/2018    Hyponatremia     Implantable loop recorder present     Syncope     Vertigo 02/17/2021    Vision loss        Past Surgical History:   Procedure Laterality Date    CARDIAC CATHETERIZATION N/A 09/26/2022    Procedure: Left Heart Cath;  Surgeon: Randell Casiano MD;  Location: [facility] CATH INVASIVE LOCATION;  Service: Cardiovascular;  Laterality: N/A;    CARDIAC ELECTROPHYSIOLOGY PROCEDURE N/A 09/26/2022    Procedure: Loop insertion;  Surgeon: Randell Casiano MD;  Location: [facility] CATH INVASIVE LOCATION;  Service: Cardiovascular;  Laterality: N/A;    COLONOSCOPY  2007    COLONOSCOPY N/A 10/26/2022    Procedure: COLONOSCOPY FOR SCREENING;  Surgeon: Roque Whitlock MD;  Location: [facility] ENDOSCOPY;  Service: Gastroenterology;  Laterality: N/A;  NORMAL COLON             Vital Signs:   There were no vitals taken for this visit.     Physical exam    Alert and orient x3  Well appearing, well developed, in no acute distress   Unlabored respirations  Nontender/nondistended      Grossly oriented to person, place and  time, judgment is intact, normal mood and affect              Assessment and Plan    Diagnoses and all orders for this visit:    1. Renal mass (Primary)      CT images and read reviewed discussed with the patient    Records reviewed and summarized in chart.      Discussed CT with patient and discussed nephrectomy.  Risks and benefits were discussed including bleeding, infection and damage to the urinary system.  We also discussed the risk of anesthesia up to and including death.  Patient voiced understanding       Will refer to tertiary center urologic oncology for large renal mass.    Patient on Eliquis will send note for cardiology clearance

## 2024-06-03 ENCOUNTER — OFFICE VISIT (OUTPATIENT)
Dept: FAMILY MEDICINE CLINIC | Facility: CLINIC | Age: 69
End: 2024-06-03
Payer: MEDICARE

## 2024-06-03 VITALS
BODY MASS INDEX: 30.49 KG/M2 | HEART RATE: 82 BPM | WEIGHT: 213 LBS | TEMPERATURE: 98.1 F | HEIGHT: 70 IN | OXYGEN SATURATION: 96 % | DIASTOLIC BLOOD PRESSURE: 72 MMHG | SYSTOLIC BLOOD PRESSURE: 140 MMHG

## 2024-06-03 DIAGNOSIS — N28.89 RENAL MASS: ICD-10-CM

## 2024-06-03 DIAGNOSIS — Z09 ENCOUNTER FOR EXAMINATION FOLLOWING TREATMENT AT HOSPITAL: Primary | ICD-10-CM

## 2024-06-03 PROCEDURE — 1159F MED LIST DOCD IN RCRD: CPT | Performed by: NURSE PRACTITIONER

## 2024-06-03 PROCEDURE — 3078F DIAST BP <80 MM HG: CPT | Performed by: NURSE PRACTITIONER

## 2024-06-03 PROCEDURE — 3077F SYST BP >= 140 MM HG: CPT | Performed by: NURSE PRACTITIONER

## 2024-06-03 PROCEDURE — 99213 OFFICE O/P EST LOW 20 MIN: CPT | Performed by: NURSE PRACTITIONER

## 2024-06-03 PROCEDURE — 1160F RVW MEDS BY RX/DR IN RCRD: CPT | Performed by: NURSE PRACTITIONER

## 2024-06-03 NOTE — PROGRESS NOTES
Chief Complaint  Hospital Follow Up Visit (Went to ER, bruised ribs from a fall)    Subjective        Past Medical History:   Diagnosis Date    Benign essential hypertension     COPD (chronic obstructive pulmonary disease)     Erectile dysfunction Apprx: 5 yrs ago    Generalized convulsive seizures 2023    Hyperlipidemia 2018    Hyponatremia     Implantable loop recorder present     Syncope     Vertigo 2021    Vision loss      Social History     Tobacco Use    Smoking status: Former     Current packs/day: 0.00     Average packs/day: 2.0 packs/day for 36.3 years (72.7 ttl pk-yrs)     Types: Cigarettes     Start date: 6/15/1974     Quit date: 10/20/2010     Years since quittin.6     Passive exposure: Past    Smokeless tobacco: Never    Tobacco comments:     SMOKES MORE THAN 2 PPD FOR 35 YEARS   Vaping Use    Vaping status: Never Used   Substance Use Topics    Alcohol use: Yes     Alcohol/week: 6.0 - 8.0 standard drinks of alcohol     Types: 6 - 8 Cans of beer per week     Comment: 8-14 DRINKS PER WEEK    Drug use: Not Currently      Current Outpatient Medications on File Prior to Visit   Medication Sig    Eliquis 5 MG tablet tablet TAKE 1 TABLET TWICE A DAY    meclizine (ANTIVERT) 25 MG tablet TAKE 2 TABLETS DAILY    metoprolol succinate XL (TOPROL-XL) 50 MG 24 hr tablet Take 1 tablet by mouth Daily.    simvastatin (ZOCOR) 20 MG tablet Take 1 tablet by mouth Daily.    Spiriva Respimat 1.25 MCG/ACT aerosol solution inhaler Inhale 2 puffs Daily.    valsartan (DIOVAN) 80 MG tablet Take 1 tablet by mouth 2 (Two) Times a Day.     No current facility-administered medications on file prior to visit.      No Known Allergies   Health Maintenance Due   Topic Date Due    ZOSTER VACCINE (2 of 2) 2015    RSV Vaccine - Adults (1 - 1-dose 60+ series) Never done    COVID-19 Vaccine (2023- season) Never done      Paulo Leiva Jr. presents to [facility]  History of  "Present Illness  He is here to follow up from ER visit due to fall. He was flatfooted on the floor and was messing with a curtain and fell and hit a chair on his right side and was having pain concerning for rib fracture. EMS was called and he went to the ER and dx with rib contusion. Pt also has a skin tear to the left elbow.     Patient recently had a CT of the chest that showed a right renal mass and recommended CT abdomen and pelvis which also showed a right renal mass favoring malignancy.  When asked patient about back/flank pain, he does note some pain in the lower center of back.     Pt plans to travel at the end of July.     Pt has follow up tomorrow with Dr. Rand.     Asks about varicose veins as a potential cause of his recent falls.  Patient denies pain or discomfort.  Objective   Vital Signs:   /72 (BP Location: Left arm)   Pulse 82   Temp 98.1 °F (36.7 °C)   Ht 177.8 cm (70\")   Wt 96.6 kg (213 lb)   SpO2 96%   BMI 30.56 kg/m²     Review of Systems   Physical Exam  Vitals reviewed.   Constitutional:       General: He is not in acute distress.     Appearance: Normal appearance. He is well-developed.   HENT:      Head: Normocephalic and atraumatic.   Eyes:      Conjunctiva/sclera: Conjunctivae normal.      Pupils: Pupils are equal, round, and reactive to light.   Cardiovascular:      Rate and Rhythm: Normal rate and regular rhythm.      Heart sounds: Normal heart sounds.      Comments: Diffuse varicose veins to bilateral lower extremities extending up into the thighs.  Pulmonary:      Effort: Pulmonary effort is normal.      Breath sounds: Normal breath sounds.   Musculoskeletal:      Cervical back: Neck supple.      Right lower leg: No edema.      Left lower leg: No edema.   Skin:     General: Skin is warm and dry.          Neurological:      Mental Status: He is alert and oriented to person, place, and time.   Psychiatric:         Mood and Affect: Mood and affect normal.         Behavior: " Behavior normal.         Thought Content: Thought content normal.         Judgment: Judgment normal.        Result Review :   The following data was reviewed by: MELANIA Santiago on 06/03/2024:  Common labs          10/13/2023    11:45 4/10/2024    15:00 5/11/2024    14:45 5/11/2024    14:46   Common Labs   Glucose 96  96  92     BUN 5  7  5     Creatinine 0.81  0.90  0.87     Sodium 130  134  134     Potassium 4.6  4.4  4.8     Chloride 92  95  94     Calcium 10.0  10.2  10.2     Albumin 4.5  4.1  3.8     Total Bilirubin 1.8  1.1  1.0     Alkaline Phosphatase 98  134  115     AST (SGOT) 37  18  15     ALT (SGPT) 30  8  11     WBC 8.27  9.38   10.56    Hemoglobin 17.8  17.2   16.6    Hematocrit 50.8  52.7   48.8    Platelets 269  364   370    Total Cholesterol  207      Triglycerides  80      HDL Cholesterol  74      LDL Cholesterol   119      Hemoglobin A1C  5.80        TSH          10/3/2023    10:02   TSH   TSH 0.615      Data reviewed : Radiologic studies CT chest and CT abd & pelvis           Assessment and Plan    Diagnoses and all orders for this visit:    1. Encounter for examination following treatment at hospital (Primary)    2. Renal mass           Follow Up   Return if symptoms worsen or fail to improve.  Patient was given instructions and counseling regarding his condition or for health maintenance advice. Please see specific information pulled into the AVS if appropriate.

## 2024-06-04 ENCOUNTER — TELEPHONE (OUTPATIENT)
Dept: UROLOGY | Facility: CLINIC | Age: 69
End: 2024-06-04

## 2024-06-04 ENCOUNTER — OFFICE VISIT (OUTPATIENT)
Dept: UROLOGY | Facility: CLINIC | Age: 69
End: 2024-06-04
Payer: MEDICARE

## 2024-06-04 VITALS — BODY MASS INDEX: 30.49 KG/M2 | WEIGHT: 213 LBS | HEIGHT: 70 IN

## 2024-06-04 DIAGNOSIS — N28.89 RENAL MASS: Primary | ICD-10-CM

## 2024-06-04 PROCEDURE — 1159F MED LIST DOCD IN RCRD: CPT | Performed by: UROLOGY

## 2024-06-04 PROCEDURE — 99204 OFFICE O/P NEW MOD 45 MIN: CPT | Performed by: UROLOGY

## 2024-06-04 PROCEDURE — 1160F RVW MEDS BY RX/DR IN RCRD: CPT | Performed by: UROLOGY

## 2024-06-05 NOTE — TELEPHONE ENCOUNTER
Procedure: Nephrectomy    Med Directive: Eliquis    PMH: HTN, HLD, Syncope, wide complex tachycardia     Last Seen: 4/29/24

## 2024-06-06 ENCOUNTER — TRANSCRIBE ORDERS (OUTPATIENT)
Dept: ADMINISTRATIVE | Facility: HOSPITAL | Age: 69
End: 2024-06-06
Payer: MEDICARE

## 2024-06-06 DIAGNOSIS — N28.89 RENAL MASS: Primary | ICD-10-CM

## 2024-06-06 DIAGNOSIS — R56.9 SEIZURE: ICD-10-CM

## 2024-06-19 ENCOUNTER — OFFICE VISIT (OUTPATIENT)
Dept: FAMILY MEDICINE CLINIC | Facility: CLINIC | Age: 69
End: 2024-06-19
Payer: MEDICARE

## 2024-06-19 VITALS
HEART RATE: 93 BPM | BODY MASS INDEX: 30.49 KG/M2 | SYSTOLIC BLOOD PRESSURE: 160 MMHG | WEIGHT: 213 LBS | TEMPERATURE: 98.3 F | OXYGEN SATURATION: 97 % | HEIGHT: 70 IN | DIASTOLIC BLOOD PRESSURE: 80 MMHG

## 2024-06-19 DIAGNOSIS — N28.89 RIGHT RENAL MASS: ICD-10-CM

## 2024-06-19 DIAGNOSIS — M54.50 CHRONIC BILATERAL LOW BACK PAIN WITHOUT SCIATICA: Primary | ICD-10-CM

## 2024-06-19 DIAGNOSIS — R29.6 RECURRENT FALLS WHILE WALKING: ICD-10-CM

## 2024-06-19 DIAGNOSIS — G89.29 CHRONIC BILATERAL LOW BACK PAIN WITHOUT SCIATICA: Primary | ICD-10-CM

## 2024-06-19 PROCEDURE — 3077F SYST BP >= 140 MM HG: CPT | Performed by: NURSE PRACTITIONER

## 2024-06-19 PROCEDURE — 1159F MED LIST DOCD IN RCRD: CPT | Performed by: NURSE PRACTITIONER

## 2024-06-19 PROCEDURE — 3079F DIAST BP 80-89 MM HG: CPT | Performed by: NURSE PRACTITIONER

## 2024-06-19 PROCEDURE — 99214 OFFICE O/P EST MOD 30 MIN: CPT | Performed by: NURSE PRACTITIONER

## 2024-06-19 PROCEDURE — 1160F RVW MEDS BY RX/DR IN RCRD: CPT | Performed by: NURSE PRACTITIONER

## 2024-06-19 RX ORDER — AMLODIPINE BESYLATE AND BENAZEPRIL HYDROCHLORIDE 5; 20 MG/1; MG/1
1 CAPSULE ORAL DAILY
COMMUNITY

## 2024-06-19 NOTE — PROGRESS NOTES
"Chief Complaint  Difficulty Walking (Having difficulty walking, can not control his feet.)    History of Present Illness  Paulo ESTHER Leiva Jr. is a 68 y.o. male who presents to [facility] with a past medical history of  Past Medical History:   Diagnosis Date    Benign essential hypertension     COPD (chronic obstructive pulmonary disease)     Erectile dysfunction Apprx: 5 yrs ago    Generalized convulsive seizures 12/27/2023    Hyperlipidemia 12/14/2018    Hyponatremia     Implantable loop recorder present     Syncope     Vertigo 02/17/2021    Vision loss        HPI  Patient presents to the office today for difficulty walking. Pt states he will have dizziness but at times his feet will be moving and he cannot stop the rest of his body. He will usually fall and run into something to stop. Pt states his mind will allow him to fall, but cannot stop. Pt has been shuffling his feet more. No significant memory changes. He has decreased his alcohol intake, down to 4 12oz beer per night.     He is planning to have his right renal mass removed. He had an MRI recently at [facility] and has not received those results. Pt notes he had bowel incontinence in the past with a seizure or with urgency upon arriving home. No urinary incontinence or saddle anesthesia. Denies nosebleeds, blood in stool, or hematuria.     Recent fall caused bruising of the rib.     Objective   Vital Signs:   Vitals:    06/19/24 1304   BP: 160/80   BP Location: Left arm   Pulse: 93   Temp: 98.3 °F (36.8 °C)   SpO2: 97%   Weight: 96.6 kg (213 lb)   Height: 177.8 cm (70\")     Body mass index is 30.56 kg/m².    Wt Readings from Last 3 Encounters:   06/19/24 96.6 kg (213 lb)   06/04/24 96.6 kg (213 lb)   06/03/24 96.6 kg (213 lb)     BP Readings from Last 3 Encounters:   06/19/24 160/80   06/03/24 140/72   05/28/24 154/77       Health Maintenance   Topic Date Due    ZOSTER VACCINE (2 of 2) 02/11/2015    RSV Vaccine - " Adults (1 - 1-dose 60+ series) Never done    COVID-19 Vaccine (1 - 2023-24 season) Never done    INFLUENZA VACCINE  08/01/2024    ANNUAL WELLNESS VISIT  10/03/2024    BMI FOLLOWUP  10/03/2024    LIPID PANEL  04/10/2025    LUNG CANCER SCREENING  05/22/2025    TDAP/TD VACCINES (3 - Td or Tdap) 04/27/2031    COLORECTAL CANCER SCREENING  10/26/2032    HEPATITIS C SCREENING  Completed    Pneumococcal Vaccine 65+  Completed    AAA SCREEN (ONE-TIME)  Completed       Physical Exam  Vitals reviewed.   Constitutional:       General: He is not in acute distress.     Appearance: Normal appearance. He is well-developed.   HENT:      Head: Normocephalic and atraumatic.   Eyes:      Conjunctiva/sclera: Conjunctivae normal.      Pupils: Pupils are equal, round, and reactive to light.   Cardiovascular:      Rate and Rhythm: Normal rate and regular rhythm.      Heart sounds: Normal heart sounds.   Pulmonary:      Effort: Pulmonary effort is normal.      Breath sounds: Normal breath sounds.   Musculoskeletal:      Cervical back: Neck supple.      Lumbar back: No tenderness or bony tenderness.      Right lower leg: No edema.      Left lower leg: No edema.   Skin:     General: Skin is warm and dry.   Neurological:      Mental Status: He is alert and oriented to person, place, and time.   Psychiatric:         Mood and Affect: Mood and affect normal.         Behavior: Behavior normal.         Thought Content: Thought content normal.         Judgment: Judgment normal.            Result Review :  The following data was reviewed by: MELANIA Santiago on 06/19/2024:  No visits with results within 1 Month(s) from this visit.   Latest known visit with results is:   Admission on 05/11/2024, Discharged on 05/11/2024   Component Date Value    QT Interval 05/11/2024 379     QTC Interval 05/11/2024 431     Glucose 05/11/2024 92     BUN 05/11/2024 5 (L)     Creatinine 05/11/2024 0.87     Sodium 05/11/2024 134 (L)     Potassium 05/11/2024 4.8      Chloride 05/11/2024 94 (L)     CO2 05/11/2024 25.8     Calcium 05/11/2024 10.2     Total Protein 05/11/2024 7.5     Albumin 05/11/2024 3.8     ALT (SGPT) 05/11/2024 11     AST (SGOT) 05/11/2024 15     Alkaline Phosphatase 05/11/2024 115     Total Bilirubin 05/11/2024 1.0     Globulin 05/11/2024 3.7     A/G Ratio 05/11/2024 1.0     BUN/Creatinine Ratio 05/11/2024 5.7 (L)     Anion Gap 05/11/2024 14.2     eGFR 05/11/2024 94.6     HS Troponin T 05/11/2024 9     Magnesium 05/11/2024 1.7     Color, UA 05/11/2024 Yellow     Appearance, UA 05/11/2024 Clear     pH, UA 05/11/2024 7.0     Specific Gravity, UA 05/11/2024 1.007     Glucose, UA 05/11/2024 Negative     Ketones, UA 05/11/2024 15 mg/dL (1+) (A)     Bilirubin, UA 05/11/2024 Negative     Blood, UA 05/11/2024 Negative     Protein, UA 05/11/2024 Trace (A)     Leuk Esterase, UA 05/11/2024 Negative     Nitrite, UA 05/11/2024 Negative     Urobilinogen, UA 05/11/2024 0.2 E.U./dL     Extra Tube 05/11/2024 Hold for add-ons.     Extra Tube 05/11/2024 hold for add-on     Extra Tube 05/11/2024 Hold for add-ons.     Extra Tube 05/11/2024 Hold for add-ons.     WBC 05/11/2024 10.56     RBC 05/11/2024 5.46     Hemoglobin 05/11/2024 16.6     Hematocrit 05/11/2024 48.8     MCV 05/11/2024 89.4     MCH 05/11/2024 30.4     MCHC 05/11/2024 34.0     RDW 05/11/2024 14.6     RDW-SD 05/11/2024 47.0     MPV 05/11/2024 9.9     Platelets 05/11/2024 370     Neutrophil % 05/11/2024 72.4     Lymphocyte % 05/11/2024 15.5 (L)     Monocyte % 05/11/2024 9.5     Eosinophil % 05/11/2024 1.0     Basophil % 05/11/2024 0.9     Immature Grans % 05/11/2024 0.7 (H)     Neutrophils, Absolute 05/11/2024 7.64 (H)     Lymphocytes, Absolute 05/11/2024 1.64     Monocytes, Absolute 05/11/2024 1.00 (H)     Eosinophils, Absolute 05/11/2024 0.11     Basophils, Absolute 05/11/2024 0.10     Immature Grans, Absolute 05/11/2024 0.07 (H)     nRBC 05/11/2024 0.0    CT abdomen pelvis w wo contrast    Addendum Date: 6/4/2024     ADDENDUM #1 The renal mass is within the right kidney. There is an error in the impression. Renal cell carcinoma is most likely. Electronically Signed: Kendal Levin MD  6/4/2024 2:57 PM EDT  Workstation ID: [id number] ORIGINAL REPORT: CT ABDOMEN PELVIS W WO CONTRAST-  Date of Exam: 5/24/2024 12:06 PM  Indication: Renal mass seen on low-dose CT chest.  Comparison: Lung cancer screening CT 5/22/2024  Technique: Axial CT images were obtained of the abdomen and pelvis before and after the uneventful intravenous administration of iodinated IV contrast. Sagittal and coronal reconstructions were performed. Automated exposure control and iterative reconstruction methods were used.   Findings: Lower Thorax: Lung bases are clear. Emphysema lung bases.  Peritoneum: No free air or free fluid.  Appendix: Appendix is not well seen. No significant inflammatory findings right lower quadrant. Kidneys, ureters, and urinary bladder: There is a large mass originating from the inferior pole mid kidney extending anteriorly. It measures 11.1 x 7.7 x 9 cm. It has diffuse peripheral enhancement and central necrosis.. There are associated pulmonary vessels supplying this mass. No definite thrombus or tumor involvement of the renal vein or artery. There is a small benign cyst of the cortex of the mid left kidney measuring 8 mm. Normal appearance of urinary bladder given the amount of distention.  Liver, gallbladder, and bile ducts: No focal hepatic lesions. Cholelithiasis. No inflammatory findings of the gallbladder. No bile duct dilatation..  Spleen: Spleen is normal size.  No focal splenic lesions.  Adrenal glands: Unremarkable.  Pancreas: No focal masses.  No pancreatic duct dilation.  Abdominal aorta and Vascular Structures: No aneurysmal dilation. Severe atherosclerotic disease.  Stomach and Bowel: No abnormally dilated loops of bowel.  No significant hiatal hernia.  No significant bowel wall thickening.  Ligament of Treitz has normal  anatomic position.  Reproductive Organs: Within normal limits.  Lymph nodes: No pathologically enlarged lymph nodes.  Soft tissues: Unremarkable.  Osseous structures: No aggressive focal lytic or sclerotic osseous lesions.  There is a large enhancing irregular marginated mass with central necrosis originating from the inferior pole of the left kidney. It measures up to 11.1 cm. There are prominent feeding and draining vessels. No metastatic disease identified in the abdomen or pelvis. Renal cell carcinoma is favored. Cholelithiasis without findings of acute cholecystitis. Severe atherosclerotic calcification abdominal aorta.  Electronically Signed By-Kendal Levin MD On:5/24/2024 12:27 PM  Signed by: Kendal Levin MD on 5/24/2024 12:27 PM CALL REPORT -[phone]. PT NOT WAITING. FOLLOW UP PREVIOUS RENAL MASS SEEN ON CT CHEST. NO ABD SX'S, NO CA. 100CC ISOVIEW 370 DLP 1306    Result Date: 6/4/2024  There is a large enhancing irregular marginated mass with central necrosis originating from the inferior pole of the left kidney. It measures up to 11.1 cm. There are prominent feeding and draining vessels. No metastatic disease identified in the abdomen or pelvis. Renal cell carcinoma is favored. Cholelithiasis without findings of acute cholecystitis. Severe atherosclerotic calcification abdominal aorta.  Electronically Signed By-Kendal Levin MD On:5/24/2024 12:27 PM      XR Ribs Right With PA Chest    Result Date: 5/28/2024  Impression: No active disease is seen.   Electronically Signed By-OLEG MATHEWS MD On:5/28/2024 6:55 PM      CT Chest Low Dose Cancer Screening WO    Result Date: 5/23/2024  1. Incidental mass likely arising from the right kidney. Recommend CT abdomen pelvis with IV contrast, and consider urology consultation. 2. Emphysema. No suspicious pulmonary nodules. Recommend continued screening. 3. Cholelithiasis. 4. Cardiomegaly. Aortic and coronary atherosclerotic disease.  Lung-RADS Category S - Other.  Clinically significant or potentially clinically significant findings (Non-lung cancer). Recommend continued screening with low dose lung CT scan in 12 months.    Electronically Signed By-Neftali Sharp MD On:5/23/2024 12:49 PM      CT Head Without Contrast    Result Date: 5/11/2024  Impression: No acute intracranial abnormalities are identified.    Electronically Signed By-KAVEH AYERS MD On:5/11/2024 3:56 PM      XR Chest 1 View    Result Date: 5/11/2024  No active disease.   Electronically Signed By-KAVEH AYERS MD On:5/11/2024 3:06 PM      MRI of brain done at [facility]    Procedures        Assessment and Plan   Diagnoses and all orders for this visit:    1. Chronic bilateral low back pain without sciatica (Primary)  -     XR Spine Lumbar 2 or 3 View (In Office)  -     MRI Lumbar Spine With & Without Contrast; Future    2. Right renal mass  -     MRI Lumbar Spine With & Without Contrast; Future    3. Recurrent falls while walking  -     MRI Lumbar Spine With & Without Contrast; Future      Lumbar spine x-ray ordered awaiting radiology review.  MRI of the lumbar spine ordered.  Suspect symptoms are neurological or spinal related.  MRI with no acute findings or findings suggestive of cause of falls, dizziness.  Will evaluate lumbar spine as cause.  Review of the labs show no significant findings suggestive of cause of falls or loss of control of the legs.  Patient has decreased alcohol intake.  Continue to use caution and avoid riding motorcycle at this time.      FOLLOW UP  Return if symptoms worsen or fail to improve.    Patient was given instructions and counseling regarding his condition or for health maintenance advice. Please see specific information pulled into the AVS if appropriate.       Vilma Rolon, MELANIA  06/19/24  15:13 EDT    CURRENT & DISCONTINUED MEDICATIONS  Current Outpatient Medications   Medication Instructions    amLODIPine-benazepril (LOTREL 5-20) 5-20 MG per capsule 1  capsule, Oral, Daily    Eliquis 5 mg, Oral, 2 Times Daily    meclizine (ANTIVERT) 50 mg, Oral, Daily    metoprolol succinate XL (TOPROL-XL) 50 mg, Oral, Daily    simvastatin (ZOCOR) 20 mg, Oral, Daily    Spiriva Respimat 1.25 MCG/ACT aerosol solution inhaler 2 puffs, Inhalation, Daily - RT    valsartan (DIOVAN) 80 mg, Oral, 2 Times Daily       There are no discontinued medications.       Answers submitted by the patient for this visit:  Primary Reason for Visit (Submitted on 6/13/2024)  What is the primary reason for your visit?: Other  Other (Submitted on 6/13/2024)  Please describe your symptoms.: Uncontrollable walking with walking with the ability of not stopping. The only way I can stop is to fall..  Have you had these symptoms before?: No  How long have you been having these symptoms?: Greater than 2 weeks  Please list any medications you are currently taking for this condition.: none  Please describe any probable cause for these symptoms. : unknown

## 2024-06-21 ENCOUNTER — TELEPHONE (OUTPATIENT)
Dept: FAMILY MEDICINE CLINIC | Facility: CLINIC | Age: 69
End: 2024-06-21

## 2024-06-21 NOTE — TELEPHONE ENCOUNTER
"Caller: Paulo Leiva Jr. \"Nain\"    Relationship to patient: Self    Best call back number:     [phone]        Patient is needing: PATIENT STATES WAS CALLED TO SCHEDULE MRI HOWEVER WHEN ASKED PATIENT DID ADVISE HE HAD SOMETHING IN HIS HEART THAT WAS PUT IN COUPLE YEARS AGO AUGUST OF 2022 BY DR LANE FOR HIS HEART MURMUR. PATIENT STATES THAT  ADVISED THEY COULD NOT SCHEDULE MRI WITHOUT HAVING THE NAME AND SERIAL NUMBER OF DEVICE. PLEASE ADVISE.           "

## 2024-06-21 NOTE — TELEPHONE ENCOUNTER
I advised patient to call Dr. Casiano's office to get that info and I then gave him the number to scheduling.

## 2024-07-01 ENCOUNTER — TELEPHONE (OUTPATIENT)
Dept: CARDIOLOGY | Facility: CLINIC | Age: 69
End: 2024-07-01
Payer: MEDICARE

## 2024-07-01 NOTE — TELEPHONE ENCOUNTER
Procedure: R Radical Nephrectomy     Med Directive: Eliquis    PMH: HTN, HLD, Syncope, wide complex tachycardia     Last Seen: 4/29/24

## 2024-07-01 NOTE — TELEPHONE ENCOUNTER
The [facility] received a fax that requires your attention. The document has been indexed to the patient’s chart for your review.      Reason for sending: EXTERNAL MEDICAL RECORD NOTIFICATION     Documents Description: CARDIAC CLEARANCE REQ-[facility]-7.1.24    Name of Sender: [facility]     Date Indexed: 7.1.24

## 2024-07-04 DIAGNOSIS — R42 VERTIGO: ICD-10-CM

## 2024-07-09 RX ORDER — MECLIZINE HYDROCHLORIDE 25 MG/1
50 TABLET ORAL DAILY
Qty: 180 TABLET | Refills: 0 | Status: SHIPPED | OUTPATIENT
Start: 2024-07-09

## 2024-07-11 ENCOUNTER — CONSULT (OUTPATIENT)
Dept: ONCOLOGY | Facility: HOSPITAL | Age: 69
End: 2024-07-11
Payer: MEDICARE

## 2024-07-11 VITALS
SYSTOLIC BLOOD PRESSURE: 154 MMHG | DIASTOLIC BLOOD PRESSURE: 94 MMHG | BODY MASS INDEX: 29.84 KG/M2 | WEIGHT: 208.4 LBS | TEMPERATURE: 98.8 F | HEART RATE: 75 BPM | OXYGEN SATURATION: 95 % | RESPIRATION RATE: 18 BRPM | HEIGHT: 70 IN

## 2024-07-11 DIAGNOSIS — C64.1 RENAL CELL CARCINOMA, RIGHT: ICD-10-CM

## 2024-07-11 DIAGNOSIS — N28.89 RENAL MASS: Primary | ICD-10-CM

## 2024-07-11 PROCEDURE — G0463 HOSPITAL OUTPT CLINIC VISIT: HCPCS | Performed by: INTERNAL MEDICINE

## 2024-07-11 NOTE — PROGRESS NOTES
"Chief Complaint/Care Team   Right renal mass    Octavio Rolonah MELANIA SANTANAgett Vilma ESTHER, MELANIA    History of Present Illness     Diagnosis: Evaluation for suspected renal cell carcinoma seen on imaging    Current Treatment: Workup in process  Previous Treatment: None    Paulo ESTHER Leiva Jr. is a 68 y.o. male who presents to [facility] HEMATOLOGY & ONCOLOGY for evaluation for suspected renal cell carcinoma seen on CT chest abdomen and pelvis from May 2024.    History of Present Illness  The patient is here for discussion of treatment for kidney cancer that was seen via CT CAP imaging in 5/2024. He is accompanied by his son.  Patient with a history of smoking approximately 2 to 3 packs/day for over 30 years, he quit 13 years ago.  He underwent CT chest for lung cancer screening imaging that incidentally revealed suspicious lesion on his right kidney.  The incidental mass arising in the right kidney measures 8.2 x 6.2 cm, no evidence of metastatic disease in the abdomen chest or pelvis.  CT head also was negative from May 2024.  He is scheduled for a radical nephrectomy in 07/2024 at [facility] with Dr. Mcmanus.       He has a history of COPD/emphysema, vertigo along with hypertension.  He has a heart arrhythmia and is on Eliquis. He was evaluation by multiple cardiologists.  Patient also reports memory impairment, frequent falls over the past 3-4 months, he has difficulty texting and typing on the keyboard.    He quit smoking 14 years ago. He used to smoke 2 to 3 cartons a day. He is a [employer] , he reports no exposure to chemicals or agent Orange.   FH: his maternal grandfather had stomach cancer.       Review of Systems     Oncology/Hematology History    No history exists.       Objective     Vitals:    07/11/24 1332   BP: 154/94   Pulse: 75   Resp: 18   Temp: 98.8 °F (37.1 °C)   TempSrc: Temporal   SpO2: 95%   Weight: 94.5 kg (208 lb 6.4 oz)   Height: 177.8 cm (70\")   PainSc: 0-No pain "     ECOG score: 1         PHQ-9 Total Score:         Physical Exam  Vitals reviewed. Exam conducted with a chaperone present.   Constitutional:       General: He is not in acute distress.     Appearance: Normal appearance.   HENT:      Head: Normocephalic and atraumatic.   Eyes:      Extraocular Movements: Extraocular movements intact.      Conjunctiva/sclera: Conjunctivae normal.   Cardiovascular:      Pulses: Normal pulses.      Heart sounds: Normal heart sounds.   Pulmonary:      Effort: Pulmonary effort is normal.      Breath sounds: Normal breath sounds. No rhonchi or rales.   Abdominal:      General: Bowel sounds are normal. There is no distension.      Palpations: Abdomen is soft. There is no mass.      Tenderness: There is no abdominal tenderness.   Musculoskeletal:      Cervical back: Normal range of motion and neck supple.   Skin:     General: Skin is warm and dry.   Neurological:      Mental Status: He is oriented to person, place, and time.         Physical Exam        Past Medical History     Past Medical History:   Diagnosis Date    Benign essential hypertension     COPD (chronic obstructive pulmonary disease)     Erectile dysfunction Apprx: 5 yrs ago    Generalized convulsive seizures 12/27/2023    Hyperlipidemia 12/14/2018    Hyponatremia     Implantable loop recorder present     Syncope     Vertigo 02/17/2021    Vision loss      Current Outpatient Medications on File Prior to Visit   Medication Sig Dispense Refill    amLODIPine-benazepril (LOTREL 5-20) 5-20 MG per capsule Take 1 capsule by mouth Daily.      Eliquis 5 MG tablet tablet TAKE 1 TABLET TWICE A DAY (Patient not taking: Reported on 6/19/2024) 180 tablet 3    meclizine (ANTIVERT) 25 MG tablet TAKE 2 TABLETS DAILY 180 tablet 0    metoprolol succinate XL (TOPROL-XL) 50 MG 24 hr tablet Take 1 tablet by mouth Daily. 90 tablet 3    simvastatin (ZOCOR) 20 MG tablet Take 1 tablet by mouth Daily. 90 tablet 1    Spiriva Respimat 1.25 MCG/ACT aerosol  solution inhaler Inhale 2 puffs Daily. 12 g 1    valsartan (DIOVAN) 80 MG tablet Take 1 tablet by mouth 2 (Two) Times a Day. 180 tablet 0     No current facility-administered medications on file prior to visit.      No Known Allergies  Past Surgical History:   Procedure Laterality Date    CARDIAC CATHETERIZATION N/A 2022    Procedure: Left Heart Cath;  Surgeon: Randell Casiano MD;  Location: [facility] CATH INVASIVE LOCATION;  Service: Cardiovascular;  Laterality: N/A;    CARDIAC ELECTROPHYSIOLOGY PROCEDURE N/A 2022    Procedure: Loop insertion;  Surgeon: Randell Casiano MD;  Location: [facility] CATH INVASIVE LOCATION;  Service: Cardiovascular;  Laterality: N/A;    COLONOSCOPY      COLONOSCOPY N/A 10/26/2022    Procedure: COLONOSCOPY FOR SCREENING;  Surgeon: Roque Whitlock MD;  Location: [facility] ENDOSCOPY;  Service: Gastroenterology;  Laterality: N/A;  NORMAL COLON     Social History     Socioeconomic History    Marital status:    Tobacco Use    Smoking status: Former     Current packs/day: 0.00     Average packs/day: 2.0 packs/day for 36.3 years (72.7 ttl pk-yrs)     Types: Cigarettes     Start date: 6/15/1974     Quit date: 10/20/2010     Years since quittin.7     Passive exposure: Past    Smokeless tobacco: Never    Tobacco comments:     SMOKES MORE THAN 2 PPD FOR 35 YEARS   Vaping Use    Vaping status: Never Used   Substance and Sexual Activity    Alcohol use: Yes     Alcohol/week: 6.0 - 8.0 standard drinks of alcohol     Types: 6 - 8 Cans of beer per week     Comment: 8-14 DRINKS PER WEEK    Drug use: Not Currently    Sexual activity: Not Currently     Partners: Female     Family History   Problem Relation Age of Onset    Dementia Mother     No Known Problems Father     Colon cancer Neg Hx     Malig Hyperthermia Neg Hx        Results     Result Review   The following data was reviewed by: Nabor Moser MD on 2024:  Lab Results   Component Value Date    HGB 16.6 2024    HCT  48.8 05/11/2024    MCV 89.4 05/11/2024     05/11/2024    WBC 10.56 05/11/2024    NEUTROABS 7.64 (H) 05/11/2024    LYMPHSABS 1.64 05/11/2024    MONOSABS 1.00 (H) 05/11/2024    EOSABS 0.11 05/11/2024    BASOSABS 0.10 05/11/2024     Lab Results   Component Value Date    GLUCOSE 92 05/11/2024    BUN 5 (L) 05/11/2024    CREATININE 0.87 05/11/2024     (L) 05/11/2024    K 4.8 05/11/2024    CL 94 (L) 05/11/2024    CO2 25.8 05/11/2024    CALCIUM 10.2 05/11/2024    PROTEINTOT 7.5 05/11/2024    ALBUMIN 3.8 05/11/2024    BILITOT 1.0 05/11/2024    ALKPHOS 115 05/11/2024    AST 15 05/11/2024    ALT 11 05/11/2024     Lab Results   Component Value Date    MG 1.7 05/11/2024    TSH 0.615 10/03/2023       No radiology results for the last day  XR Spine Lumbar 2 or 3 View (In Office)    Result Date: 6/20/2024  Impression: Impression: 1. No acute L-spine fracture or subluxation. Mild degenerative changes. Electronically Signed: Roque Mckay MD  6/20/2024 11:22 AM EDT  Workstation ID: [id number]     Assessment & Plan     Diagnoses and all orders for this visit:    1. Renal mass (Primary)  -     NM PET/CT Skull Base to Mid Thigh; Future  -     CBC & Differential; Future  -     Comprehensive Metabolic Panel; Future    2. Renal cell carcinoma, right  -     NM PET/CT Skull Base to Mid Thigh; Future  -     CBC & Differential; Future  -     Comprehensive Metabolic Panel; Future  -     Ambulatory Referral to ONC Social Work         Paulo Leiva Jr. is a 68 y.o. male who presents to [facility] HEMATOLOGY & ONCOLOGY for evaluation for suspected renal cell carcinoma seen on imaging.    -discussed results of available imaging, shared recommendation for him to proceed with nephrectomy scheduled with Dr. Mcmanus in [place]  -plan to obtain NM PET CT scan after surgery in 10/2024  -will discuss final stage, if pt requires further testing (such as NGS testing) and his systemic therapy treatment plan at next clinic  appointment.  -pt doesn't appear to meet criteria for genetic counseling now, but may consider this at a later date    Assessment & Plan    2. Falls and heart arrhythmia.  -The CT scan of the head showed no acute abnormalities, recommend pt discuss referral to neurology and/or geriatrics evaluation for evaluation for dementia or neurological condition with his PCP    Pt with high distress score, thus placed SW consult.    Plan patient follow-up in October 2024 to discuss PET scan along with labs and final systemic therapy treatment plan.    Please note that portions of this note were completed with a voice recognition program.    Electronically signed by Nabor Moser MD, 07/11/24, 4:17 PM EDT.        Follow Up     I spent 60 minutes caring for Paulo on this date of service. This time includes time spent by me in the following activities:preparing for the visit, reviewing tests, obtaining and/or reviewing a separately obtained history, performing a medically appropriate examination and/or evaluation , counseling and educating the patient/family/caregiver, ordering medications, tests, or procedures, referring and communicating with other health care professionals , documenting information in the medical record, independently interpreting results and communicating that information with the patient/family/caregiver, and care coordination    Any chemotherapy or immunotherapy or other systemic therapy treatment plan involves a high risk of complications and/or mortality of patient management.    The patient was seen and examined. Work by the provider also included review and/or ordering of lab tests, review and/or ordering of radiology tests, review and/or ordering of medicine tests, discussion with other physicians or providers, independent review of data, obtaining old records, review/summation of old records, and/or other review.    I have reviewed the family history, social history, and past medical history for this  patient. Previous information and data has been reviewed and updated as needed. I have reviewed and verified the chief complaint, history, and other documentation. The patient was interviewed and examined in the clinic and the chart reviewed. The previous observations, recommendations, and conclusions were reviewed including those of other providers.     The plan was discussed with the patient and/or family. The patient was given time to ask questions and these questions were answered. At the conclusion of their visit they had no additional questions or concerns and all questions were answered to their satisfaction.    Patient was given instructions and counseling regarding his condition or for health maintenance advice. Please see specific information pulled into the AVS if appropriate.       Patient or patient representative verbalized consent for the use of Ambient Listening during the visit with  Nabor Moser MD for chart documentation. 7/11/2024  16:17 EDT

## 2024-07-22 ENCOUNTER — HOSPITAL ENCOUNTER (OUTPATIENT)
Dept: MRI IMAGING | Facility: HOSPITAL | Age: 69
Discharge: HOME | End: 2024-07-22
Admitting: NURSE PRACTITIONER
Payer: MEDICARE

## 2024-07-22 DIAGNOSIS — N28.89 RIGHT RENAL MASS: ICD-10-CM

## 2024-07-22 DIAGNOSIS — R29.6 RECURRENT FALLS WHILE WALKING: ICD-10-CM

## 2024-07-22 DIAGNOSIS — G89.29 CHRONIC BILATERAL LOW BACK PAIN WITHOUT SCIATICA: ICD-10-CM

## 2024-07-22 DIAGNOSIS — M54.50 CHRONIC BILATERAL LOW BACK PAIN WITHOUT SCIATICA: ICD-10-CM

## 2024-07-22 LAB
CREAT BLDA-MCNC: 1 MG/DL (ref 0.6–1.3)
EGFRCR SERPLBLD CKD-EPI 2021: 82.5 ML/MIN/1.73

## 2024-07-22 PROCEDURE — 82565 ASSAY OF CREATININE: CPT

## 2024-07-22 PROCEDURE — 72158 MRI LUMBAR SPINE W/O & W/DYE: CPT

## 2024-07-22 PROCEDURE — A9577 INJ MULTIHANCE: HCPCS | Performed by: NURSE PRACTITIONER

## 2024-07-22 PROCEDURE — 0 GADOBENATE DIMEGLUMINE 529 MG/ML SOLUTION: Performed by: NURSE PRACTITIONER

## 2024-07-22 RX ADMIN — GADOBENATE DIMEGLUMINE 20 ML: 529 INJECTION, SOLUTION INTRAVENOUS at 10:39

## 2024-07-29 ENCOUNTER — TELEPHONE (OUTPATIENT)
Dept: FAMILY MEDICINE CLINIC | Facility: CLINIC | Age: 69
End: 2024-07-29

## 2024-07-29 NOTE — TELEPHONE ENCOUNTER
Caller: SANTANA DING    Relationship: Child    Best call back number: [phone]    What is the best time to reach you: ANYTIME TODAY    Who are you requesting to speak with (clinical staff, provider,  specific staff member): JERSEY OR HER NURSE    Do you know the name of the person who called: SON SANTANA    What was the call regarding: HE IS CONCERNED HIS FATHER IS DEVELOPING DIMENTIA. HIS SURGERY FOR TODAY HAS BEEN CANCELED AND HE IS HAVING A LOT OF DAILY ISSUES. PLEASE CALL HIM BACK AND ADVISE. HE WAS HAVING KIDNEY REMOVED AND HE HAD COMPLICATIONS LAST NIGHT SURGEON DECIDED TO CANCEL IT. PLEASE CALL SANTANA BACK AND ADVISE. SANTANA IS LISTED ON HIS DAD'S  VERBAL RELEASE FORM.    Is it okay if the provider responds through MyChart: N/A

## 2024-08-06 ENCOUNTER — TELEPHONE (OUTPATIENT)
Dept: ONCOLOGY | Facility: HOSPITAL | Age: 69
End: 2024-08-06
Payer: MEDICARE

## 2024-08-06 NOTE — TELEPHONE ENCOUNTER
Distress Screening Follow-Up    Date of Distress Screenin24     Location of Distress Screening: Medical oncology    Distress Level: 9    Problems Indicated: Sleep, fatigue, memory or concentration, changes in eating, worry or anxiety, grief or loss, anger    Diagnosis: Renal cell carcinoma    Current Tx Plan: Mr. Leiva was scheduled for a radical nephrectomy with Dr. Mcmanus at the end of last month, however, this was cancelled by the provider and rescheduled for Monday, 24 around noon. Mr. Leiva will be admitted prior on  night to be monitored before surgery. Advised Mr. Leiva' son that an inpatient  or discharge planner should meet with them to assess for any home care needs prior to his discharge, however, [facility] is happy to follow-up with them and help facilitate any unmet needs. [facility] notified the oncologist of the change of date for surgery.    Mental Status: Mr. Leiva has had some recent memory impairment, difficulty texting and typing on the keyboard and frequent falls over the last 3-4 months, per oncologist's consultation. He is scheduled to see his PCP tomorrow morning to complete a mini mental status exam, as the son has expressed concerns he may have dementia. His son explains his changes in cognition have happened so suddenly. He provides an example that Mr. Leiva will try to use the home phone to control the television. [facility] has already ruled out UTI. Son reports Mr. Leiva has been getting down and depressed, as he's able to acknowledge something is wrong. Mr. Leiva' mother had significant dementia prior to her death. He follows up with his PCP tomorrow morning.    Mental Health Treatment: No history reported.    Substance Use/Tobacco Use: Mr. Leiva is a former cigarette smoker, smoking 2-3 pack a day, quitting roughly 14 years ago. His son reports at one point he was drinking a six pack a day, but believes he only drinks about two beers a day since receiving his cancer diagnosis and  "preparing to have one kidney removed. No illicit drug use noted.    Support System: Mr. Leiva receives support from his spouse and their only child, a son in [place], and his best friend that lives behind him. He has siblings that live in [place].     Hobbies: Prior to Mr. Leiva' recent health changes, he was a member of a  retiree motorcycle group, however, hasn't been able to ride recently. He enjoys doing things outdoors. His son describes him as a \"country uma\", he enjoys hunting, fishing, etc. His son reports he's becoming stir crazy sitting at home. While indoors he enjoys watching [employer] or getting on Facebook. He has a computer and television in his office.    Residential status/Who lives in the home: Mr. Leiva resides with his spouse in their home in [place].    Home Care Needs: Mr. Jimenez son orders their groceries and has them delivered. His spouse is mentally competent, but has physical limitations and doesn't get out of the home much. She does do the laundry and cooks, but cannot physically help Mr. Leiva up off the floor when he falls. In those situations, she contacts his best friend that lives behind them.    Insurance: Medicare and [email or web address]    Finances: Mr. Leiva is a retired  and previous . No financial concerns identified at this time.    Transportation: Either Mr. Jimenez best friend or son provides his transportation to his medical appointments. Here recently, his son has been trying to be present at his medical appointments due to his recent changes in cognition.    Advance Care Planning: No directive on file. Mr. Jimenez son inquired about when to get Mr. Leiva a medical POA. Advised for them to look into this as soon as possible, ideally through an  for both medical and financial POA. He v/u and reports they may plan to speak with an  tomorrow while in town for his PCP appointment.     Resources/Referrals: Emotional support, patient navigation, " advance care planning    Additional Comment: [facility] attempted to contact Mr. Leiva and his son via telephone this morning to follow-up in regards to his distress score of 9 during his consultation with the medical oncologist. Left both of them a voicemail with my direct number where I may be reached back at to help navigate the healthcare system, address any barriers to care or unmet physical, emotional, social, practical and spiritual needs. [facility] support remains available.    Received a return phone call from Mr. Leiva' son this morning. He was very pleasant and openly engaged in conversation, helping provide adequate details to complete Mr. Leiva' psychosocial needs assessment. His son is currently at work during our telephone encounter. He lives in [place] and works for [employer]. Provided him with my direct phone number, encouraging [facility] support remains available and will plan to follow-up tomorrow afternoon once Mr. Leiva is seen by PCP. He expressed appreciation and noted this relieves stress off his shoulders knowing he has a contact for resources if/when needed.

## 2024-08-07 ENCOUNTER — OFFICE VISIT (OUTPATIENT)
Dept: FAMILY MEDICINE CLINIC | Facility: CLINIC | Age: 69
End: 2024-08-07
Payer: MEDICARE

## 2024-08-07 VITALS
TEMPERATURE: 97.1 F | WEIGHT: 203.8 LBS | HEART RATE: 78 BPM | BODY MASS INDEX: 29.24 KG/M2 | OXYGEN SATURATION: 98 % | DIASTOLIC BLOOD PRESSURE: 80 MMHG | SYSTOLIC BLOOD PRESSURE: 140 MMHG

## 2024-08-07 DIAGNOSIS — F10.90 ALCOHOL USE DISORDER: ICD-10-CM

## 2024-08-07 DIAGNOSIS — R73.03 PREDIABETES: ICD-10-CM

## 2024-08-07 DIAGNOSIS — N28.89 RENAL MASS, RIGHT: ICD-10-CM

## 2024-08-07 DIAGNOSIS — R29.6 RECURRENT FALLS WHILE WALKING: ICD-10-CM

## 2024-08-07 DIAGNOSIS — R41.3 MEMORY CHANGES: Primary | ICD-10-CM

## 2024-08-07 LAB
ALBUMIN SERPL-MCNC: 3.7 G/DL (ref 3.5–5.2)
ALBUMIN/GLOB SERPL: 0.9 G/DL
ALP SERPL-CCNC: 140 U/L (ref 39–117)
ALT SERPL W P-5'-P-CCNC: 13 U/L (ref 1–41)
ANION GAP SERPL CALCULATED.3IONS-SCNC: 11.8 MMOL/L (ref 5–15)
AST SERPL-CCNC: 19 U/L (ref 1–40)
BASOPHILS # BLD AUTO: 0.09 10*3/MM3 (ref 0–0.2)
BASOPHILS NFR BLD AUTO: 0.9 % (ref 0–1.5)
BILIRUB SERPL-MCNC: 0.6 MG/DL (ref 0–1.2)
BILIRUB UR QL STRIP: NEGATIVE
BUN SERPL-MCNC: 7 MG/DL (ref 8–23)
BUN/CREAT SERPL: 7.3 (ref 7–25)
CALCIUM SPEC-SCNC: 11.2 MG/DL (ref 8.6–10.5)
CHLORIDE SERPL-SCNC: 93 MMOL/L (ref 98–107)
CLARITY UR: CLEAR
CO2 SERPL-SCNC: 25.2 MMOL/L (ref 22–29)
COLOR UR: YELLOW
CREAT SERPL-MCNC: 0.96 MG/DL (ref 0.76–1.27)
DEPRECATED RDW RBC AUTO: 45.3 FL (ref 37–54)
EGFRCR SERPLBLD CKD-EPI 2021: 86.6 ML/MIN/1.73
EOSINOPHIL # BLD AUTO: 0.17 10*3/MM3 (ref 0–0.4)
EOSINOPHIL NFR BLD AUTO: 1.7 % (ref 0.3–6.2)
ERYTHROCYTE [DISTWIDTH] IN BLOOD BY AUTOMATED COUNT: 14.3 % (ref 12.3–15.4)
FOLATE SERPL-MCNC: 6.91 NG/ML (ref 4.78–24.2)
GLOBULIN UR ELPH-MCNC: 4.1 GM/DL
GLUCOSE SERPL-MCNC: 89 MG/DL (ref 65–99)
GLUCOSE UR STRIP-MCNC: NEGATIVE MG/DL
HBA1C MFR BLD: 6.2 % (ref 4.8–5.6)
HCT VFR BLD AUTO: 48.6 % (ref 37.5–51)
HGB BLD-MCNC: 16.3 G/DL (ref 13–17.7)
HGB UR QL STRIP.AUTO: NEGATIVE
HOLD SPECIMEN: NORMAL
IMM GRANULOCYTES # BLD AUTO: 0.09 10*3/MM3 (ref 0–0.05)
IMM GRANULOCYTES NFR BLD AUTO: 0.9 % (ref 0–0.5)
KETONES UR QL STRIP: NEGATIVE
LEUKOCYTE ESTERASE UR QL STRIP.AUTO: NEGATIVE
LYMPHOCYTES # BLD AUTO: 1.51 10*3/MM3 (ref 0.7–3.1)
LYMPHOCYTES NFR BLD AUTO: 15.2 % (ref 19.6–45.3)
MCH RBC QN AUTO: 29.2 PG (ref 26.6–33)
MCHC RBC AUTO-ENTMCNC: 33.5 G/DL (ref 31.5–35.7)
MCV RBC AUTO: 86.9 FL (ref 79–97)
MONOCYTES # BLD AUTO: 1.01 10*3/MM3 (ref 0.1–0.9)
MONOCYTES NFR BLD AUTO: 10.2 % (ref 5–12)
NEUTROPHILS NFR BLD AUTO: 7.08 10*3/MM3 (ref 1.7–7)
NEUTROPHILS NFR BLD AUTO: 71.1 % (ref 42.7–76)
NITRITE UR QL STRIP: NEGATIVE
NRBC BLD AUTO-RTO: 0 /100 WBC (ref 0–0.2)
PH UR STRIP.AUTO: 7 [PH] (ref 5–8)
PLATELET # BLD AUTO: 470 10*3/MM3 (ref 140–450)
PMV BLD AUTO: 10.4 FL (ref 6–12)
POTASSIUM SERPL-SCNC: 4.5 MMOL/L (ref 3.5–5.2)
PROT SERPL-MCNC: 7.8 G/DL (ref 6–8.5)
PROT UR QL STRIP: NEGATIVE
RBC # BLD AUTO: 5.59 10*6/MM3 (ref 4.14–5.8)
SODIUM SERPL-SCNC: 130 MMOL/L (ref 136–145)
SP GR UR STRIP: 1.01 (ref 1–1.03)
TSH SERPL DL<=0.05 MIU/L-ACNC: 0.77 UIU/ML (ref 0.27–4.2)
UROBILINOGEN UR QL STRIP: NORMAL
VIT B12 BLD-MCNC: 713 PG/ML (ref 211–946)
WBC NRBC COR # BLD AUTO: 9.95 10*3/MM3 (ref 3.4–10.8)

## 2024-08-07 PROCEDURE — 82607 VITAMIN B-12: CPT | Performed by: NURSE PRACTITIONER

## 2024-08-07 PROCEDURE — 85025 COMPLETE CBC W/AUTO DIFF WBC: CPT | Performed by: NURSE PRACTITIONER

## 2024-08-07 PROCEDURE — 3079F DIAST BP 80-89 MM HG: CPT | Performed by: NURSE PRACTITIONER

## 2024-08-07 PROCEDURE — 3077F SYST BP >= 140 MM HG: CPT | Performed by: NURSE PRACTITIONER

## 2024-08-07 PROCEDURE — 81003 URINALYSIS AUTO W/O SCOPE: CPT | Performed by: NURSE PRACTITIONER

## 2024-08-07 PROCEDURE — 80053 COMPREHEN METABOLIC PANEL: CPT | Performed by: NURSE PRACTITIONER

## 2024-08-07 PROCEDURE — 1159F MED LIST DOCD IN RCRD: CPT | Performed by: NURSE PRACTITIONER

## 2024-08-07 PROCEDURE — 1126F AMNT PAIN NOTED NONE PRSNT: CPT | Performed by: NURSE PRACTITIONER

## 2024-08-07 PROCEDURE — 83036 HEMOGLOBIN GLYCOSYLATED A1C: CPT | Performed by: NURSE PRACTITIONER

## 2024-08-07 PROCEDURE — 1160F RVW MEDS BY RX/DR IN RCRD: CPT | Performed by: NURSE PRACTITIONER

## 2024-08-07 PROCEDURE — 99214 OFFICE O/P EST MOD 30 MIN: CPT | Performed by: NURSE PRACTITIONER

## 2024-08-07 PROCEDURE — 84425 ASSAY OF VITAMIN B-1: CPT | Performed by: NURSE PRACTITIONER

## 2024-08-07 PROCEDURE — 36415 COLL VENOUS BLD VENIPUNCTURE: CPT | Performed by: NURSE PRACTITIONER

## 2024-08-07 PROCEDURE — 84443 ASSAY THYROID STIM HORMONE: CPT | Performed by: NURSE PRACTITIONER

## 2024-08-07 PROCEDURE — 82746 ASSAY OF FOLIC ACID SERUM: CPT | Performed by: NURSE PRACTITIONER

## 2024-08-07 NOTE — PROGRESS NOTES
Chief Complaint  Memory Loss (Having some memory issues and forgetting things around the house. /Feels like this has been going on the last 4-6 months. ) and Fall (Falling a lot more recently )    History of Present Illness  Paulo ESTHER Leiva Jr. is a 68 y.o. male who presents to [facility] with a past medical history of  Past Medical History:   Diagnosis Date    Benign essential hypertension     COPD (chronic obstructive pulmonary disease)     Erectile dysfunction Apprx: 5 yrs ago    Generalized convulsive seizures 12/27/2023    Hyperlipidemia 12/14/2018    Hyponatremia     Implantable loop recorder present     Syncope     Vertigo 02/17/2021    Vision loss        HPI  Patient presents to the office today for concerns with memory loss. Pt is waiting on surgery, scheduled to go into the hospital next Sunday night for nephrectomy at [facility]. Pt currently has right renal cell carcinoma.     Pt notes memory changes for about 2 months and progressively getting worse especially depending on activity and time of day. Pt notes he will plan to go to the restroom but end up in the kitchen. Pts wife stated one night he could not figure out where to plug his phone into. Difficulty with daily regular tasks. Pt is no longer drinking as heavily; he is drinking less. MRI brain about 2 months ago was normal. Mother had hx of dementia. Pt has  experience without exposure to Agent Orange exposure or burn pits. Pt has also been noted to have difficulty with everyday items such as computer, TV, remotes. Has picked up remote to call someone.     Pt also notes he is falling more frequently, last fall a couple weeks ago. Was having some rib pain but Aleve helped. No injury to head.     Pt was taken off Xarelto.     Objective   Vital Signs:   Vitals:    08/07/24 1047   BP: 140/80   BP Location: Left arm   Patient Position: Sitting   Pulse: 78   Temp: 97.1 °F (36.2 °C)   TempSrc: Temporal   SpO2: 98%    Weight: 92.4 kg (203 lb 12.8 oz)     Body mass index is 29.24 kg/m².    Wt Readings from Last 3 Encounters:   08/07/24 92.4 kg (203 lb 12.8 oz)   07/11/24 94.5 kg (208 lb 6.4 oz)   06/19/24 96.6 kg (213 lb)     BP Readings from Last 3 Encounters:   08/07/24 140/80   07/11/24 154/94   06/19/24 160/80       Health Maintenance   Topic Date Due    ZOSTER VACCINE (2 of 2) 02/11/2015    COVID-19 Vaccine (1 - 2023-24 season) Never done    INFLUENZA VACCINE  08/01/2024    ANNUAL WELLNESS VISIT  10/03/2024    BMI FOLLOWUP  10/03/2024    LIPID PANEL  04/10/2025    LUNG CANCER SCREENING  05/22/2025    TDAP/TD VACCINES (3 - Td or Tdap) 04/27/2031    COLORECTAL CANCER SCREENING  10/26/2032    HEPATITIS C SCREENING  Completed    Pneumococcal Vaccine 65+  Completed    AAA SCREEN (ONE-TIME)  Completed       Physical Exam  Vitals reviewed.   Constitutional:       General: He is not in acute distress.     Appearance: Normal appearance. He is well-developed.   HENT:      Head: Normocephalic and atraumatic.   Eyes:      Conjunctiva/sclera: Conjunctivae normal.      Pupils: Pupils are equal, round, and reactive to light.   Cardiovascular:      Rate and Rhythm: Normal rate and regular rhythm.      Heart sounds: Normal heart sounds.   Pulmonary:      Effort: Pulmonary effort is normal.      Breath sounds: Normal breath sounds.   Musculoskeletal:      Cervical back: Neck supple.      Right lower leg: No edema.      Left lower leg: No edema.      Comments: Using cane   Skin:     General: Skin is warm and dry.   Neurological:      Mental Status: He is alert and oriented to person, place, and time.   Psychiatric:         Mood and Affect: Mood and affect normal.         Behavior: Behavior normal.         Thought Content: Thought content normal.         Judgment: Judgment normal.            Result Review :  The following data was reviewed by: MELANIA Santiago on 08/07/2024:  Common labs          4/10/2024    15:00 5/11/2024    14:45  5/11/2024    14:46 7/22/2024    10:03   Common Labs   Glucose 96  92      BUN 7  5      Creatinine 0.90  0.87   1.00    Sodium 134  134      Potassium 4.4  4.8      Chloride 95  94      Calcium 10.2  10.2      Albumin 4.1  3.8      Total Bilirubin 1.1  1.0      Alkaline Phosphatase 134  115      AST (SGOT) 18  15      ALT (SGPT) 8  11      WBC 9.38   10.56     Hemoglobin 17.2   16.6     Hematocrit 52.7   48.8     Platelets 364   370     Total Cholesterol 207       Triglycerides 80       HDL Cholesterol 74       LDL Cholesterol  119       Hemoglobin A1C 5.80         TSH          10/3/2023    10:02   TSH   TSH 0.615       IMAGING SCANNED (06/17/2024)   MRI Lumbar Spine With & Without Contrast    Addendum Date: 7/23/2024    ADDENDUM #1 Addendum for additional impression point: Partially visualized right renal mass better characterized on prior CT abdomen pelvis. Electronically Signed: Renan Oglesby MD  7/23/2024 12:39 PM EDT  Workstation ID: [id number] ORIGINAL REPORT: MRI LUMBAR SPINE W WO CONTRAST Date of Exam: 7/22/2024 10:05 AM EDT Indication: recurrent falls.  Comparison: Spine radiograph 6/19/2024, CT abdomen pelvis 5/24/2024 Technique:  Routine multiplanar/multisequence sequence images of the lumbar spine were obtained before and after the uneventful administration of Multihance. Findings: The last well formed disc space is labeled as L5-S1. Distal cord and conus: Normal morphology and signal. The conus medullaris terminates at L1. No abnormal enhancement. Cauda equina and nerve roots: Normal morphology and signal. No abnormal enhancement. Alignment: The vertebral bodies appear in normal alignment.  Bones: The vertebral body heights are preserved. The bone marrow signal is within normal limits for age. No suspicious osseous lesions are demonstrated. Description of degenerative changes at specific levels is as follows: T12-L1: No significant disc hernia, facet joint arthropathy, or neural impingement. L1-2: No  significant disc hernia, facet joint arthropathy, or neural impingement. L2-3: No significant disc hernia, facet joint arthropathy, or neural impingement. L3-4: Mild facet arthropathy. No spinal canal or neuroforaminal stenosis. L4-5: Mild facet arthropathy. No spinal canal or neuroforaminal stenosis. L5-S1: Mild facet arthropathy. No spinal canal or neuroforaminal stenosis. Extra-vertebral soft tissues: Normal. Visualized abdomen/pelvis: Partially visualized right renal mass is better characterized on prior CT. Impression: No substantial degenerative changes of the lumbar spine. Electronically Signed: Renan Oglesby MD  7/23/2024 12:24 PM EDT  Workstation ID: [id number]    Result Date: 7/23/2024  Impression: No substantial degenerative changes of the lumbar spine. Electronically Signed: Renan Oglesby MD  7/23/2024 12:24 PM EDT  Workstation ID: [id number]    XR Spine Lumbar 2 or 3 View (In Office)    Result Date: 6/20/2024  Impression: 1. No acute L-spine fracture or subluxation. Mild degenerative changes. Electronically Signed: Roque Mckay MD  6/20/2024 11:22 AM EDT  Workstation ID: [id number]    CT abdomen pelvis w wo contrast    Addendum Date: 6/4/2024    ADDENDUM #1 The renal mass is within the right kidney. There is an error in the impression. Renal cell carcinoma is most likely. Electronically Signed: Kendal Levin MD  6/4/2024 2:57 PM EDT  Workstation ID: [id number] ORIGINAL REPORT: CT ABDOMEN PELVIS W WO CONTRAST-  Date of Exam: 5/24/2024 12:06 PM  Indication: Renal mass seen on low-dose CT chest.  Comparison: Lung cancer screening CT 5/22/2024  Technique: Axial CT images were obtained of the abdomen and pelvis before and after the uneventful intravenous administration of iodinated IV contrast. Sagittal and coronal reconstructions were performed. Automated exposure control and iterative reconstruction methods were used.   Findings: Lower Thorax: Lung bases are clear. Emphysema lung bases.  Peritoneum: No free  air or free fluid.  Appendix: Appendix is not well seen. No significant inflammatory findings right lower quadrant. Kidneys, ureters, and urinary bladder: There is a large mass originating from the inferior pole mid kidney extending anteriorly. It measures 11.1 x 7.7 x 9 cm. It has diffuse peripheral enhancement and central necrosis.. There are associated pulmonary vessels supplying this mass. No definite thrombus or tumor involvement of the renal vein or artery. There is a small benign cyst of the cortex of the mid left kidney measuring 8 mm. Normal appearance of urinary bladder given the amount of distention.  Liver, gallbladder, and bile ducts: No focal hepatic lesions. Cholelithiasis. No inflammatory findings of the gallbladder. No bile duct dilatation..  Spleen: Spleen is normal size.  No focal splenic lesions.  Adrenal glands: Unremarkable.  Pancreas: No focal masses.  No pancreatic duct dilation.  Abdominal aorta and Vascular Structures: No aneurysmal dilation. Severe atherosclerotic disease.  Stomach and Bowel: No abnormally dilated loops of bowel.  No significant hiatal hernia.  No significant bowel wall thickening.  Ligament of Treitz has normal anatomic position.  Reproductive Organs: Within normal limits.  Lymph nodes: No pathologically enlarged lymph nodes.  Soft tissues: Unremarkable.  Osseous structures: No aggressive focal lytic or sclerotic osseous lesions.  There is a large enhancing irregular marginated mass with central necrosis originating from the inferior pole of the left kidney. It measures up to 11.1 cm. There are prominent feeding and draining vessels. No metastatic disease identified in the abdomen or pelvis. Renal cell carcinoma is favored. Cholelithiasis without findings of acute cholecystitis. Severe atherosclerotic calcification abdominal aorta.  Electronically Signed By-Kendal Levin MD On:5/24/2024 12:27 PM  Signed by: Kendal Levin MD on 5/24/2024 12:27 PM CALL REPORT -[phone].  PT NOT WAITING. FOLLOW UP PREVIOUS RENAL MASS SEEN ON CT CHEST. NO ABD SX'S, NO CA. 100CC ISOVIEW 370 DLP 1306    Result Date: 6/4/2024  There is a large enhancing irregular marginated mass with central necrosis originating from the inferior pole of the left kidney. It measures up to 11.1 cm. There are prominent feeding and draining vessels. No metastatic disease identified in the abdomen or pelvis. Renal cell carcinoma is favored. Cholelithiasis without findings of acute cholecystitis. Severe atherosclerotic calcification abdominal aorta.  Electronically Signed By-Kendal Levin MD On:5/24/2024 12:27 PM      XR Ribs Right With PA Chest    Result Date: 5/28/2024  Impression: No active disease is seen.   Electronically Signed By-OLEG MATHEWS MD On:5/28/2024 6:55 PM      CT Chest Low Dose Cancer Screening WO    Result Date: 5/23/2024  1. Incidental mass likely arising from the right kidney. Recommend CT abdomen pelvis with IV contrast, and consider urology consultation. 2. Emphysema. No suspicious pulmonary nodules. Recommend continued screening. 3. Cholelithiasis. 4. Cardiomegaly. Aortic and coronary atherosclerotic disease.  Lung-RADS Category S - Other. Clinically significant or potentially clinically significant findings (Non-lung cancer). Recommend continued screening with low dose lung CT scan in 12 months.    Electronically Signed By-Neftali Sharp MD On:5/23/2024 12:49 PM      CT Head Without Contrast    Result Date: 5/11/2024  Impression: No acute intracranial abnormalities are identified.    Electronically Signed By-KAVEH AYERS MD On:5/11/2024 3:56 PM      XR Chest 1 View    Result Date: 5/11/2024  No active disease.   Electronically Signed By-KAVEH AYERS MD On:5/11/2024 3:06 PM        Procedures        Assessment and Plan   Diagnoses and all orders for this visit:    1. Memory changes (Primary)  -     Urinalysis With Culture If Indicated - Urine, Clean Catch  -     CBC & Differential  -     TSH Rfx On  Abnormal To Free T4  -     Vitamin B12 & Folate  -     Vitamin B1, Whole Blood  -     Ambulatory Referral to Neurology  -     Lactic Acid, Plasma    2. Renal mass, right  -     Comprehensive Metabolic Panel    3. Prediabetes  -     Hemoglobin A1c    4. Alcohol use disorder  -     Vitamin B1, Whole Blood    5. Recurrent falls while walking  -     Ambulatory Referral to Neurology      Due to family history of dementia in mother, recent diagnosis of cancer will refer to neurosurgery for evaluation of dementia.    FOLLOW UP  Return in about 10 weeks (around 10/16/2024) for Medicare Wellness.    Patient was given instructions and counseling regarding his condition or for health maintenance advice. Please see specific information pulled into the AVS if appropriate.       Vilma Rolon, APRN  08/07/24  13:08 EDT    CURRENT & DISCONTINUED MEDICATIONS  Current Outpatient Medications   Medication Instructions    meclizine (ANTIVERT) 50 mg, Oral, Daily    metoprolol succinate XL (TOPROL-XL) 50 mg, Oral, Daily    simvastatin (ZOCOR) 20 mg, Oral, Daily    Spiriva Respimat 1.25 MCG/ACT aerosol solution inhaler 2 puffs, Inhalation, Daily - RT    valsartan (DIOVAN) 80 mg, Oral, 2 Times Daily       Medications Discontinued During This Encounter   Medication Reason    Eliquis 5 MG tablet tablet Historical Med - Therapy completed    amLODIPine-benazepril (LOTREL 5-20) 5-20 MG per capsule Historical Med - Therapy completed

## 2024-08-07 NOTE — PROGRESS NOTES
Venipuncture Blood Specimen Collection  Venipuncture performed in [place] by Lacy Marin MA with good hemostasis. Patient tolerated the procedure well without complications.   08/07/24   Lacy Marin MA

## 2024-08-08 DIAGNOSIS — Z12.5 PROSTATE CANCER SCREENING: ICD-10-CM

## 2024-08-08 DIAGNOSIS — E83.52 SERUM CALCIUM ELEVATED: Primary | ICD-10-CM

## 2024-08-09 NOTE — TELEPHONE ENCOUNTER
[facility]W attempted to reach Mr. Leiva' son via telephone this afternoon to follow-up in regards to our prior conversation and touch base after Mr. Leiva saw his PCP on Wednesday. PCP has referred him to neurology. Looks like neurology is planning to schedule Mr. Leiva with one of their APRNs. This has not been scheduled yet. Mr. Leiva' son will be taking him to the hospital Sunday to be monitored prior to his surgery scheduled Monday afternoon. Reminded his son that an inpatient SW or  should meet with them prior to discharge to assess for any discharge needs, however, encouraged him to please contact OSW to help address any barriers to care or unmet needs as needed.

## 2024-08-12 LAB — VIT B1 BLD-SCNC: 87.4 NMOL/L (ref 66.5–200)

## 2024-08-12 RX ORDER — METOPROLOL SUCCINATE 50 MG/1
50 TABLET, EXTENDED RELEASE ORAL DAILY
Qty: 90 TABLET | Refills: 3 | Status: SHIPPED | OUTPATIENT
Start: 2024-08-12

## 2024-08-20 ENCOUNTER — OFFICE VISIT (OUTPATIENT)
Dept: FAMILY MEDICINE CLINIC | Facility: CLINIC | Age: 69
End: 2024-08-20
Payer: MEDICARE

## 2024-08-20 VITALS
DIASTOLIC BLOOD PRESSURE: 82 MMHG | TEMPERATURE: 98.4 F | WEIGHT: 198 LBS | OXYGEN SATURATION: 96 % | BODY MASS INDEX: 28.41 KG/M2 | HEART RATE: 86 BPM | SYSTOLIC BLOOD PRESSURE: 130 MMHG

## 2024-08-20 DIAGNOSIS — Z90.5 H/O RIGHT NEPHRECTOMY: Primary | ICD-10-CM

## 2024-08-20 DIAGNOSIS — E83.52 SERUM CALCIUM ELEVATED: ICD-10-CM

## 2024-08-20 DIAGNOSIS — Z09 HOSPITAL DISCHARGE FOLLOW-UP: ICD-10-CM

## 2024-08-20 DIAGNOSIS — I10 ESSENTIAL HYPERTENSION: ICD-10-CM

## 2024-08-20 DIAGNOSIS — Z12.5 PROSTATE CANCER SCREENING: ICD-10-CM

## 2024-08-20 LAB
ALBUMIN SERPL-MCNC: 3.8 G/DL (ref 3.5–5.2)
ALBUMIN/GLOB SERPL: 1.1 G/DL
ALP SERPL-CCNC: 133 U/L (ref 39–117)
ALT SERPL W P-5'-P-CCNC: 15 U/L (ref 1–41)
ANION GAP SERPL CALCULATED.3IONS-SCNC: 13.9 MMOL/L (ref 5–15)
AST SERPL-CCNC: 24 U/L (ref 1–40)
BASOPHILS # BLD AUTO: 0.1 10*3/MM3 (ref 0–0.2)
BASOPHILS NFR BLD AUTO: 1 % (ref 0–1.5)
BILIRUB SERPL-MCNC: 0.6 MG/DL (ref 0–1.2)
BUN SERPL-MCNC: 10 MG/DL (ref 8–23)
BUN/CREAT SERPL: 6.9 (ref 7–25)
CA-I SERPL ISE-MCNC: 1.21 MMOL/L (ref 1.15–1.35)
CALCIUM SPEC-SCNC: 10.3 MG/DL (ref 8.6–10.5)
CALCIUM SPEC-SCNC: 9.8 MG/DL (ref 8.6–10.5)
CHLORIDE SERPL-SCNC: 90 MMOL/L (ref 98–107)
CO2 SERPL-SCNC: 24.1 MMOL/L (ref 22–29)
CREAT SERPL-MCNC: 1.44 MG/DL (ref 0.76–1.27)
DEPRECATED RDW RBC AUTO: 46.3 FL (ref 37–54)
EGFRCR SERPLBLD CKD-EPI 2021: 53.3 ML/MIN/1.73
EOSINOPHIL # BLD AUTO: 0.58 10*3/MM3 (ref 0–0.4)
EOSINOPHIL NFR BLD AUTO: 5.5 % (ref 0.3–6.2)
ERYTHROCYTE [DISTWIDTH] IN BLOOD BY AUTOMATED COUNT: 14.6 % (ref 12.3–15.4)
GLOBULIN UR ELPH-MCNC: 3.5 GM/DL
GLUCOSE SERPL-MCNC: 96 MG/DL (ref 65–99)
HCT VFR BLD AUTO: 51 % (ref 37.5–51)
HGB BLD-MCNC: 17 G/DL (ref 13–17.7)
IMM GRANULOCYTES # BLD AUTO: 0.09 10*3/MM3 (ref 0–0.05)
IMM GRANULOCYTES NFR BLD AUTO: 0.9 % (ref 0–0.5)
LYMPHOCYTES # BLD AUTO: 1.76 10*3/MM3 (ref 0.7–3.1)
LYMPHOCYTES NFR BLD AUTO: 16.8 % (ref 19.6–45.3)
MAGNESIUM SERPL-MCNC: 2 MG/DL (ref 1.6–2.4)
MCH RBC QN AUTO: 29.1 PG (ref 26.6–33)
MCHC RBC AUTO-ENTMCNC: 33.3 G/DL (ref 31.5–35.7)
MCV RBC AUTO: 87.2 FL (ref 79–97)
MONOCYTES # BLD AUTO: 0.83 10*3/MM3 (ref 0.1–0.9)
MONOCYTES NFR BLD AUTO: 7.9 % (ref 5–12)
NEUTROPHILS NFR BLD AUTO: 67.9 % (ref 42.7–76)
NEUTROPHILS NFR BLD AUTO: 7.12 10*3/MM3 (ref 1.7–7)
NRBC BLD AUTO-RTO: 0 /100 WBC (ref 0–0.2)
PHOSPHATE SERPL-MCNC: 3.6 MG/DL (ref 2.5–4.5)
PLATELET # BLD AUTO: 410 10*3/MM3 (ref 140–450)
PMV BLD AUTO: 10.3 FL (ref 6–12)
POTASSIUM SERPL-SCNC: 4.8 MMOL/L (ref 3.5–5.2)
PROT SERPL-MCNC: 7.3 G/DL (ref 6–8.5)
PSA SERPL-MCNC: 1.66 NG/ML (ref 0–4)
PTH-INTACT SERPL-MCNC: 9.6 PG/ML (ref 15–65)
RBC # BLD AUTO: 5.85 10*6/MM3 (ref 4.14–5.8)
SODIUM SERPL-SCNC: 128 MMOL/L (ref 136–145)
WBC NRBC COR # BLD AUTO: 10.48 10*3/MM3 (ref 3.4–10.8)

## 2024-08-20 PROCEDURE — 80053 COMPREHEN METABOLIC PANEL: CPT | Performed by: NURSE PRACTITIONER

## 2024-08-20 PROCEDURE — 82330 ASSAY OF CALCIUM: CPT | Performed by: NURSE PRACTITIONER

## 2024-08-20 PROCEDURE — 3075F SYST BP GE 130 - 139MM HG: CPT | Performed by: NURSE PRACTITIONER

## 2024-08-20 PROCEDURE — 82397 CHEMILUMINESCENT ASSAY: CPT | Performed by: NURSE PRACTITIONER

## 2024-08-20 PROCEDURE — 84165 PROTEIN E-PHORESIS SERUM: CPT | Performed by: NURSE PRACTITIONER

## 2024-08-20 PROCEDURE — 84100 ASSAY OF PHOSPHORUS: CPT | Performed by: NURSE PRACTITIONER

## 2024-08-20 PROCEDURE — 83735 ASSAY OF MAGNESIUM: CPT | Performed by: NURSE PRACTITIONER

## 2024-08-20 PROCEDURE — 1125F AMNT PAIN NOTED PAIN PRSNT: CPT | Performed by: NURSE PRACTITIONER

## 2024-08-20 PROCEDURE — 3079F DIAST BP 80-89 MM HG: CPT | Performed by: NURSE PRACTITIONER

## 2024-08-20 PROCEDURE — 36415 COLL VENOUS BLD VENIPUNCTURE: CPT | Performed by: NURSE PRACTITIONER

## 2024-08-20 PROCEDURE — 99214 OFFICE O/P EST MOD 30 MIN: CPT | Performed by: NURSE PRACTITIONER

## 2024-08-20 PROCEDURE — 83970 ASSAY OF PARATHORMONE: CPT | Performed by: NURSE PRACTITIONER

## 2024-08-20 PROCEDURE — 85025 COMPLETE CBC W/AUTO DIFF WBC: CPT | Performed by: NURSE PRACTITIONER

## 2024-08-20 PROCEDURE — 1160F RVW MEDS BY RX/DR IN RCRD: CPT | Performed by: NURSE PRACTITIONER

## 2024-08-20 PROCEDURE — 1159F MED LIST DOCD IN RCRD: CPT | Performed by: NURSE PRACTITIONER

## 2024-08-20 PROCEDURE — G0103 PSA SCREENING: HCPCS | Performed by: NURSE PRACTITIONER

## 2024-08-20 RX ORDER — DOCUSATE SODIUM 250 MG
CAPSULE ORAL
COMMUNITY
Start: 2024-08-13

## 2024-08-20 RX ORDER — ACETAMINOPHEN 325 MG/1
TABLET ORAL
COMMUNITY
Start: 2024-08-13

## 2024-08-20 RX ORDER — TRAMADOL HYDROCHLORIDE 50 MG/1
TABLET ORAL
COMMUNITY
Start: 2024-08-13

## 2024-08-20 NOTE — PROGRESS NOTES
Venipuncture Blood Specimen Collection  Venipuncture performed in left arm by Idalia Astorga with good hemostasis. Patient tolerated the procedure well without complications.   08/20/24   Idalia Astorga

## 2024-08-20 NOTE — PROGRESS NOTES
Chief Complaint  Hospital Follow Up Visit (Was seen at [facility] on 8-12-24 for Kidney, right, nephrectomy)    History of Present Illness  Paulo ESTHER Leiva Jr. is a 68 y.o. male who presents to [facility] with a past medical history of  Past Medical History:   Diagnosis Date    Benign essential hypertension     COPD (chronic obstructive pulmonary disease)     Erectile dysfunction Apprx: 5 yrs ago    Generalized convulsive seizures 12/27/2023    Hyperlipidemia 12/14/2018    Hyponatremia     Implantable loop recorder present     Renal insufficiency     Syncope     Vertigo 02/17/2021    Vision loss        HPI  Patient presents to the office today for hospital discharge follow up following right nephrectomy due to right renal cancer. Pt states he was admitted on 8/11/24 and discharged on 8/13/24. Pt has been voiding regularly. Pt notes some mild constipation. He is taking Tylenol as needed for pain. He is drinking about 3-4 beer/day. His home scale weight is about 192lb. No diet changes other than less alcohol; pt does admit to decreased appetite.     Pt states he was taken off his Eliquis per patient. Discharge summary notes Eliquis on medication list. He has a follow up appointment with his surgeon tomorrow.     Review of Systems   Respiratory:  Negative for shortness of breath.    Cardiovascular:  Negative for chest pain and palpitations.        Objective   Vital Signs:   Vitals:    08/20/24 0841   BP: 130/82   BP Location: Left arm   Patient Position: Sitting   Pulse: 86   Temp: 98.4 °F (36.9 °C)   TempSrc: Temporal   SpO2: 96%   Weight: 89.8 kg (198 lb)   PainSc:   4   PainLoc: Abdomen     Body mass index is 28.41 kg/m².    Wt Readings from Last 3 Encounters:   08/20/24 89.8 kg (198 lb)   08/07/24 92.4 kg (203 lb 12.8 oz)   07/11/24 94.5 kg (208 lb 6.4 oz)     BP Readings from Last 3 Encounters:   08/20/24 130/82   08/07/24 140/80   07/11/24 154/94       Health Maintenance   Topic Date Due     ZOSTER VACCINE (2 of 2) 02/11/2015    COVID-19 Vaccine (1 - 2023-24 season) Never done    INFLUENZA VACCINE  08/01/2024    ANNUAL WELLNESS VISIT  10/03/2024    BMI FOLLOWUP  10/03/2024    LIPID PANEL  04/10/2025    LUNG CANCER SCREENING  05/22/2025    TDAP/TD VACCINES (3 - Td or Tdap) 04/27/2031    COLORECTAL CANCER SCREENING  10/26/2032    HEPATITIS C SCREENING  Completed    Pneumococcal Vaccine 65+  Completed    AAA SCREEN (ONE-TIME)  Completed       Physical Exam  Vitals reviewed.   Constitutional:       General: He is not in acute distress.     Appearance: Normal appearance. He is well-developed.   HENT:      Head: Normocephalic and atraumatic.      Right Ear: External ear normal.      Left Ear: External ear normal.   Eyes:      Conjunctiva/sclera: Conjunctivae normal.      Pupils: Pupils are equal, round, and reactive to light.   Cardiovascular:      Rate and Rhythm: Normal rate and regular rhythm.      Heart sounds: Normal heart sounds.   Pulmonary:      Effort: Pulmonary effort is normal.      Breath sounds: Normal breath sounds.   Musculoskeletal:      Cervical back: Neck supple.      Right lower leg: No edema.      Left lower leg: No edema.   Skin:     General: Skin is warm and dry.          Neurological:      Mental Status: He is alert and oriented to person, place, and time.      Cranial Nerves: No cranial nerve deficit.   Psychiatric:         Mood and Affect: Mood and affect normal.         Behavior: Behavior normal.         Thought Content: Thought content normal.         Judgment: Judgment normal.            Result Review :  The following data was reviewed by: MELANIA Santiago on 08/20/2024:  Common labs          5/11/2024    14:45 5/11/2024    14:46 7/22/2024    10:03 8/7/2024    11:53   Common Labs   Glucose 92    89    BUN 5    7    Creatinine 0.87   1.00  0.96    Sodium 134    130    Potassium 4.8    4.5    Chloride 94    93    Calcium 10.2    11.2    Albumin 3.8    3.7    Total Bilirubin  1.0    0.6    Alkaline Phosphatase 115    140    AST (SGOT) 15    19    ALT (SGPT) 11    13    WBC  10.56   9.95    Hemoglobin  16.6   16.3    Hematocrit  48.8   48.6    Platelets  370   470    Hemoglobin A1C    6.20      TSH          10/3/2023    10:02 8/7/2024    11:53   TSH   TSH 0.615  0.770      Data reviewed : Recent hospitalization notes [facility] hospital discharge and pathology report.       Procedures        Assessment and Plan   Diagnoses and all orders for this visit:    1. H/O right nephrectomy (Primary)  -     CBC & Differential  -     Comprehensive Metabolic Panel    2. Essential hypertension  -     CBC & Differential  -     Cancel: Basic Metabolic Panel    3. Hospital discharge follow-up  -     CBC & Differential    4. Prostate cancer screening  -     PSA Screen    5. Serum calcium elevated  -     Magnesium  -     PTH, Intact & Calcium  -     PTH-related Peptide  -     Calcium, Ionized  -     Protein Electrophoresis, Total  -     Phosphorus      Recommend pt to restart Eliquis and continue use of incentive spirometer. Pt may discuss restart of Eliquis with surgeon tomorrow.     FOLLOW UP  Return if symptoms worsen or fail to improve, for Next scheduled follow up.    Patient was given instructions and counseling regarding his condition or for health maintenance advice. Please see specific information pulled into the AVS if appropriate.       Vilma Rolon, APRN  08/20/24  09:23 EDT    CURRENT & DISCONTINUED MEDICATIONS  Current Outpatient Medications   Medication Instructions    acetaminophen (TYLENOL) 325 MG tablet TAKE 3 TABLETS BY MOUTH THREE TIMES DAILY FOR 7 DAYS    docusate sodium (COLACE) 250 MG capsule     meclizine (ANTIVERT) 50 mg, Oral, Daily    metoprolol succinate XL (TOPROL-XL) 50 mg, Oral, Daily    simvastatin (ZOCOR) 20 mg, Oral, Daily    Spiriva Respimat 1.25 MCG/ACT aerosol solution inhaler 2 puffs, Inhalation, Daily - RT    traMADol (ULTRAM) 50 MG tablet TAKE 1 TABLET BY MOUTH EVERY 8  HOURS AS NEEDED FOR PAIN FOR 5 DAYS    valsartan (DIOVAN) 80 mg, Oral, 2 Times Daily       There are no discontinued medications.

## 2024-08-21 LAB
ALBUMIN SERPL ELPH-MCNC: 3.1 G/DL (ref 2.9–4.4)
ALBUMIN/GLOB SERPL: 0.8 {RATIO} (ref 0.7–1.7)
ALPHA1 GLOB SERPL ELPH-MCNC: 0.4 G/DL (ref 0–0.4)
ALPHA2 GLOB SERPL ELPH-MCNC: 1.4 G/DL (ref 0.4–1)
B-GLOBULIN SERPL ELPH-MCNC: 1.1 G/DL (ref 0.7–1.3)
GAMMA GLOB SERPL ELPH-MCNC: 0.9 G/DL (ref 0.4–1.8)
GLOBULIN SER CALC-MCNC: 3.9 G/DL (ref 2.2–3.9)
LABORATORY COMMENT REPORT: ABNORMAL
M PROTEIN SERPL ELPH-MCNC: ABNORMAL G/DL
PROT SERPL-MCNC: 7 G/DL (ref 6–8.5)

## 2024-08-22 ENCOUNTER — TELEPHONE (OUTPATIENT)
Dept: CARDIOLOGY | Facility: CLINIC | Age: 69
End: 2024-08-22
Payer: MEDICARE

## 2024-08-22 DIAGNOSIS — E87.1 HYPONATREMIA: Primary | ICD-10-CM

## 2024-08-30 LAB — PTH RELATED PROT SERPL-SCNC: <2 PMOL/L

## 2024-09-04 ENCOUNTER — TELEPHONE (OUTPATIENT)
Dept: FAMILY MEDICINE CLINIC | Facility: CLINIC | Age: 69
End: 2024-09-04
Payer: MEDICARE

## 2024-09-04 DIAGNOSIS — R26.2 AMBULATORY DYSFUNCTION: ICD-10-CM

## 2024-09-04 DIAGNOSIS — R53.1 WEAKNESS: Primary | ICD-10-CM

## 2024-09-04 NOTE — TELEPHONE ENCOUNTER
Caller: SANTANA DING    Relationship: Emergency Contact    Best call back number: [phone]    What is the medical concern/diagnosis: DIFFICULTY WALKING     What specialty or service is being requested: PHYSICAL THERAPY    What is the provider, practice or medical service name: UNKNOWN, WOULD PREFER LOCAL IF POSSIBLE       Any additional details: PATIENT IS HAVING DIFFICULTY WALKING FOR ANY DISTANCE FOR EXAMPLE TO AND FROM HIS MAILBOX AND FEELS PHYSICAL THERAPY MIGHT HELP

## 2024-09-05 ENCOUNTER — TELEPHONE (OUTPATIENT)
Dept: FAMILY MEDICINE CLINIC | Facility: CLINIC | Age: 69
End: 2024-09-05
Payer: MEDICARE

## 2024-09-05 NOTE — TELEPHONE ENCOUNTER
Patient requesting an order for physical therapy--he is still having weakness, trouble walking some.

## 2024-09-09 ENCOUNTER — APPOINTMENT (OUTPATIENT)
Dept: CT IMAGING | Facility: HOSPITAL | Age: 69
DRG: 071 | End: 2024-09-09
Payer: MEDICARE

## 2024-09-09 ENCOUNTER — APPOINTMENT (OUTPATIENT)
Dept: MRI IMAGING | Facility: HOSPITAL | Age: 69
DRG: 071 | End: 2024-09-09
Payer: MEDICARE

## 2024-09-09 ENCOUNTER — HOSPITAL ENCOUNTER (INPATIENT)
Facility: HOSPITAL | Age: 69
LOS: 5 days | Discharge: HOME HEALTH | DRG: 071 | End: 2024-09-14
Attending: EMERGENCY MEDICINE | Admitting: STUDENT IN AN ORGANIZED HEALTH CARE EDUCATION/TRAINING PROGRAM
Payer: MEDICARE

## 2024-09-09 ENCOUNTER — APPOINTMENT (OUTPATIENT)
Dept: GENERAL RADIOLOGY | Facility: HOSPITAL | Age: 69
DRG: 071 | End: 2024-09-09
Payer: MEDICARE

## 2024-09-09 DIAGNOSIS — R26.2 DIFFICULTY IN WALKING: ICD-10-CM

## 2024-09-09 DIAGNOSIS — R41.82 ALTERED MENTAL STATUS, UNSPECIFIED ALTERED MENTAL STATUS TYPE: Primary | ICD-10-CM

## 2024-09-09 DIAGNOSIS — I10 ESSENTIAL HYPERTENSION: ICD-10-CM

## 2024-09-09 DIAGNOSIS — G91.2 NORMAL PRESSURE HYDROCEPHALUS: ICD-10-CM

## 2024-09-09 DIAGNOSIS — N17.9 AKI (ACUTE KIDNEY INJURY): ICD-10-CM

## 2024-09-09 LAB
ALBUMIN SERPL-MCNC: 3.7 G/DL (ref 3.5–5.2)
ALBUMIN/GLOB SERPL: 1.2 G/DL
ALP SERPL-CCNC: 89 U/L (ref 39–117)
ALT SERPL W P-5'-P-CCNC: 22 U/L (ref 1–41)
ANION GAP SERPL CALCULATED.3IONS-SCNC: 12 MMOL/L (ref 5–15)
ARTERIAL PATENCY WRIST A: POSITIVE
AST SERPL-CCNC: 27 U/L (ref 1–40)
ATMOSPHERIC PRESS: 748.7 MMHG
BASE EXCESS BLDA CALC-SCNC: -2 MMOL/L (ref -2–2)
BASOPHILS # BLD AUTO: 0.09 10*3/MM3 (ref 0–0.2)
BASOPHILS NFR BLD AUTO: 0.7 % (ref 0–1.5)
BDY SITE: ABNORMAL
BILIRUB SERPL-MCNC: 1.1 MG/DL (ref 0–1.2)
BILIRUB UR QL STRIP: NEGATIVE
BUN SERPL-MCNC: 13 MG/DL (ref 8–23)
BUN/CREAT SERPL: 9.6 (ref 7–25)
CALCIUM SPEC-SCNC: 9.7 MG/DL (ref 8.6–10.5)
CHLORIDE SERPL-SCNC: 91 MMOL/L (ref 98–107)
CLARITY UR: CLEAR
CO2 SERPL-SCNC: 23 MMOL/L (ref 22–29)
COLOR UR: YELLOW
CREAT SERPL-MCNC: 1.36 MG/DL (ref 0.76–1.27)
DEPRECATED RDW RBC AUTO: 45.8 FL (ref 37–54)
EGFRCR SERPLBLD CKD-EPI 2021: 57 ML/MIN/1.73
EOSINOPHIL # BLD AUTO: 0.03 10*3/MM3 (ref 0–0.4)
EOSINOPHIL NFR BLD AUTO: 0.2 % (ref 0.3–6.2)
ERYTHROCYTE [DISTWIDTH] IN BLOOD BY AUTOMATED COUNT: 15.3 % (ref 12.3–15.4)
GLOBULIN UR ELPH-MCNC: 3 GM/DL
GLUCOSE SERPL-MCNC: 110 MG/DL (ref 65–99)
GLUCOSE UR STRIP-MCNC: NEGATIVE MG/DL
HCO3 BLDA-SCNC: 22.2 MMOL/L (ref 22–26)
HCT VFR BLD AUTO: 43 % (ref 37.5–51)
HCT VFR BLD CALC: 43 % (ref 38–51)
HEMODILUTION: NO
HGB BLD-MCNC: 14.8 G/DL (ref 13–17.7)
HGB BLDA-MCNC: 14.7 G/DL (ref 12–18)
HGB UR QL STRIP.AUTO: NEGATIVE
IMM GRANULOCYTES # BLD AUTO: 0.06 10*3/MM3 (ref 0–0.05)
IMM GRANULOCYTES NFR BLD AUTO: 0.5 % (ref 0–0.5)
INHALED O2 CONCENTRATION: 21 %
INR PPP: 0.98 (ref 0.86–1.15)
KETONES UR QL STRIP: NEGATIVE
LEUKOCYTE ESTERASE UR QL STRIP.AUTO: NEGATIVE
LYMPHOCYTES # BLD AUTO: 1.02 10*3/MM3 (ref 0.7–3.1)
LYMPHOCYTES NFR BLD AUTO: 8.3 % (ref 19.6–45.3)
MCH RBC QN AUTO: 28.2 PG (ref 26.6–33)
MCHC RBC AUTO-ENTMCNC: 34.4 G/DL (ref 31.5–35.7)
MCV RBC AUTO: 82.1 FL (ref 79–97)
MODALITY: ABNORMAL
MONOCYTES # BLD AUTO: 0.81 10*3/MM3 (ref 0.1–0.9)
MONOCYTES NFR BLD AUTO: 6.6 % (ref 5–12)
NEUTROPHILS NFR BLD AUTO: 10.33 10*3/MM3 (ref 1.7–7)
NEUTROPHILS NFR BLD AUTO: 83.7 % (ref 42.7–76)
NITRITE UR QL STRIP: NEGATIVE
NRBC BLD AUTO-RTO: 0 /100 WBC (ref 0–0.2)
PCO2 BLDA: 35.3 MM HG (ref 35–45)
PH BLDA: 7.41 PH UNITS (ref 7.35–7.45)
PH UR STRIP.AUTO: 7 [PH] (ref 5–8)
PLATELET # BLD AUTO: 265 10*3/MM3 (ref 140–450)
PMV BLD AUTO: 10.2 FL (ref 6–12)
PO2 BLD: 343 MM[HG] (ref 0–500)
PO2 BLDA: 72 MM HG (ref 80–100)
POTASSIUM SERPL-SCNC: 4.6 MMOL/L (ref 3.5–5.2)
PROT SERPL-MCNC: 6.7 G/DL (ref 6–8.5)
PROT UR QL STRIP: NEGATIVE
PROTHROMBIN TIME: 13.2 SECONDS (ref 11.8–14.9)
QT INTERVAL: 381 MS
QTC INTERVAL: 443 MS
RBC # BLD AUTO: 5.24 10*6/MM3 (ref 4.14–5.8)
SAO2 % BLDCOA: 94.5 % (ref 95–99)
SODIUM SERPL-SCNC: 126 MMOL/L (ref 136–145)
SP GR UR STRIP: 1.01 (ref 1–1.03)
T4 FREE SERPL-MCNC: 1.75 NG/DL (ref 0.92–1.68)
TSH SERPL DL<=0.05 MIU/L-ACNC: 0.8 UIU/ML (ref 0.27–4.2)
UROBILINOGEN UR QL STRIP: NORMAL
WBC NRBC COR # BLD AUTO: 12.34 10*3/MM3 (ref 3.4–10.8)

## 2024-09-09 PROCEDURE — 70551 MRI BRAIN STEM W/O DYE: CPT

## 2024-09-09 PROCEDURE — 85610 PROTHROMBIN TIME: CPT | Performed by: EMERGENCY MEDICINE

## 2024-09-09 PROCEDURE — 84443 ASSAY THYROID STIM HORMONE: CPT | Performed by: EMERGENCY MEDICINE

## 2024-09-09 PROCEDURE — 71045 X-RAY EXAM CHEST 1 VIEW: CPT

## 2024-09-09 PROCEDURE — 80053 COMPREHEN METABOLIC PANEL: CPT

## 2024-09-09 PROCEDURE — 81003 URINALYSIS AUTO W/O SCOPE: CPT | Performed by: EMERGENCY MEDICINE

## 2024-09-09 PROCEDURE — 99223 1ST HOSP IP/OBS HIGH 75: CPT | Performed by: STUDENT IN AN ORGANIZED HEALTH CARE EDUCATION/TRAINING PROGRAM

## 2024-09-09 PROCEDURE — 36600 WITHDRAWAL OF ARTERIAL BLOOD: CPT | Performed by: STUDENT IN AN ORGANIZED HEALTH CARE EDUCATION/TRAINING PROGRAM

## 2024-09-09 PROCEDURE — 84439 ASSAY OF FREE THYROXINE: CPT | Performed by: EMERGENCY MEDICINE

## 2024-09-09 PROCEDURE — 85025 COMPLETE CBC W/AUTO DIFF WBC: CPT

## 2024-09-09 PROCEDURE — 82803 BLOOD GASES ANY COMBINATION: CPT | Performed by: STUDENT IN AN ORGANIZED HEALTH CARE EDUCATION/TRAINING PROGRAM

## 2024-09-09 PROCEDURE — 70450 CT HEAD/BRAIN W/O DYE: CPT

## 2024-09-09 PROCEDURE — 93005 ELECTROCARDIOGRAM TRACING: CPT | Performed by: EMERGENCY MEDICINE

## 2024-09-09 PROCEDURE — 25810000003 SODIUM CHLORIDE 0.9 % SOLUTION: Performed by: EMERGENCY MEDICINE

## 2024-09-09 PROCEDURE — 99291 CRITICAL CARE FIRST HOUR: CPT

## 2024-09-09 RX ORDER — METOPROLOL SUCCINATE 50 MG/1
50 TABLET, EXTENDED RELEASE ORAL DAILY
Status: DISCONTINUED | OUTPATIENT
Start: 2024-09-10 | End: 2024-09-14 | Stop reason: HOSPADM

## 2024-09-09 RX ORDER — SODIUM CHLORIDE 9 MG/ML
75 INJECTION, SOLUTION INTRAVENOUS CONTINUOUS
Status: DISCONTINUED | OUTPATIENT
Start: 2024-09-09 | End: 2024-09-12

## 2024-09-09 RX ORDER — ATORVASTATIN CALCIUM 10 MG/1
10 TABLET, FILM COATED ORAL DAILY
Status: DISCONTINUED | OUTPATIENT
Start: 2024-09-10 | End: 2024-09-14 | Stop reason: HOSPADM

## 2024-09-09 RX ORDER — ACETAMINOPHEN 325 MG/1
650 TABLET ORAL EVERY 6 HOURS PRN
Status: DISCONTINUED | OUTPATIENT
Start: 2024-09-09 | End: 2024-09-14 | Stop reason: HOSPADM

## 2024-09-09 RX ORDER — HYDRALAZINE HYDROCHLORIDE 20 MG/ML
10 INJECTION INTRAMUSCULAR; INTRAVENOUS EVERY 6 HOURS PRN
Status: DISCONTINUED | OUTPATIENT
Start: 2024-09-09 | End: 2024-09-14 | Stop reason: HOSPADM

## 2024-09-09 RX ORDER — SODIUM CHLORIDE 9 MG/ML
40 INJECTION, SOLUTION INTRAVENOUS AS NEEDED
Status: DISCONTINUED | OUTPATIENT
Start: 2024-09-09 | End: 2024-09-14 | Stop reason: HOSPADM

## 2024-09-09 RX ORDER — ONDANSETRON 2 MG/ML
4 INJECTION INTRAMUSCULAR; INTRAVENOUS EVERY 6 HOURS PRN
Status: DISCONTINUED | OUTPATIENT
Start: 2024-09-09 | End: 2024-09-14 | Stop reason: HOSPADM

## 2024-09-09 RX ORDER — SODIUM CHLORIDE 0.9 % (FLUSH) 0.9 %
10 SYRINGE (ML) INJECTION AS NEEDED
Status: DISCONTINUED | OUTPATIENT
Start: 2024-09-09 | End: 2024-09-14 | Stop reason: HOSPADM

## 2024-09-09 RX ORDER — SODIUM CHLORIDE 0.9 % (FLUSH) 0.9 %
10 SYRINGE (ML) INJECTION EVERY 12 HOURS SCHEDULED
Status: DISCONTINUED | OUTPATIENT
Start: 2024-09-09 | End: 2024-09-14 | Stop reason: HOSPADM

## 2024-09-09 RX ORDER — NITROGLYCERIN 0.4 MG/1
0.4 TABLET SUBLINGUAL
Status: DISCONTINUED | OUTPATIENT
Start: 2024-09-09 | End: 2024-09-14 | Stop reason: HOSPADM

## 2024-09-09 RX ADMIN — SODIUM CHLORIDE 1000 ML: 9 INJECTION, SOLUTION INTRAVENOUS at 16:36

## 2024-09-09 NOTE — ED TRIAGE NOTES
Pt arrives to ED via ems from home with complaints of confusion.. per ems family said when pt woke up this morning pt was confused, couldn't pronounce words, and having trouble with date and time... family told ems pt has an appointment coming up to test for dementia

## 2024-09-09 NOTE — ED PROVIDER NOTES
Time: 12:34 PM EDT  Date of encounter:  9/9/2024  Independent Historian/Clinical History and Information was obtained by:   Patient and Family    History is limited by: Altered Mental Status    Chief Complaint   Patient presents with    Altered Mental Status         History of Present Illness:  Patient is a 68 y.o. year old male who presents to the emergency department for evaluation of confusion and increasing weakness..  Patient had a recent nephrectomy and he had some mild confusion after surgery however family states that over the last couple of weeks he is becoming increasing confused and he is also having difficulty ambulating with ataxia..  The patient's confusion and his weakness have worsened significantly over the last 72 hours the patient also has had some difficulty urinating at times and family states that he may be having incontinence also.      Patient Care Team  Primary Care Provider: Vilma Rolon APRN    Past Medical History:     No Known Allergies  Past Medical History:   Diagnosis Date    Benign essential hypertension     COPD (chronic obstructive pulmonary disease)     Erectile dysfunction Apprx: 5 yrs ago    Generalized convulsive seizures 12/27/2023    Hyperlipidemia 12/14/2018    Hyponatremia     Implantable loop recorder present     Renal insufficiency     Syncope     Vertigo 02/17/2021    Vision loss      Past Surgical History:   Procedure Laterality Date    CARDIAC CATHETERIZATION N/A 09/26/2022    Procedure: Left Heart Cath;  Surgeon: Randell Casiano MD;  Location: [facility] CATH INVASIVE LOCATION;  Service: Cardiovascular;  Laterality: N/A;    CARDIAC ELECTROPHYSIOLOGY PROCEDURE N/A 09/26/2022    Procedure: Loop insertion;  Surgeon: Randell Casiano MD;  Location: [facility] INVASIVE LOCATION;  Service: Cardiovascular;  Laterality: N/A;    COLONOSCOPY  2007    COLONOSCOPY N/A 10/26/2022    Procedure: COLONOSCOPY FOR SCREENING;  Surgeon: Roque Whitlock MD;  Location: [facility]  ENDOSCOPY;  Service: Gastroenterology;  Laterality: N/A;  NORMAL COLON    NEPHRECTOMY Right 2024     Family History   Problem Relation Age of Onset    Dementia Mother     No Known Problems Father     Colon cancer Neg Hx     Malig Hyperthermia Neg Hx        Home Medications:  Prior to Admission medications    Medication Sig Start Date End Date Taking? Authorizing Provider   acetaminophen (TYLENOL) 325 MG tablet TAKE 3 TABLETS BY MOUTH THREE TIMES DAILY FOR 7 DAYS 24   Kvng Londono MD   docusate sodium (COLACE) 250 MG capsule  24   Kvng Londono MD   meclizine (ANTIVERT) 25 MG tablet TAKE 2 TABLETS DAILY 24   Vilma Rolon APRN   metoprolol succinate XL (TOPROL-XL) 50 MG 24 hr tablet TAKE 1 TABLET DAILY 24   Randell Casiano MD   simvastatin (ZOCOR) 20 MG tablet Take 1 tablet by mouth Daily. 4/10/24   Vilma Rolon APRN   Spiriva Respimat 1.25 MCG/ACT aerosol solution inhaler Inhale 2 puffs Daily. 4/10/24   Vilma Rolon APRN   traMADol (ULTRAM) 50 MG tablet TAKE 1 TABLET BY MOUTH EVERY 8 HOURS AS NEEDED FOR PAIN FOR 5 DAYS 24   Kvng Londono MD   valsartan (DIOVAN) 80 MG tablet Take 1 tablet by mouth 2 (Two) Times a Day. 24   Vilma Rolon APRN        Social History:   Social History     Tobacco Use    Smoking status: Former     Current packs/day: 0.00     Average packs/day: 2.0 packs/day for 36.3 years (72.7 ttl pk-yrs)     Types: Cigarettes     Start date: 6/15/1974     Quit date: 10/20/2010     Years since quittin.8     Passive exposure: Past    Smokeless tobacco: Never    Tobacco comments:     SMOKES MORE THAN 2 PPD FOR 35 YEARS   Vaping Use    Vaping status: Never Used   Substance Use Topics    Alcohol use: Yes     Alcohol/week: 6.0 - 8.0 standard drinks of alcohol     Types: 6 - 8 Cans of beer per week     Comment: 8-14 DRINKS PER WEEK    Drug use: Not Currently         Review of Systems:  Review of Systems   Constitutional:  Negative  "for chills and fever.   HENT:  Negative for congestion, ear pain and sore throat.    Eyes:  Negative for pain.   Respiratory:  Negative for cough, chest tightness and shortness of breath.    Cardiovascular:  Negative for chest pain.   Gastrointestinal:  Negative for abdominal pain, diarrhea, nausea and vomiting.   Genitourinary:  Positive for difficulty urinating, frequency and urgency. Negative for flank pain and hematuria.   Musculoskeletal:  Negative for joint swelling.   Skin:  Negative for pallor.   Neurological:  Positive for dizziness, weakness and light-headedness. Negative for seizures and headaches.   Psychiatric/Behavioral:  Positive for confusion.    All other systems reviewed and are negative.       Physical Exam:  /50   Pulse 91   Temp 98.6 °F (37 °C) (Oral)   Resp 16   Ht 180.3 cm (71\")   Wt 90 kg (198 lb 6.6 oz)   SpO2 92%   BMI 27.67 kg/m²         Physical Exam  Vitals and nursing note reviewed.   Constitutional:       General: He is not in acute distress.     Appearance: Normal appearance. He is not toxic-appearing.   HENT:      Head: Normocephalic and atraumatic.      Mouth/Throat:      Mouth: Mucous membranes are moist.   Eyes:      General: No scleral icterus.  Cardiovascular:      Rate and Rhythm: Normal rate and regular rhythm.      Pulses: Normal pulses.      Heart sounds: Normal heart sounds.   Pulmonary:      Effort: Pulmonary effort is normal. No respiratory distress.      Breath sounds: Normal breath sounds.   Abdominal:      General: Abdomen is flat.      Palpations: Abdomen is soft.      Tenderness: There is no abdominal tenderness.   Musculoskeletal:         General: Normal range of motion.      Cervical back: Normal range of motion and neck supple.   Skin:     General: Skin is warm and dry.   Neurological:      Mental Status: He is alert. He is disoriented.      Comments: The patient does not have any focal neurologic findings.                "       Procedures:  Procedures      Medical Decision Making:      Comorbidities that affect care:    Recent surgery, COPD, hyperlipidemia    External Notes reviewed:    Previous Admission Note: Admission for nephrectomy at [facility].      The following orders were placed and all results were independently analyzed by me:  Orders Placed This Encounter   Procedures    CT Head Without Contrast    XR Chest 1 View    MRI Brain Without Contrast    Comprehensive Metabolic Panel    CBC Auto Differential    Protime-INR    TSH    T4, Free    Urinalysis With Culture If Indicated - Urine, Clean Catch    TSH Rfx On Abnormal To Free T4    Basic Metabolic Panel    Blood Gas, Arterial -    Diet: Cardiac; Healthy Heart (2-3 Na+); Fluid Consistency: Thin (IDDSI 0)    Vital Signs    Intake & Output    Weigh Patient    Oral Care    Maintain IV Access    Telemetry - Place Orders & Notify Provider of Results When Patient Experiences Acute Chest Pain, Dysrhythmia or Respiratory Distress    Hospitalist (on-call MD unless specified)    OT Consult: Eval & Treat ADL Performance Below Baseline    PT Consult: Eval & Treat Discharge Placement Assessment    ECG 12 Lead Rhythm Change    Insert Peripheral IV    Inpatient Admission    CBC & Differential    CBC & Differential       Medications Given in the Emergency Department:  Medications   sodium chloride 0.9 % flush 10 mL (has no administration in time range)   sodium chloride 0.9 % flush 10 mL (has no administration in time range)   sodium chloride 0.9 % infusion 40 mL (has no administration in time range)   nitroglycerin (NITROSTAT) SL tablet 0.4 mg (has no administration in time range)   sodium chloride 0.9 % infusion (has no administration in time range)   hydrALAZINE (APRESOLINE) injection 10 mg (has no administration in time range)   ondansetron (ZOFRAN) injection 4 mg (has no administration in time range)   acetaminophen (TYLENOL) tablet 650 mg (has no administration in time  range)   sodium chloride 0.9 % bolus 1,000 mL (0 mL Intravenous Stopped 9/9/24 1819)        ED Course:    The patient was initially evaluated in the triage area where orders were placed. The patient was later dispositioned by Matthew Gallagher DO.      The patient was advised to stay for completion of workup which includes but is not limited to communication of labs and radiological results, reassessment and plan. The patient was advised that leaving prior to disposition by a provider could result in critical findings that are not communicated to the patient.     ED Course as of 09/09/24 2015   Mon Sep 09, 2024   1234 --- RED ROUNDING PROVIDER ---  Patient was rounded on by KATHIA fortune APRN. Orders were written and the patient is currently awaiting disposition.   [MS]      ED Course User Index  [MS] Flaquita Castellano APRN     EKG: Normal sinus rhythm rate 81 bpm  No acute ischemia.        Labs:    Lab Results (last 24 hours)       Procedure Component Value Units Date/Time    CBC & Differential [[id number]]  (Abnormal) Collected: 09/09/24 1338    Specimen: Blood Updated: 09/09/24 1344    Narrative:      The following orders were created for panel order CBC & Differential.  Procedure                               Abnormality         Status                     ---------                               -----------         ------                     CBC Auto Differential[[id number]]        Abnormal            Final result                 Please view results for these tests on the individual orders.    Comprehensive Metabolic Panel [[id number]]  (Abnormal) Collected: 09/09/24 1338    Specimen: Blood Updated: 09/09/24 1401     Glucose 110 mg/dL      BUN 13 mg/dL      Creatinine 1.36 mg/dL      Sodium 126 mmol/L      Potassium 4.6 mmol/L      Chloride 91 mmol/L      CO2 23.0 mmol/L      Calcium 9.7 mg/dL      Total Protein 6.7 g/dL      Albumin 3.7 g/dL      ALT (SGPT) 22 U/L      AST (SGOT) 27 U/L       Alkaline Phosphatase 89 U/L      Total Bilirubin 1.1 mg/dL      Globulin 3.0 gm/dL      A/G Ratio 1.2 g/dL      BUN/Creatinine Ratio 9.6     Anion Gap 12.0 mmol/L      eGFR 57.0 mL/min/1.73     Narrative:      GFR Normal >60  Chronic Kidney Disease <60  Kidney Failure <15      CBC Auto Differential [[id number]]  (Abnormal) Collected: 09/09/24 1338    Specimen: Blood Updated: 09/09/24 1344     WBC 12.34 10*3/mm3      RBC 5.24 10*6/mm3      Hemoglobin 14.8 g/dL      Hematocrit 43.0 %      MCV 82.1 fL      MCH 28.2 pg      MCHC 34.4 g/dL      RDW 15.3 %      RDW-SD 45.8 fl      MPV 10.2 fL      Platelets 265 10*3/mm3      Neutrophil % 83.7 %      Lymphocyte % 8.3 %      Monocyte % 6.6 %      Eosinophil % 0.2 %      Basophil % 0.7 %      Immature Grans % 0.5 %      Neutrophils, Absolute 10.33 10*3/mm3      Lymphocytes, Absolute 1.02 10*3/mm3      Monocytes, Absolute 0.81 10*3/mm3      Eosinophils, Absolute 0.03 10*3/mm3      Basophils, Absolute 0.09 10*3/mm3      Immature Grans, Absolute 0.06 10*3/mm3      nRBC 0.0 /100 WBC     TSH [[id number]]  (Normal) Collected: 09/09/24 1338    Specimen: Blood Updated: 09/09/24 1708     TSH 0.804 uIU/mL     T4, Free [[id number]]  (Abnormal) Collected: 09/09/24 1338    Specimen: Blood Updated: 09/09/24 1708     Free T4 1.75 ng/dL     Protime-INR [[id number]]  (Normal) Collected: 09/09/24 1636    Specimen: Blood Updated: 09/09/24 1658     Protime 13.2 Seconds      INR 0.98    Narrative:      Suggested Therapeutic Ranges For Oral Anticoagulant Therapy:  Level of Therapy                      INR Target Range  Standard Dose                            2.0-3.0  High Dose                                2.5-3.5  Patients not receiving anticoagulant  Therapy Normal Range                     0.86-1.15    Urinalysis With Culture If Indicated - Urine, Clean Catch [[id number]]  (Normal) Collected: 09/09/24 1720    Specimen: Urine, Clean Catch Updated: 09/09/24 0459     Color, UA Yellow     Appearance,  UA Clear     pH, UA 7.0     Specific Gravity, UA 1.007     Glucose, UA Negative     Ketones, UA Negative     Bilirubin, UA Negative     Blood, UA Negative     Protein, UA Negative     Leuk Esterase, UA Negative     Nitrite, UA Negative     Urobilinogen, UA 0.2 E.U./dL    Narrative:      In absence of clinical symptoms, the presence of pyuria, bacteria, and/or nitrites on the urinalysis result does not correlate with infection.  Urine microscopic not indicated.             Imaging:    XR Chest 1 View    Result Date: 9/9/2024  XR CHEST 1 VW Date of Exam: 9/9/2024 4:14 PM EDT Indication: Weakness Comparison: May 28, 2024 Findings: There are emphysematous changes. The lungs are clear. The heart and mediastinal contours appear normal. There is no pleural effusion. The pulmonary vasculature appears normal. The osseous structures appear intact.     Impression: 1.No acute cardiopulmonary process identified. 2.Emphysema. Electronically Signed: Eliot White MD  9/9/2024 4:38 PM EDT  Workstation ID: [id number]    CT Head Without Contrast    Result Date: 9/9/2024  CT HEAD WO CONTRAST Date of Exam: 9/9/2024 12:57 PM EDT Indication: confusion. Comparison: Head CT 5/11/2024 Technique: Axial CT images were obtained of the head without contrast administration.  Reconstructed coronal and sagittal images were also obtained. Automated exposure control and iterative construction methods were used. Findings: Negative for large territory loss of gray-white differentiation, acute intracranial hemorrhage, large mass lesion or midline shift. Stable prominence of the ventricles and extra-axial fluid spaces suggesting global volume loss. Stable mild periventricular hypodensity suggesting chronic microvascular ischemic change. Distal carotid and vertebral artery atherosclerotic disease. Stable prominence of cisterna magna. Symmetric orbits. No large extra-axial fluid collection. General crowding at the vertex slightly out of proportion to the  degree of parenchymal volume loss. Pericallosal angle near 90 degrees. No significant dilation of the sylvian fissures. Leftward deviated septum. Negative for sinus obstruction or layering fluid. No large mastoid effusion. Negative for depressed skull fracture.     Impression: 1. No acute intracranial findings by CT. 2. Global parenchymal volume loss and mild chronic microvascular ischemic changes stable from prior comparison. 3. CT findings that may support normal pressure hydrocephalus in the right clinical setting. Electronically Signed: Neftali hSarp MD  9/9/2024 1:21 PM EDT  Workstation ID: [id number]       Differential Diagnosis and Discussion:      Altered Mental Status: Based on the patient's signs and symptoms, differential diagnosis includes but is not limited to meningitis, stroke, sepsis, subarachnoid hemorrhage, intracranial bleeding, encephalitis, and metabolic encephalopathy.    All labs were reviewed and interpreted by me.    MDM     Amount and/or Complexity of Data Reviewed  Clinical lab tests: reviewed  Tests in the radiology section of CPT®: reviewed  Tests in the medicine section of CPT®: reviewed           The patient presents with symptoms concerning for a stroke. The patient was evaluated by me immediately upon arrival. Extensive history and physical was obtained. Family is present to give further insight into patients onset of symptoms.  CT scan findings were discussed with radiology. The case was also discussed at length with telehealth neurology. Nursing staff was instructed on how to proceed with patient care. Patient was then placed on the cardiac monitor and monitored for arrhythmia that could be contributing to stroke like symptoms. EKG was performed and evaluated by me. Patient's blood pressure was monitored and controled consistent with stroke guidelines. Treatment options are limited due to the patient's length of symptoms.    Total Critical Care time of 45 minutes. Total critical  care time documented does not include time spent on separately billed procedures for services of nurses or physician assistants. I personally saw and examined the patient. I have reviewed all diagnostic interpretations and treatment plans as written. I was present for the key portions of any procedures performed and the inclusive time noted in any critical care statement. Critical care time includes patient management by me, time spent at the patients bedside,  time to review lab and imaging results, discussing patient care, documentation in the medical record, and time spent with family or caregiver.          Patient Care Considerations:    MRI: I considered ordering an MRI however this can be done as an inpatient      Consultants/Shared Management Plan:    Hospitalist: I have discussed the case with hopsitalist who agrees to accept the patient for admission.    Social Determinants of Health:    Patient is unable to carry out activities of daily life. Escalation of care is necessary.       Disposition and Care Coordination:    Admit:   Through independent evaluation of the patient's history, physical, and imperical data, the patient meets criteria for inpatient admission to the hospital.        Final diagnoses:   Altered mental status, unspecified altered mental status type   Normal pressure hydrocephalus        ED Disposition       ED Disposition   Decision to Admit    Condition   --    Comment   Level of Care: Telemetry [5]   Diagnosis: AMS (altered mental status) [[id number]]   Admitting Physician: HOLGER GRIFFIN [[id number]]   Certification: I Certify That Inpatient Hospital Services Are Medically Necessary For Greater Than 2 Midnights                 This medical record created using voice recognition software.             Matthew Gallagher DO  09/09/24 2015

## 2024-09-10 ENCOUNTER — APPOINTMENT (OUTPATIENT)
Dept: CT IMAGING | Facility: HOSPITAL | Age: 69
DRG: 071 | End: 2024-09-10
Payer: MEDICARE

## 2024-09-10 ENCOUNTER — APPOINTMENT (OUTPATIENT)
Dept: INTERVENTIONAL RADIOLOGY/VASCULAR | Facility: HOSPITAL | Age: 69
DRG: 071 | End: 2024-09-10
Payer: MEDICARE

## 2024-09-10 LAB
ANION GAP SERPL CALCULATED.3IONS-SCNC: 10.3 MMOL/L (ref 5–15)
APPEARANCE CSF: CLEAR
APPEARANCE CSF: CLEAR
APTT PPP: 32.9 SECONDS (ref 24.2–34.2)
BASOPHILS # BLD AUTO: 0.07 10*3/MM3 (ref 0–0.2)
BASOPHILS NFR BLD AUTO: 0.7 % (ref 0–1.5)
BUN SERPL-MCNC: 11 MG/DL (ref 8–23)
BUN/CREAT SERPL: 8 (ref 7–25)
C GATTII+NEOFOR DNA CSF QL NAA+NON-PROBE: NOT DETECTED
CALCIUM SPEC-SCNC: 9.3 MG/DL (ref 8.6–10.5)
CHLORIDE SERPL-SCNC: 95 MMOL/L (ref 98–107)
CMV DNA CSF QL NAA+PROBE: NOT DETECTED
CO2 SERPL-SCNC: 22.7 MMOL/L (ref 22–29)
COLOR CSF: COLORLESS
COLOR CSF: COLORLESS
CREAT SERPL-MCNC: 1.38 MG/DL (ref 0.76–1.27)
DEPRECATED RDW RBC AUTO: 46.6 FL (ref 37–54)
E COLI K1 DNA CSF QL NAA+NON-PROBE: NOT DETECTED
EGFRCR SERPLBLD CKD-EPI 2021: 56 ML/MIN/1.73
EOSINOPHIL # BLD AUTO: 0.61 10*3/MM3 (ref 0–0.4)
EOSINOPHIL NFR BLD AUTO: 6.2 % (ref 0.3–6.2)
ERYTHROCYTE [DISTWIDTH] IN BLOOD BY AUTOMATED COUNT: 15.5 % (ref 12.3–15.4)
EV RNA CSF QL NAA+PROBE: NOT DETECTED
GLUCOSE CSF-MCNC: 64 MG/DL (ref 40–70)
GLUCOSE SERPL-MCNC: 96 MG/DL (ref 65–99)
GP B STREP DNA SPEC QL NAA+PROBE: NOT DETECTED
HAEM INFLU SEROTYP DNA SPEC NAA+PROBE: NOT DETECTED
HCT VFR BLD AUTO: 39.7 % (ref 37.5–51)
HGB BLD-MCNC: 13.7 G/DL (ref 13–17.7)
HHV6 DNA CSF QL NAA+PROBE: NOT DETECTED
HOLD SPECIMEN: NORMAL
HSV1 DNA CSF QL NAA+PROBE: NOT DETECTED
HSV2 DNA CSF QL NAA+PROBE: NOT DETECTED
IMM GRANULOCYTES # BLD AUTO: 0.03 10*3/MM3 (ref 0–0.05)
IMM GRANULOCYTES NFR BLD AUTO: 0.3 % (ref 0–0.5)
L MONOCYTOG RRNA SPEC QL PROBE: NOT DETECTED
LYMPHOCYTES # BLD AUTO: 1.58 10*3/MM3 (ref 0.7–3.1)
LYMPHOCYTES NFR BLD AUTO: 16 % (ref 19.6–45.3)
MCH RBC QN AUTO: 28.5 PG (ref 26.6–33)
MCHC RBC AUTO-ENTMCNC: 34.5 G/DL (ref 31.5–35.7)
MCV RBC AUTO: 82.7 FL (ref 79–97)
MONOCYTES # BLD AUTO: 1.04 10*3/MM3 (ref 0.1–0.9)
MONOCYTES NFR BLD AUTO: 10.5 % (ref 5–12)
N MEN DNA SPEC QL NAA+PROBE: NOT DETECTED
NEUTROPHILS NFR BLD AUTO: 6.56 10*3/MM3 (ref 1.7–7)
NEUTROPHILS NFR BLD AUTO: 66.3 % (ref 42.7–76)
NRBC BLD AUTO-RTO: 0 /100 WBC (ref 0–0.2)
NUC CELL # CSF MANUAL: 0 /MM3 (ref 0–5)
NUC CELL # CSF MANUAL: 0 /MM3 (ref 0–5)
PARECHOVIRUS A RNA CSF QL NAA+NON-PROBE: NOT DETECTED
PLATELET # BLD AUTO: 260 10*3/MM3 (ref 140–450)
PMV BLD AUTO: 10.3 FL (ref 6–12)
POTASSIUM SERPL-SCNC: 4.3 MMOL/L (ref 3.5–5.2)
PROT CSF-MCNC: 38.9 MG/DL (ref 15–45)
RBC # BLD AUTO: 4.8 10*6/MM3 (ref 4.14–5.8)
RBC # CSF MANUAL: 250 /MM3
RBC # CSF MANUAL: 50 /MM3
S PNEUM DNA CSF QL NAA+NON-PROBE: NOT DETECTED
SODIUM SERPL-SCNC: 128 MMOL/L (ref 136–145)
TUBE # CSF: 1
TUBE # CSF: 4
VZV DNA CSF QL NAA+PROBE: NOT DETECTED
WBC NRBC COR # BLD AUTO: 9.89 10*3/MM3 (ref 3.4–10.8)
XANTHOCHROMIA FLD QL: NORMAL
XANTHOCHROMIA FLD QL: NORMAL

## 2024-09-10 PROCEDURE — 36415 COLL VENOUS BLD VENIPUNCTURE: CPT | Performed by: STUDENT IN AN ORGANIZED HEALTH CARE EDUCATION/TRAINING PROGRAM

## 2024-09-10 PROCEDURE — 86255 FLUORESCENT ANTIBODY SCREEN: CPT | Performed by: INTERNAL MEDICINE

## 2024-09-10 PROCEDURE — 99222 1ST HOSP IP/OBS MODERATE 55: CPT | Performed by: FAMILY MEDICINE

## 2024-09-10 PROCEDURE — 86341 ISLET CELL ANTIBODY: CPT | Performed by: INTERNAL MEDICINE

## 2024-09-10 PROCEDURE — 87483 CNS DNA AMP PROBE TYPE 12-25: CPT | Performed by: INTERNAL MEDICINE

## 2024-09-10 PROCEDURE — 82945 GLUCOSE OTHER FLUID: CPT | Performed by: INTERNAL MEDICINE

## 2024-09-10 PROCEDURE — 009U3ZX DRAINAGE OF SPINAL CANAL, PERCUTANEOUS APPROACH, DIAGNOSTIC: ICD-10-PCS | Performed by: RADIOLOGY

## 2024-09-10 PROCEDURE — 25810000003 SODIUM CHLORIDE 0.9 % SOLUTION: Performed by: STUDENT IN AN ORGANIZED HEALTH CARE EDUCATION/TRAINING PROGRAM

## 2024-09-10 PROCEDURE — 99233 SBSQ HOSP IP/OBS HIGH 50: CPT | Performed by: INTERNAL MEDICINE

## 2024-09-10 PROCEDURE — 89050 BODY FLUID CELL COUNT: CPT | Performed by: INTERNAL MEDICINE

## 2024-09-10 PROCEDURE — 85730 THROMBOPLASTIN TIME PARTIAL: CPT | Performed by: INTERNAL MEDICINE

## 2024-09-10 PROCEDURE — 97161 PT EVAL LOW COMPLEX 20 MIN: CPT

## 2024-09-10 PROCEDURE — 80048 BASIC METABOLIC PNL TOTAL CA: CPT | Performed by: STUDENT IN AN ORGANIZED HEALTH CARE EDUCATION/TRAINING PROGRAM

## 2024-09-10 PROCEDURE — 25010000002 HALOPERIDOL LACTATE PER 5 MG: Performed by: INTERNAL MEDICINE

## 2024-09-10 PROCEDURE — B01B1ZZ FLUOROSCOPY OF SPINAL CORD USING LOW OSMOLAR CONTRAST: ICD-10-PCS | Performed by: RADIOLOGY

## 2024-09-10 PROCEDURE — 84157 ASSAY OF PROTEIN OTHER: CPT | Performed by: INTERNAL MEDICINE

## 2024-09-10 PROCEDURE — 25010000002 LORAZEPAM PER 2 MG: Performed by: INTERNAL MEDICINE

## 2024-09-10 PROCEDURE — 25010000002 ZIPRASIDONE MESYLATE PER 10 MG: Performed by: INTERNAL MEDICINE

## 2024-09-10 PROCEDURE — 85025 COMPLETE CBC W/AUTO DIFF WBC: CPT | Performed by: STUDENT IN AN ORGANIZED HEALTH CARE EDUCATION/TRAINING PROGRAM

## 2024-09-10 PROCEDURE — 25010000002 HYDRALAZINE PER 20 MG: Performed by: STUDENT IN AN ORGANIZED HEALTH CARE EDUCATION/TRAINING PROGRAM

## 2024-09-10 RX ORDER — LIDOCAINE HYDROCHLORIDE 20 MG/ML
20 INJECTION, SOLUTION INFILTRATION; PERINEURAL ONCE
Status: COMPLETED | OUTPATIENT
Start: 2024-09-10 | End: 2024-09-10

## 2024-09-10 RX ORDER — ZIPRASIDONE MESYLATE 20 MG/ML
20 INJECTION, POWDER, LYOPHILIZED, FOR SOLUTION INTRAMUSCULAR EVERY 4 HOURS PRN
Status: DISCONTINUED | OUTPATIENT
Start: 2024-09-10 | End: 2024-09-14 | Stop reason: HOSPADM

## 2024-09-10 RX ORDER — LORAZEPAM 2 MG/ML
2 INJECTION INTRAMUSCULAR ONCE
Status: COMPLETED | OUTPATIENT
Start: 2024-09-10 | End: 2024-09-10

## 2024-09-10 RX ORDER — HALOPERIDOL 5 MG/ML
5 INJECTION INTRAMUSCULAR ONCE
Status: COMPLETED | OUTPATIENT
Start: 2024-09-10 | End: 2024-09-10

## 2024-09-10 RX ORDER — QUETIAPINE FUMARATE 25 MG/1
50 TABLET, FILM COATED ORAL NIGHTLY
Status: DISCONTINUED | OUTPATIENT
Start: 2024-09-10 | End: 2024-09-14 | Stop reason: HOSPADM

## 2024-09-10 RX ADMIN — ATORVASTATIN CALCIUM 10 MG: 10 TABLET, FILM COATED ORAL at 08:19

## 2024-09-10 RX ADMIN — ZIPRASIDONE MESYLATE 20 MG: 20 INJECTION, POWDER, LYOPHILIZED, FOR SOLUTION INTRAMUSCULAR at 14:00

## 2024-09-10 RX ADMIN — SODIUM CHLORIDE 50 ML/HR: 9 INJECTION, SOLUTION INTRAVENOUS at 00:58

## 2024-09-10 RX ADMIN — HALOPERIDOL LACTATE 5 MG: 5 INJECTION, SOLUTION INTRAMUSCULAR at 16:10

## 2024-09-10 RX ADMIN — HYDRALAZINE HYDROCHLORIDE 10 MG: 20 INJECTION INTRAMUSCULAR; INTRAVENOUS at 21:41

## 2024-09-10 RX ADMIN — METOPROLOL SUCCINATE 50 MG: 50 TABLET, EXTENDED RELEASE ORAL at 08:19

## 2024-09-10 RX ADMIN — SODIUM BICARBONATE 1 ML: 84 INJECTION, SOLUTION INTRAVENOUS at 12:05

## 2024-09-10 RX ADMIN — LORAZEPAM 2 MG: 2 INJECTION INTRAMUSCULAR; INTRAVENOUS at 18:17

## 2024-09-10 RX ADMIN — Medication 10 ML: at 21:42

## 2024-09-10 RX ADMIN — QUETIAPINE FUMARATE 50 MG: 25 TABLET ORAL at 21:41

## 2024-09-10 RX ADMIN — LIDOCAINE HYDROCHLORIDE 6 ML: 20 INJECTION, SOLUTION INFILTRATION; PERINEURAL at 12:05

## 2024-09-10 RX ADMIN — Medication 10 ML: at 00:58

## 2024-09-10 NOTE — PLAN OF CARE
Goal Outcome Evaluation:           Progress: no change   Pt admitted from the ED. No acute events this shift.

## 2024-09-10 NOTE — THERAPY EVALUATION
Acute Care - Physical Therapy Initial Evaluation   Ananth     Patient Name: Paulo Leiva Jr.  : 1955  MRN: [id number]  Today's Date: 9/10/2024   Onset of Illness/Injury or Date of Surgery: (P) 24  Visit Dx:     ICD-10-CM ICD-9-CM   1. Altered mental status, unspecified altered mental status type  R41.82 780.97   2. Normal pressure hydrocephalus  G91.2 331.5   3. Difficulty in walking  R26.2 719.7     Patient Active Problem List   Diagnosis    COPD (chronic obstructive pulmonary disease)    Essential hypertension    Hyperlipidemia    Hyponatremia    Vertigo    Colon cancer screening    Syncope and collapse    Wide-complex tachycardia    Status post placement of implantable loop recorder    Class 1 obesity due to excess calories without serious comorbidity with body mass index (BMI) of 30.0 to 30.9 in adult    Generalized convulsive seizures    Renal mass    AMS (altered mental status)     Past Medical History:   Diagnosis Date    Benign essential hypertension     COPD (chronic obstructive pulmonary disease)     Erectile dysfunction Apprx: 5 yrs ago    Generalized convulsive seizures 2023    Hyperlipidemia 2018    Hyponatremia     Implantable loop recorder present     Renal insufficiency     Syncope     Vertigo 2021    Vision loss      Past Surgical History:   Procedure Laterality Date    CARDIAC CATHETERIZATION N/A 2022    Procedure: Left Heart Cath;  Surgeon: Randell Casiano MD;  Location: [facility] CATH INVASIVE LOCATION;  Service: Cardiovascular;  Laterality: N/A;    CARDIAC ELECTROPHYSIOLOGY PROCEDURE N/A 2022    Procedure: Loop insertion;  Surgeon: Randell Casiano MD;  Location: [facility] CATH INVASIVE LOCATION;  Service: Cardiovascular;  Laterality: N/A;    COLONOSCOPY      COLONOSCOPY N/A 10/26/2022    Procedure: COLONOSCOPY FOR SCREENING;  Surgeon: Roque Whitlock MD;  Location: [facility] ENDOSCOPY;  Service: Gastroenterology;  Laterality: N/A;  NORMAL COLON     NEPHRECTOMY Right 08/12/2024     PT Assessment (Last 12 Hours)       PT Evaluation and Treatment       Row Name 09/10/24 1140          Physical Therapy Time and Intention    Subjective Information no complaints (P)   -EW     Document Type evaluation (P)   -EW     Mode of Treatment individual therapy;physical therapy (P)   -EW     Total Minutes, Physical Therapy 30 (P)   -EW     Patient Effort good (P)   -EW     Symptoms Noted During/After Treatment none (P)   -EW       Row Name 09/10/24 1140          General Information    Patient Profile Reviewed yes (P)   -EW     Onset of Illness/Injury or Date of Surgery 09/09/24 (P)   -EW     Referring Physician Jose Terry MD (P)   -EW     Patient Observations alert;cooperative;agree to therapy (P)   -EW     Prior Level of Function independent: (P)   -EW     Equipment Currently Used at Home cane, quad;walker, rolling (P)   -EW     Existing Precautions/Restrictions no known precautions/restrictions (P)   -EW     Limitations/Impairments safety/cognitive;other (see comments) (P)   Increased confusion and decreased ability to follow commands.  -EW     Benefits Reviewed patient and family:;improve function;decrease pain;increase strength;increase balance;increase independence (P)   -EW     Barriers to Rehab cognitive status (P)   -EW       Row Name 09/10/24 1140          Living Environment    Current Living Arrangements home (P)   -EW     People in Home spouse (P)   -EW     Primary Care Provided by self (P)   -EW       Row Name 09/10/24 1140          Home Use of Assistive/Adaptive Equipment    Equipment Currently Used at Home cane, straight;walker, rolling (P)   -EW       Row Name 09/10/24 1140          Range of Motion (ROM)    Range of Motion bilateral lower extremities;ROM is WFL (P)   -EW       Row Name 09/10/24 1140          Strength (Manual Muscle Testing)    Strength (Manual Muscle Testing) bilateral lower extremities (P)   B LE: 4+/5 for all  -EW       Row Name  09/10/24 1140          Bed Mobility    Bed Mobility bed mobility (all) activities (P)   -EW     All Activities, [place] (Bed Mobility) independent (P)   -EW     Bed Mobility, Safety Issues cognitive deficits limit understanding (P)   -EW       Row Name 09/10/24 1140          Transfers    Transfers sit-stand transfer;stand-sit transfer (P)   -EW       Row Name 09/10/24 1140          Sit-Stand Transfer    Sit-Stand [place] (Transfers) contact guard (P)   -EW     Assistive Device (Sit-Stand Transfers) walker, front-wheeled (P)   -EW       Row Name 09/10/24 1140          Stand-Sit Transfer    Stand-Sit [place] (Transfers) contact guard (P)   -EW     Assistive Device (Stand-Sit Transfers) walker, front-wheeled (P)   -EW       Row Name 09/10/24 1140          Gait/Stairs (Locomotion)    Gait/Stairs Locomotion gait/ambulation assistive device (P)   -EW     [place] Level (Gait) contact guard (P)   -EW     Assistive Device (Gait) walker, front-wheeled (P)   -EW     Patient was able to Ambulate yes (P)   -EW     Distance in Feet (Gait) 100 (P)   -EW       Row Name 09/10/24 1140          Safety Issues, Functional Mobility    Safety Issues Affecting Function (Mobility) ability to follow commands;positioning of assistive device;sequencing abilities (P)   -EW     Impairments Affecting Function (Mobility) cognition (P)   -EW       Row Name 09/10/24 1140          Balance    Balance Assessment sitting static balance;standing dynamic balance (P)   -EW     Static Sitting Balance independent (P)   -EW     Position, Sitting Balance sitting edge of bed (P)   -EW     Dynamic Standing Balance contact guard (P)   -EW     Position/Device Used, Standing Balance supported;walker, front-wheeled (P)   -EW       Row Name 09/10/24 1140          Plan of Care Review    Plan of Care Reviewed With patient;son (P)   -EW     Outcome Evaluation Pt presents with deficits in B LE strength. He is experiencing confusion and decreased  ability to follow simple commands. He required consistent verbal and non-verbal cueing to complete tasks. Recommended d/c to home with 24/7 supervision. (P)   -EW       Row Name 09/10/24 1140          Therapy Assessment/Plan (PT)    Rehab Potential (PT) good, to achieve stated therapy goals (P)   -EW     Criteria for Skilled Interventions Met (PT) yes;skilled treatment is necessary (P)   -EW     Therapy Frequency (PT) daily (P)   -EW     Predicted Duration of Therapy Intervention (PT) 10 days (P)   -EW     Problem List (PT) problems related to;cognition;strength (P)   -EW     Activity Limitations Related to Problem List (PT) other (see comments);home management activity not performed adequately or safely;community activities not performed adequately or safely (P)   Decreased ability to follow commands and increased confusion.  -EW       Row Name 09/10/24 1140          Therapy Plan Review/Discharge Plan (PT)    Therapy Plan Review (PT) evaluation/treatment results reviewed;participants included;patient;son (P)   -EW       Row Name 09/10/24 1140          Physical Therapy Goals    Transfer Goal Selection (PT) transfer, PT goal 1 (P)   -EW     Gait Training Goal Selection (PT) gait training, PT goal 1 (P)   -EW     Strength Goal Selection (PT) strength, PT goal 1 (P)   -EW       Row Name 09/10/24 1140          Transfer Goal 1 (PT)    Activity/Assistive Device (Transfer Goal 1, PT) transfers, all (P)   -EW     [place] Level/Cues Needed (Transfer Goal 1, PT) independent (P)   -EW     Time Frame (Transfer Goal 1, PT) long term goal (LTG);10 days (P)   -EW       Row Name 09/10/24 1140          Gait Training Goal 1 (PT)    Activity/Assistive Device (Gait Training Goal 1, PT) gait (walking locomotion) (P)   -EW     [place] Level (Gait Training Goal 1, PT) independent (P)   -EW     Distance (Gait Training Goal 1, PT) 150 feet (P)   -EW     Time Frame (Gait Training Goal 1, PT) long term goal (LTG);10 days (P)   -EW        Row Name 09/10/24 1140          Strength Goal 1 (PT)    Strength Goal 1 (PT) Improve BLE strength to 5/5 for improved function and quality of life. (P)   -EW     Time Frame (Strength Goal 1, PT) long term goal (LTG);10 days (P)   -EW               User Key  (r) = Recorded By, (t) = Taken By, (c) = Cosigned By      Initials Name Provider Type    EW Jay Jones, PT Student PT Student                    Physical Therapy Education       Title: PT OT SLP Therapies (Done)       Topic: Physical Therapy (Done)       Point: Mobility training (Done)       Learning Progress Summary             Patient Acceptance, E,TB, VU by EW at 9/10/2024 1154                         Point: Home exercise program (Done)       Learning Progress Summary             Patient Acceptance, E,TB, VU by EW at 9/10/2024 1154                         Point: Body mechanics (Done)       Learning Progress Summary             Patient Acceptance, E,TB, VU by EW at 9/10/2024 1154                         Point: Precautions (Done)       Learning Progress Summary             Patient Acceptance, E,TB, VU by EW at 9/10/2024 1154                                         User Key       Initials Effective Dates Name Provider Type Discipline    EW 08/27/24 -  Jay Jones, PT Student PT Student PT                  PT Recommendation and Plan  Anticipated Discharge Disposition (PT): (P) home with 24/7 care  Planned Therapy Interventions (PT): (P) balance training, bed mobility training, gait training, home exercise program, motor coordination training, neuromuscular re-education, patient/family education, postural re-education, ROM (range of motion), stair training, strengthening, stretching, transfer training  Therapy Frequency (PT): (P) daily  Plan of Care Reviewed With: (P) patient, son  Outcome Evaluation: (P) Pt presents with deficits in B LE strength. He is experiencing confusion and decreased ability to follow simple commands. He required consistent  verbal and non-verbal cueing to complete tasks. Recommended d/c to home with 24/7 supervision.   Outcome Measures       Row Name 09/10/24 1100             How much help from another person do you currently need...    Turning from your back to your side while in flat bed without using bedrails? 4 (P)   -EW      Moving from lying on back to sitting on the side of a flat bed without bedrails? 4 (P)   -EW      Moving to and from a bed to a chair (including a wheelchair)? 4 (P)   -EW      Standing up from a chair using your arms (e.g., wheelchair, bedside chair)? 4 (P)   -EW      Climbing 3-5 steps with a railing? 4 (P)   -EW      To walk in hospital room? 4 (P)   -EW      AM-PAC 6 Clicks Score (PT) 24 (P)   -EW      Highest Level of Mobility Goal 8 --> Walked 250 feet or more (P)   -EW         Functional Assessment    Outcome Measure Options AM-PAC 6 Clicks Basic Mobility (PT) (P)   -EW                User Key  (r) = Recorded By, (t) = Taken By, (c) = Cosigned By      Initials Name Provider Type    EW Jay Jones, PT Student PT Student                     Time Calculation:    PT Charges       Row Name 09/10/24 1140             Time Calculation    PT Received On 09/10/24 (P)   -EW      PT Goal Re-Cert Due Date 09/19/24 (P)   -EW         Untimed Charges    PT Eval/Re-eval Minutes 30 (P)   -EW         Total Minutes    Untimed Charges Total Minutes 30 (P)   -EW       Total Minutes 30 (P)   -EW                User Key  (r) = Recorded By, (t) = Taken By, (c) = Cosigned By      Initials Name Provider Type    EW Jay Jones, PT Student PT Student                  Therapy Charges for Today       Code Description Service Date Service Provider Modifiers Qty    [id number] HC PT EVAL LOW COMPLEXITY 2 9/10/2024 Jay Jones, PT Student GP 1            PT G-Codes  Outcome Measure Options: (P) AM-PAC 6 Clicks Basic Mobility (PT)  AM-PAC 6 Clicks Score (PT): (P) 24    Jay Jones PT Student  9/10/2024

## 2024-09-10 NOTE — PLAN OF CARE
Goal Outcome Evaluation:  Plan of Care Reviewed With: (P) patient, son           Outcome Evaluation: (P) Pt presents with deficits in B LE strength. He is experiencing confusion and decreased ability to follow simple commands. He required consistent verbal and non-verbal cueing to complete tasks. Recommended d/c to home with 24/7 supervision.      Anticipated Discharge Disposition (PT): (P) home with 24/7 care

## 2024-09-10 NOTE — H&P
[facility]  Date: 2024   Patient Name: Paulo Leiva Jr.  : 1955  MRN: [id number]  Primary Care Physician:  Vilma Rolon APRN  Date of admission: 2024    Subjective   Subjective     Chief Complaint: AMS  HPI:    Paulo Leiva Jr. is a 68 y.o. male with past medical history of right-sided nephrectomy, essential hypertension, COPD, hyponatremia presents to ED due to altered mental status, difficulty 1 and urinary incontinence.  As per family patient has been dealing with multiple falls for the past couple months.  Patient was seen by cardiology for possible arrhythmia issues and has a loop recorder in place.  About 1 month ago patient had a right-sided nephrectomy for renal mass at [place].  Since his surgery family states patient is having worsening walking issues, urinary incontinence and worsening mental status has been gradually worsening.  Today family noticed patient was having difficulty talking with worsening mental status and gait issues.  In the ED, patient's vitals showed temperature 98.6, heart rate 91, blood pressure 134/50 and satting 92% on room air.  Labs show sodium 126, creatinine 1.36, glucose 110, white blood cell 12.3, hemoglobin 14.8.  CT head showed possible normal pressure hydrocephalus.  Patient admitted to floors for further management.    Personal History     Past Medical History:  Past Medical History:   Diagnosis Date    Benign essential hypertension     COPD (chronic obstructive pulmonary disease)     Erectile dysfunction Apprx: 5 yrs ago    Generalized convulsive seizures 2023    Hyperlipidemia 2018    Hyponatremia     Implantable loop recorder present     Renal insufficiency     Syncope     Vertigo 2021    Vision loss        Past Surgical History:  Past Surgical History:   Procedure Laterality Date    CARDIAC CATHETERIZATION N/A 2022    Procedure: Left Heart Cath;  Surgeon: Randell Casiano MD;   Location: [facility] CATH INVASIVE LOCATION;  Service: Cardiovascular;  Laterality: N/A;    CARDIAC ELECTROPHYSIOLOGY PROCEDURE N/A 2022    Procedure: Loop insertion;  Surgeon: Randell Casiano MD;  Location: [facility] CATH INVASIVE LOCATION;  Service: Cardiovascular;  Laterality: N/A;    COLONOSCOPY      COLONOSCOPY N/A 10/26/2022    Procedure: COLONOSCOPY FOR SCREENING;  Surgeon: Roque Whitlock MD;  Location: [facility] ENDOSCOPY;  Service: Gastroenterology;  Laterality: N/A;  NORMAL COLON    NEPHRECTOMY Right 2024       Family History:   Family History   Problem Relation Age of Onset    Dementia Mother     No Known Problems Father     Colon cancer Neg Hx     Malig Hyperthermia Neg Hx        Social History:   Social History     Socioeconomic History    Marital status:    Tobacco Use    Smoking status: Former     Current packs/day: 0.00     Average packs/day: 2.0 packs/day for 36.3 years (72.7 ttl pk-yrs)     Types: Cigarettes     Start date: 6/15/1974     Quit date: 10/20/2010     Years since quittin.8     Passive exposure: Past    Smokeless tobacco: Never    Tobacco comments:     SMOKES MORE THAN 2 PPD FOR 35 YEARS   Vaping Use    Vaping status: Never Used   Substance and Sexual Activity    Alcohol use: Yes     Alcohol/week: 6.0 - 8.0 standard drinks of alcohol     Types: 6 - 8 Cans of beer per week     Comment: 8-14 DRINKS PER WEEK    Drug use: Not Currently    Sexual activity: Not Currently     Partners: Female       Home Medications:  Tiotropium Bromide Monohydrate, apixaban, meclizine, metoprolol succinate XL, simvastatin, and valsartan    Allergies:  No Known Allergies    Review of Systems   All systems were reviewed and negative except for above    Objective   Objective     Vitals:   Temp:  [98.6 °F (37 °C)] 98.6 °F (37 °C)  Heart Rate:  [78-96] 91  Resp:  [16-18] 16  BP: ()/(50-87) 134/50    Physical Exam    Constitutional: Awake, alert, no acute distress   Eyes: Pupils equal,  sclerae anicteric, no conjunctival injection   HENT: NCAT, mucous membranes moist   Neck: Supple, no thyromegaly, no lymphadenopathy, trachea midline   Respiratory: Clear to auscultation bilaterally, nonlabored respirations    Cardiovascular: RRR, no murmurs, rubs, or gallops, palpable pedal pulses bilaterally   Gastrointestinal: Positive bowel sounds, soft, nontender, nondistended   Musculoskeletal: No bilateral ankle edema, no clubbing or cyanosis to extremities   Psychiatric: Appropriate affect, cooperative   Neurologic: Oriented x 3, strength symmetric in all extremities, Cranial Nerves grossly intact to confrontation, speech clear   Skin: No rashes     Result Review    Result Review:  I have personally reviewed the results from the time of this admission to 9/9/2024 20:11 EDT and agree with these findings:  [x]  Laboratory  []  Microbiology  [x]  Radiology  []  EKG/Telemetry   []  Cardiology/Vascular   []  Pathology  [x]  Old records  []  Other:      Assessment & Plan   Assessment / Plan     Assessment/Plan:     Assessment  Encephalopathy with difficulty walking concern for normal pressure hydrocephalus  Right renal mass status post nephrectomy   chronic hyponatremia  essential hypertension   Cardiac arrhythmia on Eliquis  History of COPD  Former smoker    Plan  [facility]  Telemetry  Monitor vitals  CBC BMP  Chest x-ray reviewed  CT brain reviewed  MRI of the brain ordered  NS  Hold Eliquis for possible LP  PT OT consulted   Teleneurology in the morning    VTE Prophylaxis:  Pharmacologic & mechanical VTE prophylaxis orders are present.      CODE STATUS:  Full code       Admission Status:  I believe this patient meets inpt status.    Electronically signed by Raudel Wolf MD, 09/09/24, 8:11 PM EDT.

## 2024-09-10 NOTE — PLAN OF CARE
Goal Outcome Evaluation:           Progress: declining  Outcome Evaluation: pt very confused and agaitated this shift, in two point soft wrist restraints due to pt getting up trying to leave, removing equipment. security called to help redirect pts behavior family at bedside trying to help as well. md aware medication given to help. pt waiting on a private room transfer. VSS

## 2024-09-10 NOTE — PROGRESS NOTES
[facility]   Hospitalist Progress Note  Date: 9/10/2024  Patient Name: Paulo Leiva Jr.  : 1955  MRN: [id number]  Date of admission: 2024      Subjective   Subjective     Chief Complaint: Altered renal status, falls    Summary: 68 y.o. male with past medical history of right-sided nephrectomy, essential hypertension, COPD, hyponatremia presents to ED due to altered mental status, difficulty 1 and urinary incontinence.  As per family patient has been dealing with multiple falls for the past couple months.  Patient was seen by cardiology for possible arrhythmia issues and has a loop recorder in place.  About 1 month ago patient had a right-sided nephrectomy for renal mass at [place].  Since his surgery family states patient is having worsening walking issues, urinary incontinence and worsening mental status has been gradually worsening.  Today family noticed patient was having difficulty talking with worsening mental status and gait issues.  In the ED, patient's vitals showed temperature 98.6, heart rate 91, blood pressure 134/50 and satting 92% on room air.  Labs show sodium 126, creatinine 1.36, glucose 110, white blood cell 12.3, hemoglobin 14.8.  CT head showed possible normal pressure hydrocephalus.  Patient admitted to floors for further management.     Interval Followup: No acute events overnight.  He does have intermittent confusion.  This morning, he was standing at bedside trying to make his bed.  I repeatedly had to ask him to sit back down as he is a falls risk.  He does admit that he has felt forgetful.  His son is at bedside and states he has had some memory trouble for at least a year but has significant gotten worse over the last month.    Objective   Objective     Vitals:   Temp:  [97.9 °F (36.6 °C)-98.2 °F (36.8 °C)] 98.2 °F (36.8 °C)  Heart Rate:  [] 100  Resp:  [16-20] 16  BP: ()/(50-80) 157/80  Physical Exam    Constitutional: Awake, alert, no acute distress,  intermittently confused   Respiratory: Clear to auscultation bilaterally, nonlabored respirations    Cardiovascular: RRR, no MRG   Gastrointestinal: Positive bowel sounds, soft, nontender, nondistended   Neurologic: Oriented x 3, strength symmetric in all extremities, Cranial Nerves grossly intact to confrontation, speech clear    Result Review    I have personally reviewed the results below:  [x]  Laboratory personally reviewed BMP, CBC, PTT  []  Microbiology  []  Radiology  []  EKG/Telemetry   []  Cardiology/Vascular   []  Pathology  []  Old records  []  Other:  CBC          8/20/2024    09:09 9/9/2024    13:38 9/10/2024    04:56   CBC   WBC 10.48  12.34  9.89    RBC 5.85  5.24  4.80    Hemoglobin 17.0  14.8  13.7    Hematocrit 51.0  43.0  39.7    MCV 87.2  82.1  82.7    MCH 29.1  28.2  28.5    MCHC 33.3  34.4  34.5    RDW 14.6  15.3  15.5    Platelets 410  265  260      CMP          8/20/2024    09:09 9/9/2024    13:38 9/10/2024    04:56   CMP   Glucose 96  110  96    BUN 10  13  11    Creatinine 1.44  1.36  1.38    EGFR 53.3  57.0  56.0    Sodium 128  126  128    Potassium 4.8  4.6  4.3    Chloride 90  91  95    Calcium 10.3     9.8  9.7  9.3    Total Protein 7.0      Total Protein 7.3  6.7     Albumin 3.8     3.1  3.7     Globulin 3.9      Globulin 3.5  3.0     Total Bilirubin 0.6  1.1     Alkaline Phosphatase 133  89     AST (SGOT) 24  27     ALT (SGPT) 15  22     Albumin/Globulin Ratio 1.1  1.2     BUN/Creatinine Ratio 6.9  9.6  8.0    Anion Gap 13.9  12.0  10.3        Assessment & Plan   Assessment / Plan   Encephalopathy with difficulty walking concern for normal pressure hydrocephalus  Right renal mass status post nephrectomy   chronic hyponatremia  essential hypertension   Cardiac arrhythmia on Eliquis  History of COPD  Former smoker    Continue to monitor in the hospital for workup and management of the above  Consulted neurology, appreciate assistance  Discussed with neurology, will proceed with lumbar  puncture today to obtain opening pressures and rule out meningitis  Sodium levels improving, continue with normal saline at 75 cc/h  Continue to hold Eliquis today, will restart tomorrow after LP is performed  Restart home metoprolol and atorvastatin  PT/OT consulted  Fall precautions, bed alarm in place  Trend renal function and electrolytes with a.m. BMP, magnesium   Trend Hgb and WBC with a.m. CBC     Discussed plan with RN, neurology, interventional radiology    Total of 53 minutes spent reviewing labs and imaging, counseling and educating the patient and family, ordering medications, discussing with specialty services, documenting clinical information in the electronic medical record, and care coordination.    VTE Prophylaxis:  Pharmacologic & mechanical VTE prophylaxis orders are present.        CODE STATUS:   Level Of Support Discussed With: Patient  Code Status (Patient has no pulse and is not breathing): CPR (Attempt to Resuscitate)  Medical Interventions (Patient has pulse or is breathing): Full Support        Electronically signed by Jose Jaffe MD, 09/10/24, 11:33 AM EDT.

## 2024-09-10 NOTE — SIGNIFICANT NOTE
09/10/24 1450   OTHER   Discipline occupational therapist   Rehab Time/Intention   Session Not Performed patient unavailable for evaluation  (security present in patient room. OT will attempt re-evaluation as patient appropriate.)

## 2024-09-10 NOTE — CONSULTS
[facility] Tele[facility]rology Consult Services    Routine Consult New    Patient Name:   Paulo Leiva  YOB: 1955  Identification Number:   MRN - [id number]  Date of Service:   09/10/2024 09:07:17    Diagnosis        G93.41 - Encephalopathy Metabolic    Impression  68 y.o. male with past medical history of right-sided nephrectomy, essential hypertension, COPD, hyponatremia presents to ED due to altered mental status, difficulty 1 and urinary incontinence.  Patient has had decline over the past 2 months.  MRI negative for acute process.  DDx can include primary dementia process, paraneoplastic process, NPH, or other metabolic encephalopathy.    Plan:  Infectious/Metabolic w/u per primary team.  Would consider Paraneoplastic panel. Serum.  Formal cognitive testing outpatient. Patient has appointment with outpatient neurology on Friday. Would try to keep this appointment.  NPH evaluation  - PT gait evaluation prior to therapeutic tap LP (TT-LP). Please document walking speed, steps for turning, stride length, floor clearance, start hesitation, and tendency towards falling (as these are most likely to improve post-TT)  -- LP w/ opening pressure. Therapeutic tap w/ removal of 2% of body weight; 30-50cc.  -- Basic studies, save extra vials from therapeutic tap for addition testing PRN.  - PT reassessment of gait within 30-60 mins of therapeutic tap. Please document well.  - Please notify surgical services if patient sees notable improvement post-TT      Our recommendations are outlined below    Laboratory Studies :  Check B12/folateTSHAmmoniaLipid [id number]    Nursing Recommendations :  Delirium precautions: Blinds open during the day, closed at night, frequent reorientation, minimize nighttime interruptionsWhen possible avoid benzodiazepines, opioid pain medications, and anticholinergic medications    Consultations :  Toxic metabolic work up per primary team    DVT Prophylaxis :  Choice  of Primary Team    Disposition :  Neurology will follow    ----------------------------------------------------------------------------------------------------    Imaging  MRI Brain  IMPRESSION:    1. No acute brain abnormality is seen. No acute infarct. No acute  intracranial hemorrhage.    2. There may be mild chronic small vessel ischemia/infarction.    3. Slight motion artifact obscures detail on some of the sequences.    4. 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    5. The other findings are as detailed above.    Chief Complaint:  NPH evaluation    History of Present Illness:  Patient is pleasant, seated in bed.  History is primarily gathered from son at bedside.  Note recent surgery for renal cell carinoma. Apparently no remaining disease. This was 2 months ago.  Since then, patient has had progressive memory issues. He spends lots of time in office at home. He has trouble managing the TV, his phone, and computer. There have been some gait issues. Son describes it as slowed walking, lurching forward, unable to stop. Patient has had mild incontinence as well.  MRI shows possible NPH correlated findings.        Past Medical History:       Hypertension       Hyperlipidemia    Medications:    Anticoagulant use:  Yes Eliquis on chart  No Antiplatelet use  Reviewed EMR for current medications    Allergies:   Reviewed    Social History:  Patient Is:   Smoking: No  Alcohol Use: No    Family History:    There is no family history of premature cerebrovascular disease pertinent to this consultation    ROS :  14 Points Review of Systems was performed and was negative except mentioned in HPI.    Past Surgical History:  There Is No Surgical History Contributory To Today’s Visit    Examination  BP(117//83),  1A: Level of Consciousness - Alert; keenly responsive + 0  1B: Ask Month and Age - Both Questions Right + 0  1C: Blink Eyes & Squeeze Hands - Performs Both Tasks + 0  2: Test  Horizontal Extraocular Movements - Normal + 0  3: Test Visual Fields - No Visual Loss + 0  4: Test Facial Palsy (Use Grimace if Obtunded) - Normal symmetry + 0  5A: Test Left Arm Motor Drift - No Drift for 10 Seconds + 0  5B: Test Right Arm Motor Drift - No Drift for 10 Seconds + 0  6A: Test Left Leg Motor Drift - No Drift for 5 Seconds + 0  6B: Test Right Leg Motor Drift - No Drift for 5 Seconds + 0  7: Test Limb Ataxia (FNF/Heel-Shin) - No Ataxia + 0  8: Test Sensation - Normal; No sensory loss + 0  9: Test Language/Aphasia - Normal; No aphasia + 0  10: Test Dysarthria - Normal + 0  11: Test Extinction/Inattention - No abnormality + 0    NIHSS Score: 0          This consult was conducted in real time using interactive audio and video technology. Patient was informed of the technology being used for this visit and agreed to proceed. Patient located in hospital and provider located at home/office setting.    Telehealth Neurology consultation was provided. I spent minutes providing telehealth care. This includes time spent for face to face visit via telemedicine, review of medical records, imaging studies and discussion of findings with providers, the patient and/or family.      Dr Margarito Godfrey      [facility]  For Inpatient follow-up with [facility] physician please call [facility] [phone]. This is not an outpatient service. Post hospital discharge, please contact hospital directly.    Please do not communicate with [facility] physicians via secure chat. If you have any questions, Please contact [facility].  Please call or reconsult our service if there are any clinical or diagnostic changes.

## 2024-09-11 LAB
ANION GAP SERPL CALCULATED.3IONS-SCNC: 11 MMOL/L (ref 5–15)
BASOPHILS # BLD AUTO: 0.07 10*3/MM3 (ref 0–0.2)
BASOPHILS NFR BLD AUTO: 0.6 % (ref 0–1.5)
BUN SERPL-MCNC: 10 MG/DL (ref 8–23)
BUN/CREAT SERPL: 7.4 (ref 7–25)
CALCIUM SPEC-SCNC: 9.9 MG/DL (ref 8.6–10.5)
CHLORIDE SERPL-SCNC: 96 MMOL/L (ref 98–107)
CO2 SERPL-SCNC: 23 MMOL/L (ref 22–29)
CREAT SERPL-MCNC: 1.36 MG/DL (ref 0.76–1.27)
DEPRECATED RDW RBC AUTO: 48.3 FL (ref 37–54)
EGFRCR SERPLBLD CKD-EPI 2021: 57 ML/MIN/1.73
EOSINOPHIL # BLD AUTO: 0.49 10*3/MM3 (ref 0–0.4)
EOSINOPHIL NFR BLD AUTO: 4.4 % (ref 0.3–6.2)
ERYTHROCYTE [DISTWIDTH] IN BLOOD BY AUTOMATED COUNT: 16.1 % (ref 12.3–15.4)
GLUCOSE SERPL-MCNC: 97 MG/DL (ref 65–99)
HCT VFR BLD AUTO: 46.3 % (ref 37.5–51)
HGB BLD-MCNC: 15.8 G/DL (ref 13–17.7)
IMM GRANULOCYTES # BLD AUTO: 0.02 10*3/MM3 (ref 0–0.05)
IMM GRANULOCYTES NFR BLD AUTO: 0.2 % (ref 0–0.5)
LYMPHOCYTES # BLD AUTO: 1.34 10*3/MM3 (ref 0.7–3.1)
LYMPHOCYTES NFR BLD AUTO: 12 % (ref 19.6–45.3)
MAGNESIUM SERPL-MCNC: 1.7 MG/DL (ref 1.6–2.4)
MCH RBC QN AUTO: 28.4 PG (ref 26.6–33)
MCHC RBC AUTO-ENTMCNC: 34.1 G/DL (ref 31.5–35.7)
MCV RBC AUTO: 83.1 FL (ref 79–97)
MONOCYTES # BLD AUTO: 1.05 10*3/MM3 (ref 0.1–0.9)
MONOCYTES NFR BLD AUTO: 9.4 % (ref 5–12)
NEUTROPHILS NFR BLD AUTO: 73.4 % (ref 42.7–76)
NEUTROPHILS NFR BLD AUTO: 8.18 10*3/MM3 (ref 1.7–7)
NRBC BLD AUTO-RTO: 0 /100 WBC (ref 0–0.2)
PLATELET # BLD AUTO: 291 10*3/MM3 (ref 140–450)
PMV BLD AUTO: 10.6 FL (ref 6–12)
POTASSIUM SERPL-SCNC: 4.4 MMOL/L (ref 3.5–5.2)
RBC # BLD AUTO: 5.57 10*6/MM3 (ref 4.14–5.8)
SODIUM SERPL-SCNC: 130 MMOL/L (ref 136–145)
WBC NRBC COR # BLD AUTO: 11.15 10*3/MM3 (ref 3.4–10.8)

## 2024-09-11 PROCEDURE — 85025 COMPLETE CBC W/AUTO DIFF WBC: CPT | Performed by: INTERNAL MEDICINE

## 2024-09-11 PROCEDURE — 82747 ASSAY OF FOLIC ACID RBC: CPT | Performed by: PSYCHIATRY & NEUROLOGY

## 2024-09-11 PROCEDURE — 86255 FLUORESCENT ANTIBODY SCREEN: CPT | Performed by: FAMILY MEDICINE

## 2024-09-11 PROCEDURE — 85014 HEMATOCRIT: CPT | Performed by: PSYCHIATRY & NEUROLOGY

## 2024-09-11 PROCEDURE — 83735 ASSAY OF MAGNESIUM: CPT | Performed by: INTERNAL MEDICINE

## 2024-09-11 PROCEDURE — 94640 AIRWAY INHALATION TREATMENT: CPT

## 2024-09-11 PROCEDURE — 25010000002 MAGNESIUM SULFATE 2 GM/50ML SOLUTION: Performed by: INTERNAL MEDICINE

## 2024-09-11 PROCEDURE — 94799 UNLISTED PULMONARY SVC/PX: CPT

## 2024-09-11 PROCEDURE — 94664 DEMO&/EVAL PT USE INHALER: CPT

## 2024-09-11 PROCEDURE — 99232 SBSQ HOSP IP/OBS MODERATE 35: CPT | Performed by: FAMILY MEDICINE

## 2024-09-11 PROCEDURE — 80048 BASIC METABOLIC PNL TOTAL CA: CPT | Performed by: INTERNAL MEDICINE

## 2024-09-11 PROCEDURE — 86596 VOLTAGE-GTD CA CHNL ANTB EA: CPT | Performed by: FAMILY MEDICINE

## 2024-09-11 PROCEDURE — 25810000003 SODIUM CHLORIDE 0.9 % SOLUTION: Performed by: INTERNAL MEDICINE

## 2024-09-11 PROCEDURE — 25010000002 ZIPRASIDONE MESYLATE PER 10 MG: Performed by: INTERNAL MEDICINE

## 2024-09-11 PROCEDURE — 99233 SBSQ HOSP IP/OBS HIGH 50: CPT | Performed by: INTERNAL MEDICINE

## 2024-09-11 RX ORDER — VALSARTAN 80 MG/1
80 TABLET ORAL 2 TIMES DAILY
Status: DISCONTINUED | OUTPATIENT
Start: 2024-09-11 | End: 2024-09-14 | Stop reason: HOSPADM

## 2024-09-11 RX ORDER — RIVASTIGMINE TARTRATE 1.5 MG/1
1.5 CAPSULE ORAL 2 TIMES DAILY WITH MEALS
Status: DISCONTINUED | OUTPATIENT
Start: 2024-09-11 | End: 2024-09-14 | Stop reason: HOSPADM

## 2024-09-11 RX ORDER — MAGNESIUM SULFATE HEPTAHYDRATE 40 MG/ML
2 INJECTION, SOLUTION INTRAVENOUS ONCE
Status: COMPLETED | OUTPATIENT
Start: 2024-09-11 | End: 2024-09-11

## 2024-09-11 RX ORDER — CITALOPRAM HYDROBROMIDE 20 MG/1
10 TABLET ORAL DAILY
Status: DISCONTINUED | OUTPATIENT
Start: 2024-09-11 | End: 2024-09-14 | Stop reason: HOSPADM

## 2024-09-11 RX ORDER — TRAZODONE HYDROCHLORIDE 50 MG/1
50 TABLET, FILM COATED ORAL NIGHTLY PRN
Status: DISCONTINUED | OUTPATIENT
Start: 2024-09-11 | End: 2024-09-13

## 2024-09-11 RX ADMIN — Medication 10 ML: at 21:13

## 2024-09-11 RX ADMIN — QUETIAPINE FUMARATE 50 MG: 25 TABLET ORAL at 21:12

## 2024-09-11 RX ADMIN — MAGNESIUM SULFATE HEPTAHYDRATE 2 G: 40 INJECTION, SOLUTION INTRAVENOUS at 13:46

## 2024-09-11 RX ADMIN — APIXABAN 5 MG: 5 TABLET, FILM COATED ORAL at 21:12

## 2024-09-11 RX ADMIN — SODIUM CHLORIDE 75 ML/HR: 9 INJECTION, SOLUTION INTRAVENOUS at 02:06

## 2024-09-11 RX ADMIN — SODIUM CHLORIDE 75 ML/HR: 9 INJECTION, SOLUTION INTRAVENOUS at 07:57

## 2024-09-11 RX ADMIN — RIVASTIGMINE TARTRATE 1.5 MG: 1.5 CAPSULE ORAL at 17:01

## 2024-09-11 RX ADMIN — TIOTROPIUM BROMIDE INHALATION SPRAY 2 PUFF: 3.12 SPRAY, METERED RESPIRATORY (INHALATION) at 11:25

## 2024-09-11 RX ADMIN — TRAZODONE HYDROCHLORIDE 50 MG: 50 TABLET ORAL at 22:51

## 2024-09-11 RX ADMIN — VALSARTAN 80 MG: 80 TABLET, FILM COATED ORAL at 21:12

## 2024-09-11 RX ADMIN — ZIPRASIDONE MESYLATE 20 MG: 20 INJECTION, POWDER, LYOPHILIZED, FOR SOLUTION INTRAMUSCULAR at 00:54

## 2024-09-11 RX ADMIN — CITALOPRAM HYDROBROMIDE 10 MG: 20 TABLET ORAL at 17:01

## 2024-09-11 RX ADMIN — SODIUM CHLORIDE 75 ML/HR: 9 INJECTION, SOLUTION INTRAVENOUS at 22:27

## 2024-09-11 RX ADMIN — Medication 100 MG: at 17:01

## 2024-09-11 NOTE — SIGNIFICANT NOTE
09/11/24 0748   OTHER   Discipline occupational therapist   Rehab Time/Intention   Session Not Performed patient unavailable for evaluation  (restraints donned. OT will continue to follow.)

## 2024-09-11 NOTE — PROGRESS NOTES
[facility]   Hospitalist Progress Note  Date: 2024  Patient Name: Paulo Leiva Jr.  : 1955  MRN: [id number]  Date of admission: 2024      Subjective   Subjective     Chief Complaint: Altered renal status, falls    Summary: 68 y.o. male with past medical history of right-sided nephrectomy, essential hypertension, COPD, hyponatremia presents to ED due to altered mental status, difficulty 1 and urinary incontinence.  As per family patient has been dealing with multiple falls for the past couple months.  Patient was seen by cardiology for possible arrhythmia issues and has a loop recorder in place.  About 1 month ago patient had a right-sided nephrectomy for renal mass at [facility].  Since his surgery family states patient is having worsening walking issues, urinary incontinence and worsening mental status has been gradually worsening.  Today family noticed patient was having difficulty talking with worsening mental status and gait issues.  In the ED, patient's vitals showed temperature 98.6, heart rate 91, blood pressure 134/50 and satting 92% on room air.  Labs show sodium 126, creatinine 1.36, glucose 110, white blood cell 12.3, hemoglobin 14.8.  CT head showed possible normal pressure hydrocephalus.  Patient admitted to floors for further management.     Interval Followup: Patient has had significant decline in encephalopathy since lumbar puncture yesterday.  Ultimately requiring 2 point restraints and multiple medications to try to calm him.  He had been given Geodon overnight with minimal improvement.  He is repeatedly been confused about place and thought he was [place] the [place].  In discussing with his family, he has had a decline over the last few months but has had memory trouble for at least a year.  He has never been formally diagnosed with dementia.     Objective   Objective     Vitals:   Temp:  [97.2 °F (36.2 °C)-98.2 °F (36.8 °C)] 98.2 °F (36.8 °C)  Heart Rate:  [] 119  Resp:   [16-20] 18  BP: (137-168)/() 137/88  Physical Exam    Constitutional: Awake, alert, confused, in restraints   Respiratory: Clear to auscultation bilaterally, nonlabored respirations    Cardiovascular: RRR, no MRG   Gastrointestinal: Positive bowel sounds, soft, nontender, nondistended   Neurologic: Oriented x 2, strength symmetric in all extremities, Cranial Nerves grossly intact to confrontation, speech clear    Result Review    I have personally reviewed the results below:  [x]  Laboratory personally reviewed BMP, CBC, magnesium level, meningitis panel  []  Microbiology  []  Radiology  []  EKG/Telemetry   []  Cardiology/Vascular   []  Pathology  []  Old records  []  Other:  CBC          9/9/2024    13:38 9/10/2024    04:56 9/11/2024    07:52   CBC   WBC 12.34  9.89  11.15    RBC 5.24  4.80  5.57    Hemoglobin 14.8  13.7  15.8    Hematocrit 43.0  39.7  46.3    MCV 82.1  82.7  83.1    MCH 28.2  28.5  28.4    MCHC 34.4  34.5  34.1    RDW 15.3  15.5  16.1    Platelets 265  260  291      CMP          9/9/2024    13:38 9/10/2024    04:56 9/11/2024    07:52   CMP   Glucose 110  96  97    BUN 13  11  10    Creatinine 1.36  1.38  1.36    EGFR 57.0  56.0  57.0    Sodium 126  128  130    Potassium 4.6  4.3  4.4    Chloride 91  95  96    Calcium 9.7  9.3  9.9    Total Protein 6.7      Albumin 3.7      Globulin 3.0      Total Bilirubin 1.1      Alkaline Phosphatase 89      AST (SGOT) 27      ALT (SGPT) 22      Albumin/Globulin Ratio 1.2      BUN/Creatinine Ratio 9.6  8.0  7.4    Anion Gap 12.0  10.3  11.0        Assessment & Plan   Assessment / Plan   Encephalopathy   Concern for dementia with behavioral disturbance  Hospital-acquired delirium  Right renal mass status post nephrectomy   chronic hyponatremia  essential hypertension   Cardiac arrhythmia on Eliquis  History of COPD  Former smoker    Continue to monitor in the hospital for workup and management of the above  Patient's mental status remains a constant threat  to life requiring continued inpatient management  Discussed with neurology-lumbar puncture negative for meningitis, no significant improvement making NPH unlikely  At this point, neurology more concern for dementia with delirium  Delirium precautions in place  Have asked psychiatry to evaluate the patient and help with behavioral disturbance, appreciate assistance  LP reviewed, normal opening pressure, meningitis panel negative, no indication of meningitis  Paraneoplastic panel has been sent and can be followed on outpatient basis  Will need formal cognitive testing on outpatient basis  Continue NS at 75 cc/h  Continue with nightly Seroquel if patient will take  Try to remove from restraints if possible  Restart home Eliquis and valsartan  Continue metoprolol and atorvastatin  PT/OT consulted-may need placement per family  Fall precautions, bed alarm in place  Trend renal function and electrolytes with a.m. BMP, magnesium   Trend Hgb and WBC with a.m. CBC     Discussed plan with RN, neurology, psychiatry    VTE Prophylaxis:  Pharmacologic & mechanical VTE prophylaxis orders are present.        CODE STATUS:   Level Of Support Discussed With: Patient  Code Status (Patient has no pulse and is not breathing): CPR (Attempt to Resuscitate)  Medical Interventions (Patient has pulse or is breathing): Full Support

## 2024-09-11 NOTE — PLAN OF CARE
Goal Outcome Evaluation:  Plan of Care Reviewed With: patient        Progress: no change  Outcome Evaluation: Patient tolerated being restraints well during this shift, has had family and friends come by to visit. Ambulated to bathroom with 1 assist, did very well. All questions by family have been answered by staff. Will continue to monitor, call light in reach.

## 2024-09-11 NOTE — CONSULTS
[facility] Tele[facility] Consult [facility]    Routine Consult Follow-Up    Patient Name:   Paulo Leiva  YOB: 1955  Identification Number:   MRN - [id number]  Date of Service:   09/11/2024 10:19:57    Diagnosis        G93.41 - Encephalopathy Metabolic    Impression  68 y.o. male with past medical history of right-sided nephrectomy, essential hypertension, COPD, hyponatremia presents to ED due to altered mental status, difficulty 1 and urinary incontinence.  Patient has had decline over the past 2 months. Patient has been symptomatic for over 1 year.    MRI negative for acute process.  Patient did not improve after LP.  Now with some delirium    DDx can include primary dementia process, paraneoplastic process, or other metabolic encephalopathy.    Plan:  Infectious/Metabolic w/u per primary team.  Paraneoplastic panel. Serum and CSF. Ordered. f/u outpatient  Formal cognitive testing outpatient. Patient has appointment with outpatient neurology on Friday. Would try to keep this appointment.  No further inpatient neurological w/u.    Our recommendations are outlined below    Nursing Recommendations :  Delirium precautions: Blinds open during the day, closed at night, frequent reorientation, minimize nighttime interruptionsWhen possible avoid benzodiazepines, opioid pain medications, and anticholinergic medications    DVT Prophylaxis :  Choice of Primary Team    Disposition :  Neurology will sign off. Reconsult if Needed.    Subjective  Patient delirious this morning.  Following commands well.  Did not improve after LP. Worsened after procedure and sedative medications. In restraints.    Hospital Course  9/10 - LP performed with out improvement.    Imaging  MRI Brain  IMPRESSION:    1. No acute brain abnormality is seen. No acute infarct. No acute  intracranial hemorrhage.    2. There may be mild chronic small vessel ischemia/infarction.    3. Slight motion artifact obscures detail  on some of the sequences.    4. 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    5. The other findings are as detailed above.      Examination  BP(137/88),  1A: Level of Consciousness - Alert; keenly responsive + 0  1B: Ask Month and Age - Both Questions Right + 0  1C: Blink Eyes & Squeeze Hands - Performs Both Tasks + 0  2: Test Horizontal Extraocular Movements - Normal + 0  3: Test Visual Fields - No Visual Loss + 0  4: Test Facial Palsy (Use Grimace if Obtunded) - Normal symmetry + 0  5A: Test Left Arm Motor Drift - No Drift for 10 Seconds + 0  5B: Test Right Arm Motor Drift - No Drift for 10 Seconds + 0  6A: Test Left Leg Motor Drift - No Drift for 5 Seconds + 0  6B: Test Right Leg Motor Drift - No Drift for 5 Seconds + 0  7: Test Limb Ataxia (FNF/Heel-Shin) - No Ataxia + 0  8: Test Sensation - Normal; No sensory loss + 0  9: Test Language/Aphasia - Normal; No aphasia + 0  10: Test Dysarthria - Normal + 0  11: Test Extinction/Inattention - No abnormality + 0    NIHSS Score: 0          This consult was conducted in real time using interactive audio and video technology. Patient was informed of the technology being used for this visit and agreed to proceed. Patient located in hospital and provider located at home/office setting.    Telehealth Neurology consultation was provided. I spent minutes providing telehealth care. This includes time spent for face to face visit via telemedicine, review of medical records, imaging studies and discussion of findings with providers, the patient and/or family.      Dr Margarito Godfrey      [facility]  For Inpatient follow-up with [facility] physician please call [facility] [phone]. This is not an outpatient service. Post hospital discharge, please contact hospital directly.    Please do not communicate with [facility] physicians via secure chat. If you have any questions, Please contact [facility].  Please call or reconsult our service if  there are any clinical or diagnostic changes.

## 2024-09-11 NOTE — CONSULTS
" [facility]   PSYCHIATRIC CONSULTATION    Patient Name: Paulo Leiva Jr.  : 1955  MRN: [id number]  Primary Care Physician:  Vilma Rolon APRN  Date of admission: 2024    Referring Provider: Dr. Echavarria  Reason for Consultation: Confusion, forgetfulness, agitation    Subjective   Subjective     Chief Complaint: Patient unable to provide chief complaint    HPI:     Paulo Leiva Jr. is a 68 y.o. male with a history of renal cancer with right sided nephrectomy, hypertension, and COPD for mental status changes with increasing confusion.  Patient also was noted to have some ataxia and incontinence.    MRI showed possibility of normal pressure hydrocephalus.  However lumbar puncture another workup has proved negative for hydrocephalus.  Patient is noted to have some delirium in the hospital.  Has been confused and irritable and agitated.    Patient is unable to tell me where we are today.  He is oriented to person, can identify the family and visitors in the room.  He is not fully oriented.  He reports that he has been forgetful.  He reports getting confused.  When asked to clarify he states that he would often not recall where he is, where things are in the house, or be able to identify what the purpose of some things in the house are.  He reports this is made him feel depressed.  He reports he has a low mood.  He denies any suicidal ideation.  Reports he has been sleeping well.  States that he feels well overall physically, but he is worried about things \"mentally.\"    Reports having problems for about the past 2 years.  They have gotten worse in the last 3 months status post nephrectomy.    Family reports that he has been unsteady on his feet and more confused and forgetful.  He has no previous diagnosis of dementia.    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  He has a history of drinking lifelong.  After he retired from  15 years ago his alcohol " consumption increased.  He reports drinking 20 or more beers on a daily basis and his family in the room confirms this.  He is drinking and has been curtailed since his nephrectomy 3 months ago.  However his son tells me that drinking has been increasing recently.   Send reports that drinking may begin around 1:00 daily.  He has reported his decrease in drinking and going from 20 beers daily down to 4 or 5, but may be slowly escalating.    Patient appears to have an emerging dementia.  May also have emerging alcoholic dementia or working Korsakoff's given his gait disturbance.        Review of Systems   All systems were reviewed and negative except for: No complaints today    Personal History     Past Medical History:   Diagnosis Date    Benign essential hypertension     COPD (chronic obstructive pulmonary disease)     Erectile dysfunction Apprx: 5 yrs ago    Generalized convulsive seizures 12/27/2023    Hyperlipidemia 12/14/2018    Hyponatremia     Implantable loop recorder present     Renal insufficiency     Syncope     Vertigo 02/17/2021    Vision loss        Past Surgical History:   Procedure Laterality Date    CARDIAC CATHETERIZATION N/A 09/26/2022    Procedure: Left Heart Cath;  Surgeon: Randell Casiano MD;  Location: [facility] CATH INVASIVE LOCATION;  Service: Cardiovascular;  Laterality: N/A;    CARDIAC ELECTROPHYSIOLOGY PROCEDURE N/A 09/26/2022    Procedure: Loop insertion;  Surgeon: Randell Casiano MD;  Location: [facility] CATH INVASIVE LOCATION;  Service: Cardiovascular;  Laterality: N/A;    COLONOSCOPY  2007    COLONOSCOPY N/A 10/26/2022    Procedure: COLONOSCOPY FOR SCREENING;  Surgeon: Roque Whitlock MD;  Location: [facility] ENDOSCOPY;  Service: Gastroenterology;  Laterality: N/A;  NORMAL COLON    NEPHRECTOMY Right 08/12/2024       Past Psychiatric History: Denies any previous psychiatric history or diagnosis.  Denies any treatment from a psychiatric provider or treatment from her primary care  provider    Psychiatric Hospitalizations: None    Suicide Attempts: None    Prior Treatment and Medications Tried: None        Family History: family history includes Dementia in his mother; No Known Problems in his father. Otherwise pertinent FHx was reviewed and not pertinent to current issue.    Social History:     Born and raised in [place].  He has lived in [place] for about 30 years after he retired from 22 years of service in the [employer].  He never saw any combat.  He is  to his first and only wife.  He currently lives with his wife.  Has 2 biological children.  After returning from the [employer] he was not over the road  and then became a  for [employer] before fully retiring 15 years ago.  He is a high school graduate.    He is not Episcopal or spiritual    No history of abuse    Social History     Socioeconomic History    Marital status:    Tobacco Use    Smoking status: Former     Current packs/day: 0.00     Average packs/day: 2.0 packs/day for 36.3 years (72.7 ttl pk-yrs)     Types: Cigarettes     Start date: 6/15/1974     Quit date: 10/20/2010     Years since quittin.9     Passive exposure: Past    Smokeless tobacco: Never    Tobacco comments:     SMOKES MORE THAN 2 PPD FOR 35 YEARS   Vaping Use    Vaping status: Never Used   Substance and Sexual Activity    Alcohol use: Yes     Alcohol/week: 6.0 - 8.0 standard drinks of alcohol     Types: 6 - 8 Cans of beer per week     Comment: 8-14 DRINKS PER WEEK as of 2024, prior to 2024 he would drink 20 or more beers daily and did so for 15 years    Drug use: Not Currently    Sexual activity: Not Currently     Partners: Female       Substance Abuse History: reports that he quit smoking about 13 years ago. His smoking use included cigarettes. He started smoking about 50 years ago. He has a 72.7 pack-year smoking history. He has been exposed to tobacco smoke. He has never used smokeless tobacco. He reports current alcohol  "use of about 6.0 - 8.0 standard drinks of alcohol per week. He reports that he does not currently use drugs.    Home Medications:  Tiotropium Bromide Monohydrate, apixaban, meclizine, metoprolol succinate XL, simvastatin, and valsartan      Allergies:  No Known Allergies    Objective   Objective     Vitals:   Temp:  [97.2 °F (36.2 °C)-98.2 °F (36.8 °C)] 98.2 °F (36.8 °C)  Heart Rate:  [] 119  Resp:  [16-20] 18  BP: (137-168)/() 137/88  Physical Exam       Mental Status Exam:     Patient in 2-point restraints.  He is calm and cooperative.  He participates in interview.  He is confused and has a unreliable historian.  Per history was previously of average intelligence.  There is no acute agitation or restlessness during interview.    He is awake, alert, and oriented to person.  Initially he tells me that we are at a meeting to discuss systems and to see, \"what if is, or what if it is not.\"  States that we are in a [facility].    Patient's immediate recall is 3 out of 3 items recalled after about 4 to 5 minutes is 1 out of 3 items.  He can identify 2 common objects at will accurately.  He spells the word house forward on the first attempt with a difficulty in spelling it backwards he scores a 2 out of 5.  He only does 2 steps of a three-step command.  Patient states the day of the week is Saturday and was corrected until the day of the week is Wednesday.  When asked for the month he has a long pause and thinking about it and states it is Friday, when point out the error and asked for the month again he again responds, \"Friday.\"  Does know the year is 2024.  After being oriented that he is at the hospital and specifically [facility] early in interview at this point interview he states we are at [facility] but cannot tell me what that is or why he would know what that is.  He cannot name the city.  He is able to state we are in [place].      Hygiene:   fair  Cooperation:  Cooperative  Eye Contact:  " "Fair  Psychomotor Behavior:  Appropriate  Mood: \"Little down\"  Affect:  Blunted  Speech:  Normal  Language: Appropriate  Thought Process:   Forgetful, confused  Thought Content:  Normal  Suicidal:  None  Homicidal:  None  Hallucinations:  None  Delusion:  None  Memory:  Deficits  Orientation:  Person and Situation  Reliability:  poor  Insight:   Limited  Judgement:  Fair  Impulse Control:  Impaired    Result Review    Result Review:  I have personally reviewed the results from the time of this admission to 9/11/2024 15:44 EDT and agree with these findings:  [x]  Laboratory  []  Microbiology  []  Radiology  []  EKG/Telemetry   []  Cardiology/Vascular   []  Pathology  []  Old records  []  Other:  Most notable findings include:     Assessment & Plan   Assessment / Plan     Brief Patient Summary:  Paulo Leiva Jr. is a 68 y.o. male who was admitted for altered mental status with increased falls and incontinence and found to not have normal pressure hydrocephalus.  Appears he is having some delirium.    Active Hospital Problems:  Active Hospital Problems    Diagnosis     **AMS (altered mental status)          Plan:   1) discussed medication for cognitive decline the patient is agreeable to a trial of rivastigmine and will initiate 1.5 mg p.o. twice daily and should be titrated by her primary care per protocol  2) the patient acknowledges depression and has had some behavioral disturbance with dementia and will initiate citalopram 10 mg daily for this  3) we will add trazodone as needed for insomnia  4) given the patient's history of alcohol use we will initiate folate and thiamine for vitamin supplementation to see if that helps with his current mental state  5) we will follow make treatment recommendations as indicated.  If patient stabilizes may be returned home but will follow to see if he would qualify or needs acute inpatient geriatric psych.  6) had conversation with the patient's son concerning progression of " dementia and Alzheimer's but also the need to quit alcohol consumption given the possibility of Wernicke's or Korsakoff.  7) patient continues to drive, and also wants motorcycles enjoys riding motorcycles and told patient's son that he absolutely should not drive a motorcycle and should not be operating a motor vehicle.        Part of this note may be an electronic transcription/translation of spoken language to printed text using the Dragon Dictation System.    Electronically signed by Regulo Almaguer MD, 09/11/24, 3:34 PM EDT.

## 2024-09-11 NOTE — PLAN OF CARE
Problem: Adult Inpatient Plan of Care  Goal: Plan of Care Review  Outcome: Ongoing, Not Progressing  Flowsheets (Taken 9/11/2024 0542)  Progress: declining  Plan of Care Reviewed With:   patient   son  Outcome Evaluation: Pt. very confused and agitated/restless/combative this shift. Medications given this shift for behaviors, see MAR. Pt is in 2 point soft wrist restraints, tried to redirect/reorient with no success. no injury noted, + peripheral pulses. Pt has had elevated HR and BP this shift.  Medications given per orders/MAR for elevated BP.  Heart rate currently sinus rhythm 97.  Pt currently resting in bed without distress noted.  Call light in reach. Safety maintained at all times this shift.  Goal: Patient-Specific Goal (Individualized)  Outcome: Ongoing, Not Progressing  Goal: Absence of Hospital-Acquired Illness or Injury  Outcome: Ongoing, Not Progressing  Intervention: Identify and Manage Fall Risk  Recent Flowsheet Documentation  Taken 9/10/2024 2200 by Isha Tolliver RN  Safety Promotion/Fall Prevention:   safety round/check completed   clutter free environment maintained   fall prevention program maintained  Taken 9/10/2024 1944 by Isha Tolliver RN  Safety Promotion/Fall Prevention:   safety round/check completed   nonskid shoes/slippers when out of bed   fall prevention program maintained   clutter free environment maintained  Intervention: Prevent Skin Injury  Recent Flowsheet Documentation  Taken 9/10/2024 1944 by Isha Tolliver RN  Body Position: position changed independently  Intervention: Prevent and Manage VTE (Venous Thromboembolism) Risk  Recent Flowsheet Documentation  Taken 9/10/2024 1944 by Isha Tolliver RN  Activity Management: bedrest  VTE Prevention/Management:   bilateral   sequential compression devices on  Range of Motion: active ROM (range of motion) encouraged  Intervention: Prevent Infection  Recent Flowsheet Documentation  Taken 9/10/2024 1944 by Isha Tolliver  RN  Infection Prevention:   environmental surveillance performed   equipment surfaces disinfected   hand hygiene promoted   single patient room provided   rest/sleep promoted  Goal: Optimal Comfort and Wellbeing  Outcome: Ongoing, Not Progressing  Intervention: Provide Person-Centered Care  Recent Flowsheet Documentation  Taken 9/10/2024 1944 by Isha Tolliver RN  Trust Relationship/Rapport:   care explained   choices provided   emotional support provided   empathic listening provided   questions answered   questions encouraged   reassurance provided   thoughts/feelings acknowledged  Goal: Readiness for Transition of Care  Outcome: Ongoing, Not Progressing     Problem: Fall Injury Risk  Goal: Absence of Fall and Fall-Related Injury  Outcome: Ongoing, Not Progressing  Intervention: Identify and Manage Contributors  Recent Flowsheet Documentation  Taken 9/10/2024 1945 by Isha Tolliver, RN  Medication Review/Management: medications reviewed  Taken 9/10/2024 1944 by Isha Tolliver RN  Medication Review/Management: medications reviewed  Self-Care Promotion: independence encouraged  Intervention: Promote Injury-Free Environment  Recent Flowsheet Documentation  Taken 9/10/2024 2200 by Isha Tolliver RN  Safety Promotion/Fall Prevention:   safety round/check completed   clutter free environment maintained   fall prevention program maintained  Taken 9/10/2024 1944 by Isha Tolliver RN  Safety Promotion/Fall Prevention:   safety round/check completed   nonskid shoes/slippers when out of bed   fall prevention program maintained   clutter free environment maintained     Problem: Restraint, Nonviolent  Goal: Absence of Harm or Injury  Outcome: Ongoing, Not Progressing  Intervention: Implement Least Restrictive Safety Strategies  Recent Flowsheet Documentation  Taken 9/11/2024 0400 by Isha Tolliver, RN  Medical Device Protection:   IV pole/bag removed from visual field   tubing secured  Less Restrictive Alternative:   bed  alarm in use   coping techniques promoted   calming techniques promoted   emotional support provided   family presence promoted   medication offered   safety enhancements provided   self-care activities encouraged   sensory stimulation limited  De-Escalation Techniques:   quiet time facilitated   increased round frequency   stimulation decreased  Diversional Activities: television  Taken 9/11/2024 0200 by Isha Tolliver RN  Medical Device Protection:   IV pole/bag removed from visual field   tubing secured  Less Restrictive Alternative:   bed alarm in use   coping techniques promoted   calming techniques promoted   emotional support provided   family presence promoted   medication offered   safety enhancements provided   self-care activities encouraged  De-Escalation Techniques:   increased round frequency   stimulation decreased   quiet time facilitated   reoriented  Diversional Activities: television  Taken 9/11/2024 0000 by Isha Tolliver RN  Medical Device Protection:   IV pole/bag removed from visual field   tubing secured  Less Restrictive Alternative:   bed alarm in use   coping techniques promoted   calming techniques promoted   emotional support provided   family presence promoted   medication offered   safety enhancements provided   self-care activities encouraged  De-Escalation Techniques:   increased round frequency   quiet time facilitated   reoriented   stimulation decreased  Diversional Activities: television  Taken 9/10/2024 2200 by Isha Tolliver, RN  Medical Device Protection:   IV pole/bag removed from visual field   tubing secured  Less Restrictive Alternative:   1:1 observation maintained   bed alarm in use   coping techniques promoted   calming techniques promoted   emotional support provided   family presence promoted   medication offered   safety enhancements provided   self-care activities encouraged  De-Escalation Techniques:   increased round frequency   quiet time facilitated    reoriented   stimulation decreased  Diversional Activities: television  Taken 9/10/2024 2000 by Isha Tolliver RN  Medical Device Protection: IV pole/bag removed from visual field  Less Restrictive Alternative:   1:1 observation maintained   bed alarm in use   coping techniques promoted   calming techniques promoted   emotional support provided   family presence promoted   medication offered   safety enhancements provided   self-care activities encouraged  De-Escalation Techniques:   increased round frequency   quiet time facilitated   reoriented   stimulation decreased  Diversional Activities: television  Taken 9/10/2024 1944 by Isha Tolliver, RN  Medical Device Protection:   IV pole/bag removed from visual field   tubing secured  Diversional Activities: television  Intervention: Protect Dignity, Rights, and Personal Wellbeing  Recent Flowsheet Documentation  Taken 9/10/2024 1944 by Isha Tolliver RN  Trust Relationship/Rapport:   care explained   choices provided   emotional support provided   empathic listening provided   questions answered   questions encouraged   reassurance provided   thoughts/feelings acknowledged  Intervention: Protect Skin and Joint Integrity  Recent Flowsheet Documentation  Taken 9/10/2024 1944 by Isha Tolliver RN  Body Position: position changed independently  Range of Motion: active ROM (range of motion) encouraged   Goal Outcome Evaluation:  Plan of Care Reviewed With: patient, son        Progress: declining  Outcome Evaluation: Pt. very confused and agitated/restless/combative this shift. Medications given this shift for behaviors, see MAR. Pt is in 2 point soft wrist restraints, tried to redirect/reorient with no success. no injury noted, + peripheral pulses. Pt has had elevated HR and BP this shift.  Medications given per orders/MAR for elevated BP.  Heart rate currently sinus rhythm 97.  Pt currently resting in bed without distress noted.  Call light in reach. Safety maintained  at all times this shift.

## 2024-09-12 LAB
ANION GAP SERPL CALCULATED.3IONS-SCNC: 11.3 MMOL/L (ref 5–15)
BASOPHILS # BLD AUTO: 0.06 10*3/MM3 (ref 0–0.2)
BASOPHILS NFR BLD AUTO: 0.6 % (ref 0–1.5)
BUN SERPL-MCNC: 18 MG/DL (ref 8–23)
BUN/CREAT SERPL: 9.9 (ref 7–25)
CALCIUM SPEC-SCNC: 9.1 MG/DL (ref 8.6–10.5)
CHLORIDE SERPL-SCNC: 100 MMOL/L (ref 98–107)
CO2 SERPL-SCNC: 20.7 MMOL/L (ref 22–29)
CREAT SERPL-MCNC: 1.82 MG/DL (ref 0.76–1.27)
DEPRECATED RDW RBC AUTO: 48.3 FL (ref 37–54)
EGFRCR SERPLBLD CKD-EPI 2021: 40.2 ML/MIN/1.73
EOSINOPHIL # BLD AUTO: 0.82 10*3/MM3 (ref 0–0.4)
EOSINOPHIL NFR BLD AUTO: 8.7 % (ref 0.3–6.2)
ERYTHROCYTE [DISTWIDTH] IN BLOOD BY AUTOMATED COUNT: 16.1 % (ref 12.3–15.4)
FOLATE BLD-MCNC: 326 NG/ML
FOLATE RBC-MCNC: 694 NG/ML
GLUCOSE SERPL-MCNC: 96 MG/DL (ref 65–99)
HCT VFR BLD AUTO: 39.8 % (ref 37.5–51)
HCT VFR BLD AUTO: 47 % (ref 37.5–51)
HGB BLD-MCNC: 13.6 G/DL (ref 13–17.7)
IMM GRANULOCYTES # BLD AUTO: 0.04 10*3/MM3 (ref 0–0.05)
IMM GRANULOCYTES NFR BLD AUTO: 0.4 % (ref 0–0.5)
LYMPHOCYTES # BLD AUTO: 1.68 10*3/MM3 (ref 0.7–3.1)
LYMPHOCYTES NFR BLD AUTO: 17.8 % (ref 19.6–45.3)
MAGNESIUM SERPL-MCNC: 2.1 MG/DL (ref 1.6–2.4)
MCH RBC QN AUTO: 28.4 PG (ref 26.6–33)
MCHC RBC AUTO-ENTMCNC: 34.2 G/DL (ref 31.5–35.7)
MCV RBC AUTO: 83.1 FL (ref 79–97)
MONOCYTES # BLD AUTO: 0.95 10*3/MM3 (ref 0.1–0.9)
MONOCYTES NFR BLD AUTO: 10.1 % (ref 5–12)
NEUTROPHILS NFR BLD AUTO: 5.87 10*3/MM3 (ref 1.7–7)
NEUTROPHILS NFR BLD AUTO: 62.4 % (ref 42.7–76)
NRBC BLD AUTO-RTO: 0 /100 WBC (ref 0–0.2)
PHOSPHATE SERPL-MCNC: 3.9 MG/DL (ref 2.5–4.5)
PLATELET # BLD AUTO: 269 10*3/MM3 (ref 140–450)
PMV BLD AUTO: 10.8 FL (ref 6–12)
POTASSIUM SERPL-SCNC: 4.2 MMOL/L (ref 3.5–5.2)
RBC # BLD AUTO: 4.79 10*6/MM3 (ref 4.14–5.8)
SODIUM SERPL-SCNC: 132 MMOL/L (ref 136–145)
WBC NRBC COR # BLD AUTO: 9.42 10*3/MM3 (ref 3.4–10.8)

## 2024-09-12 PROCEDURE — 84100 ASSAY OF PHOSPHORUS: CPT | Performed by: INTERNAL MEDICINE

## 2024-09-12 PROCEDURE — 83735 ASSAY OF MAGNESIUM: CPT | Performed by: INTERNAL MEDICINE

## 2024-09-12 PROCEDURE — 80048 BASIC METABOLIC PNL TOTAL CA: CPT | Performed by: INTERNAL MEDICINE

## 2024-09-12 PROCEDURE — 99233 SBSQ HOSP IP/OBS HIGH 50: CPT | Performed by: INTERNAL MEDICINE

## 2024-09-12 PROCEDURE — 97165 OT EVAL LOW COMPLEX 30 MIN: CPT

## 2024-09-12 PROCEDURE — 85025 COMPLETE CBC W/AUTO DIFF WBC: CPT | Performed by: INTERNAL MEDICINE

## 2024-09-12 PROCEDURE — 97110 THERAPEUTIC EXERCISES: CPT

## 2024-09-12 PROCEDURE — 97116 GAIT TRAINING THERAPY: CPT

## 2024-09-12 RX ADMIN — APIXABAN 5 MG: 5 TABLET, FILM COATED ORAL at 09:23

## 2024-09-12 RX ADMIN — TRAZODONE HYDROCHLORIDE 50 MG: 50 TABLET ORAL at 22:48

## 2024-09-12 RX ADMIN — METOPROLOL SUCCINATE 50 MG: 50 TABLET, EXTENDED RELEASE ORAL at 09:22

## 2024-09-12 RX ADMIN — CITALOPRAM HYDROBROMIDE 10 MG: 20 TABLET ORAL at 09:22

## 2024-09-12 RX ADMIN — QUETIAPINE FUMARATE 50 MG: 25 TABLET ORAL at 20:25

## 2024-09-12 RX ADMIN — Medication 10 ML: at 20:27

## 2024-09-12 RX ADMIN — RIVASTIGMINE TARTRATE 1.5 MG: 1.5 CAPSULE ORAL at 18:17

## 2024-09-12 RX ADMIN — Medication 100 MG: at 09:23

## 2024-09-12 RX ADMIN — ATORVASTATIN CALCIUM 10 MG: 10 TABLET, FILM COATED ORAL at 09:23

## 2024-09-12 RX ADMIN — RIVASTIGMINE TARTRATE 1.5 MG: 1.5 CAPSULE ORAL at 09:21

## 2024-09-12 RX ADMIN — APIXABAN 5 MG: 5 TABLET, FILM COATED ORAL at 20:25

## 2024-09-12 NOTE — PLAN OF CARE
Goal Outcome Evaluation:  Plan of Care Reviewed With: patient        Progress: no change  Outcome Evaluation: Patient presents with limitations affecting strength, activity tolerance, and balance impacting patient's ability to return home safely and independently.  The skills of a therapist will be required to safely and effectively implement the following treatment plan to restore maximal level of function      Anticipated Discharge Disposition (OT): home

## 2024-09-12 NOTE — PROGRESS NOTES
[facility]   Hospitalist Progress Note  Date: 2024  Patient Name: Paulo Leiva Jr.  : 1955  MRN: [id number]  Date of admission: 2024      Subjective   Subjective     Chief Complaint: Altered renal status, falls    Summary: 68 y.o. male with past medical history of right-sided nephrectomy, essential hypertension, COPD, hyponatremia presents to ED due to altered mental status, difficulty 1 and urinary incontinence.  As per family patient has been dealing with multiple falls for the past couple months.  Patient was seen by cardiology for possible arrhythmia issues and has a loop recorder in place.  About 1 month ago patient had a right-sided nephrectomy for renal mass at [place].  Since his surgery family states patient is having worsening walking issues, urinary incontinence and worsening mental status has been gradually worsening.  Today family noticed patient was having difficulty talking with worsening mental status and gait issues.  In the ED, patient's vitals showed temperature 98.6, heart rate 91, blood pressure 134/50 and satting 92% on room air.  Labs show sodium 126, creatinine 1.36, glucose 110, white blood cell 12.3, hemoglobin 14.8.  CT head showed possible normal pressure hydrocephalus.  Patient admitted to floors for further management.     Interval Followup: Patient is much more calm and cooperative this morning, he is out of restraints.  He is sitting up in bedside chair but does still remain confused.  He has difficulty remembering where he is.  Collateral history reveals he has been a heavy drinker for 15 years, he has recently cut back but still drinks a significant amount.    Objective   Objective     Vitals:   Temp:  [97.7 °F (36.5 °C)-98.4 °F (36.9 °C)] 97.8 °F (36.6 °C)  Heart Rate:  [] 96  Resp:  [18] 18  BP: (110-168)/(59-80) 126/73  Physical Exam    Constitutional: Awake, alert, sitting up in bedside chair, confused, calm and cooperative   Respiratory: Clear to  auscultation bilaterally, nonlabored respirations    Cardiovascular: RRR, no MRG   Gastrointestinal: Positive bowel sounds, soft, nontender, nondistended   Neurologic: Oriented x 2, strength symmetric in all extremities, Cranial Nerves grossly intact to confrontation, speech clear    Result Review    I have personally reviewed the results below:  [x]  Laboratory personally reviewed BMP, CBC, magnesium, phosphorus  []  Microbiology  []  Radiology  []  EKG/Telemetry   []  Cardiology/Vascular   []  Pathology  []  Old records  []  Other:  CBC          9/10/2024    04:56 9/11/2024    07:52 9/11/2024    16:18 9/12/2024    05:24   CBC   WBC 9.89  11.15   9.42    RBC 4.80  5.57   4.79    Hemoglobin 13.7  15.8   13.6    Hematocrit 39.7  46.3  47.0  39.8    MCV 82.7  83.1   83.1    MCH 28.5  28.4   28.4    MCHC 34.5  34.1   34.2    RDW 15.5  16.1   16.1    Platelets 260  291   269      CMP          9/10/2024    04:56 9/11/2024    07:52 9/12/2024    05:24   CMP   Glucose 96  97  96    BUN 11  10  18    Creatinine 1.38  1.36  1.82    EGFR 56.0  57.0  40.2    Sodium 128  130  132    Potassium 4.3  4.4  4.2    Chloride 95  96  100    Calcium 9.3  9.9  9.1    BUN/Creatinine Ratio 8.0  7.4  9.9    Anion Gap 10.3  11.0  11.3        Assessment & Plan   Assessment / Plan   Encephalopathy   Concern for dementia with behavioral disturbance  Hospital-acquired delirium  Right renal mass status post nephrectomy   chronic hyponatremia  essential hypertension   Cardiac arrhythmia on Eliquis  History of COPD  Former smoker    Continue to monitor in the hospital for workup and management of the above  Discussed with psychiatry-patient likely has developing dementia complicated by severe alcohol abuse  Strongly recommend discontinuation of alcohol, patient should not drive or operate heavy machinery at all moving forward  Delirium precautions in place  Continue with rivastigmine and citalopram per psychiatry recommendations  Meningitis panel  negative, paraneoplastic panel pending  Will need formal cognitive testing on outpatient basis  DC IV fluids  Hold valsartan today due to bump in creatinine  Continue with Eliquis for history of A-fib/arrhythmia  Continue metoprolol and atorvastatin  PT/OT consulted-may need rehab placement per family  Fall precautions, bed alarm in place  Trend renal function and electrolytes with a.m. BMP, magnesium   Trend Hgb and WBC with a.m. CBC     Discussed plan with RN, neurology, psychiatry    Total of 53 minutes spent reviewing labs and imaging, counseling and educating the patient and family, ordering medications, discussing with specialty services, documenting clinical information in the electronic medical record, and care coordination.    VTE Prophylaxis:  Pharmacologic & mechanical VTE prophylaxis orders are present.        CODE STATUS:   Level Of Support Discussed With: Patient  Code Status (Patient has no pulse and is not breathing): CPR (Attempt to Resuscitate)  Medical Interventions (Patient has pulse or is breathing): Full Support

## 2024-09-12 NOTE — THERAPY EVALUATION
Patient Name: Paulo Leiva Jr.  : 1955    MRN: [id number]                              Today's Date: 2024       Admit Date: 2024    Visit Dx:     ICD-10-CM ICD-9-CM   1. Altered mental status, unspecified altered mental status type  R41.82 780.97   2. Normal pressure hydrocephalus  G91.2 331.5   3. Difficulty in walking  R26.2 719.7     Patient Active Problem List   Diagnosis    COPD (chronic obstructive pulmonary disease)    Essential hypertension    Hyperlipidemia    Hyponatremia    Vertigo    Colon cancer screening    Syncope and collapse    Wide-complex tachycardia    Status post placement of implantable loop recorder    Class 1 obesity due to excess calories without serious comorbidity with body mass index (BMI) of 30.0 to 30.9 in adult    Generalized convulsive seizures    Renal mass    AMS (altered mental status)     Past Medical History:   Diagnosis Date    Benign essential hypertension     COPD (chronic obstructive pulmonary disease)     Erectile dysfunction Apprx: 5 yrs ago    Generalized convulsive seizures 2023    Hyperlipidemia 2018    Hyponatremia     Implantable loop recorder present     Renal insufficiency     Syncope     Vertigo 2021    Vision loss      Past Surgical History:   Procedure Laterality Date    CARDIAC CATHETERIZATION N/A 2022    Procedure: Left Heart Cath;  Surgeon: Randell Casiano MD;  Location: [facility] CATH INVASIVE LOCATION;  Service: Cardiovascular;  Laterality: N/A;    CARDIAC ELECTROPHYSIOLOGY PROCEDURE N/A 2022    Procedure: Loop insertion;  Surgeon: Randell Casiano MD;  Location: [facility] CATH INVASIVE LOCATION;  Service: Cardiovascular;  Laterality: N/A;    COLONOSCOPY      COLONOSCOPY N/A 10/26/2022    Procedure: COLONOSCOPY FOR SCREENING;  Surgeon: Roque Whitlock MD;  Location: [facility] ENDOSCOPY;  Service: Gastroenterology;  Laterality: N/A;  NORMAL COLON    NEPHRECTOMY Right 2024      General Information       Row  Name 09/12/24 0837          OT Time and Intention    Document Type evaluation  -PG     Mode of Treatment individual therapy;occupational therapy  -PG       Row Name 09/12/24 0837          General Information    Patient Profile Reviewed yes  Lives with wife.  He reports independence with all self-care and transfers previously no assistive device used.  -PG     Prior Level of Function independent:;transfer;ADL's  -PG     Existing Precautions/Restrictions fall  -PG     Barriers to Rehab none identified  -PG       Row Name 09/12/24 0837          Occupational Profile    Reason for Services/Referral (Occupational Profile) Patient is a 68-year-old male admitted for altered mental status with history of alcohol abuse.  Patient is being evaluated by Occupational Therapy due to recent decline in ADL function.  No previous OT services identified  -PG       Row Name 09/12/24 0837          Living Environment    People in Home spouse  -PG       Row Name 09/12/24 0837          Cognition    Orientation Status (Cognition) oriented x 3  -PG       Row Name 09/12/24 0837          Safety Issues, Functional Mobility    Impairments Affecting Function (Mobility) balance;endurance/activity tolerance;cognition  -PG     Cognitive Impairments, Mobility Safety/Performance insight into deficits/self-awareness;problem-solving/reasoning  -PG               User Key  (r) = Recorded By, (t) = Taken By, (c) = Cosigned By      Initials Name Provider Type    PG Sea Noe, OT Occupational Therapist                     Mobility/ADL's       Row Name 09/12/24 0838          Transfers    Transfers bed-chair transfer  -PG       Row Name 09/12/24 0838          Bed-Chair Transfer    Bed-Chair [place] (Transfers) contact guard;verbal cues  -PG       Row Name 09/12/24 0838          Activities of Daily Living    BADL Assessment/Intervention bathing;upper body dressing;lower body dressing;grooming;toileting  -PG       Row Name 09/12/24 0838          Bathing  Assessment/Intervention    [place] Level (Bathing) bathing skills;minimum assist (75% patient effort)  -PG       Row Name 09/12/24 0838          Upper Body Dressing Assessment/Training    [place] Level (Upper Body Dressing) upper body dressing skills;set up  -PG       Row Name 09/12/24 0838          Lower Body Dressing Assessment/Training    [place] Level (Lower Body Dressing) lower body dressing skills;minimum assist (75% patient effort)  -PG       Row Name 09/12/24 0838          Grooming Assessment/Training    [place] Level (Grooming) grooming skills;set up  -PG       Row Name 09/12/24 0838          Toileting Assessment/Training    [place] Level (Toileting) toileting skills;contact guard assist  -PG               User Key  (r) = Recorded By, (t) = Taken By, (c) = Cosigned By      Initials Name Provider Type    PG Sea Noe OT Occupational Therapist                   Obj/Interventions       Row Name 09/12/24 0839          Sensory Assessment (Somatosensory)    Sensory Assessment (Somatosensory) sensation intact  -PG       Row Name 09/12/24 0839          Vision Assessment/Intervention    Visual Impairment/Limitations WFL  -PG       Row Name 09/12/24 0839          Range of Motion Comprehensive    General Range of Motion no range of motion deficits identified  -PG       Row Name 09/12/24 0839          Strength Comprehensive (MMT)    General Manual Muscle Testing (MMT) Assessment no strength deficits identified  -PG       Row Name 09/12/24 0839          Motor Skills    Motor Skills coordination;functional endurance  -PG     Coordination WFL  -PG     Functional Endurance Fair  -PG               User Key  (r) = Recorded By, (t) = Taken By, (c) = Cosigned By      Initials Name Provider Type    PG Sea Noe OT Occupational Therapist                   Goals/Plan       Row Name 09/12/24 0841          Transfer Goal 1 (OT)    Activity/Assistive Device (Transfer Goal 1, OT) transfers, all  -PG      [place] Level/Cues Needed (Transfer Goal 1, OT) modified independence  -PG     Time Frame (Transfer Goal 1, OT) long term goal (LTG);10 days  -PG       Row Name 09/12/24 0841          Bathing Goal 1 (OT)    Activity/Device (Bathing Goal 1, OT) bathing skills, all  -PG     [place] Level/Cues Needed (Bathing Goal 1, OT) modified independence  -PG     Time Frame (Bathing Goal 1, OT) long term goal (LTG);10 days  -PG       Row Name 09/12/24 0841          Dressing Goal 1 (OT)    Activity/Device (Dressing Goal 1, OT) dressing skills, all  -PG     [place]/Cues Needed (Dressing Goal 1, OT) modified independence  -PG     Time Frame (Dressing Goal 1, OT) long term goal (LTG);10 days  -PG       Row Name 09/12/24 0841          Toileting Goal 1 (OT)    Activity/Device (Toileting Goal 1, OT) toileting skills, all  -PG     [place] Level/Cues Needed (Toileting Goal 1, OT) modified independence  -PG     Time Frame (Toileting Goal 1, OT) long term goal (LTG);10 days  -PG       Row Name 09/12/24 0841          Grooming Goal 1 (OT)    Activity/Device (Grooming Goal 1, OT) grooming skills, all  -PG     [place] (Grooming Goal 1, OT) modified independence  -PG     Time Frame (Grooming Goal 1, OT) long term goal (LTG);10 days  -PG       Row Name 09/12/24 0841          Problem Specific Goal 1 (OT)    Problem Specific Goal 1 (OT) Patient will improve activity tolerance to good to support independence and engagement with ADL activities  -PG     Time Frame (Problem Specific Goal 1, OT) long term goal (LTG);10 days  -PG       Row Name 09/12/24 0841          Therapy Assessment/Plan (OT)    Planned Therapy Interventions (OT) activity tolerance training;BADL retraining;strengthening exercise;transfer/mobility retraining;patient/caregiver education/training;occupation/activity based interventions  -PG               User Key  (r) = Recorded By, (t) = Taken By, (c) = Cosigned By      Initials Name Provider Type    PG  Sea Noe, OT Occupational Therapist                   Clinical Impression       Row Name 09/12/24 0840          Pain Assessment    Pretreatment Pain Rating 0/10 - no pain  -PG     Posttreatment Pain Rating 0/10 - no pain  -PG       Row Name 09/12/24 0840          Plan of Care Review    Plan of Care Reviewed With patient  -PG     Progress no change  -PG     Outcome Evaluation Patient presents with limitations affecting strength, activity tolerance, and balance impacting patient's ability to return home safely and independently.  The skills of a therapist will be required to safely and effectively implement the following treatment plan to restore maximal level of function  -PG       Row Name 09/12/24 0840          Therapy Assessment/Plan (OT)    Patient/Family Therapy Goal Statement (OT) Return home independently  -PG     Rehab Potential (OT) good, to achieve stated therapy goals  -PG     Criteria for Skilled Therapeutic Interventions Met (OT) yes;meets criteria;skilled treatment is necessary  -PG     Therapy Frequency (OT) 5 times/wk  -PG       Row Name 09/12/24 0840          Therapy Plan Review/Discharge Plan (OT)    Anticipated Discharge Disposition (OT) home  -PG               User Key  (r) = Recorded By, (t) = Taken By, (c) = Cosigned By      Initials Name Provider Type    PG Sea Noe, BREANN Occupational Therapist                   Outcome Measures       Row Name 09/12/24 0841          How much help from another is currently needed...    Putting on and taking off regular lower body clothing? 3  -PG     Bathing (including washing, rinsing, and drying) 3  -PG     Toileting (which includes using toilet bed pan or urinal) 3  -PG     Putting on and taking off regular upper body clothing 4  -PG     Taking care of personal grooming (such as brushing teeth) 4  -PG     Eating meals 4  -PG     AM-PAC 6 Clicks Score (OT) 21  -PG       [facility] Name 09/12/24 0841          Functional Assessment    Outcome Measure Options  AM-PAC 6 Clicks Daily Activity (OT);Optimal Instrument  -PG       Row Name 09/12/24 0841          Optimal Instrument    Optimal Instrument Optimal - 3  -PG     Bending/Stooping 2  -PG     Standing 2  -PG     Reaching 1  -PG     From the list, choose the 3 activities you would most like to be able to do without any difficulty Bending/stooping;Standing;Reaching  -PG     Total Score Optimal - 3 5  -PG               User Key  (r) = Recorded By, (t) = Taken By, (c) = Cosigned By      Initials Name Provider Type    PG Sea Noe OT Occupational Therapist                    Occupational Therapy Education       Title: PT OT SLP Therapies (Done)       Topic: Occupational Therapy (Done)       Point: ADL training (Done)       Description:   Instruct learner(s) on proper safety adaptation and remediation techniques during self care or transfers.   Instruct in proper use of assistive devices.                  Learning Progress Summary             Patient Acceptance, E,D, DU by PG at 9/12/2024 0842                         Point: Home exercise program (Done)       Description:   Instruct learner(s) on appropriate technique for monitoring, assisting and/or progressing therapeutic exercises/activities.                  Learning Progress Summary             Patient Acceptance, E,D, DU by PG at 9/12/2024 0842                         Point: Precautions (Done)       Description:   Instruct learner(s) on prescribed precautions during self-care and functional transfers.                  Learning Progress Summary             Patient Acceptance, E,D, DU by PG at 9/12/2024 0842                         Point: Body mechanics (Done)       Description:   Instruct learner(s) on proper positioning and spine alignment during self-care, functional mobility activities and/or exercises.                  Learning Progress Summary             Patient Acceptance, E,D, DU by PG at 9/12/2024 0842                                         User Key        Initials Effective Dates Name Provider Type Discipline    PG 06/16/21 -  Sea Noe OT Occupational Therapist OT                  OT Recommendation and Plan  Planned Therapy Interventions (OT): activity tolerance training, BADL retraining, strengthening exercise, transfer/mobility retraining, patient/caregiver education/training, occupation/activity based interventions  Therapy Frequency (OT): 5 times/wk  Plan of Care Review  Plan of Care Reviewed With: patient  Progress: no change  Outcome Evaluation: Patient presents with limitations affecting strength, activity tolerance, and balance impacting patient's ability to return home safely and independently.  The skills of a therapist will be required to safely and effectively implement the following treatment plan to restore maximal level of function     Time Calculation:   Evaluation Complexity (OT)  Review Occupational Profile/Medical/Therapy History Complexity: brief/low complexity  Assessment, Occupational Performance/Identification of Deficit Complexity: 3-5 performance deficits  Clinical Decision Making Complexity (OT): problem focused assessment/low complexity  Overall Complexity of Evaluation (OT): low complexity     Time Calculation- OT       Row Name 09/12/24 0843             Time Calculation- OT    OT Received On 09/12/24  -PG      OT Goal Re-Cert Due Date 09/21/24  -PG         Untimed Charges    OT Eval/Re-eval Minutes 30  -PG         Total Minutes    Untimed Charges Total Minutes 30  -PG       Total Minutes 30  -PG                User Key  (r) = Recorded By, (t) = Taken By, (c) = Cosigned By      Initials Name Provider Type    PG Sea Noe OT Occupational Therapist                  Therapy Charges for Today       Code Description Service Date Service Provider Modifiers Qty    [id number] HC OT EVAL LOW COMPLEXITY 2 9/12/2024 Sea Noe OT GO 1                 Sea Noe OT  9/12/2024

## 2024-09-12 NOTE — PLAN OF CARE
Goal Outcome Evaluation:  Plan of Care Reviewed With: family, patient        Progress: improving  Outcome Evaluation: Patient tolerated being out of restraints with no issues, calm and cooperative throughout shift. restraints d/c at Midnight. VSS, no c/o pain or discomfort. ambulated to bathroom with stand by assist with no issues. Family updated per telephone call from son and wife on his improvements throughout shift. continue plan of care. no acute events this shift.

## 2024-09-12 NOTE — THERAPY TREATMENT NOTE
Acute Care - Physical Therapy Treatment Note   Ananth     Patient Name: Paulo Leiva Jr.  : 1955  MRN: [id number]  Today's Date: 2024   Onset of Illness/Injury or Date of Surgery: 24  Visit Dx:     ICD-10-CM ICD-9-CM   1. Altered mental status, unspecified altered mental status type  R41.82 780.97   2. Normal pressure hydrocephalus  G91.2 331.5   3. Difficulty in walking  R26.2 719.7     Patient Active Problem List   Diagnosis    COPD (chronic obstructive pulmonary disease)    Essential hypertension    Hyperlipidemia    Hyponatremia    Vertigo    Colon cancer screening    Syncope and collapse    Wide-complex tachycardia    Status post placement of implantable loop recorder    Class 1 obesity due to excess calories without serious comorbidity with body mass index (BMI) of 30.0 to 30.9 in adult    Generalized convulsive seizures    Renal mass    AMS (altered mental status)     Past Medical History:   Diagnosis Date    Benign essential hypertension     COPD (chronic obstructive pulmonary disease)     Erectile dysfunction Apprx: 5 yrs ago    Generalized convulsive seizures 2023    Hyperlipidemia 2018    Hyponatremia     Implantable loop recorder present     Renal insufficiency     Syncope     Vertigo 2021    Vision loss      Past Surgical History:   Procedure Laterality Date    CARDIAC CATHETERIZATION N/A 2022    Procedure: Left Heart Cath;  Surgeon: Randell Casiano MD;  Location: [facility] CATH INVASIVE LOCATION;  Service: Cardiovascular;  Laterality: N/A;    CARDIAC ELECTROPHYSIOLOGY PROCEDURE N/A 2022    Procedure: Loop insertion;  Surgeon: Randell Casiano MD;  Location: [facility] CATH INVASIVE LOCATION;  Service: Cardiovascular;  Laterality: N/A;    COLONOSCOPY      COLONOSCOPY N/A 10/26/2022    Procedure: COLONOSCOPY FOR SCREENING;  Surgeon: Roque Whitlock MD;  Location: [facility] ENDOSCOPY;  Service: Gastroenterology;  Laterality: N/A;  NORMAL COLON    NEPHRECTOMY  Right 08/12/2024     PT Assessment (Last 12 Hours)       PT Evaluation and Treatment       Row Name 09/12/24 1358          Physical Therapy Time and Intention    Subjective Information no complaints  -DK     Document Type therapy note (daily note)  -DK     Mode of Treatment individual therapy;physical therapy  -DK     Patient Effort good  -DK     Symptoms Noted During/After Treatment none  -DK       Row Name 09/12/24 1358          Pain    Pretreatment Pain Rating 0/10 - no pain  -DK     Posttreatment Pain Rating 0/10 - no pain  -DK       Row Name 09/12/24 1358          Cognition    Affect/Mental Status (Cognition) confused  -DK     Orientation Status (Cognition) oriented to;person;situation  -DK     Follows Commands (Cognition) WFL  -DK     Cognitive Function WFL  -DK     Personal Safety Interventions gait belt;nonskid shoes/slippers when out of bed;supervised activity  -DK       Row Name 09/12/24 1358          Transfers    Transfers sit-stand transfer;stand-sit transfer  -DK       Row Name 09/12/24 1358          Sit-Stand Transfer    Sit-Stand Frederick (Transfers) standby assist;contact guard;1 person assist  -DK     Assistive Device (Sit-Stand Transfers) walker, front-wheeled  -DK       Row Name 09/12/24 1358          Stand-Sit Transfer    Stand-Sit Frederick (Transfers) standby assist;contact guard;1 person assist  -DK     Assistive Device (Stand-Sit Transfers) walker, front-wheeled  -DK       Row Name 09/12/24 1356          Gait/Stairs (Locomotion)    Gait/Stairs Locomotion gait/ambulation independence;gait/ambulation assistive device;distance ambulated;gait pattern  -DK     Frederick Level (Gait) standby assist;contact guard;1 person assist  -DK     Assistive Device (Gait) walker, front-wheeled  -DK     Distance in Feet (Gait) 120  -DK     Pattern (Gait) step-through  -DK     Deviations/Abnormal Patterns (Gait) timoteo decreased;festinating/shuffling;gait speed decreased;steppage  -DK     Bilateral  Gait Deviations forward flexed posture  -     Comment, (Gait/Stairs) Pt ambulated on room air with a rolling walker.  He did require minor re-direction due to thinking the wall was a door.  He was left in the recliner on alert post treatment.  -       Row Name 09/12/24 1358          Safety Issues, Functional Mobility    Safety Issues Affecting Function (Mobility) impulsivity;safety precaution awareness;judgment  -     Impairments Affecting Function (Mobility) endurance/activity tolerance;strength  -     Cognitive Impairments, Mobility Safety/Performance attention;impulsivity;judgment;safety precaution awareness  -       Row Name 09/12/24 1355          Balance    Balance Assessment sitting static balance;sitting dynamic balance;standing static balance;standing dynamic balance  -     Static Sitting Balance standby assist  -     Dynamic Sitting Balance standby assist  -DK     Position, Sitting Balance unsupported;sitting in chair  -     Static Standing Balance standby assist  -     Dynamic Standing Balance standby assist;contact guard;1-person assist  -     Position/Device Used, Standing Balance walker, front-wheeled  -     Balance Interventions standing;dynamic;tandem gait  -       Row Name 09/12/24 1355          Motor Skills    Motor Skills --  therapeutic exercises  -     Coordination WFL  -     Therapeutic Exercise hip;knee;ankle  -       Row Name 09/12/24 1351          Hip (Therapeutic Exercise)    Hip (Therapeutic Exercise) AROM (active range of motion)  -     Hip AROM (Therapeutic Exercise) bilateral;flexion;extension;aBduction;aDduction;sitting;20 repititions  -       Row Name 09/12/24 1359          Knee (Therapeutic Exercise)    Knee (Therapeutic Exercise) AROM (active range of motion)  -     Knee AROM (Therapeutic Exercise) bilateral;flexion;extension;LAQ (long arc quad);sitting;20 repititions  -       Row Name 09/12/24 1356          Ankle (Therapeutic Exercise)    Ankle  (Therapeutic Exercise) AROM (active range of motion)  -DK     Ankle AROM (Therapeutic Exercise) bilateral;dorsiflexion;plantarflexion;sitting;20 repititions  -DK       Row Name 09/12/24 1358          Plan of Care Review    Plan of Care Reviewed With patient;family  -DK     Progress improving  -DK       Row Name 09/12/24 1356          Positioning and Restraints    Pre-Treatment Position sitting in chair/recliner  -DK     Post Treatment Position chair  -DK     In Chair reclined;call light within reach;encouraged to call for assist;exit alarm on;with family/caregiver;legs elevated  -DK       Row Name 09/12/24 135          Therapy Assessment/Plan (PT)    Rehab Potential (PT) good, to achieve stated therapy goals  -DK     Criteria for Skilled Interventions Met (PT) skilled treatment is necessary  -DK     Therapy Frequency (PT) daily  -DK     Problem List (PT) problems related to;balance;cognition;mobility;strength;vision  -DK     Activity Limitations Related to Problem List (PT) unable to ambulate safely;unable to transfer safely  -       Row Name 09/12/24 0871          Progress Summary (PT)    Progress Toward Functional Goals (PT) progress toward functional goals is good  -               User Key  (r) = Recorded By, (t) = Taken By, (c) = Cosigned By      Initials Name Provider Type    Unique Lara PTA Physical Therapist Assistant                    Physical Therapy Education       Title: PT OT SLP Therapies (Done)       Topic: Physical Therapy (Done)       Point: Mobility training (Done)       Learning Progress Summary             Patient Acceptance, E,D, DU by PG at 9/12/2024 0842    Acceptance, E,TB, VU by EW at 9/10/2024 1154                         Point: Home exercise program (Done)       Learning Progress Summary             Patient Acceptance, E,D, DU by PG at 9/12/2024 0842    Acceptance, E,TB, VU by EW at 9/10/2024 1154                         Point: Body mechanics (Done)       Learning Progress  Summary             Patient Acceptance, E,D, DU by PG at 9/12/2024 0842    Acceptance, E,TB, VU by EW at 9/10/2024 1154                         Point: Precautions (Done)       Learning Progress Summary             Patient Acceptance, E,D, DU by PG at 9/12/2024 0842    Acceptance, E,TB, VU by EW at 9/10/2024 1154                                         User Key       Initials Effective Dates Name Provider Type Discipline    PG 06/16/21 -  [place], OT Occupational Therapist OT    EW 08/27/24 -  Jay Jones, PT Student PT Student PT                  PT Recommendation and Plan  Planned Therapy Interventions (PT): balance training, bed mobility training, gait training, home exercise program, strengthening, transfer training  Therapy Frequency (PT): daily  Progress Summary (PT)  Progress Toward Functional Goals (PT): progress toward functional goals is good  Plan of Care Reviewed With: patient, family  Progress: improving   Outcome Measures       Row Name 09/12/24 1358 09/10/24 1100          How much help from another person do you currently need...    Turning from your back to your side while in flat bed without using bedrails? 4  -DK 4  -BRIDGET (r) EW (t) BRIDGET (c)     Moving from lying on back to sitting on the side of a flat bed without bedrails? 4  -DK 4  -BRIDGET (r) EW (t) BRIDGET (c)     Moving to and from a bed to a chair (including a wheelchair)? 3  -DK 4  -BRIDGET (r) EW (t) BRIDGET (c)     Standing up from a chair using your arms (e.g., wheelchair, bedside chair)? 4  -DK 4  -BRIDGET (r) EW (t) BRIDGET (c)     Climbing 3-5 steps with a railing? 3  -DK 4  -BRIDGET (r) EW (t) BRIDGET (c)     To walk in hospital room? 3  -DK 4  -BRIDGET (r) EW (t) BRIDGET (c)     AM-PAC 6 Clicks Score (PT) 21  -DK 24  -BRIDGET (r) EW (t)     Highest Level of Mobility Goal 6 --> Walk 10 steps or more  -DK 8 --> Walked 250 feet or more  -BRIDGET (r) EW (t)        Functional Assessment    Outcome Measure Options AM-PAC 6 Clicks Basic Mobility (PT)  -DK AM-PAC 6 Clicks Basic Mobility (PT)   -BRIDGET (r) EW (t) BRIDGET (c)               User Key  (r) = Recorded By, (t) = Taken By, (c) = Cosigned By      Initials Name Provider Type    Unique Lara PTA Physical Therapist Assistant    James Saldivar, PT Physical Therapist    Jay Mcadams, PT Student PT Student                     Time Calculation:    PT Charges       Row Name 09/12/24 1402             Time Calculation    PT Received On 09/12/24  -DK      PT Goal Re-Cert Due Date 09/19/24  -DK         Timed Charges    90393 - PT Therapeutic Exercise Minutes 12  -DK      04643 - Gait Training Minutes  8  -DK      69553 - PT Therapeutic Activity Minutes 8  -DK         Total Minutes    Timed Charges Total Minutes 28  -DK       Total Minutes 28  -DK                User Key  (r) = Recorded By, (t) = Taken By, (c) = Cosigned By      Initials Name Provider Type    Unique Lara PTA Physical Therapist Assistant                  Therapy Charges for Today       Code Description Service Date Service Provider Modifiers Qty    45148370998 HC PT THER PROC EA 15 MIN 9/12/2024 Unique Zavala PTA GP 1    61636830540 HC GAIT TRAINING EA 15 MIN 9/12/2024 Unique Zavala PTA GP 1            PT G-Codes  Outcome Measure Options: AM-PAC 6 Clicks Basic Mobility (PT)  AM-PAC 6 Clicks Score (PT): 21  AM-PAC 6 Clicks Score (OT): 21    Unique Zavala PTA  9/12/2024

## 2024-09-12 NOTE — PLAN OF CARE
Goal Outcome Evaluation:  Plan of Care Reviewed With: patient           Outcome Evaluation: Pt oriented to self and place. Cooperataive and no attempts to wander. Room air. New IV placed in R FA, 22 gauge. No complaints of pain, discomfort, or anxiety. Continue with plan of care.

## 2024-09-12 NOTE — PROGRESS NOTES
[facility]     Psychiatric Progress Note    Patient Name: Paulo Leiva Jr.  : 1955  MRN: [id number]  Primary Care Physician:  Vilma Rolon APRN  Date of admission: 2024    Subjective   Subjective     Patient seen and chart reviewed, discussed with staff.    Chief Complaint: Confusion, mental status changes      HPI:     Patient is a several restraints.  He is sitting up at bedside talking with a friend of the family.  He is calm and cooperative.  He appears better than he did yesterday.    Remains confused.  States that he talked to me yesterday and the man.  Does not recall who was visiting yesterday.  States his mood is good.    He is not oriented to day of the week states that Saturday, informed and started staying.  After 2 to 3 minutes he is able to recall that it is Thursday.  He knows the month.  Knows he is in the hospital but cannot name it.  States that we are currently in [place].  Does not know the year.    Had a kim discussion with the patient that he cannot drink alcohol needs to cease all alcohol consumption, because his condition will worsen.    Discussed that he should not be driving a car and actually should not be driving a motorcycle given his unsteady gait and inability to keep balance on his feet much less on the motorcycle.  He states that he had been realizing he probably should not drive.  States he has not driven a car with motorcycle in a very long time and last drove the car prior to his nephrectomy.      Patient is accepting of this and states that he will comply.    Objective   Objective     Vitals:   Temp:  [97.8 °F (36.6 °C)-98.4 °F (36.9 °C)] 98 °F (36.7 °C)  Heart Rate:  [104] 104  Resp:  [18] 18  BP: (127-168)/(59-80) 168/74          Mental Status Exam:      Appearance:   Well-developed, well-nourished  Reliability:   Limited  Eye Contact:   Fair  Concentration/Focus:    Attentive but some confusion  Behaviors:    No agitation  Memory :    Deficits, see  "above concerning orientation  Speech:    Normal  Language:   Appropriate  Mood :    \"Pretty good\"  Affect:    Congruent with stated mood and slightly constricted but euthymic mostly  Thought process:    Forgetful, superficial,  Thought Content:    Denies suicidal homicidal ideation no hallucination  Insight:   Fairly good  Judgement:    Appropriate, no behavioral Sterman      Result Review    Result Review:  I have personally reviewed the results from the time of this admission to 9/12/2024 11:58 EDT and agree with these findings:  []  Laboratory  []  Microbiology  []  Radiology  []  EKG/Telemetry   []  Cardiology/Vascular   []  Pathology  []  Old records  []  Other:  Most notable findings include:     Lab Results (last 24 hours)       Procedure Component Value Units Date/Time    Magnesium [[id number]]  (Normal) Collected: 09/12/24 0524    Specimen: Blood from Hand, Left Updated: 09/12/24 0628     Magnesium 2.1 mg/dL     Basic Metabolic Panel [[id number]]  (Abnormal) Collected: 09/12/24 0524    Specimen: Blood from Hand, Left Updated: 09/12/24 0628     Glucose 96 mg/dL      BUN 18 mg/dL      Creatinine 1.82 mg/dL      Sodium 132 mmol/L      Potassium 4.2 mmol/L      Chloride 100 mmol/L      CO2 20.7 mmol/L      Calcium 9.1 mg/dL      BUN/Creatinine Ratio 9.9     Anion Gap 11.3 mmol/L      eGFR 40.2 mL/min/1.73     Narrative:      GFR Normal >60  Chronic Kidney Disease <60  Kidney Failure <15      Phosphorus [[id number]]  (Normal) Collected: 09/12/24 0524    Specimen: Blood from Hand, Left Updated: 09/12/24 0622     Phosphorus 3.9 mg/dL     CBC & Differential [[id number]]  (Abnormal) Collected: 09/12/24 0524    Specimen: Blood from Hand, Left Updated: 09/12/24 0559    Narrative:      The following orders were created for panel order CBC & Differential.  Procedure                               Abnormality         Status                     ---------                               -----------         ------                  "    CBC Auto Differential[[id number]]        Abnormal            Final result                 Please view results for these tests on the individual orders.    CBC Auto Differential [[id number]]  (Abnormal) Collected: 09/12/24 0524    Specimen: Blood from Hand, Left Updated: 09/12/24 0559     WBC 9.42 10*3/mm3      RBC 4.79 10*6/mm3      Hemoglobin 13.6 g/dL      Hematocrit 39.8 %      MCV 83.1 fL      MCH 28.4 pg      MCHC 34.2 g/dL      RDW 16.1 %      RDW-SD 48.3 fl      MPV 10.8 fL      Platelets 269 10*3/mm3      Neutrophil % 62.4 %      Lymphocyte % 17.8 %      Monocyte % 10.1 %      Eosinophil % 8.7 %      Basophil % 0.6 %      Immature Grans % 0.4 %      Neutrophils, Absolute 5.87 10*3/mm3      Lymphocytes, Absolute 1.68 10*3/mm3      Monocytes, Absolute 0.95 10*3/mm3      Eosinophils, Absolute 0.82 10*3/mm3      Basophils, Absolute 0.06 10*3/mm3      Immature Grans, Absolute 0.04 10*3/mm3      nRBC 0.0 /100 WBC     Folate RBC [[id number]] Collected: 09/11/24 1618    Specimen: Blood from Arm, Right Updated: 09/11/24 1631                Medications:   apixaban, 5 mg, Oral, BID  atorvastatin, 10 mg, Oral, Daily  citalopram, 10 mg, Oral, Daily  metoprolol succinate XL, 50 mg, Oral, Daily  QUEtiapine, 50 mg, Oral, Nightly  rivastigmine, 1.5 mg, Oral, BID With Meals  sodium chloride, 10 mL, Intravenous, Q12H  thiamine, 100 mg, Oral, Daily  tiotropium bromide monohydrate, 2 puff, Inhalation, Daily - RT  [Held by provider] valsartan, 80 mg, Oral, BID          Assessment / Plan       Active Hospital Problems:  Active Hospital Problems    Diagnosis     **AMS (altered mental status)        Plan:     Continue meds as ordered including citalopram and rivastigmine.  Patient should be encouraged given discharge instructions to stop alcohol consumption  Patient should not drive motor vehicles  Patient at this point has no acute psychiatric problems that require hospitalization.  Does have an emerging dementia that is good to be  chronic and progressive he will need close follow-up with primary care and/or psychiatry.  He has an appointment with neurology on Friday and they will also help manage cognitive issues.      Disposition:  I expect patient to be discharged     Part of this note may be an electronic transcription/translation of spoken language to printed text using the Dragon dictation system.         Electronically signed by Regulo Almaguer MD, 09/12/24, 11:58 AM EDT.

## 2024-09-13 ENCOUNTER — TELEPHONE (OUTPATIENT)
Dept: NEUROLOGY | Facility: CLINIC | Age: 69
End: 2024-09-13

## 2024-09-13 LAB
AMPAR1 AB CSF QL CBA IFA: NEGATIVE
AMPAR2 IGG CSF QL CBA IFA: NEGATIVE
AMPHIPHYSIN AB CSF QL IF: NEGATIVE
ANION GAP SERPL CALCULATED.3IONS-SCNC: 10.7 MMOL/L (ref 5–15)
BASOPHILS # BLD AUTO: 0.04 10*3/MM3 (ref 0–0.2)
BASOPHILS NFR BLD AUTO: 0.4 % (ref 0–1.5)
BUN SERPL-MCNC: 24 MG/DL (ref 8–23)
BUN/CREAT SERPL: 12.6 (ref 7–25)
CALCIUM SPEC-SCNC: 9 MG/DL (ref 8.6–10.5)
CASPR2 AB CSF QL CBA IFA: NEGATIVE
CHLORIDE SERPL-SCNC: 99 MMOL/L (ref 98–107)
CO2 SERPL-SCNC: 21.3 MMOL/L (ref 22–29)
CREAT SERPL-MCNC: 1.9 MG/DL (ref 0.76–1.27)
CREAT UR-MCNC: 96.4 MG/DL
CV2 AB CSF QL IF: NEGATIVE
DEPRECATED RDW RBC AUTO: 49.2 FL (ref 37–54)
DPPX IGG CSF QL IF: NEGATIVE
EGFRCR SERPLBLD CKD-EPI 2021: 38.2 ML/MIN/1.73
EOSINOPHIL # BLD AUTO: 0.67 10*3/MM3 (ref 0–0.4)
EOSINOPHIL NFR BLD AUTO: 7.3 % (ref 0.3–6.2)
ERYTHROCYTE [DISTWIDTH] IN BLOOD BY AUTOMATED COUNT: 16.3 % (ref 12.3–15.4)
GABABR IGG CSF QL CBA IFA: NEGATIVE
GAD65 AB CSF IA-SCNC: NEGATIVE NMOL/L
GLIAL NUC TYPE 1 AB CSF QL IF: NEGATIVE
GLUCOSE SERPL-MCNC: 98 MG/DL (ref 65–99)
HCT VFR BLD AUTO: 37.1 % (ref 37.5–51)
HGB BLD-MCNC: 12.6 G/DL (ref 13–17.7)
HU1 AB CSF QL IF: NEGATIVE
HU2 AB CSF QL IF: NEGATIVE
HU3 AB CSF QL IF: NEGATIVE
IGLON5 IGG CSF QL CBA IFA: NEGATIVE
IMM GRANULOCYTES # BLD AUTO: 0.03 10*3/MM3 (ref 0–0.05)
IMM GRANULOCYTES NFR BLD AUTO: 0.3 % (ref 0–0.5)
IMP & REVIEW OF LAB RESULTS: NEGATIVE
IP3R 1 IGG CSF QL IF: NEGATIVE
LGI1 AB CSF QL CBA IFA: NEGATIVE
LYMPHOCYTES # BLD AUTO: 2.09 10*3/MM3 (ref 0.7–3.1)
LYMPHOCYTES NFR BLD AUTO: 22.9 % (ref 19.6–45.3)
MA+TA AB CSF QL IF: NEGATIVE
MAGNESIUM SERPL-MCNC: 1.8 MG/DL (ref 1.6–2.4)
MCH RBC QN AUTO: 28.4 PG (ref 26.6–33)
MCHC RBC AUTO-ENTMCNC: 34 G/DL (ref 31.5–35.7)
MCV RBC AUTO: 83.6 FL (ref 79–97)
MGLUR1 IGG CSF QL CBA IFA: NEGATIVE
MONOCYTES # BLD AUTO: 0.96 10*3/MM3 (ref 0.1–0.9)
MONOCYTES NFR BLD AUTO: 10.5 % (ref 5–12)
NEUTROPHILS NFR BLD AUTO: 5.34 10*3/MM3 (ref 1.7–7)
NEUTROPHILS NFR BLD AUTO: 58.6 % (ref 42.7–76)
NMDAR IGG CSF QL IF: NEGATIVE
NRBC BLD AUTO-RTO: 0 /100 WBC (ref 0–0.2)
PCA-2 AB CSF QL IF: NEGATIVE
PCA-TR AB CSF QL CBA IFA: NEGATIVE
PCA-TR AB CSF QL IF: NEGATIVE
PHOSPHATE SERPL-MCNC: 4.7 MG/DL (ref 2.5–4.5)
PLATELET # BLD AUTO: 254 10*3/MM3 (ref 140–450)
PMV BLD AUTO: 10.5 FL (ref 6–12)
POTASSIUM SERPL-SCNC: 4.2 MMOL/L (ref 3.5–5.2)
PROT ?TM UR-MCNC: 9.9 MG/DL
PROT/CREAT UR: 0.1 MG/G{CREAT}
PURKINJE CELLS AB CSF QL IF: NEGATIVE
RBC # BLD AUTO: 4.44 10*6/MM3 (ref 4.14–5.8)
SODIUM SERPL-SCNC: 131 MMOL/L (ref 136–145)
SODIUM UR-SCNC: 61 MMOL/L
WBC NRBC COR # BLD AUTO: 9.13 10*3/MM3 (ref 3.4–10.8)
ZIC4 AB SER QL: NEGATIVE

## 2024-09-13 PROCEDURE — 84100 ASSAY OF PHOSPHORUS: CPT | Performed by: INTERNAL MEDICINE

## 2024-09-13 PROCEDURE — 80048 BASIC METABOLIC PNL TOTAL CA: CPT | Performed by: INTERNAL MEDICINE

## 2024-09-13 PROCEDURE — 85025 COMPLETE CBC W/AUTO DIFF WBC: CPT | Performed by: INTERNAL MEDICINE

## 2024-09-13 PROCEDURE — 99232 SBSQ HOSP IP/OBS MODERATE 35: CPT | Performed by: INTERNAL MEDICINE

## 2024-09-13 PROCEDURE — 83735 ASSAY OF MAGNESIUM: CPT | Performed by: INTERNAL MEDICINE

## 2024-09-13 PROCEDURE — 94799 UNLISTED PULMONARY SVC/PX: CPT

## 2024-09-13 PROCEDURE — 82570 ASSAY OF URINE CREATININE: CPT | Performed by: INTERNAL MEDICINE

## 2024-09-13 PROCEDURE — 84300 ASSAY OF URINE SODIUM: CPT | Performed by: INTERNAL MEDICINE

## 2024-09-13 PROCEDURE — 84156 ASSAY OF PROTEIN URINE: CPT | Performed by: INTERNAL MEDICINE

## 2024-09-13 PROCEDURE — 25810000003 SODIUM CHLORIDE 0.9 % SOLUTION: Performed by: INTERNAL MEDICINE

## 2024-09-13 PROCEDURE — 94664 DEMO&/EVAL PT USE INHALER: CPT

## 2024-09-13 RX ORDER — TRAZODONE HYDROCHLORIDE 50 MG/1
50 TABLET, FILM COATED ORAL NIGHTLY
Status: DISCONTINUED | OUTPATIENT
Start: 2024-09-13 | End: 2024-09-14 | Stop reason: HOSPADM

## 2024-09-13 RX ORDER — SODIUM CHLORIDE 9 MG/ML
75 INJECTION, SOLUTION INTRAVENOUS CONTINUOUS
Status: ACTIVE | OUTPATIENT
Start: 2024-09-13 | End: 2024-09-14

## 2024-09-13 RX ADMIN — RIVASTIGMINE TARTRATE 1.5 MG: 1.5 CAPSULE ORAL at 18:19

## 2024-09-13 RX ADMIN — SODIUM CHLORIDE 75 ML/HR: 9 INJECTION, SOLUTION INTRAVENOUS at 15:18

## 2024-09-13 RX ADMIN — TRAZODONE HYDROCHLORIDE 50 MG: 50 TABLET ORAL at 20:08

## 2024-09-13 RX ADMIN — METOPROLOL SUCCINATE 50 MG: 50 TABLET, EXTENDED RELEASE ORAL at 08:25

## 2024-09-13 RX ADMIN — ATORVASTATIN CALCIUM 10 MG: 10 TABLET, FILM COATED ORAL at 08:13

## 2024-09-13 RX ADMIN — Medication 10 ML: at 09:52

## 2024-09-13 RX ADMIN — RIVASTIGMINE TARTRATE 1.5 MG: 1.5 CAPSULE ORAL at 08:13

## 2024-09-13 RX ADMIN — Medication 100 MG: at 08:13

## 2024-09-13 RX ADMIN — QUETIAPINE FUMARATE 50 MG: 25 TABLET ORAL at 20:08

## 2024-09-13 RX ADMIN — Medication 10 ML: at 20:08

## 2024-09-13 RX ADMIN — APIXABAN 5 MG: 5 TABLET, FILM COATED ORAL at 20:08

## 2024-09-13 RX ADMIN — TIOTROPIUM BROMIDE INHALATION SPRAY 2 PUFF: 3.12 SPRAY, METERED RESPIRATORY (INHALATION) at 12:53

## 2024-09-13 RX ADMIN — APIXABAN 5 MG: 5 TABLET, FILM COATED ORAL at 08:13

## 2024-09-13 RX ADMIN — CITALOPRAM HYDROBROMIDE 10 MG: 20 TABLET ORAL at 08:13

## 2024-09-13 NOTE — PLAN OF CARE
Goal Outcome Evaluation:  Plan of Care Reviewed With: patient        Progress: improving  Outcome Evaluation: Pt A/Ox2. Room air. Ambulated with walker stand by and showered stand by today. Possible discharge tomorrow. Bladder scanned pt following urine void of 450 mL; scan showed 92 mL post void. Initiated IVF @ 75 mL/hr. No complaints of pain or discomfort. Behavior appropriate. Continue with plan of care.

## 2024-09-13 NOTE — CONSULTS
[facility]   Nephrology Consult Note      Patient Name: Paulo Leiva Jr.  : 1955  MRN: [id number]  Primary Care Physician:  Vilma Rolon APRN  Referring Physician: No ref. provider found  Date of admission: 2024    Subjective   Subjective     Reason for Consult/ Chief Complaint: Acute on chronic kidney disease    HPI:  Paulo Leiva Jr. is a 68 y.o. male with history of renal cell cancer, status post right nephrectomy in 2024 by Dr. Mcmanus at [facility] who presented with altered mentation and was noted to have worsening kidney function.  Creatinine 1.9 today.  Prior to nephrectomy creatinine was 0.87, postprocedure creatinine was 1.3.  Since admission, his sodium improved from 126-131, he is less confused.  He did have difficulty with emptying his bladder earlier today.  He is tolerating p.o.  His valsartan has been on hold.  Urinalysis bland without hematuria.  Prior to nephrectomy, he was not aware of any kidney diseases.    Review of Systems   All systems were reviewed and negative except for: What is mentioned above.    Personal History     Past Medical History:   Diagnosis Date    Benign essential hypertension     COPD (chronic obstructive pulmonary disease)     Erectile dysfunction Apprx: 5 yrs ago    Generalized convulsive seizures 2023    Hyperlipidemia 2018    Hyponatremia     Implantable loop recorder present     Renal insufficiency     Syncope     Vertigo 2021    Vision loss        Past Surgical History:   Procedure Laterality Date    CARDIAC CATHETERIZATION N/A 2022    Procedure: Left Heart Cath;  Surgeon: Randell Casiano MD;  Location: [facility] INVASIVE LOCATION;  Service: Cardiovascular;  Laterality: N/A;    CARDIAC ELECTROPHYSIOLOGY PROCEDURE N/A 2022    Procedure: Loop insertion;  Surgeon: Randell Casiano MD;  Location: [facility] CATH INVASIVE LOCATION;  Service: Cardiovascular;  Laterality: N/A;    COLONOSCOPY      COLONOSCOPY  N/A 10/26/2022    Procedure: COLONOSCOPY FOR SCREENING;  Surgeon: Roque Whitlock MD;  Location: [facility] ENDOSCOPY;  Service: Gastroenterology;  Laterality: N/A;  NORMAL COLON    NEPHRECTOMY Right 08/12/2024       Family History: family history includes Dementia in his mother; No Known Problems in his father. Otherwise pertinent FHx was reviewed and not pertinent to current issue.    Social History:  reports that he quit smoking about 13 years ago. His smoking use included cigarettes. He started smoking about 50 years ago. He has a 72.7 pack-year smoking history. He has been exposed to tobacco smoke. He has never used smokeless tobacco. He reports current alcohol use of about 6.0 - 8.0 standard drinks of alcohol per week. He reports that he does not currently use drugs.    Home Medications:  Tiotropium Bromide Monohydrate, apixaban, meclizine, metoprolol succinate XL, simvastatin, and valsartan    Allergies:  No Known Allergies    Objective    Objective     Vitals:   Temp:  [97.5 °F (36.4 °C)-98.4 °F (36.9 °C)] 97.6 °F (36.4 °C)  Heart Rate:  [] 68  Resp:  [18] 18  BP: ()/(56-79) 154/69     Physical Exam    Constitutional: Awake, alert, no distress, conversant and pleasant   Eyes: sclerae anicteric, no conjunctival injection   HENT: mucous membranes moist   Neck: Supple, no thyromegaly, no lymphadenopathy, trachea midline, No JVD   Respiratory: Clear to auscultation bilaterally, nonlabored respirations    Cardiovascular: RRR, no murmurs, rubs, or gallops.   Gastrointestinal: Positive bowel sounds, soft, nontender, nondistended, incisions from recent surgery noted, healing well.  Some suprapubic fullness noted.   Musculoskeletal: No edema, no clubbing or cyanosis   Psychiatric: Appropriate affect, cooperative   Neurologic: Oriented x 3, moving all extremities, Cranial Nerves grossly intact, speech clear   Skin: warm and dry, no rashes     Result Review    Result Reviewed:  I have personally reviewed  the results from the time of this admission to 9/13/2024 14:52 EDT and agree with these findings:  [x]  Laboratory  []  Microbiology  [x]  Radiology  []  EKG/Telemetry   []  Cardiology/Vascular   []  Pathology  [x]  Old records  []  Other:  LAB RESULTS:    LAB RESULTS:        Lab 09/13/24  0537 09/12/24  0524 09/11/24  0752 09/10/24  0456 09/09/24  1338   SODIUM 131* 132* 130* 128* 126*   POTASSIUM 4.2 4.2 4.4 4.3 4.6   CHLORIDE 99 100 96* 95* 91*   CO2 21.3* 20.7* 23.0 22.7 23.0   BUN 24* 18 10 11 13   CREATININE 1.90* 1.82* 1.36* 1.38* 1.36*   GLUCOSE 98 96 97 96 110*   EGFR 38.2* 40.2* 57.0* 56.0* 57.0*   ANION GAP 10.7 11.3 11.0 10.3 12.0   MAGNESIUM 1.8 2.1 1.7  --   --    PHOSPHORUS 4.7* 3.9  --   --   --            Most notable findings include: Labs reviewed as above.    Assessment & Plan   Assessment / Plan     Brief Patient Summary:  Paulo Leiva Jr. is a 68 y.o. male who had recent nephrectomy for renal cell cancer, here with altered mentation and worse kidney function.    Active Hospital Problems:  Active Hospital Problems    Diagnosis     **AMS (altered mental status)        Assessment and Plan:   - Acute kidney injury, exact etiology is uncertain, had transient hypotensive episode but mostly hemodynamically stable.  Urinalysis bland, status post recent right nephrectomy for renal cell cancer.  Start normal saline at 75 cc an hour x 1 L.  Check bladder scan, quantify proteinuria and recheck labs in AM.    - Probable chronic kidney disease stage III, baseline creatinine after nephrectomy may be around 1.3-1.5.  Currently ARB on hold.    - Hyponatremia, improved since admission.  Monitor.    - Renal cell cancer, status post right nephrectomy.  Followed by urology at [facility].    Altered mentation, seems better, neurology and psychiatry are following.  History of early dementia and alcohol use.    Discussed with primary nursing staff.  Will follow,    Thank you very much for this  consult!    Electronically signed by Kathy Moctezuma MD, 9/13/2024, 14:52 EDT.

## 2024-09-13 NOTE — PROGRESS NOTES
[facility]   Hospitalist Progress Note  Date: 2024  Patient Name: Paulo Leiva Jr.  : 1955  MRN: [id number]  Date of admission: 2024      Subjective   Subjective     Chief Complaint: Altered renal status, falls    Summary: 68 y.o. male with past medical history of right-sided nephrectomy, essential hypertension, COPD, hyponatremia presents to ED due to altered mental status, difficulty 1 and urinary incontinence.  As per family patient has been dealing with multiple falls for the past couple months.  Patient was seen by cardiology for possible arrhythmia issues and has a loop recorder in place.  About 1 month ago patient had a right-sided nephrectomy for renal mass at [place].  Since his surgery family states patient is having worsening walking issues, urinary incontinence and worsening mental status has been gradually worsening.  Today family noticed patient was having difficulty talking with worsening mental status and gait issues.  In the ED, patient's vitals showed temperature 98.6, heart rate 91, blood pressure 134/50 and satting 92% on room air.  Labs show sodium 126, creatinine 1.36, glucose 110, white blood cell 12.3, hemoglobin 14.8.  CT head showed possible normal pressure hydrocephalus.  Patient admitted to floors for further management.     Interval Followup: No acute events overnight, was able to rest a little better last night.  Continues to improve daily.  Patient's family is going to attempt to take him home at discharge, they are going to try to avoid rehab at this time.    Objective   Objective     Vitals:   Temp:  [97.5 °F (36.4 °C)-98.4 °F (36.9 °C)] 97.6 °F (36.4 °C)  Heart Rate:  [] 68  Resp:  [18] 18  BP: ()/(56-79) 154/69  Physical Exam    Constitutional: Awake, alert, sitting up on the edge of the bed, calm and cooperative   Respiratory: Clear to auscultation bilaterally, nonlabored respirations    Cardiovascular: RRR, no MRG   Gastrointestinal: Positive  bowel sounds, soft, nontender, nondistended   Neurologic: Oriented x 3, strength symmetric in all extremities, Cranial Nerves grossly intact to confrontation, speech clear    Result Review    I have personally reviewed the results below:  [x]  Laboratory personally reviewed BMP, CBC, magnesium, phosphorus  []  Microbiology  []  Radiology  []  EKG/Telemetry   []  Cardiology/Vascular   []  Pathology  []  Old records  []  Other:  CBC          9/11/2024    07:52 9/11/2024    16:18 9/12/2024    05:24 9/13/2024    05:37   CBC   WBC 11.15   9.42  9.13    RBC 5.57   4.79  4.44    Hemoglobin 15.8   13.6  12.6    Hematocrit 46.3  47.0  39.8  37.1    MCV 83.1   83.1  83.6    MCH 28.4   28.4  28.4    MCHC 34.1   34.2  34.0    RDW 16.1   16.1  16.3    Platelets 291   269  254      CMP          9/11/2024    07:52 9/12/2024    05:24 9/13/2024    05:37   CMP   Glucose 97  96  98    BUN 10  18  24    Creatinine 1.36  1.82  1.90    EGFR 57.0  40.2  38.2    Sodium 130  132  131    Potassium 4.4  4.2  4.2    Chloride 96  100  99    Calcium 9.9  9.1  9.0    BUN/Creatinine Ratio 7.4  9.9  12.6    Anion Gap 11.0  11.3  10.7        Assessment & Plan   Assessment / Plan   Encephalopathy   Concern for dementia with behavioral disturbance  Hospital-acquired delirium  Right renal mass status post nephrectomy   chronic hyponatremia  essential hypertension   Cardiac arrhythmia on Eliquis  History of COPD  Former smoker    Continue to monitor in the hospital for workup and management of the above  Strongly recommend discontinuation of alcohol, patient should not drive or operate heavy machinery at all moving forward  Delirium precautions in place  Continue with rivastigmine and citalopram per psychiatry recommendations  Meningitis panel negative, paraneoplastic panel pending  Will need formal cognitive testing and neurology follow-up on outpatient basis  Continue to hold valsartan, consult nephrology due to bump in creatinine  Obtain urine  sodium, give a short trial of normal saline  Continue with Eliquis for history of A-fib/arrhythmia  Continue metoprolol and atorvastatin  PT/OT consulted-plan to return home with home health when medically stable  Fall precautions, bed alarm in place  Trend renal function and electrolytes with a.m. BMP, magnesium   Trend Hgb and WBC with a.m. CBC     Discussed plan with RN, nephrology    VTE Prophylaxis:  Pharmacologic & mechanical VTE prophylaxis orders are present.        CODE STATUS:   Level Of Support Discussed With: Patient  Code Status (Patient has no pulse and is not breathing): CPR (Attempt to Resuscitate)  Medical Interventions (Patient has pulse or is breathing): Full Support

## 2024-09-13 NOTE — PLAN OF CARE
Goal Outcome Evaluation:  Plan of Care Reviewed With: patient        Progress: no change  Outcome Evaluation: VSS. Pt oriented to self & place. Pt ambulated to bathroom with walker and standby assistance well throughout shift. PRN trazodone administered for c/o trouble sleeping. Pt rested well. No c/o pain or discomfort at this time.

## 2024-09-13 NOTE — TELEPHONE ENCOUNTER
Caller:  HOSPITAL D/C NURSE     Relationship:  NURSE    Best call back number:    PATIENT CALLED REQUESTING TO CANCEL SAME DAY APPT.    Did the patient call AFTER the start time of their scheduled appointment?    NO    Was the patient's appointment rescheduled?    NO    Any additional information: PATIENT IS HOSPITALIZED AT THIS TIME.  HOSP WILL CALL MONDAY WITH D/C INFORMATION

## 2024-09-14 ENCOUNTER — READMISSION MANAGEMENT (OUTPATIENT)
Dept: CALL CENTER | Facility: HOSPITAL | Age: 69
End: 2024-09-14
Payer: MEDICARE

## 2024-09-14 VITALS
OXYGEN SATURATION: 97 % | SYSTOLIC BLOOD PRESSURE: 141 MMHG | DIASTOLIC BLOOD PRESSURE: 88 MMHG | RESPIRATION RATE: 18 BRPM | HEIGHT: 71 IN | BODY MASS INDEX: 28.02 KG/M2 | TEMPERATURE: 97.9 F | WEIGHT: 200.18 LBS | HEART RATE: 92 BPM

## 2024-09-14 LAB
ANION GAP SERPL CALCULATED.3IONS-SCNC: 11.1 MMOL/L (ref 5–15)
BASOPHILS # BLD AUTO: 0.06 10*3/MM3 (ref 0–0.2)
BASOPHILS NFR BLD AUTO: 0.8 % (ref 0–1.5)
BUN SERPL-MCNC: 18 MG/DL (ref 8–23)
BUN/CREAT SERPL: 12.4 (ref 7–25)
CALCIUM SPEC-SCNC: 8.4 MG/DL (ref 8.6–10.5)
CHLORIDE SERPL-SCNC: 98 MMOL/L (ref 98–107)
CO2 SERPL-SCNC: 20.9 MMOL/L (ref 22–29)
CREAT SERPL-MCNC: 1.45 MG/DL (ref 0.76–1.27)
DEPRECATED RDW RBC AUTO: 49.6 FL (ref 37–54)
EGFRCR SERPLBLD CKD-EPI 2021: 52.8 ML/MIN/1.73
EOSINOPHIL # BLD AUTO: 0.69 10*3/MM3 (ref 0–0.4)
EOSINOPHIL NFR BLD AUTO: 9.2 % (ref 0.3–6.2)
ERYTHROCYTE [DISTWIDTH] IN BLOOD BY AUTOMATED COUNT: 16.2 % (ref 12.3–15.4)
GLUCOSE SERPL-MCNC: 90 MG/DL (ref 65–99)
HCT VFR BLD AUTO: 36.3 % (ref 37.5–51)
HGB BLD-MCNC: 12.3 G/DL (ref 13–17.7)
IMM GRANULOCYTES # BLD AUTO: 0.03 10*3/MM3 (ref 0–0.05)
IMM GRANULOCYTES NFR BLD AUTO: 0.4 % (ref 0–0.5)
LYMPHOCYTES # BLD AUTO: 1.88 10*3/MM3 (ref 0.7–3.1)
LYMPHOCYTES NFR BLD AUTO: 25 % (ref 19.6–45.3)
MAGNESIUM SERPL-MCNC: 1.6 MG/DL (ref 1.6–2.4)
MCH RBC QN AUTO: 28.3 PG (ref 26.6–33)
MCHC RBC AUTO-ENTMCNC: 33.9 G/DL (ref 31.5–35.7)
MCV RBC AUTO: 83.6 FL (ref 79–97)
MONOCYTES # BLD AUTO: 0.78 10*3/MM3 (ref 0.1–0.9)
MONOCYTES NFR BLD AUTO: 10.4 % (ref 5–12)
NEUTROPHILS NFR BLD AUTO: 4.07 10*3/MM3 (ref 1.7–7)
NEUTROPHILS NFR BLD AUTO: 54.2 % (ref 42.7–76)
NRBC BLD AUTO-RTO: 0 /100 WBC (ref 0–0.2)
PHOSPHATE SERPL-MCNC: 3.9 MG/DL (ref 2.5–4.5)
PLATELET # BLD AUTO: 244 10*3/MM3 (ref 140–450)
PMV BLD AUTO: 10.1 FL (ref 6–12)
POTASSIUM SERPL-SCNC: 4.1 MMOL/L (ref 3.5–5.2)
RBC # BLD AUTO: 4.34 10*6/MM3 (ref 4.14–5.8)
SODIUM SERPL-SCNC: 130 MMOL/L (ref 136–145)
WBC NRBC COR # BLD AUTO: 7.51 10*3/MM3 (ref 3.4–10.8)

## 2024-09-14 PROCEDURE — 94664 DEMO&/EVAL PT USE INHALER: CPT

## 2024-09-14 PROCEDURE — 84100 ASSAY OF PHOSPHORUS: CPT | Performed by: INTERNAL MEDICINE

## 2024-09-14 PROCEDURE — 85025 COMPLETE CBC W/AUTO DIFF WBC: CPT | Performed by: INTERNAL MEDICINE

## 2024-09-14 PROCEDURE — 80048 BASIC METABOLIC PNL TOTAL CA: CPT | Performed by: INTERNAL MEDICINE

## 2024-09-14 PROCEDURE — 94799 UNLISTED PULMONARY SVC/PX: CPT

## 2024-09-14 PROCEDURE — 99239 HOSP IP/OBS DSCHRG MGMT >30: CPT | Performed by: INTERNAL MEDICINE

## 2024-09-14 PROCEDURE — 83735 ASSAY OF MAGNESIUM: CPT | Performed by: INTERNAL MEDICINE

## 2024-09-14 RX ORDER — RIVASTIGMINE TARTRATE 1.5 MG/1
1.5 CAPSULE ORAL 2 TIMES DAILY WITH MEALS
Qty: 60 CAPSULE | Refills: 0 | Status: SHIPPED | OUTPATIENT
Start: 2024-09-14 | End: 2024-10-14

## 2024-09-14 RX ORDER — QUETIAPINE FUMARATE 50 MG/1
50 TABLET, FILM COATED ORAL NIGHTLY
Qty: 30 TABLET | Refills: 0 | Status: SHIPPED | OUTPATIENT
Start: 2024-09-14

## 2024-09-14 RX ORDER — CITALOPRAM HYDROBROMIDE 10 MG/1
10 TABLET ORAL DAILY
Qty: 30 TABLET | Refills: 0 | Status: SHIPPED | OUTPATIENT
Start: 2024-09-15

## 2024-09-14 RX ORDER — VALSARTAN 80 MG/1
40 TABLET ORAL DAILY
Qty: 15 TABLET | Refills: 0 | Status: SHIPPED | OUTPATIENT
Start: 2024-09-14 | End: 2024-10-14

## 2024-09-14 RX ORDER — LANOLIN ALCOHOL/MO/W.PET/CERES
100 CREAM (GRAM) TOPICAL DAILY
Qty: 30 TABLET | Refills: 0 | Status: SHIPPED | OUTPATIENT
Start: 2024-09-15

## 2024-09-14 RX ADMIN — RIVASTIGMINE TARTRATE 1.5 MG: 1.5 CAPSULE ORAL at 08:08

## 2024-09-14 RX ADMIN — CITALOPRAM HYDROBROMIDE 10 MG: 20 TABLET ORAL at 08:08

## 2024-09-14 RX ADMIN — Medication 10 ML: at 08:08

## 2024-09-14 RX ADMIN — Medication 100 MG: at 08:08

## 2024-09-14 RX ADMIN — METOPROLOL SUCCINATE 50 MG: 50 TABLET, EXTENDED RELEASE ORAL at 08:08

## 2024-09-14 RX ADMIN — ATORVASTATIN CALCIUM 10 MG: 10 TABLET, FILM COATED ORAL at 08:08

## 2024-09-14 RX ADMIN — APIXABAN 5 MG: 5 TABLET, FILM COATED ORAL at 08:08

## 2024-09-14 RX ADMIN — TIOTROPIUM BROMIDE INHALATION SPRAY 2 PUFF: 3.12 SPRAY, METERED RESPIRATORY (INHALATION) at 11:41

## 2024-09-14 NOTE — OUTREACH NOTE
Prep Survey      Flowsheet Row Responses   [facility] patient discharged from? [facility]   Is LACE score < 7 ? No   Eligibility [facility]   Date of Admission 09/09/24   Date of Discharge 09/14/24   Discharge Disposition Home or Self Care   Discharge diagnosis AMS (altered mental status)   Does the patient have one of the following disease processes/diagnoses(primary or secondary)? Other   Prep survey completed? Yes            Ramonita DEMPSEY - Registered Nurse

## 2024-09-14 NOTE — PLAN OF CARE
Goal Outcome Evaluation:  Plan of Care Reviewed With: patient        Progress: no change  Outcome Evaluation: VSS. Patient AAO x2. Patient is discharging home today with son. No other concerns or complaints.

## 2024-09-14 NOTE — PLAN OF CARE
Goal Outcome Evaluation:  Plan of Care Reviewed With: patient        Progress: improving  Outcome Evaluation: VSS. A&Ox2 to self & place. Pt rested well throughout shift. Possible d/c today. No c/o pain or discomfort at this time.

## 2024-09-14 NOTE — DISCHARGE SUMMARY
[facility]         HOSPITALIST  DISCHARGE SUMMARY    Patient Name: Paulo Leiva Jr.  : 1955  MRN: [id number]    Date of Admission: 2024  Date of Discharge:  24  Primary Care Physician: Vilma Rolon APRN    Consults       Date and Time Order Name Status Description    2024  8:11 AM Inpatient Nephrology Consult Completed     2024  7:41 AM Inpatient Psychiatrist Consult Completed     9/10/2024  7:42 AM Inpatient Neurology Consult General      2024  7:40 PM Hospitalist (on-call MD unless specified)              Active and Resolved Hospital Problems:  Active Hospital Problems    Diagnosis POA    **AMS (altered mental status) [R41.82] Yes      Resolved Hospital Problems   No resolved problems to display.   Encephalopathy   Likely dementia with behavioral disturbance  Hospital-acquired delirium  Right renal mass status post nephrectomy   chronic hyponatremia  essential hypertension   Cardiac arrhythmia on Eliquis  History of COPD  Alcohol abuse/dependence  Former smoker    Hospital Course     Hospital Course:  Paulo Leiva Jr. is a 68 y.o. male with past medical history of right-sided nephrectomy, essential hypertension, alcohol abuse, COPD, hyponatremia presents to ED due to altered mental status, difficulty 1 and urinary incontinence. As per family patient has been dealing with multiple falls for the past couple months. Patient was seen by cardiology for possible arrhythmia issues and has a loop recorder in place. About 1 month ago patient had a right-sided nephrectomy for renal mass at [place]. Since his surgery family states patient is having worsening walking issues, urinary incontinence and worsening mental status has been gradually worsening. Today family noticed patient was having difficulty talking with worsening mental status and gait issues. In the ED, patient's vitals showed temperature 98.6, heart rate 91, blood pressure 134/50 and satting 92% on room  air. Labs show sodium 126, creatinine 1.36, glucose 110, white blood cell 12.3, hemoglobin 14.8. CT head showed possible normal pressure hydrocephalus. Patient admitted to floors for further management.  Neurology was consulted, LP was performed which was negative for meningitis, had normal pressure.  Unfortunately, his mental status acutely worsened after LP which essentially ruled out normal pressure hydrocephalus.  He did have acute encephalopathy requiring restraints and medications to help keep him calm.  Psychiatry was consulted.  Between psychiatry and neurology, it was felt the patient has developing dementia.  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  He was started on thiamine, rivastigmine as well as antianxiolytic with significant improvement.  He was taken out of restraints and improved drastically.  He was able to dress himself, bathe himself, and ambulate about the room independently on the day of discharge.  He was advised to discontinue all alcohol ingestion and advised that he should not be driving or operating heavy machinery.  Hospital course was complicated by an ERICK for which nephrology was consulted.  ERICK improved, his valsartan was decreased at discharge.  He was discharged home with home health in stable condition on 9/14/2024.  Recommend follow-up with PCP within 1 week, nephrology within 2 weeks, neurology within 1 month.  He will need formal neurocognitive testing on an outpatient basis.    DISCHARGE Follow Up Recommendations for labs and diagnostics: BMP within 1-2 weeks.  Outpatient neurocognitive testing    Day of Discharge     Vital Signs:  Temp:  [97.5 °F (36.4 °C)-98.1 °F (36.7 °C)] 98 °F (36.7 °C)  Heart Rate:  [68-79] 74  Resp:  [18] 18  BP: (120-173)/(59-77) 126/60  Physical Exam:   Gen: NAD, WDWN  ENT: PERRL, EOMI   CV: RRR no MRG  Pulm: CTAB, no w/r/r  GI: Abd soft, NTND, +bs  Neuro: Moving all extremities spontaneously, CN  II-XII grossly intact   Psych: A&O*3, intermittently forgetful, normal mood and affect  Skin: No lesions or rashes noted    Discharge Details        Discharge Medications        New Medications        Instructions Start Date   citalopram 10 MG tablet  Commonly known as: CeleXA   10 mg, Oral, Daily   Start Date: September 15, 2024     QUEtiapine 50 MG tablet  Commonly known as: SEROquel   50 mg, Oral, Nightly      rivastigmine 1.5 MG capsule  Commonly known as: EXELON   1.5 mg, Oral, 2 Times Daily With Meals      thiamine 100 MG tablet  Commonly known as: VITAMIN B1   100 mg, Oral, Daily   Start Date: September 15, 2024            Changes to Medications        Instructions Start Date   valsartan 80 MG tablet  Commonly known as: DIOVAN  What changed:   how much to take  when to take this   40 mg, Oral, Daily             Continue These Medications        Instructions Start Date   apixaban 5 MG tablet tablet  Commonly known as: ELIQUIS   5 mg, Oral, 2 Times Daily      meclizine 25 MG tablet  Commonly known as: ANTIVERT   50 mg, Oral, Daily      metoprolol succinate XL 50 MG 24 hr tablet  Commonly known as: TOPROL-XL   50 mg, Oral, Daily      simvastatin 20 MG tablet  Commonly known as: ZOCOR   20 mg, Oral, Daily      Spiriva Respimat 1.25 MCG/ACT aerosol solution inhaler  Generic drug: Tiotropium Bromide Monohydrate   2 puffs, Inhalation, Daily - RT               No Known Allergies    Discharge Disposition:  Home-Health Care [facility]    Diet:  Hospital:  Diet Order   Procedures    Diet: Cardiac; Healthy Heart (2-3 Na+); Fluid Consistency: Thin (IDDSI 0)       Discharge Activity:   Activity Instructions       Activity as Tolerated              CODE STATUS:  Code Status and Medical Interventions: CPR (Attempt to Resuscitate); Full Support   Ordered at: 09/10/24 0742     Level Of Support Discussed With:    Patient     Code Status (Patient has no pulse and is not breathing):    CPR (Attempt to Resuscitate)     Medical  Interventions (Patient has pulse or is breathing):    Full Support         Future Appointments   Date Time Provider Department Center   10/1/2024 11:00 AM [facility] PET ADMIN RM 1  [facility]   10/1/2024 11:15 AM [facility] PET 1  [facility]   10/9/2024 12:45 PM NURSE/MA ONC VANNESA [facility] ONC E521 [facility]   10/11/2024  2:15 PM Nabor Moser MD [facility] ONC E521 [facility]   10/29/2024  9:45 AM Jenni Crawley APRN [facility] CD ETOWN [facility]       Additional Instructions for the Follow-ups that You Need to Schedule       Ambulatory Referral to Neurology   As directed      3 weeks    Discharge Follow-up with PCP   As directed       Currently Documented PCP:    Vilma Rolon APRN    PCP Phone Number:    [phone]     Follow Up Details: 3-5 days        Basic Metabolic Panel    Sep 28, 2024 (Approximate)      Release to patient: Routine Release                Pertinent  and/or Most Recent Results     PROCEDURES:   None    LAB RESULTS:      Lab 09/14/24  0528 09/13/24  0537 09/12/24  0524 09/11/24  1618 09/11/24  0752 09/10/24  0902 09/10/24  0456 09/09/24  1636   WBC 7.51 9.13 9.42  --  11.15*  --  9.89  --    HEMOGLOBIN 12.3* 12.6* 13.6  --  15.8  --  13.7  --    HEMATOCRIT 36.3* 37.1* 39.8 47.0 46.3  --  39.7  --    PLATELETS 244 254 269  --  291  --  260  --    NEUTROS ABS 4.07 5.34 5.87  --  8.18*  --  6.56  --    IMMATURE GRANS (ABS) 0.03 0.03 0.04  --  0.02  --  0.03  --    LYMPHS ABS 1.88 2.09 1.68  --  1.34  --  1.58  --    MONOS ABS 0.78 0.96* 0.95*  --  1.05*  --  1.04*  --    EOS ABS 0.69* 0.67* 0.82*  --  0.49*  --  0.61*  --    MCV 83.6 83.6 83.1  --  83.1  --  82.7  --    PROTIME  --   --   --   --   --   --   --  13.2   APTT  --   --   --   --   --  32.9  --   --          Lab 09/14/24  0528 09/13/24  0537 09/12/24  0524 09/11/24  0752 09/10/24  0456 09/09/24  1338   SODIUM 130* 131* 132* 130* 128* 126*   POTASSIUM 4.1 4.2 4.2 4.4 4.3 4.6   CHLORIDE 98 99 100 96* 95* 91*   CO2 20.9* 21.3* 20.7* 23.0 22.7 23.0   ANION GAP 11.1  10.7 11.3 11.0 10.3 12.0   BUN 18 24* 18 10 11 13   CREATININE 1.45* 1.90* 1.82* 1.36* 1.38* 1.36*   EGFR 52.8* 38.2* 40.2* 57.0* 56.0* 57.0*   GLUCOSE 90 98 96 97 96 110*   CALCIUM 8.4* 9.0 9.1 9.9 9.3 9.7   MAGNESIUM 1.6 1.8 2.1 1.7  --   --    PHOSPHORUS 3.9 4.7* 3.9  --   --   --    TSH  --   --   --   --   --  0.804         Lab 09/09/24  1338   TOTAL PROTEIN 6.7   ALBUMIN 3.7   GLOBULIN 3.0   ALT (SGPT) 22   AST (SGOT) 27   BILIRUBIN 1.1   ALK PHOS 89         Lab 09/09/24  1636   PROTIME 13.2   INR 0.98                 Lab 09/09/24  2037   PH, ARTERIAL 7.406   PCO2, ARTERIAL 35.3   PO2 ART 72.0*   O2 SATURATION ART 94.5*   FIO2 21   HCO3 ART 22.2   BASE EXCESS ART -2.0     Brief Urine Lab Results  (Last result in the past 365 days)        Color   Clarity   Blood   Leuk Est   Nitrite   Protein   CREAT   Urine HCG        09/13/24 1503             96.4               Microbiology Results (last 10 days)       Procedure Component Value - Date/Time    Meningitis / Encephalitis Panel, PCR - Cerebrospinal Fluid, Lumbar Puncture [[id number]]  (Normal) Collected: 09/10/24 1210    Lab Status: Final result Specimen: Cerebrospinal Fluid from Lumbar Puncture Updated: 09/10/24 1823     ESCHERICHIA COLI K1, PCR Not Detected     HAEMOPHILUS INFLUENZAE, PCR Not Detected     LISTERIA MONOCYTOGENES, PCR Not Detected     NEISSERIA MENINGITIDIS, PCR Not Detected     STREPTOCOCCUS AGALACTIAE, PCR Not Detected     STREPTOCOCCUS PNEUMONIAE, PCR Not Detected     CYTOMEGALOVIRUS (CMV), PCR Not Detected     ENTEROVIRUS, PCR Not Detected     HERPES SIMPLEX VIRUS 1 (HSV-1), PCR Not Detected     HERPES SIMPLEX VIRUS 2 (HSV-2), PCR Not Detected     HUMAN PARECHOVIRUS, PCR Not Detected     VARICELLA ZOSTER VIRUS (VZV), PCR Not Detected     CRYPTOCOCCUS NEOFORMANS / GATTII, PCR Not Detected     HUMAN HERPES VIRUS 6 PCR Not Detected            IR LUMBAR PUNCTURE DIAGNOSTIC    Result Date: 9/10/2024  Impression: Fluoroscopic guided lumbar puncture  without evidence of complication, patient tolerated procedure. The patient was transferred back to the hospital room status post procedure for further evaluation medical management. Report dictated by: Flaquita Chris  I have personally reviewed this case and agree with the findings above: Electronically Signed: Suman Calvert MD  9/10/2024 3:11 PM EDT  Workstation ID: [id number]    MRI Brain Without Contrast    Result Date: 9/10/2024   1. No acute brain abnormality is seen. No acute infarct. No acute intracranial hemorrhage.  2. There may be mild chronic small vessel ischemia/infarction.  3. Slight motion artifact obscures detail on some of the sequences.  4. 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  5. The other findings are as detailed above.    Portions of this note were completed with a voice recognition program.     Electronically Signed By-Franky Da Silva MD On:9/10/2024 5:45 AM      XR Chest 1 View    Result Date: 9/9/2024  Impression: 1.No acute cardiopulmonary process identified. 2.Emphysema. Electronically Signed: Eliot White MD  9/9/2024 4:38 PM EDT  Workstation ID: [id number]    CT Head Without Contrast    Result Date: 9/9/2024  Impression: 1. No acute intracranial findings by CT. 2. Global parenchymal volume loss and mild chronic microvascular ischemic changes stable from prior comparison. 3. CT findings that may support normal pressure hydrocephalus in the right clinical setting. Electronically Signed: Neftali Sharp MD  9/9/2024 1:21 PM EDT  Workstation ID: [id number]      Results for orders placed during the hospital encounter of 09/07/22    Duplex Carotid Ultrasound CAR    Interpretation Summary  · Proximal right internal carotid artery plaque without significant stenosis.  · Proximal left internal carotid artery plaque without significant stenosis.  · Vertebral flow is antegrade bilaterally.      Results for orders placed during the hospital encounter of  09/07/22    Duplex Carotid Ultrasound CAR    Interpretation Summary  · Proximal right internal carotid artery plaque without significant stenosis.  · Proximal left internal carotid artery plaque without significant stenosis.  · Vertebral flow is antegrade bilaterally.      Results for orders placed during the hospital encounter of 09/07/22    Adult Transthoracic Echo Complete W/ Cont if Necessary Per Protocol    Interpretation Summary  · Calculated left ventricular EF = 52.3% Estimated left ventricular EF was in agreement with the calculated left ventricular EF.  · The left ventricular cavity is mildly dilated.  · Left atrial dilation mild to moderate      Labs Pending at Discharge:  Pending Labs       Order Current Status    Paraneoplastic, Autoantibody Profile In process              Time spent on Discharge including face to face service:  35 minutes    Electronically signed by Jose Jaffe MD, 09/14/24, 11:04 AM EDT.

## 2024-09-14 NOTE — PROGRESS NOTES
[facility]     Nephrology Progress Note      Patient Name: Paulo Leiva Jr.  : 1955  MRN: [id number]  Primary Care Physician:  Vilma Rolon APRN  Date of admission: 2024    Subjective   Subjective     Interval History:  Patient Reports feeling well.  No particular complaints, no shortness of breath or chest pain.  Tolerating p.o.  Hoping to go home today.  PVR 92 cc.    Review of Systems   All systems were reviewed and negative except for: What is mentioned above.    Objective   Objective     Vitals:   Temp:  [97.5 °F (36.4 °C)-98.1 °F (36.7 °C)] 97.9 °F (36.6 °C)  Heart Rate:  [68-92] 92  Resp:  [18] 18  BP: (120-173)/(59-88) 141/88  Physical Exam:   Constitutional: Awake, alert, sitting in chair, no distress   Eyes: sclerae anicteric, no conjunctival injection   HENT: mucous membranes moist   Neck: Supple, no thyromegaly, no lymphadenopathy, trachea midline, No JVD   Respiratory: Clear to auscultation bilaterally, nonlabored respirations    Cardiovascular: RRR, no murmurs, rubs, or gallops.   Gastrointestinal: Positive bowel sounds, soft, nontender, nondistended, abdominal incisions noted.   Musculoskeletal: No edema, no clubbing or cyanosis   Psychiatric: Appropriate affect, cooperative   Neurologic: Oriented x 3, moving all extremities, Cranial Nerves grossly intact, speech clear   Skin: warm and dry, no rashes     Result Review    Result Reviewed:  I have personally reviewed the results from the time of this admission to 2024 12:31 EDT and agree with these findings:  [x]  Laboratory  []  Microbiology  [x]  Radiology  []  EKG/Telemetry   []  Cardiology/Vascular   []  Pathology  []  Old records  []  Other:        Lab 24  0528 24  0537 24  0524 24  0752 09/10/24  0456 24  1338   SODIUM 130* 131* 132* 130* 128* 126*   POTASSIUM 4.1 4.2 4.2 4.4 4.3 4.6   CHLORIDE 98 99 100 96* 95* 91*   CO2 20.9* 21.3* 20.7* 23.0 22.7 23.0   BUN 18 24* 18 10 11 13    CREATININE 1.45* 1.90* 1.82* 1.36* 1.38* 1.36*   GLUCOSE 90 98 96 97 96 110*   EGFR 52.8* 38.2* 40.2* 57.0* 56.0* 57.0*   ANION GAP 11.1 10.7 11.3 11.0 10.3 12.0   MAGNESIUM 1.6 1.8 2.1 1.7  --   --    PHOSPHORUS 3.9 4.7* 3.9  --   --   --            Most notable findings include: Creatinine 1.45 as above.    Assessment & Plan   Assessment / Plan       Active Hospital Problems:  Active Hospital Problems    Diagnosis     **AMS (altered mental status)        Assessment and Plan:    - Acute kidney injury, exact etiology is uncertain, had transient hypotensive episode but mostly hemodynamically stable.  Urinalysis bland, status post recent right nephrectomy for renal cell cancer.  S/p normal saline x 1 L.  PVR 92 cc.  UA is bland with minimal proteinuria, UPC 9/94.  Creatinine improved to possibly his post nephrectomy baseline.  Clinically stable.     - Probable chronic kidney disease stage III, baseline creatinine after nephrectomy may be around 1.3-1.5.  Currently ARB on hold.  Okay to resume at half dose upon discharge.     - Hyponatremia, improved and stable.  Monitor.     - Renal cell cancer, status post right nephrectomy.  Followed by urology at [facility].     - Altered mentation, seems better, neurology and psychiatry are following.  History of early dementia and alcohol use.    Discussed with primary and family.  Okay to DC from renal standpoint.  Office follow-up in 2 weeks with repeat BMP.  Please call with any questions.      Electronically signed by Kathy Moctezuma MD, 9/14/2024, 12:31 EDT.

## 2024-09-16 ENCOUNTER — TRANSITIONAL CARE MANAGEMENT TELEPHONE ENCOUNTER (OUTPATIENT)
Dept: CALL CENTER | Facility: HOSPITAL | Age: 69
End: 2024-09-16
Payer: MEDICARE

## 2024-09-17 ENCOUNTER — TRANSITIONAL CARE MANAGEMENT TELEPHONE ENCOUNTER (OUTPATIENT)
Dept: CALL CENTER | Facility: HOSPITAL | Age: 69
End: 2024-09-17
Payer: MEDICARE

## 2024-09-19 LAB
AMPHIPHYSIN AB SER QL: NEGATIVE
CASPR2 IGG SERPL QL IF: NEGATIVE
CV2 IGG SER-ACNC: NEGATIVE
GLIAL NUC TYPE 1 AB SER QL IF: NEGATIVE
HU AB SER QL IF: NEGATIVE
HU2 AB SER QL IF: NEGATIVE
HU3 AB SER QL: NEGATIVE
IMP & REVIEW OF LAB RESULTS: NEGATIVE
LGI1 IGG SERPL QL IF: NEGATIVE
PCA-1 AB SER QL IF: NEGATIVE
PCA-2 AB SER QL IF: NEGATIVE
PCA-TR AB SER QL IF: NEGATIVE
VGCC AB SER-SCNC: <1 PMOL/L (ref 0–30)

## 2024-09-25 LAB
QT INTERVAL: 381 MS
QTC INTERVAL: 443 MS

## 2024-09-26 ENCOUNTER — OFFICE VISIT (OUTPATIENT)
Dept: FAMILY MEDICINE CLINIC | Facility: CLINIC | Age: 69
End: 2024-09-26
Payer: MEDICARE

## 2024-09-26 VITALS
TEMPERATURE: 98.4 F | BODY MASS INDEX: 28.19 KG/M2 | HEART RATE: 71 BPM | OXYGEN SATURATION: 98 % | SYSTOLIC BLOOD PRESSURE: 128 MMHG | DIASTOLIC BLOOD PRESSURE: 70 MMHG | WEIGHT: 202.1 LBS

## 2024-09-26 DIAGNOSIS — I10 ESSENTIAL HYPERTENSION: ICD-10-CM

## 2024-09-26 DIAGNOSIS — F10.90 ALCOHOL USE DISORDER: ICD-10-CM

## 2024-09-26 DIAGNOSIS — F03.918 DEMENTIA WITH OTHER BEHAVIORAL DISTURBANCE, UNSPECIFIED DEMENTIA SEVERITY, UNSPECIFIED DEMENTIA TYPE: Primary | ICD-10-CM

## 2024-09-26 DIAGNOSIS — E78.5 HYPERLIPIDEMIA, UNSPECIFIED HYPERLIPIDEMIA TYPE: ICD-10-CM

## 2024-09-26 DIAGNOSIS — N17.9 AKI (ACUTE KIDNEY INJURY): ICD-10-CM

## 2024-09-26 LAB
ANION GAP SERPL CALCULATED.3IONS-SCNC: 8.6 MMOL/L (ref 5–15)
BUN SERPL-MCNC: 15 MG/DL (ref 8–23)
BUN/CREAT SERPL: 9.4 (ref 7–25)
CALCIUM SPEC-SCNC: 10.2 MG/DL (ref 8.6–10.5)
CHLORIDE SERPL-SCNC: 92 MMOL/L (ref 98–107)
CO2 SERPL-SCNC: 26.4 MMOL/L (ref 22–29)
CREAT SERPL-MCNC: 1.59 MG/DL (ref 0.76–1.27)
EGFRCR SERPLBLD CKD-EPI 2021: 47.3 ML/MIN/1.73
GLUCOSE SERPL-MCNC: 86 MG/DL (ref 65–99)
POTASSIUM SERPL-SCNC: 5.2 MMOL/L (ref 3.5–5.2)
SODIUM SERPL-SCNC: 127 MMOL/L (ref 136–145)

## 2024-09-26 PROCEDURE — 1111F DSCHRG MED/CURRENT MED MERGE: CPT | Performed by: NURSE PRACTITIONER

## 2024-09-26 PROCEDURE — 80048 BASIC METABOLIC PNL TOTAL CA: CPT | Performed by: INTERNAL MEDICINE

## 2024-09-26 PROCEDURE — 3074F SYST BP LT 130 MM HG: CPT | Performed by: NURSE PRACTITIONER

## 2024-09-26 PROCEDURE — 36415 COLL VENOUS BLD VENIPUNCTURE: CPT | Performed by: NURSE PRACTITIONER

## 2024-09-26 PROCEDURE — 1159F MED LIST DOCD IN RCRD: CPT | Performed by: NURSE PRACTITIONER

## 2024-09-26 PROCEDURE — 1126F AMNT PAIN NOTED NONE PRSNT: CPT | Performed by: NURSE PRACTITIONER

## 2024-09-26 PROCEDURE — 99495 TRANSJ CARE MGMT MOD F2F 14D: CPT | Performed by: NURSE PRACTITIONER

## 2024-09-26 PROCEDURE — 3078F DIAST BP <80 MM HG: CPT | Performed by: NURSE PRACTITIONER

## 2024-09-26 PROCEDURE — 1160F RVW MEDS BY RX/DR IN RCRD: CPT | Performed by: NURSE PRACTITIONER

## 2024-09-26 RX ORDER — RIVASTIGMINE TARTRATE 1.5 MG/1
1.5 CAPSULE ORAL 2 TIMES DAILY WITH MEALS
Qty: 60 CAPSULE | Refills: 0 | Status: SHIPPED | OUTPATIENT
Start: 2024-09-26 | End: 2024-10-26

## 2024-09-26 RX ORDER — SIMVASTATIN 20 MG
20 TABLET ORAL DAILY
Qty: 90 TABLET | Refills: 0 | Status: SHIPPED | OUTPATIENT
Start: 2024-09-26

## 2024-09-26 RX ORDER — LANOLIN ALCOHOL/MO/W.PET/CERES
100 CREAM (GRAM) TOPICAL DAILY
Qty: 30 TABLET | Refills: 0 | Status: SHIPPED | OUTPATIENT
Start: 2024-09-26

## 2024-09-26 RX ORDER — QUETIAPINE FUMARATE 50 MG/1
50 TABLET, FILM COATED ORAL NIGHTLY
Qty: 30 TABLET | Refills: 0 | Status: SHIPPED | OUTPATIENT
Start: 2024-09-26

## 2024-09-26 RX ORDER — VALSARTAN 80 MG/1
40 TABLET ORAL DAILY
Qty: 15 TABLET | Refills: 0 | Status: SHIPPED | OUTPATIENT
Start: 2024-09-26 | End: 2024-10-26

## 2024-09-26 RX ORDER — CITALOPRAM HYDROBROMIDE 10 MG/1
10 TABLET ORAL DAILY
Qty: 30 TABLET | Refills: 0 | Status: SHIPPED | OUTPATIENT
Start: 2024-09-26

## 2024-09-27 ENCOUNTER — TELEPHONE (OUTPATIENT)
Dept: FAMILY MEDICINE CLINIC | Facility: CLINIC | Age: 69
End: 2024-09-27
Payer: MEDICARE

## 2024-09-30 ENCOUNTER — TELEPHONE (OUTPATIENT)
Dept: CARDIOLOGY | Facility: CLINIC | Age: 69
End: 2024-09-30
Payer: MEDICARE

## 2024-09-30 ENCOUNTER — TELEPHONE (OUTPATIENT)
Dept: CARDIOLOGY | Facility: CLINIC | Age: 69
End: 2024-09-30

## 2024-09-30 NOTE — TELEPHONE ENCOUNTER
Patient was confused and thought his Cancer Doctor explanted his loop recorder so he turned his phone off.    We are trying to get him reconnected with it.   Previously Declined (within the last year)

## 2024-09-30 NOTE — TELEPHONE ENCOUNTER
Hub staff attempted to follow warm transfer process and was unsuccessful     Caller: TIGRE    Relationship to patient: SELF    Best call back number: [phone]    Patient is needing: PATIENT CALLING JOSESITO ORTIZ BACK TO LET THEM KNOW THAT THE PHONE SHOULD BE FIXED. PLEASE CALL PATIENT TO ADVISE IF ANY CONCERNS. THANK YOU!

## 2024-10-01 ENCOUNTER — HOSPITAL ENCOUNTER (OUTPATIENT)
Dept: PET IMAGING | Facility: HOSPITAL | Age: 69
Discharge: HOME | End: 2024-10-01
Payer: MEDICARE

## 2024-10-01 DIAGNOSIS — C64.1 RENAL CELL CARCINOMA, RIGHT: ICD-10-CM

## 2024-10-01 DIAGNOSIS — N28.89 RENAL MASS: ICD-10-CM

## 2024-10-01 PROCEDURE — 78815 PET IMAGE W/CT SKULL-THIGH: CPT

## 2024-10-01 PROCEDURE — 0 FLUDEOXYGLUCOSE F18 SOLUTION: Performed by: INTERNAL MEDICINE

## 2024-10-01 PROCEDURE — A9552 F18 FDG: HCPCS | Performed by: INTERNAL MEDICINE

## 2024-10-01 RX ADMIN — FLUDEOXYGLUCOSE F 18 1 DOSE: 200 INJECTION, SOLUTION INTRAVENOUS at 10:50

## 2024-10-09 ENCOUNTER — LAB (OUTPATIENT)
Dept: ONCOLOGY | Facility: HOSPITAL | Age: 69
End: 2024-10-09
Payer: MEDICARE

## 2024-10-09 DIAGNOSIS — E87.1 HYPONATREMIA: Primary | ICD-10-CM

## 2024-10-09 DIAGNOSIS — N28.89 RENAL MASS: ICD-10-CM

## 2024-10-09 DIAGNOSIS — C64.1 RENAL CELL CARCINOMA, RIGHT: ICD-10-CM

## 2024-10-09 LAB
ALBUMIN SERPL-MCNC: 4.1 G/DL (ref 3.5–5.2)
ALBUMIN/GLOB SERPL: 1.6 G/DL
ALP SERPL-CCNC: 101 U/L (ref 39–117)
ALT SERPL W P-5'-P-CCNC: 25 U/L (ref 1–41)
ANION GAP SERPL CALCULATED.3IONS-SCNC: 3.5 MMOL/L (ref 5–15)
AST SERPL-CCNC: 25 U/L (ref 1–40)
BASOPHILS # BLD AUTO: 0.06 10*3/MM3 (ref 0–0.2)
BASOPHILS NFR BLD AUTO: 0.7 % (ref 0–1.5)
BILIRUB SERPL-MCNC: 0.8 MG/DL (ref 0–1.2)
BUN SERPL-MCNC: 16 MG/DL (ref 8–23)
BUN/CREAT SERPL: 10.6 (ref 7–25)
CALCIUM SPEC-SCNC: 9.9 MG/DL (ref 8.6–10.5)
CHLORIDE SERPL-SCNC: 91 MMOL/L (ref 98–107)
CO2 SERPL-SCNC: 29.5 MMOL/L (ref 22–29)
CREAT SERPL-MCNC: 1.51 MG/DL (ref 0.76–1.27)
DEPRECATED RDW RBC AUTO: 49.6 FL (ref 37–54)
EGFRCR SERPLBLD CKD-EPI 2021: 50.3 ML/MIN/1.73
EOSINOPHIL # BLD AUTO: 0.32 10*3/MM3 (ref 0–0.4)
EOSINOPHIL NFR BLD AUTO: 3.9 % (ref 0.3–6.2)
ERYTHROCYTE [DISTWIDTH] IN BLOOD BY AUTOMATED COUNT: 15.8 % (ref 12.3–15.4)
GLOBULIN UR ELPH-MCNC: 2.6 GM/DL
GLUCOSE SERPL-MCNC: 82 MG/DL (ref 65–99)
HCT VFR BLD AUTO: 40 % (ref 37.5–51)
HGB BLD-MCNC: 13.8 G/DL (ref 13–17.7)
IMM GRANULOCYTES # BLD AUTO: 0.07 10*3/MM3 (ref 0–0.05)
IMM GRANULOCYTES NFR BLD AUTO: 0.8 % (ref 0–0.5)
LYMPHOCYTES # BLD AUTO: 1.84 10*3/MM3 (ref 0.7–3.1)
LYMPHOCYTES NFR BLD AUTO: 22.1 % (ref 19.6–45.3)
MCH RBC QN AUTO: 29.1 PG (ref 26.6–33)
MCHC RBC AUTO-ENTMCNC: 34.5 G/DL (ref 31.5–35.7)
MCV RBC AUTO: 84.4 FL (ref 79–97)
MONOCYTES # BLD AUTO: 0.9 10*3/MM3 (ref 0.1–0.9)
MONOCYTES NFR BLD AUTO: 10.8 % (ref 5–12)
NEUTROPHILS NFR BLD AUTO: 5.12 10*3/MM3 (ref 1.7–7)
NEUTROPHILS NFR BLD AUTO: 61.7 % (ref 42.7–76)
PLATELET # BLD AUTO: 316 10*3/MM3 (ref 140–450)
PMV BLD AUTO: 10.2 FL (ref 6–12)
POTASSIUM SERPL-SCNC: 5.2 MMOL/L (ref 3.5–5.2)
PROT SERPL-MCNC: 6.7 G/DL (ref 6–8.5)
RBC # BLD AUTO: 4.74 10*6/MM3 (ref 4.14–5.8)
SODIUM SERPL-SCNC: 124 MMOL/L (ref 136–145)
WBC NRBC COR # BLD AUTO: 8.31 10*3/MM3 (ref 3.4–10.8)

## 2024-10-09 PROCEDURE — 85025 COMPLETE CBC W/AUTO DIFF WBC: CPT

## 2024-10-09 PROCEDURE — 80053 COMPREHEN METABOLIC PANEL: CPT

## 2024-10-09 PROCEDURE — 36415 COLL VENOUS BLD VENIPUNCTURE: CPT

## 2024-10-09 RX ORDER — SODIUM CHLORIDE 1 G/1
1 TABLET ORAL DAILY
Qty: 30 TABLET | Refills: 1 | Status: SHIPPED | OUTPATIENT
Start: 2024-10-09 | End: 2024-10-11 | Stop reason: SDUPTHER

## 2024-10-11 ENCOUNTER — LAB (OUTPATIENT)
Dept: ONCOLOGY | Facility: HOSPITAL | Age: 69
End: 2024-10-11
Payer: MEDICARE

## 2024-10-11 ENCOUNTER — OFFICE VISIT (OUTPATIENT)
Dept: ONCOLOGY | Facility: HOSPITAL | Age: 69
End: 2024-10-11
Payer: MEDICARE

## 2024-10-11 VITALS
OXYGEN SATURATION: 100 % | TEMPERATURE: 97.6 F | WEIGHT: 205 LBS | DIASTOLIC BLOOD PRESSURE: 123 MMHG | HEIGHT: 71 IN | SYSTOLIC BLOOD PRESSURE: 164 MMHG | HEART RATE: 88 BPM | BODY MASS INDEX: 28.7 KG/M2 | RESPIRATION RATE: 20 BRPM

## 2024-10-11 DIAGNOSIS — E87.1 HYPONATREMIA: ICD-10-CM

## 2024-10-11 DIAGNOSIS — E87.1 HYPONATREMIA: Primary | ICD-10-CM

## 2024-10-11 DIAGNOSIS — R94.8 ABNORMAL POSITRON EMISSION TOMOGRAPHY (PET) SCAN: ICD-10-CM

## 2024-10-11 LAB
ANION GAP SERPL CALCULATED.3IONS-SCNC: 4.6 MMOL/L (ref 5–15)
BUN SERPL-MCNC: 13 MG/DL (ref 8–23)
BUN/CREAT SERPL: 8.7 (ref 7–25)
CALCIUM SPEC-SCNC: 9.8 MG/DL (ref 8.6–10.5)
CHLORIDE SERPL-SCNC: 92 MMOL/L (ref 98–107)
CO2 SERPL-SCNC: 28.4 MMOL/L (ref 22–29)
CREAT SERPL-MCNC: 1.5 MG/DL (ref 0.76–1.27)
EGFRCR SERPLBLD CKD-EPI 2021: 50.7 ML/MIN/1.73
GLUCOSE SERPL-MCNC: 80 MG/DL (ref 65–99)
POTASSIUM SERPL-SCNC: 4.9 MMOL/L (ref 3.5–5.2)
SODIUM SERPL-SCNC: 125 MMOL/L (ref 136–145)

## 2024-10-11 PROCEDURE — 36415 COLL VENOUS BLD VENIPUNCTURE: CPT

## 2024-10-11 PROCEDURE — 80048 BASIC METABOLIC PNL TOTAL CA: CPT

## 2024-10-11 RX ORDER — SODIUM CHLORIDE 1 G/1
1 TABLET ORAL DAILY
Qty: 30 TABLET | Refills: 3 | Status: SHIPPED | OUTPATIENT
Start: 2024-10-11

## 2024-10-11 NOTE — PROGRESS NOTES
Chief Complaint/Care Team   Right Renal Mass    Vilma RolonMELANIA  Vilma Rolon, APRSANFORD    History of Present Illness     Diagnosis: Clear-cell renal cell carcinoma, stage II, pT2 pN0, M0, grade 3, diagnosed 8/2024.    Current Treatment: Pending patient decision regarding Keytruda IV every 3 weeks for 17 cycles    Previous Treatment: Status post right radical nephrectomy on 8/12/2024 4 by Dr. Zay Mcmanus.    Paulo Leiva Jr. is a 68 y.o. male who presents to [facility] HEMATOLOGY & ONCOLOGY for evaluation for suspected renal cell carcinoma seen on CT chest abdomen and pelvis from May 2024.    History of Present Illness  The patient is here for discussion of treatment for kidney cancer that was seen via CT CAP imaging in 5/2024. He is accompanied by his son.  Patient with a history of smoking approximately 2 to 3 packs/day for over 30 years, he quit 13 years ago.  He underwent CT chest for lung cancer screening imaging that incidentally revealed suspicious lesion on his right kidney.  The incidental mass arising in the right kidney measures 8.2 x 6.2 cm, no evidence of metastatic disease in the abdomen chest or pelvis.  CT head also was negative from May 2024.  He is scheduled for a radical nephrectomy in 07/2024 at [facility] with Dr. Mcmanus.       He has a history of COPD/emphysema, vertigo along with hypertension.  He has a heart arrhythmia and is on Eliquis. He was evaluation by multiple cardiologists.  Patient also reports memory impairment, frequent falls over the past 3-4 months, he has difficulty texting and typing on the keyboard.    He quit smoking 14 years ago. He used to smoke 2 to 3 cartons a day. He is a [employer] , he reports no exposure to chemicals or agent Orange.   FH: his maternal grandfather had stomach cancer.       Interval history: Patient here to discuss adjuvant treatment options after undergoing right radical nephrectomy by Dr. Mcmanus at [facility]  "medicine.  He reports recovering well from surgery.  He is here with his neighbor.  Patient underwent PET/CT scan in October 1, 2024 he is here to discuss those results.    Review of Systems     Oncology/Hematology History    No history exists.       Objective     Vitals:    10/11/24 1401   BP: (!) 164/123   Pulse: 88   Resp: 20   Temp: 97.6 °F (36.4 °C)   TempSrc: Temporal   SpO2: 100%   Weight: 93 kg (205 lb)   Height: 180.3 cm (71\")   PainSc: 0-No pain       ECOG score: 0         PHQ-9 Total Score:         Physical Exam  Vitals reviewed. Exam conducted with a chaperone present.   Constitutional:       General: He is not in acute distress.     Appearance: Normal appearance.   HENT:      Head: Normocephalic and atraumatic.   Eyes:      Extraocular Movements: Extraocular movements intact.      Conjunctiva/sclera: Conjunctivae normal.   Cardiovascular:      Pulses: Normal pulses.      Heart sounds: Normal heart sounds.   Pulmonary:      Effort: Pulmonary effort is normal.      Breath sounds: Normal breath sounds. No rhonchi or rales.   Abdominal:      General: Bowel sounds are normal. There is no distension.      Palpations: Abdomen is soft. There is no mass.      Tenderness: There is no abdominal tenderness.   Musculoskeletal:      Cervical back: Normal range of motion and neck supple.   Skin:     General: Skin is warm and dry.   Neurological:      Mental Status: He is oriented to person, place, and time.         Physical Exam        Past Medical History     Past Medical History:   Diagnosis Date    Benign essential hypertension     COPD (chronic obstructive pulmonary disease)     Erectile dysfunction Apprx: 5 yrs ago    Generalized convulsive seizures 12/27/2023    Hyperlipidemia 12/14/2018    Hyponatremia     Implantable loop recorder present     Renal insufficiency     Syncope     Vertigo 02/17/2021    Vision loss      Current Outpatient Medications on File Prior to Visit   Medication Sig Dispense Refill    " apixaban (ELIQUIS) 5 MG tablet tablet Take 1 tablet by mouth 2 (Two) Times a Day.      citalopram (CeleXA) 10 MG tablet Take 1 tablet by mouth Daily. 30 tablet 0    meclizine (ANTIVERT) 25 MG tablet TAKE 2 TABLETS DAILY 180 tablet 0    metoprolol succinate XL (TOPROL-XL) 50 MG 24 hr tablet TAKE 1 TABLET DAILY 90 tablet 3    QUEtiapine (SEROquel) 50 MG tablet Take 1 tablet by mouth Every Night. 30 tablet 0    rivastigmine (EXELON) 1.5 MG capsule Take 1 capsule by mouth 2 (Two) Times a Day With Meals for 30 days. 60 capsule 0    Spiriva Respimat 1.25 MCG/ACT aerosol solution inhaler Inhale 2 puffs Daily. 12 g 1    thiamine (VITAMIN B1) 100 MG tablet Take 1 tablet by mouth Daily. 30 tablet 0    valsartan (DIOVAN) 80 MG tablet Take 0.5 tablets by mouth Daily for 30 days. 15 tablet 0    simvastatin (ZOCOR) 20 MG tablet Take 1 tablet by mouth Daily. (Patient not taking: Reported on 10/11/2024) 90 tablet 0     No current facility-administered medications on file prior to visit.      No Known Allergies  Past Surgical History:   Procedure Laterality Date    CARDIAC CATHETERIZATION N/A 09/26/2022    Procedure: Left Heart Cath;  Surgeon: Randell Casiano MD;  Location: [facility] CATH INVASIVE LOCATION;  Service: Cardiovascular;  Laterality: N/A;    CARDIAC ELECTROPHYSIOLOGY PROCEDURE N/A 09/26/2022    Procedure: Loop insertion;  Surgeon: Randell Casiano MD;  Location: [facility] CATH INVASIVE LOCATION;  Service: Cardiovascular;  Laterality: N/A;    COLONOSCOPY  2007    COLONOSCOPY N/A 10/26/2022    Procedure: COLONOSCOPY FOR SCREENING;  Surgeon: Roque Whitlock MD;  Location: [facility] ENDOSCOPY;  Service: Gastroenterology;  Laterality: N/A;  NORMAL COLON    NEPHRECTOMY Right 08/12/2024     Social History     Socioeconomic History    Marital status:    Tobacco Use    Smoking status: Former     Current packs/day: 0.00     Average packs/day: 2.0 packs/day for 36.3 years (72.7 ttl pk-yrs)     Types: Cigarettes     Start date:  6/15/1974     Quit date: 10/20/2010     Years since quittin.9     Passive exposure: Past    Smokeless tobacco: Never    Tobacco comments:     SMOKES MORE THAN 2 PPD FOR 35 YEARS   Vaping Use    Vaping status: Never Used   Substance and Sexual Activity    Alcohol use: Not Currently     Alcohol/week: 6.0 - 8.0 standard drinks of alcohol     Types: 6 - 8 Cans of beer per week     Comment: as of 24 was his last drink 8-14 DRINKS PER WEEK as of 2024, prior to 2024 he would drink 20 or more beers daily and did so for 15 years    Drug use: Never    Sexual activity: Not Currently     Partners: Female     Birth control/protection: Condom     Family History   Problem Relation Age of Onset    Dementia Mother     No Known Problems Father     Colon cancer Neg Hx     Malig Hyperthermia Neg Hx        Results     Result Review   The following data was reviewed by: Nabor Moser MD on 2024:  Lab Results   Component Value Date    HGB 13.8 10/09/2024    HCT 40.0 10/09/2024    MCV 84.4 10/09/2024     10/09/2024    WBC 8.31 10/09/2024    NEUTROABS 5.12 10/09/2024    LYMPHSABS 1.84 10/09/2024    MONOSABS 0.90 10/09/2024    EOSABS 0.32 10/09/2024    BASOSABS 0.06 10/09/2024     Lab Results   Component Value Date    GLUCOSE 80 10/11/2024    BUN 13 10/11/2024    CREATININE 1.50 (H) 10/11/2024     (C) 10/11/2024    K 4.9 10/11/2024    CL 92 (L) 10/11/2024    CO2 28.4 10/11/2024    CALCIUM 9.8 10/11/2024    PROTEINTOT 6.7 10/09/2024    ALBUMIN 4.1 10/09/2024    BILITOT 0.8 10/09/2024    ALKPHOS 101 10/09/2024    AST 25 10/09/2024    ALT 25 10/09/2024     Lab Results   Component Value Date    MG 1.6 2024    PHOS 3.9 2024    FREET4 1.75 (H) 2024    TSH 0.804 2024       No radiology results for the last day  NM PET/CT Skull Base to Mid Thigh    Result Date: 10/2/2024  Impression: Impression: Postoperative changes of right nephrectomy and adrenalectomy. No evidence of residual disease.  No evidence of hypermetabolic metastatic disease. There are a few mildly hypermetabolic axillary lymph nodes bilaterally though the morphology is normal and therefore favored to be reactive. Additionally there is focal uptake seen within the left infraspinatus muscle near the myotendinous junction which is presumably secondary to injury but incompletely characterized. Consider MRI of the shoulder with and without contrast to further evaluate. Subacute bilateral rib fractures. Electronically Signed: Renan Oglesby MD  10/2/2024 3:42 PM EDT  Workstation ID: [id number]     Assessment & Plan     Diagnoses and all orders for this visit:    1. Hyponatremia (Primary)  -     Ambulatory Referral to Nephrology  -     sodium chloride 1 g tablet; Take 1 tablet by mouth Daily.  Dispense: 30 tablet; Refill: 3  -     Comprehensive Metabolic Panel; Future  -     CBC & Differential; Future  -     CBC & Differential; Future  -     Comprehensive Metabolic Panel; Future    2. Abnormal positron emission tomography (PET) scan  -     MRI shoulder left w wo contrast; Future           Paulo ESTHER Cali Finch is a 68 y.o. male who presents to [facility] HEMATOLOGY [facility] ONCOLOGY for follow-up regarding stage II clear-cell renal cell carcinoma.    -Patient is status post radical nephrectomy in August 2024 by Dr. Zay Mcmanus at [facility].  -Per pathology report from surgery: Clear-cell renal cell carcinoma, stage II, pT2 pN0, M0, grade 3, diagnosed 8/2024.  -Discussed diagnosis, stage, prognosis, and reviewed pathology report and PET/CT imaging with patient in detail today.  -Reviewed NCCN guidelines version 2.2025 as well as results of Keynote 564 published in [place] Journal of Medicine April 2024 (SAILAJA Riojas. Et al. overall survival with adjuvant pembrolizumab and renal cell carcinoma.NEJ 2024; 390:135-95), provided him a copy of abstract today  -Discussed option of further observation versus 17  cycles of adjuvant pembrolizumab, due to improvement overall survival and results of keynote 564, recommend patient proceed with Keytruda, patient shared he would like to think about it and contact our office next week regarding his decision regarding treatment.  -pt doesn't appear to meet criteria for genetic counseling now, but may consider this at a later date    Assessment & Plan  2.  Hyponatremia  - Sodium of 125, thus ordered sodium tablets 3 times a day  - Also refer patient nephrology urgently.  Counseled patient on ER if he has worsening dizziness, logical symptoms, or seizure.      3. Falls and heart arrhythmia.  -The CT scan of the head showed no acute abnormalities, recommend pt discuss referral to neurology and/or geriatrics evaluation for evaluation for dementia or neurological condition with his PCP        Pt with high distress score, thus placed SW consult.    Plan for patient follow-up in 6 weeks after MRI shoulder given findings on PET/CT scan, the patient will follow with nurse practitioner Sheila in 2 weeks for recheck of CBC and CMP to monitor hyponatremia.    Please note that portions of this note were completed with a voice recognition program.    Electronically signed by Nabor Moser MD, 10/12/24, 1:44 PM EDT.          Follow Up     I spent 45 minutes caring for Paulo on this date of service. This time includes time spent by me in the following activities:preparing for the visit, reviewing tests, obtaining and/or reviewing a separately obtained history, performing a medically appropriate examination and/or evaluation , counseling and educating the patient/family/caregiver, ordering medications, tests, or procedures, referring and communicating with other health care professionals , documenting information in the medical record, independently interpreting results and communicating that information with the patient/family/caregiver, and care coordination    Any chemotherapy or immunotherapy or  other systemic therapy treatment plan involves a high risk of complications and/or mortality of patient management.    The patient was seen and examined. Work by the provider also included review and/or ordering of lab tests, review and/or ordering of radiology tests, review and/or ordering of medicine tests, discussion with other physicians or providers, independent review of data, obtaining old records, review/summation of old records, and/or other review.    I have reviewed the family history, social history, and past medical history for this patient. Previous information and data has been reviewed and updated as needed. I have reviewed and verified the chief complaint, history, and other documentation. The patient was interviewed and examined in the clinic and the chart reviewed. The previous observations, recommendations, and conclusions were reviewed including those of other providers.     The plan was discussed with the patient and/or family. The patient was given time to ask questions and these questions were answered. At the conclusion of their visit they had no additional questions or concerns and all questions were answered to their satisfaction.    Patient was given instructions and counseling regarding his condition or for health maintenance advice. Please see specific information pulled into the AVS if appropriate.       Patient or patient representative verbalized consent for the use of Ambient Listening during the visit with  Nabor Moser MD for chart documentation. 10/12/2024  16:17 EDT

## 2024-10-28 ENCOUNTER — OFFICE VISIT (OUTPATIENT)
Dept: FAMILY MEDICINE CLINIC | Facility: CLINIC | Age: 69
End: 2024-10-28
Payer: MEDICARE

## 2024-10-28 VITALS
WEIGHT: 204 LBS | HEART RATE: 59 BPM | BODY MASS INDEX: 28.56 KG/M2 | SYSTOLIC BLOOD PRESSURE: 156 MMHG | DIASTOLIC BLOOD PRESSURE: 104 MMHG | HEIGHT: 71 IN | OXYGEN SATURATION: 94 %

## 2024-10-28 DIAGNOSIS — J44.9 CHRONIC OBSTRUCTIVE PULMONARY DISEASE, UNSPECIFIED COPD TYPE: ICD-10-CM

## 2024-10-28 DIAGNOSIS — E78.5 HYPERLIPIDEMIA, UNSPECIFIED HYPERLIPIDEMIA TYPE: ICD-10-CM

## 2024-10-28 DIAGNOSIS — F03.918 DEMENTIA WITH OTHER BEHAVIORAL DISTURBANCE, UNSPECIFIED DEMENTIA SEVERITY, UNSPECIFIED DEMENTIA TYPE: ICD-10-CM

## 2024-10-28 DIAGNOSIS — Z00.00 ENCOUNTER FOR SUBSEQUENT ANNUAL WELLNESS VISIT (AWV) IN MEDICARE PATIENT: Primary | ICD-10-CM

## 2024-10-28 DIAGNOSIS — E87.1 HYPONATREMIA: ICD-10-CM

## 2024-10-28 DIAGNOSIS — R42 VERTIGO: ICD-10-CM

## 2024-10-28 DIAGNOSIS — Z23 INFLUENZA VACCINE NEEDED: ICD-10-CM

## 2024-10-28 DIAGNOSIS — I10 ESSENTIAL HYPERTENSION: ICD-10-CM

## 2024-10-28 PROBLEM — G40.409 GENERALIZED TONIC-CLONIC SEIZURE: Status: ACTIVE | Noted: 2023-12-27

## 2024-10-28 PROBLEM — H81.10 BENIGN PAROXYSMAL POSITIONAL VERTIGO: Status: ACTIVE | Noted: 2024-06-05

## 2024-10-28 PROBLEM — C64.9 MALIGNANT NEOPLASM OF UNSPECIFIED KIDNEY, EXCEPT RENAL PELVIS: Status: ACTIVE | Noted: 2024-08-21

## 2024-10-28 PROBLEM — Z79.1 LONG TERM (CURRENT) USE OF NON-STEROIDAL ANTI-INFLAMMATORIES (NSAID): Status: RESOLVED | Noted: 2018-04-16 | Resolved: 2024-10-28

## 2024-10-28 PROBLEM — N18.31 STAGE 3A CHRONIC KIDNEY DISEASE: Status: ACTIVE | Noted: 2024-09-24

## 2024-10-28 LAB
ALBUMIN SERPL-MCNC: 4.2 G/DL (ref 3.5–5.2)
ALBUMIN/GLOB SERPL: 1.4 G/DL
ALP SERPL-CCNC: 117 U/L (ref 39–117)
ALT SERPL W P-5'-P-CCNC: 27 U/L (ref 1–41)
ANION GAP SERPL CALCULATED.3IONS-SCNC: 13.5 MMOL/L (ref 5–15)
AST SERPL-CCNC: 27 U/L (ref 1–40)
BASOPHILS # BLD AUTO: 0.06 10*3/MM3 (ref 0–0.2)
BASOPHILS NFR BLD AUTO: 0.5 % (ref 0–1.5)
BILIRUB SERPL-MCNC: 1.1 MG/DL (ref 0–1.2)
BUN SERPL-MCNC: 21 MG/DL (ref 8–23)
BUN/CREAT SERPL: 12.6 (ref 7–25)
CALCIUM SPEC-SCNC: 9.8 MG/DL (ref 8.6–10.5)
CHLORIDE SERPL-SCNC: 87 MMOL/L (ref 98–107)
CO2 SERPL-SCNC: 24.5 MMOL/L (ref 22–29)
CREAT SERPL-MCNC: 1.67 MG/DL (ref 0.76–1.27)
DEPRECATED RDW RBC AUTO: 54.6 FL (ref 37–54)
EGFRCR SERPLBLD CKD-EPI 2021: 44.6 ML/MIN/1.73
EOSINOPHIL # BLD AUTO: 0.17 10*3/MM3 (ref 0–0.4)
EOSINOPHIL NFR BLD AUTO: 1.4 % (ref 0.3–6.2)
ERYTHROCYTE [DISTWIDTH] IN BLOOD BY AUTOMATED COUNT: 17.2 % (ref 12.3–15.4)
GLOBULIN UR ELPH-MCNC: 3.1 GM/DL
GLUCOSE SERPL-MCNC: 90 MG/DL (ref 65–99)
HCT VFR BLD AUTO: 39.8 % (ref 37.5–51)
HGB BLD-MCNC: 13.5 G/DL (ref 13–17.7)
IMM GRANULOCYTES # BLD AUTO: 0.08 10*3/MM3 (ref 0–0.05)
IMM GRANULOCYTES NFR BLD AUTO: 0.7 % (ref 0–0.5)
LYMPHOCYTES # BLD AUTO: 1.87 10*3/MM3 (ref 0.7–3.1)
LYMPHOCYTES NFR BLD AUTO: 15.8 % (ref 19.6–45.3)
MCH RBC QN AUTO: 29.4 PG (ref 26.6–33)
MCHC RBC AUTO-ENTMCNC: 33.9 G/DL (ref 31.5–35.7)
MCV RBC AUTO: 86.7 FL (ref 79–97)
MONOCYTES # BLD AUTO: 0.68 10*3/MM3 (ref 0.1–0.9)
MONOCYTES NFR BLD AUTO: 5.8 % (ref 5–12)
NEUTROPHILS NFR BLD AUTO: 75.8 % (ref 42.7–76)
NEUTROPHILS NFR BLD AUTO: 8.95 10*3/MM3 (ref 1.7–7)
NRBC BLD AUTO-RTO: 0 /100 WBC (ref 0–0.2)
PLATELET # BLD AUTO: 350 10*3/MM3 (ref 140–450)
PMV BLD AUTO: 10.8 FL (ref 6–12)
POTASSIUM SERPL-SCNC: 4.5 MMOL/L (ref 3.5–5.2)
PROT SERPL-MCNC: 7.3 G/DL (ref 6–8.5)
RBC # BLD AUTO: 4.59 10*6/MM3 (ref 4.14–5.8)
SODIUM SERPL-SCNC: 125 MMOL/L (ref 136–145)
WBC NRBC COR # BLD AUTO: 11.81 10*3/MM3 (ref 3.4–10.8)

## 2024-10-28 PROCEDURE — 80053 COMPREHEN METABOLIC PANEL: CPT | Performed by: INTERNAL MEDICINE

## 2024-10-28 PROCEDURE — 85025 COMPLETE CBC W/AUTO DIFF WBC: CPT | Performed by: INTERNAL MEDICINE

## 2024-10-28 PROCEDURE — 1170F FXNL STATUS ASSESSED: CPT | Performed by: NURSE PRACTITIONER

## 2024-10-28 PROCEDURE — 3080F DIAST BP >= 90 MM HG: CPT | Performed by: NURSE PRACTITIONER

## 2024-10-28 PROCEDURE — 1126F AMNT PAIN NOTED NONE PRSNT: CPT | Performed by: NURSE PRACTITIONER

## 2024-10-28 PROCEDURE — 36415 COLL VENOUS BLD VENIPUNCTURE: CPT | Performed by: NURSE PRACTITIONER

## 2024-10-28 PROCEDURE — 90662 IIV NO PRSV INCREASED AG IM: CPT | Performed by: NURSE PRACTITIONER

## 2024-10-28 PROCEDURE — 3077F SYST BP >= 140 MM HG: CPT | Performed by: NURSE PRACTITIONER

## 2024-10-28 PROCEDURE — G0008 ADMIN INFLUENZA VIRUS VAC: HCPCS | Performed by: NURSE PRACTITIONER

## 2024-10-28 PROCEDURE — 99213 OFFICE O/P EST LOW 20 MIN: CPT | Performed by: NURSE PRACTITIONER

## 2024-10-28 PROCEDURE — G0439 PPPS, SUBSEQ VISIT: HCPCS | Performed by: NURSE PRACTITIONER

## 2024-10-28 RX ORDER — VALSARTAN 80 MG/1
40 TABLET ORAL DAILY
Qty: 45 TABLET | Refills: 1 | Status: SHIPPED | OUTPATIENT
Start: 2024-10-28

## 2024-10-28 RX ORDER — TIOTROPIUM BROMIDE INHALATION SPRAY 1.56 UG/1
SPRAY, METERED RESPIRATORY (INHALATION)
Qty: 12 G | Refills: 3 | OUTPATIENT
Start: 2024-10-28

## 2024-10-28 RX ORDER — MECLIZINE HYDROCHLORIDE 25 MG/1
50 TABLET ORAL DAILY
Qty: 180 TABLET | Refills: 1 | Status: SHIPPED | OUTPATIENT
Start: 2024-10-28

## 2024-10-28 RX ORDER — TIOTROPIUM BROMIDE INHALATION SPRAY 1.56 UG/1
2 SPRAY, METERED RESPIRATORY (INHALATION)
Qty: 12 G | Refills: 1 | Status: SHIPPED | OUTPATIENT
Start: 2024-10-28

## 2024-10-28 RX ORDER — RIVASTIGMINE TARTRATE 1.5 MG/1
1.5 CAPSULE ORAL 2 TIMES DAILY WITH MEALS
Qty: 180 CAPSULE | Refills: 1 | Status: SHIPPED | OUTPATIENT
Start: 2024-10-28

## 2024-10-28 RX ORDER — SIMVASTATIN 20 MG
20 TABLET ORAL DAILY
Qty: 90 TABLET | Refills: 1 | Status: SHIPPED | OUTPATIENT
Start: 2024-10-28

## 2024-10-28 RX ORDER — CITALOPRAM HYDROBROMIDE 10 MG/1
10 TABLET ORAL DAILY
Qty: 90 TABLET | Refills: 1 | Status: SHIPPED | OUTPATIENT
Start: 2024-10-28

## 2024-10-28 RX ORDER — QUETIAPINE FUMARATE 50 MG/1
50 TABLET, FILM COATED ORAL NIGHTLY
Qty: 90 TABLET | Refills: 1 | Status: SHIPPED | OUTPATIENT
Start: 2024-10-28

## 2024-10-28 NOTE — PROGRESS NOTES
Subjective   The ABCs of the Annual Wellness Visit  Medicare Wellness Visit      Paulo ESTHER Leiva Jr. is a 68 y.o. patient who presents for a Medicare Wellness Visit.    The following portions of the patient's history were reviewed and   updated as appropriate: allergies, current medications, past family history, past medical history, past social history, past surgical history, and problem list.    Compared to one year ago, the patient's physical   health is better.     Compared to one year ago, the patient's mental   health is the same.    Recent Hospitalizations:  This patient has had a [facility] admission record on file within the last 365 days.  Current Medical Providers:  Patient Care Team:  Vilma Rolon APRN as PCP - General (Nurse Practitioner)  Randell Casiano MD as Consulting Physician (Cardiology)  Braulio Rand MD as Consulting Physician (Urology)    Outpatient Medications Prior to Visit   Medication Sig Dispense Refill    apixaban (ELIQUIS) 5 MG tablet tablet Take 1 tablet by mouth 2 (Two) Times a Day.      metoprolol succinate XL (TOPROL-XL) 50 MG 24 hr tablet TAKE 1 TABLET DAILY 90 tablet 3    thiamine (VITAMIN B1) 100 MG tablet Take 1 tablet by mouth Daily. 30 tablet 0    citalopram (CeleXA) 10 MG tablet Take 1 tablet by mouth Daily. 30 tablet 0    meclizine (ANTIVERT) 25 MG tablet TAKE 2 TABLETS DAILY 180 tablet 0    QUEtiapine (SEROquel) 50 MG tablet Take 1 tablet by mouth Every Night. 30 tablet 0    rivastigmine (EXELON) 1.5 MG capsule Take 1 capsule by mouth 2 (Two) Times a Day With Meals for 30 days. 60 capsule 0    simvastatin (ZOCOR) 20 MG tablet Take 1 tablet by mouth Daily. 90 tablet 0    sodium chloride 1 g tablet Take 1 tablet by mouth Daily. 30 tablet 3    Spiriva Respimat 1.25 MCG/ACT aerosol solution inhaler Inhale 2 puffs Daily. 12 g 1    valsartan (DIOVAN) 80 MG tablet Take 0.5 tablets by mouth Daily for 30 days. 15 tablet 0     No facility-administered medications prior  "to visit.     No opioid medication identified on active medication list. I have reviewed chart for other potential  high risk medication/s and harmful drug interactions in the elderly.      Aspirin is not on active medication list.  Aspirin use is contraindicated for this patient due to: current use of Eliquis.  .    Patient Active Problem List   Diagnosis    Chronic obstructive pulmonary disease    Essential hypertension    HLD (hyperlipidemia)    Hyponatremia    Vertigo    Colon cancer screening    Syncope and collapse    Tachycardia    Status post placement of implantable loop recorder    Class 1 obesity due to excess calories without serious comorbidity with body mass index (BMI) of 30.0 to 30.9 in adult    Generalized convulsive seizures    Renal mass    AMS (altered mental status)    Benign paroxysmal positional vertigo    Malignant neoplasm of unspecified kidney, except renal pelvis    Stage 3a chronic kidney disease    Generalized tonic-clonic seizure     Advance Care Planning Advance Directive is not on file.  ACP discussion was held with the patient during this visit. Pt has one but needs to have it updated. Request to bring in when updated.         Objective   Vitals:    10/28/24 1444   BP: (!) 156/104   Pulse: 59   SpO2: 94%   Weight: 92.5 kg (204 lb)   Height: 180.3 cm (71\")   PainSc: 0-No pain       Estimated body mass index is 28.45 kg/m² as calculated from the following:    Height as of this encounter: 180.3 cm (71\").    Weight as of this encounter: 92.5 kg (204 lb).       Does the patient have evidence of cognitive impairment?  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 Son has noted improvement.   Lab Results   Component Value Date    HGBA1C 6.20 (H) 08/07/2024                                                                                                Health  Risk Assessment    Smoking " Status:  Social History     Tobacco Use   Smoking Status Former    Current packs/day: 0.00    Average packs/day: 2.0 packs/day for 36.3 years (72.7 ttl pk-yrs)    Types: Cigarettes    Start date: 6/15/1974    Quit date: 10/20/2010    Years since quittin.0    Passive exposure: Past   Smokeless Tobacco Never   Tobacco Comments    SMOKES MORE THAN 2 PPD FOR 35 YEARS     Alcohol Consumption:  Social History     Substance and Sexual Activity   Alcohol Use Not Currently    Alcohol/week: 6.0 - 8.0 standard drinks of alcohol    Types: 6 - 8 Cans of beer per week    Comment: as of 24 was his last drink 8-14 DRINKS PER WEEK as of 2024, prior to 2024 he would drink 20 or more beers daily and did so for 15 years       Fall Risk Screen  ARMANIADI Fall Risk Assessment was completed, and patient is at HIGH risk for falls. Assessment completed on:10/28/2024    Depression Screening:      10/29/2024     9:03 AM   PHQ-2/PHQ-9 Depression Screening   Little interest or pleasure in doing things Not at all   Feeling down, depressed, or hopeless Not at all     Health Habits and Functional and Cognitive Screening:      10/28/2024     2:50 PM   Functional & Cognitive Status   Do you have difficulty preparing food and eating? No   Do you have difficulty bathing yourself, getting dressed or grooming yourself? No   Do you have difficulty using the toilet? No   Do you have difficulty moving around from place to place? No   Do you have trouble with steps or getting out of a bed or a chair? No   Current Diet Limited Junk Food   Dental Exam Up to date   Eye Exam Not up to date   Exercise (times per week) 3 times per week   Current Exercises Include Home Fitness Gym;Stair Step Machine   Do you need help using the phone?  No   Are you deaf or do you have serious difficulty hearing?  No   Do you need help to go to places out of walking distance? No   Do you need help shopping? No   Do you need help preparing meals?  No   Do you need  help with housework?  No   Do you need help with laundry? No   Do you need help taking your medications? No   Do you need help managing money? No   Do you ever drive or ride in a car without wearing a seat belt? No   Have you felt unusual stress, anger or loneliness in the last month? No   Who do you live with? Spouse   If you need help, do you have trouble finding someone available to you? No   Have you been bothered in the last four weeks by sexual problems? No   Do you have difficulty concentrating, remembering or making decisions? Yes           Age-appropriate Screening Schedule:  Refer to the list below for future screening recommendations based on patient's age, sex and/or medical conditions. Orders for these recommended tests are listed in the plan section. The patient has been provided with a written plan.    Health Maintenance List  Health Maintenance   Topic Date Due    ZOSTER VACCINE (2 of 2) 02/11/2015    COVID-19 Vaccine (1 - 2023-24 season) 10/30/2024 (Originally 9/1/2024)    LIPID PANEL  04/10/2025    LUNG CANCER SCREENING  05/22/2025    BMI FOLLOWUP  09/04/2025    ANNUAL WELLNESS VISIT  10/28/2025    TDAP/TD VACCINES (3 - Td or Tdap) 04/27/2031    COLORECTAL CANCER SCREENING  10/26/2032    HEPATITIS C SCREENING  Completed    INFLUENZA VACCINE  Completed    Pneumococcal Vaccine 65+  Completed    AAA SCREEN (ONE-TIME)  Completed                                                                                                                                                CMS Preventative Services Quick Reference  Risk Factors Identified During Encounter  Alcohol Misuse:  continue sobriety.  Fall Risk-High or Moderate: Discussed Fall Prevention in the home  Immunizations Discussed/Encouraged: Influenza    The above risks/problems have been discussed with the patient.  Pertinent information has been shared with the patient in the After Visit Summary.  An After Visit Summary and PPPS were made available to  "the patient.    Follow Up:   Next Medicare Wellness visit to be scheduled in 1 year.          Additional E&M Note during same encounter follows:  Patient has multiple medical problems which are significant and separately identifiable that require additional work above and beyond the Medicare Wellness Visit.      Chief Complaint  Medicare Wellness-subsequent    Paulo Leiva Jr. is a 68 y.o. male who presents to [facility]     HTN: elevated in office. Home BP readings are occasionally higher than 140 systolic but averaging 120-130.   Home BP log:  10/20/24 122/69  10/21/24 142/89  10/22/24 138/81  10/23/24 121/81  10/24/24 129/81  10/28/24 148/88    Denies bleeding including rectal or nosebleeds.     Objective   Vital Signs:   Vitals:    10/28/24 1444   BP: (!) 156/104   Pulse: 59   SpO2: 94%   Weight: 92.5 kg (204 lb)   Height: 180.3 cm (71\")   PainSc: 0-No pain       Wt Readings from Last 3 Encounters:   10/29/24 95.1 kg (209 lb 9.6 oz)   10/28/24 92.5 kg (204 lb)   10/11/24 93 kg (205 lb)     BP Readings from Last 3 Encounters:   10/29/24 (!) 147/101   10/28/24 (!) 156/104   10/11/24 (!) 164/123       Physical Exam  Vitals reviewed.   Constitutional:       General: He is not in acute distress.     Appearance: Normal appearance. He is well-developed.   HENT:      Head: Normocephalic and atraumatic.   Eyes:      Conjunctiva/sclera: Conjunctivae normal.      Pupils: Pupils are equal, round, and reactive to light.   Cardiovascular:      Rate and Rhythm: Normal rate and regular rhythm.      Heart sounds: Normal heart sounds.   Pulmonary:      Effort: Pulmonary effort is normal.      Breath sounds: Normal breath sounds.   Musculoskeletal:      Cervical back: Neck supple.      Right lower leg: No edema.      Left lower leg: No edema.   Skin:     General: Skin is warm and dry.   Neurological:      Mental Status: He is alert and oriented to person, place, and time.   Psychiatric:         " Mood and Affect: Mood and affect normal.         Behavior: Behavior normal.         Thought Content: Thought content normal.         Judgment: Judgment normal.         The following data was reviewed by MELANIA Santiago on 10/28/2024  Common Labs   Common labs          10/9/2024    12:54 10/11/2024    12:57 10/28/2024    15:34   Common Labs   Glucose 82  80  90    BUN 16  13  21    Creatinine 1.51  1.50  1.67    Sodium 124  125  125    Potassium 5.2  4.9  4.5    Chloride 91  92  87    Calcium 9.9  9.8  9.8    Albumin 4.1   4.2    Total Bilirubin 0.8   1.1    Alkaline Phosphatase 101   117    AST (SGOT) 25   27    ALT (SGPT) 25   27    WBC 8.31   11.81    Hemoglobin 13.8   13.5    Hematocrit 40.0   39.8    Platelets 316   350      TSH   TSH          8/7/2024    11:53 9/9/2024    13:38   TSH   TSH 0.770  0.804        Assessment & Plan   Diagnoses and all orders for this visit:    1. Encounter for subsequent annual wellness visit (AWV) in Medicare patient (Primary)    2. Influenza vaccine needed  -     Fluzone High-Dose 65+yrs (6975-9045)    3. Dementia with other behavioral disturbance, unspecified dementia severity, unspecified dementia type  -     citalopram (CeleXA) 10 MG tablet; Take 1 tablet by mouth Daily.  Dispense: 90 tablet; Refill: 1  -     QUEtiapine (SEROquel) 50 MG tablet; Take 1 tablet by mouth Every Night.  Dispense: 90 tablet; Refill: 1  -     rivastigmine (EXELON) 1.5 MG capsule; Take 1 capsule by mouth 2 (Two) Times a Day With Meals.  Dispense: 180 capsule; Refill: 1    4. Vertigo  -     meclizine (ANTIVERT) 25 MG tablet; Take 2 tablets by mouth Daily.  Dispense: 180 tablet; Refill: 1    5. Hyperlipidemia, unspecified hyperlipidemia type  -     simvastatin (ZOCOR) 20 MG tablet; Take 1 tablet by mouth Daily.  Dispense: 90 tablet; Refill: 1    6. Chronic obstructive pulmonary disease, unspecified COPD type  -     Spiriva Respimat 1.25 MCG/ACT aerosol solution inhaler; Inhale 2 puffs Daily.   Dispense: 12 g; Refill: 1    7. Essential hypertension  -     valsartan (DIOVAN) 80 MG tablet; Take 0.5 tablets by mouth Daily.  Dispense: 45 tablet; Refill: 1    8. Hyponatremia  -     Comprehensive Metabolic Panel  -     CBC & Differential      Continue to monitor BP and go to the ER with readings greater than 190 systolic. Continue current medications.         FOLLOW UP  Return in about 3 months (around 1/28/2025) for Refills and fasting labs.  Patient was given instructions and counseling regarding his condition or for health maintenance advice. Please see specific information pulled into the AVS if appropriate.     Vilma Rolon, MELANIA  10/29/24  15:52 EDT

## 2024-10-28 NOTE — PROGRESS NOTES
Venipuncture Blood Specimen Collection  Venipuncture performed in left arm by Cadence Mayer with good hemostasis. Patient tolerated the procedure well without complications.   10/28/24   Cadence Mayer

## 2024-10-29 ENCOUNTER — OFFICE VISIT (OUTPATIENT)
Dept: ONCOLOGY | Facility: HOSPITAL | Age: 69
End: 2024-10-29
Payer: MEDICARE

## 2024-10-29 VITALS
DIASTOLIC BLOOD PRESSURE: 101 MMHG | RESPIRATION RATE: 16 BRPM | OXYGEN SATURATION: 100 % | WEIGHT: 209.6 LBS | SYSTOLIC BLOOD PRESSURE: 147 MMHG | HEIGHT: 71 IN | TEMPERATURE: 98.1 F | BODY MASS INDEX: 29.34 KG/M2 | HEART RATE: 70 BPM

## 2024-10-29 DIAGNOSIS — E87.1 HYPONATREMIA: Primary | ICD-10-CM

## 2024-10-29 DIAGNOSIS — C64.1 RENAL CELL CARCINOMA, RIGHT: ICD-10-CM

## 2024-10-29 PROCEDURE — G0463 HOSPITAL OUTPT CLINIC VISIT: HCPCS | Performed by: NURSE PRACTITIONER

## 2024-10-29 RX ORDER — SODIUM CHLORIDE 1 G/1
1 TABLET ORAL 3 TIMES DAILY
Qty: 90 TABLET | Refills: 0 | Status: SHIPPED | OUTPATIENT
Start: 2024-10-29

## 2024-10-29 NOTE — PROGRESS NOTES
Chief Complaint/Care Team   Follow-up (Right renal mass), hyponatremia    Vilma Rolon APRN Padgett, Sarah A, MELANIA    History of Present Illness     Diagnosis: Clear-cell renal cell carcinoma, stage II, pT2 pN0, M0, grade 3, diagnosed 8/2024.    Current Treatment: Pending patient decision regarding Keytruda IV every 3 weeks for 17 cycles    Previous Treatment: Status post right radical nephrectomy on 8/12/2024 4 by Dr. Zay Mcmanus.    Paulo Leiva Jr. is a 68 y.o. male who presents to [facility] for evaluation for suspected renal cell carcinoma seen on CT chest abdomen and pelvis from May 2024.    History of Present Illness  The patient is here for discussion of treatment for kidney cancer that was seen via CT CAP imaging in 5/2024. He is accompanied by his son.  Patient with a history of smoking approximately 2 to 3 packs/day for over 30 years, he quit 13 years ago.  He underwent CT chest for lung cancer screening imaging that incidentally revealed suspicious lesion on his right kidney.  The incidental mass arising in the right kidney measures 8.2 x 6.2 cm, no evidence of metastatic disease in the abdomen chest or pelvis.  CT head also was negative from May 2024.  He is scheduled for a radical nephrectomy in 07/2024 at [facility] with Dr. Mcmanus.     He has a history of COPD/emphysema, vertigo along with hypertension.  He has a heart arrhythmia and is on Eliquis. He was evaluation by multiple cardiologists.  Patient also reports memory impairment, frequent falls over the past 3-4 months, he has difficulty texting and typing on the keyboard.    He quit smoking 14 years ago. He used to smoke 2 to 3 cartons a day. He is a [employer] , he reports no exposure to chemicals or agent Orange.   FH: his maternal grandfather had stomach cancer.       10/29/2024: Interval history:  Patient here with family member and to to follow up on lab results. He was seen on  "10/12/2024 with Dr. Moser. 10/11/2024 NA of 125. Was started on sodium tablets and order was supposed to be TID dosing. Script sent to pharmacy was sent as 1 tab QD with 3 refills. Saw nephology NP on 10/20/2024.  Patient reports he has opted against any immunotherapy. Lab work 10/28/2024 with NA of 125. Creatinine of 1.67. CBC with WBC of 11.81, hemoglobin 13.5 and platelet count of 350.       Review of Systems   HENT: Negative.     Eyes: Negative.    Respiratory: Negative.     Cardiovascular: Negative.    Gastrointestinal: Negative.    Endocrine: Negative.    Genitourinary: Negative.    Musculoskeletal: Negative.    Allergic/Immunologic: Negative.    Neurological:  Positive for weakness.   Hematological: Negative.    Psychiatric/Behavioral: Negative.     All other systems reviewed and are negative.       Oncology/Hematology History    No history exists.       Objective     Vitals:    10/29/24 0855   BP: (!) 147/101   Pulse: 70   Resp: 16   Temp: 98.1 °F (36.7 °C)   TempSrc: Temporal   SpO2: 100%   Weight: 95.1 kg (209 lb 9.6 oz)   Height: 180.3 cm (71\")   PainSc: 0-No pain       ECOG score: 0         PHQ-9 Total Score:         Physical Exam  Vitals reviewed. Exam conducted with a chaperone present.   Constitutional:       General: He is not in acute distress.     Appearance: Normal appearance.   HENT:      Head: Normocephalic and atraumatic.   Eyes:      Extraocular Movements: Extraocular movements intact.      Conjunctiva/sclera: Conjunctivae normal.   Cardiovascular:      Pulses: Normal pulses.      Heart sounds: Normal heart sounds.   Pulmonary:      Effort: Pulmonary effort is normal.      Breath sounds: Normal breath sounds. No rhonchi or rales.   Abdominal:      General: Bowel sounds are normal. There is no distension.      Palpations: Abdomen is soft. There is no mass.      Tenderness: There is no abdominal tenderness.   Musculoskeletal:      Cervical back: Normal range of motion and neck supple.   Skin:   "   General: Skin is warm and dry.   Neurological:      Mental Status: He is oriented to person, place, and time.       Physical Exam        Past Medical History     Past Medical History:   Diagnosis Date    Benign essential hypertension     COPD (chronic obstructive pulmonary disease)     Erectile dysfunction Apprx: 5 yrs ago    Generalized convulsive seizures 12/27/2023    Hyperlipidemia 12/14/2018    Hyponatremia     Implantable loop recorder present     Long term (current) use of non-steroidal anti-inflammatories (nsaid) 04/16/2018    Renal insufficiency     Syncope     Vertigo 02/17/2021    Vision loss      Current Outpatient Medications on File Prior to Visit   Medication Sig Dispense Refill    apixaban (ELIQUIS) 5 MG tablet tablet Take 1 tablet by mouth 2 (Two) Times a Day.      citalopram (CeleXA) 10 MG tablet Take 1 tablet by mouth Daily. 90 tablet 1    meclizine (ANTIVERT) 25 MG tablet Take 2 tablets by mouth Daily. 180 tablet 1    metoprolol succinate XL (TOPROL-XL) 50 MG 24 hr tablet TAKE 1 TABLET DAILY 90 tablet 3    QUEtiapine (SEROquel) 50 MG tablet Take 1 tablet by mouth Every Night. 90 tablet 1    rivastigmine (EXELON) 1.5 MG capsule Take 1 capsule by mouth 2 (Two) Times a Day With Meals. 180 capsule 1    simvastatin (ZOCOR) 20 MG tablet Take 1 tablet by mouth Daily. 90 tablet 1    sodium chloride 1 g tablet Take 1 tablet by mouth Daily. 30 tablet 3    Spiriva Respimat 1.25 MCG/ACT aerosol solution inhaler Inhale 2 puffs Daily. 12 g 1    thiamine (VITAMIN B1) 100 MG tablet Take 1 tablet by mouth Daily. 30 tablet 0    valsartan (DIOVAN) 80 MG tablet Take 0.5 tablets by mouth Daily. 45 tablet 1     No current facility-administered medications on file prior to visit.      No Known Allergies  Past Surgical History:   Procedure Laterality Date    CARDIAC CATHETERIZATION N/A 09/26/2022    Procedure: Left Heart Cath;  Surgeon: Randell Casiano MD;  Location: [facility] INVASIVE LOCATION;  Service:  Cardiovascular;  Laterality: N/A;    CARDIAC ELECTROPHYSIOLOGY PROCEDURE N/A 2022    Procedure: Loop insertion;  Surgeon: Randell Casiano MD;  Location: [facility] CATH INVASIVE LOCATION;  Service: Cardiovascular;  Laterality: N/A;    COLONOSCOPY      COLONOSCOPY N/A 10/26/2022    Procedure: COLONOSCOPY FOR SCREENING;  Surgeon: Roque Whitlock MD;  Location: [facility] ENDOSCOPY;  Service: Gastroenterology;  Laterality: N/A;  NORMAL COLON    NEPHRECTOMY Right 2024     Social History     Socioeconomic History    Marital status:    Tobacco Use    Smoking status: Former     Current packs/day: 0.00     Average packs/day: 2.0 packs/day for 36.3 years (72.7 ttl pk-yrs)     Types: Cigarettes     Start date: 6/15/1974     Quit date: 10/20/2010     Years since quittin.0     Passive exposure: Past    Smokeless tobacco: Never    Tobacco comments:     SMOKES MORE THAN 2 PPD FOR 35 YEARS   Vaping Use    Vaping status: Never Used   Substance and Sexual Activity    Alcohol use: Not Currently     Alcohol/week: 6.0 - 8.0 standard drinks of alcohol     Types: 6 - 8 Cans of beer per week     Comment: as of 24 was his last drink 8-14 DRINKS PER WEEK as of 2024, prior to 2024 he would drink 20 or more beers daily and did so for 15 years    Drug use: Never    Sexual activity: Not Currently     Partners: Female     Birth control/protection: Condom     Family History   Problem Relation Age of Onset    Dementia Mother     No Known Problems Father     Colon cancer Neg Hx     Malig Hyperthermia Neg Hx        Results     Result Review   The following data was reviewed by: Sharee Thomas on 2024:  Lab Results   Component Value Date    HGB 13.5 10/28/2024    HCT 39.8 10/28/2024    MCV 86.7 10/28/2024     10/28/2024    WBC 11.81 (H) 10/28/2024    NEUTROABS 8.95 (H) 10/28/2024    LYMPHSABS 1.87 10/28/2024    MONOSABS 0.68 10/28/2024    EOSABS 0.17 10/28/2024    BASOSABS 0.06 10/28/2024     Lab  Results   Component Value Date    GLUCOSE 90 10/28/2024    BUN 21 10/28/2024    CREATININE 1.67 (H) 10/28/2024     (L) 10/28/2024    K 4.5 10/28/2024    CL 87 (L) 10/28/2024    CO2 24.5 10/28/2024    CALCIUM 9.8 10/28/2024    PROTEINTOT 7.3 10/28/2024    ALBUMIN 4.2 10/28/2024    BILITOT 1.1 10/28/2024    ALKPHOS 117 10/28/2024    AST 27 10/28/2024    ALT 27 10/28/2024     Lab Results   Component Value Date    MG 1.6 09/14/2024    PHOS 3.9 09/14/2024    FREET4 1.75 (H) 09/09/2024    TSH 0.804 09/09/2024       No radiology results for the last day  NM PET/CT Skull Base to Mid Thigh    Result Date: 10/2/2024  Impression: Impression: Postoperative changes of right nephrectomy and adrenalectomy. No evidence of residual disease. No evidence of hypermetabolic metastatic disease. There are a few mildly hypermetabolic axillary lymph nodes bilaterally though the morphology is normal and therefore favored to be reactive. Additionally there is focal uptake seen within the left infraspinatus muscle near the myotendinous junction which is presumably secondary to injury but incompletely characterized. Consider MRI of the shoulder with and without contrast to further evaluate. Subacute bilateral rib fractures. Electronically Signed: Renan Oglesby MD  10/2/2024 3:42 PM EDT  Workstation ID: [id number]     Assessment & Plan     There are no diagnoses linked to this encounter.         Paulo Leiva JrHugo is a 68 y.o. male who presents to [facility] for follow-up regarding stage II clear-cell renal cell carcinoma.    -Patient is status post radical nephrectomy in August 2024 by Dr. Zay Mcmanus at [facility].  -Per pathology report from surgery: Clear-cell renal cell carcinoma, stage II, pT2 pN0, M0, grade 3, diagnosed 8/2024.  -Discussed diagnosis, stage, prognosis, and reviewed pathology report and PET/CT imaging with patient in detail today.  -Reviewed NCCN  guidelines version 2.2025 as well as results of Keynote 564 published in [place] Journal of Medicine April 2024 (Beulah T. Et al. overall survival with adjuvant pembrolizumab and renal cell carcinoma.NEJ 2024; 390:2829-93), provided him a copy of abstract today  -Discussed option of further observation versus 17 cycles of adjuvant pembrolizumab, due to improvement overall survival and results of keynote 564, recommend patient proceed with Keytruda, patient shared he would like to think about it and contact our office next week regarding his decision regarding treatment.  -pt doesn't appear to meet criteria for genetic counseling now, but may consider this at a later date    Assessment & Plan  2.  Hyponatremia  - Sodium of 125, thus ordered sodium tablets 3 times a day. Patient has only been taking oral sodium tablet 1 tab QD. Sodium level of 125 today on 10/29/2024. Patient was instructed to increase sodium tablets to 3 times a day. Plan to recheck lab work in 2 weeks. Refill sent for oral sodium.     - Also refer patient nephrology urgently.  Counseled patient on ER if he has worsening dizziness, logical symptoms, or seizure. Has follow with nephology in 6 months.         3. Falls and heart arrhythmia.  -The CT scan of the head showed no acute abnormalities, recommend pt discuss referral to neurology and/or geriatrics evaluation for evaluation for dementia or neurological condition with his PCP    Plan for patient follow-up in 6 weeks after MRI shoulder given findings on PET/CT scan, the patient will follow with nurse practitioner Sheila in 2 weeks for recheck of CBC and CMP to monitor hyponatremia.    Please note that portions of this note were completed with a voice recognition program.           Follow Up     I spent 45 minutes caring for Paulo on this date of service. This time includes time spent by me in the following activities:preparing for the visit, reviewing tests, obtaining and/or reviewing a  separately obtained history, performing a medically appropriate examination and/or evaluation , counseling and educating the patient/family/caregiver, ordering medications, tests, or procedures, referring and communicating with other health care professionals , documenting information in the medical record, independently interpreting results and communicating that information with the patient/family/caregiver, and care coordination    Any chemotherapy or immunotherapy or other systemic therapy treatment plan involves a high risk of complications and/or mortality of patient management.    The patient was seen and examined. Work by the provider also included review and/or ordering of lab tests, review and/or ordering of radiology tests, review and/or ordering of medicine tests, discussion with other physicians or providers, independent review of data, obtaining old records, review/summation of old records, and/or other review.    I have reviewed the family history, social history, and past medical history for this patient. Previous information and data has been reviewed and updated as needed. I have reviewed and verified the chief complaint, history, and other documentation. The patient was interviewed and examined in the clinic and the chart reviewed. The previous observations, recommendations, and conclusions were reviewed including those of other providers.     The plan was discussed with the patient and/or family. The patient was given time to ask questions and these questions were answered. At the conclusion of their visit they had no additional questions or concerns and all questions were answered to their satisfaction.    Patient was given instructions and counseling regarding his condition or for health maintenance advice. Please see specific information pulled into the AVS if appropriate.       Patient or patient representative verbalized consent for the use of Ambient Listening during the visit with  Tatyana  MELANIA Montes for chart documentation. 10/29/2024  16:17 EDT  History of Present Illness

## 2024-11-05 ENCOUNTER — OFFICE VISIT (OUTPATIENT)
Dept: CARDIOLOGY | Facility: CLINIC | Age: 69
End: 2024-11-05
Payer: MEDICARE

## 2024-11-05 VITALS
WEIGHT: 211.6 LBS | BODY MASS INDEX: 29.62 KG/M2 | SYSTOLIC BLOOD PRESSURE: 163 MMHG | HEIGHT: 71 IN | DIASTOLIC BLOOD PRESSURE: 82 MMHG | HEART RATE: 60 BPM

## 2024-11-05 DIAGNOSIS — E78.2 MIXED HYPERLIPIDEMIA: ICD-10-CM

## 2024-11-05 DIAGNOSIS — I10 ESSENTIAL HYPERTENSION: Primary | ICD-10-CM

## 2024-11-05 DIAGNOSIS — Z95.818 STATUS POST PLACEMENT OF IMPLANTABLE LOOP RECORDER: ICD-10-CM

## 2024-11-05 PROBLEM — R55 SYNCOPE AND COLLAPSE: Status: RESOLVED | Noted: 2022-09-02 | Resolved: 2024-11-05

## 2024-11-05 PROBLEM — R00.0 TACHYCARDIA: Status: RESOLVED | Noted: 2022-09-02 | Resolved: 2024-11-05

## 2024-11-05 PROCEDURE — 1159F MED LIST DOCD IN RCRD: CPT | Performed by: NURSE PRACTITIONER

## 2024-11-05 PROCEDURE — 3077F SYST BP >= 140 MM HG: CPT | Performed by: NURSE PRACTITIONER

## 2024-11-05 PROCEDURE — 99214 OFFICE O/P EST MOD 30 MIN: CPT | Performed by: NURSE PRACTITIONER

## 2024-11-05 PROCEDURE — 1160F RVW MEDS BY RX/DR IN RCRD: CPT | Performed by: NURSE PRACTITIONER

## 2024-11-05 PROCEDURE — 3079F DIAST BP 80-89 MM HG: CPT | Performed by: NURSE PRACTITIONER

## 2024-11-05 NOTE — PROGRESS NOTES
Chief Complaint  Follow-up, Hypertension, and Hyperlipidemia    Subjective            History of Present Illness  Paulo ESTHER Leiva Jr. Is a 68-year-old male patient who presents to the office today for follow up. He has hypertension, hyperlipidemia, and presence of loop recorder. He is compliant with medications. He denies any new or worsening cardiac symptoms today.     PMH  Past Medical History:   Diagnosis Date    Benign essential hypertension     COPD (chronic obstructive pulmonary disease)     Erectile dysfunction Apprx: 5 yrs ago    Generalized convulsive seizures 12/27/2023    Hyperlipidemia 12/14/2018    Hyponatremia     Implantable loop recorder present     Long term (current) use of non-steroidal anti-inflammatories (nsaid) 04/16/2018    Renal insufficiency     Syncope     Syncope and collapse 09/02/2022    Total of 5 episdoes with the last one 10/22    Outside Source Comment: Overview:  Formatting of this note might be different from the original.  Total of 5 episdoes with the last one 10/22  Last Assessment & Plan:  Formatting of this note might be different from the original.  No recurrent episodes Lipitor does not show any significant dysrhythmias at this point continue with monitoring      Vertigo 02/17/2021    Vision loss          ALLERGY  No Known Allergies       SURGICALHX  Past Surgical History:   Procedure Laterality Date    CARDIAC CATHETERIZATION N/A 09/26/2022    Procedure: Left Heart Cath;  Surgeon: aRndell Casiano MD;  Location: [facility] CATH INVASIVE LOCATION;  Service: Cardiovascular;  Laterality: N/A;    CARDIAC ELECTROPHYSIOLOGY PROCEDURE N/A 09/26/2022    Procedure: Loop insertion;  Surgeon: Randell Casiano MD;  Location: [facility] CATH INVASIVE LOCATION;  Service: Cardiovascular;  Laterality: N/A;    COLONOSCOPY  2007    COLONOSCOPY N/A 10/26/2022    Procedure: COLONOSCOPY FOR SCREENING;  Surgeon: Roque Whitlock MD;  Location: [facility] ENDOSCOPY;  Service: Gastroenterology;  Laterality:  N/A;  NORMAL COLON    NEPHRECTOMY Right 2024          SOC  Social History     Socioeconomic History    Marital status:    Tobacco Use    Smoking status: Former     Current packs/day: 0.00     Average packs/day: 2.0 packs/day for 36.3 years (72.7 ttl pk-yrs)     Types: Cigarettes     Start date: 6/15/1974     Quit date: 10/20/2010     Years since quittin.0     Passive exposure: Past    Smokeless tobacco: Never    Tobacco comments:     SMOKES MORE THAN 2 PPD FOR 35 YEARS   Vaping Use    Vaping status: Never Used   Substance and Sexual Activity    Alcohol use: Yes     Alcohol/week: 6.0 - 8.0 standard drinks of alcohol     Types: 6 - 8 Cans of beer per week     Comment: as of 24 was his last drink 8-14 DRINKS PER WEEK as of 2024, prior to 2024 he would drink 20 or more beers daily and did so for 15 years    Drug use: Not Currently    Sexual activity: Not Currently     Partners: Female     Birth control/protection: Condom         FAMHX  Family History   Problem Relation Age of Onset    Dementia Mother     No Known Problems Father     Colon cancer Neg Hx     Malig Hyperthermia Neg Hx           MEDSIGONLY  Current Outpatient Medications on File Prior to Visit   Medication Sig    apixaban (ELIQUIS) 5 MG tablet tablet Take 1 tablet by mouth 2 (Two) Times a Day.    citalopram (CeleXA) 10 MG tablet Take 1 tablet by mouth Daily.    meclizine (ANTIVERT) 25 MG tablet Take 2 tablets by mouth Daily.    metoprolol succinate XL (TOPROL-XL) 50 MG 24 hr tablet TAKE 1 TABLET DAILY    QUEtiapine (SEROquel) 50 MG tablet Take 1 tablet by mouth Every Night.    rivastigmine (EXELON) 1.5 MG capsule Take 1 capsule by mouth 2 (Two) Times a Day With Meals.    simvastatin (ZOCOR) 20 MG tablet Take 1 tablet by mouth Daily.    sodium chloride 1 g tablet Take 1 tablet by mouth 3 (Three) Times a Day.    Spiriva Respimat 1.25 MCG/ACT aerosol solution inhaler Inhale 2 puffs Daily.    thiamine (VITAMIN B1) 100 MG tablet  "Take 1 tablet by mouth Daily.    valsartan (DIOVAN) 80 MG tablet Take 0.5 tablets by mouth Daily.     No current facility-administered medications on file prior to visit.         Objective   /82   Pulse 60   Ht 180.3 cm (70.98\")   Wt 96 kg (211 lb 9.6 oz)   BMI 29.53 kg/m²       Physical Exam  HENT:      Head: Normocephalic.   Neck:      Vascular: No carotid bruit.   Cardiovascular:      Rate and Rhythm: Normal rate and regular rhythm.      Pulses: Normal pulses.      Heart sounds: Normal heart sounds. No murmur heard.  Pulmonary:      Effort: Pulmonary effort is normal.      Breath sounds: Normal breath sounds.   Musculoskeletal:      Cervical back: Neck supple.      Right lower leg: No edema.      Left lower leg: No edema.   Skin:     General: Skin is dry.   Neurological:      Mental Status: He is alert and oriented to person, place, and time.   Psychiatric:         Behavior: Behavior normal.       Result Review :   The following data was reviewed by: MELANIA Bustillos on 11/05/2024:  No results found for: \"PROBNP\"  CMP          10/28/2024    15:34   CMP   Glucose 90    BUN 21    Creatinine 1.67    EGFR 44.6    Sodium 125    Potassium 4.5    Chloride 87    Calcium 9.8    Total Protein 7.3    Albumin 4.2    Globulin 3.1    Total Bilirubin 1.1    Alkaline Phosphatase 117    AST (SGOT) 27    ALT (SGPT) 27    Albumin/Globulin Ratio 1.4    BUN/Creatinine Ratio 12.6    Anion Gap 13.5      CBC w/diff          10/28/2024    15:34   CBC w/Diff   WBC 11.81    RBC 4.59    Hemoglobin 13.5    Hematocrit 39.8    MCV 86.7    MCH 29.4    MCHC 33.9    RDW 17.2    Platelets 350    Neutrophil Rel % 75.8    Immature Granulocyte Rel % 0.7    Lymphocyte Rel % 15.8    Monocyte Rel % 5.8    Eosinophil Rel % 1.4    Basophil Rel % 0.5       Lab Results   Component Value Date    TSH 0.804 09/09/2024      Lab Results   Component Value Date    FREET4 1.75 (H) 09/09/2024      No results found for: \"DDIMERQUANT\"  Magnesium   Date " "Value Ref Range Status   09/14/2024 1.6 1.6 - 2.4 mg/dL Final      No results found for: \"DIGOXIN\"   Lab Results   Component Value Date    TROPONINT 9 05/11/2024           Lipid Panel          4/10/2024    15:00   Lipid Panel   Total Cholesterol 207    Triglycerides 80    HDL Cholesterol 74    VLDL Cholesterol 14    LDL Cholesterol  119    LDL/HDL Ratio 1.58        Results for orders placed during the hospital encounter of 09/07/22    Adult Transthoracic Echo Complete W/ Cont if Necessary Per Protocol    Interpretation Summary  · Calculated left ventricular EF = 52.3% Estimated left ventricular EF was in agreement with the calculated left ventricular EF.  · The left ventricular cavity is mildly dilated.  · Left atrial dilation mild to moderate           Assessment and Plan    Diagnoses and all orders for this visit:    1. Essential hypertension (Primary)  Elevated blood pressure in office today. Check blood pressure twice a day for the next two weeks, blood pressure log provided for patient. For now continue metoprolol 50 mg daily and valsartan 40 mg daily.    2. Mixed hyperlipidemia  Last lipid panel was 4/10/24 with  which is outside of goal range. He has been tolerating simvastatin 20 mg daily for now, repeat fasting labs and if LDL remains above 100 then would recommend increasing simvastatin dose.  -     Basic Metabolic Panel; Future  -     Lipid Panel; Future  -     Hepatic Function Panel; Future    3. Status post placement of implantable loop recorder  Last home remote session was 9/30/24 with no new episodes/issues.           Follow Up   Return in about 6 months (around 5/5/2025) for Follow up with Dr Casiano.    Patient was given instructions and counseling regarding his condition or for health maintenance advice. Please see specific information pulled into the AVS if appropriate.     Paulo Leiva Jr.  reports that he quit smoking about 14 years ago. His smoking use included cigarettes. He started " smoking about 50 years ago. He has a 72.7 pack-year smoking history. He has been exposed to tobacco smoke. He has never used smokeless tobacco.          Jenni Crawley, APRN  11/05/24  14:57 EST    Dictated Utilizing Dragon Dictation

## 2024-11-13 ENCOUNTER — TELEPHONE (OUTPATIENT)
Dept: ONCOLOGY | Facility: HOSPITAL | Age: 69
End: 2024-11-13

## 2024-11-13 ENCOUNTER — LAB (OUTPATIENT)
Dept: ONCOLOGY | Facility: HOSPITAL | Age: 69
End: 2024-11-13
Payer: MEDICARE

## 2024-11-13 ENCOUNTER — OFFICE VISIT (OUTPATIENT)
Dept: ONCOLOGY | Facility: HOSPITAL | Age: 69
End: 2024-11-13
Payer: MEDICARE

## 2024-11-13 VITALS
TEMPERATURE: 98.2 F | DIASTOLIC BLOOD PRESSURE: 88 MMHG | HEIGHT: 71 IN | RESPIRATION RATE: 18 BRPM | HEART RATE: 67 BPM | BODY MASS INDEX: 30.31 KG/M2 | SYSTOLIC BLOOD PRESSURE: 176 MMHG | OXYGEN SATURATION: 95 % | WEIGHT: 216.49 LBS

## 2024-11-13 DIAGNOSIS — C64.1 RENAL CELL CARCINOMA, RIGHT: ICD-10-CM

## 2024-11-13 DIAGNOSIS — E87.1 HYPONATREMIA: ICD-10-CM

## 2024-11-13 DIAGNOSIS — E87.1 HYPONATREMIA: Primary | ICD-10-CM

## 2024-11-13 LAB
ALBUMIN SERPL-MCNC: 4.1 G/DL (ref 3.5–5.2)
ALBUMIN/GLOB SERPL: 1.5 G/DL
ALP SERPL-CCNC: 108 U/L (ref 39–117)
ALT SERPL W P-5'-P-CCNC: 19 U/L (ref 1–41)
ANION GAP SERPL CALCULATED.3IONS-SCNC: 8.8 MMOL/L (ref 5–15)
AST SERPL-CCNC: 25 U/L (ref 1–40)
BASOPHILS # BLD AUTO: 0.08 10*3/MM3 (ref 0–0.2)
BASOPHILS NFR BLD AUTO: 0.9 % (ref 0–1.5)
BILIRUB SERPL-MCNC: 1 MG/DL (ref 0–1.2)
BUN SERPL-MCNC: 12 MG/DL (ref 8–23)
BUN/CREAT SERPL: 7.5 (ref 7–25)
CALCIUM SPEC-SCNC: 9.3 MG/DL (ref 8.6–10.5)
CHLORIDE SERPL-SCNC: 94 MMOL/L (ref 98–107)
CO2 SERPL-SCNC: 26.2 MMOL/L (ref 22–29)
CREAT SERPL-MCNC: 1.6 MG/DL (ref 0.76–1.27)
DEPRECATED RDW RBC AUTO: 55.3 FL (ref 37–54)
EGFRCR SERPLBLD CKD-EPI 2021: 46.9 ML/MIN/1.73
EOSINOPHIL # BLD AUTO: 0.27 10*3/MM3 (ref 0–0.4)
EOSINOPHIL NFR BLD AUTO: 3 % (ref 0.3–6.2)
ERYTHROCYTE [DISTWIDTH] IN BLOOD BY AUTOMATED COUNT: 17.3 % (ref 12.3–15.4)
GLOBULIN UR ELPH-MCNC: 2.8 GM/DL
GLUCOSE SERPL-MCNC: 71 MG/DL (ref 65–99)
HCT VFR BLD AUTO: 37.5 % (ref 37.5–51)
HGB BLD-MCNC: 12.6 G/DL (ref 13–17.7)
IMM GRANULOCYTES # BLD AUTO: 0.07 10*3/MM3 (ref 0–0.05)
IMM GRANULOCYTES NFR BLD AUTO: 0.8 % (ref 0–0.5)
LYMPHOCYTES # BLD AUTO: 1.91 10*3/MM3 (ref 0.7–3.1)
LYMPHOCYTES NFR BLD AUTO: 20.9 % (ref 19.6–45.3)
MAGNESIUM SERPL-MCNC: 1.8 MG/DL (ref 1.6–2.4)
MCH RBC QN AUTO: 29.2 PG (ref 26.6–33)
MCHC RBC AUTO-ENTMCNC: 33.6 G/DL (ref 31.5–35.7)
MCV RBC AUTO: 87 FL (ref 79–97)
MONOCYTES # BLD AUTO: 0.95 10*3/MM3 (ref 0.1–0.9)
MONOCYTES NFR BLD AUTO: 10.4 % (ref 5–12)
NEUTROPHILS NFR BLD AUTO: 5.87 10*3/MM3 (ref 1.7–7)
NEUTROPHILS NFR BLD AUTO: 64 % (ref 42.7–76)
NRBC BLD AUTO-RTO: 0 /100 WBC (ref 0–0.2)
PLATELET # BLD AUTO: 275 10*3/MM3 (ref 140–450)
PMV BLD AUTO: 9.9 FL (ref 6–12)
POTASSIUM SERPL-SCNC: 5.1 MMOL/L (ref 3.5–5.2)
PROT SERPL-MCNC: 6.9 G/DL (ref 6–8.5)
RBC # BLD AUTO: 4.31 10*6/MM3 (ref 4.14–5.8)
SODIUM SERPL-SCNC: 129 MMOL/L (ref 136–145)
WBC NRBC COR # BLD AUTO: 9.15 10*3/MM3 (ref 3.4–10.8)

## 2024-11-13 PROCEDURE — 83735 ASSAY OF MAGNESIUM: CPT

## 2024-11-13 PROCEDURE — 36415 COLL VENOUS BLD VENIPUNCTURE: CPT

## 2024-11-13 PROCEDURE — 85025 COMPLETE CBC W/AUTO DIFF WBC: CPT

## 2024-11-13 PROCEDURE — G0463 HOSPITAL OUTPT CLINIC VISIT: HCPCS | Performed by: NURSE PRACTITIONER

## 2024-11-13 PROCEDURE — 80053 COMPREHEN METABOLIC PANEL: CPT

## 2024-11-13 NOTE — TELEPHONE ENCOUNTER
Called and spoke to patient to let him know his sodium level/ lab recheck and apt, as requested by bryce VELÁZQUEZ . Patient verbalized understanding.

## 2024-11-13 NOTE — PROGRESS NOTES
Chief Complaint/Care Team   Follow-up (Labs from pcp. Right renal mass), hyponatremia    Vilma Rolon APRN Padgett, Sarah A, APRN    History of Present Illness     Diagnosis: Clear-cell renal cell carcinoma, stage II, pT2 pN0, M0, grade 3, diagnosed 8/2024.    Current Treatment: Pending patient decision regarding Keytruda IV every 3 weeks for 17 cycles    Previous Treatment: Status post right radical nephrectomy on 8/12/2024 4 by Dr. Zay Mcmanus.    Paulo Leiva Jr. is a 68 y.o. male who presents to [facility] HEMATOLOGY & ONCOLOGY for evaluation for suspected renal cell carcinoma seen on CT chest abdomen and pelvis from May 2024.    History of Present Illness  The patient is here for discussion of treatment for kidney cancer that was seen via CT CAP imaging in 5/2024. He is accompanied by his son.  Patient with a history of smoking approximately 2 to 3 packs/day for over 30 years, he quit 13 years ago.  He underwent CT chest for lung cancer screening imaging that incidentally revealed suspicious lesion on his right kidney.  The incidental mass arising in the right kidney measures 8.2 x 6.2 cm, no evidence of metastatic disease in the abdomen chest or pelvis.  CT head also was negative from May 2024.  He is scheduled for a radical nephrectomy in 07/2024 at [facility] with Dr. Mcmanus.     He has a history of COPD/emphysema, vertigo along with hypertension.  He has a heart arrhythmia and is on Eliquis. He was evaluation by multiple cardiologists.  Patient also reports memory impairment, frequent falls over the past 3-4 months, he has difficulty texting and typing on the keyboard.    He quit smoking 14 years ago. He used to smoke 2 to 3 cartons a day. He is a [employer] , he reports no exposure to chemicals or agent Orange.   FH: his maternal grandfather had stomach cancer.         11/13/2024: Interval follow up: Interval follow up for 11/13/2024: Seen 2 weeks ago for lab check.  "Was taking 1 sodium tablet a day, now taking 3 tablets a day. Doing well in the interim. No complaints. Denies any dizziness. BP elevated here today. Reports has white coat syndrome. BP runs in the 130's systolic over 80's at home he reports. Did not get labs done yet today. Saw nephology NP on 10/20/2024. Has follow up again in 2 months. Lab work today: 11/13/2024: WBC 9.15, hemoglobin 12.6, platelet 275. CMP is pending.       Review of Systems   HENT: Negative.     Eyes: Negative.    Respiratory: Negative.     Cardiovascular: Negative.    Gastrointestinal: Negative.    Endocrine: Negative.    Genitourinary: Negative.    Musculoskeletal: Negative.    Skin: Negative.    Allergic/Immunologic: Negative.    Neurological:  Negative for weakness.   Hematological: Negative.    Psychiatric/Behavioral: Negative.     All other systems reviewed and are negative.       Oncology/Hematology History    No history exists.       Objective     Vitals:    11/13/24 1126   BP: 176/88   Pulse: 67   Resp: 18   Temp: 98.2 °F (36.8 °C)   TempSrc: Temporal   SpO2: 95%   Weight: 98.2 kg (216 lb 7.9 oz)   Height: 180.3 cm (71\")   PainSc: 0-No pain       ECOG score: 0         PHQ-9 Total Score:         Physical Exam  Vitals reviewed. Exam conducted with a chaperone present.   Constitutional:       General: He is not in acute distress.     Appearance: Normal appearance.   HENT:      Head: Normocephalic and atraumatic.   Eyes:      Extraocular Movements: Extraocular movements intact.      Conjunctiva/sclera: Conjunctivae normal.   Cardiovascular:      Pulses: Normal pulses.      Heart sounds: Normal heart sounds.   Pulmonary:      Effort: Pulmonary effort is normal.      Breath sounds: Normal breath sounds. No rhonchi or rales.   Abdominal:      General: Bowel sounds are normal. There is no distension.      Palpations: Abdomen is soft. There is no mass.      Tenderness: There is no abdominal tenderness.   Musculoskeletal:      Cervical back: " Normal range of motion and neck supple.   Skin:     General: Skin is warm and dry.   Neurological:      Mental Status: He is oriented to person, place, and time.         Physical Exam        Past Medical History     Past Medical History:   Diagnosis Date    Benign essential hypertension     COPD (chronic obstructive pulmonary disease)     Erectile dysfunction Apprx: 5 yrs ago    Generalized convulsive seizures 12/27/2023    Hyperlipidemia 12/14/2018    Hyponatremia     Implantable loop recorder present     Long term (current) use of non-steroidal anti-inflammatories (nsaid) 04/16/2018    Renal insufficiency     Syncope     Syncope and collapse 09/02/2022    Total of 5 episdoes with the last one 10/22    Outside Source Comment: Overview:  Formatting of this note might be different from the original.  Total of 5 episdoes with the last one 10/22  Last Assessment & Plan:  Formatting of this note might be different from the original.  No recurrent episodes Lipitor does not show any significant dysrhythmias at this point continue with monitoring      Vertigo 02/17/2021    Vision loss      Current Outpatient Medications on File Prior to Visit   Medication Sig Dispense Refill    apixaban (ELIQUIS) 5 MG tablet tablet Take 1 tablet by mouth 2 (Two) Times a Day.      citalopram (CeleXA) 10 MG tablet Take 1 tablet by mouth Daily. 90 tablet 1    meclizine (ANTIVERT) 25 MG tablet Take 2 tablets by mouth Daily. 180 tablet 1    metoprolol succinate XL (TOPROL-XL) 50 MG 24 hr tablet TAKE 1 TABLET DAILY 90 tablet 3    QUEtiapine (SEROquel) 50 MG tablet Take 1 tablet by mouth Every Night. 90 tablet 1    rivastigmine (EXELON) 1.5 MG capsule Take 1 capsule by mouth 2 (Two) Times a Day With Meals. 180 capsule 1    simvastatin (ZOCOR) 20 MG tablet Take 1 tablet by mouth Daily. 90 tablet 1    sodium chloride 1 g tablet Take 1 tablet by mouth 3 (Three) Times a Day. 90 tablet 0    Spiriva Respimat 1.25 MCG/ACT aerosol solution inhaler  Inhale 2 puffs Daily. 12 g 1    thiamine (VITAMIN B1) 100 MG tablet Take 1 tablet by mouth Daily. 30 tablet 0    valsartan (DIOVAN) 80 MG tablet Take 0.5 tablets by mouth Daily. 45 tablet 1     No current facility-administered medications on file prior to visit.      No Known Allergies  Past Surgical History:   Procedure Laterality Date    CARDIAC CATHETERIZATION N/A 2022    Procedure: Left Heart Cath;  Surgeon: Randell Casiano MD;  Location: [facility] CATH INVASIVE LOCATION;  Service: Cardiovascular;  Laterality: N/A;    CARDIAC ELECTROPHYSIOLOGY PROCEDURE N/A 2022    Procedure: Loop insertion;  Surgeon: Randell Casiano MD;  Location: [facility] CATH INVASIVE LOCATION;  Service: Cardiovascular;  Laterality: N/A;    COLONOSCOPY      COLONOSCOPY N/A 10/26/2022    Procedure: COLONOSCOPY FOR SCREENING;  Surgeon: Roque Whitlock MD;  Location: [facility] ENDOSCOPY;  Service: Gastroenterology;  Laterality: N/A;  NORMAL COLON    NEPHRECTOMY Right 2024     Social History     Socioeconomic History    Marital status:    Tobacco Use    Smoking status: Former     Current packs/day: 0.00     Average packs/day: 2.0 packs/day for 36.3 years (72.7 ttl pk-yrs)     Types: Cigarettes     Start date: 6/15/1974     Quit date: 10/20/2010     Years since quittin.0     Passive exposure: Past    Smokeless tobacco: Never    Tobacco comments:     SMOKES MORE THAN 2 PPD FOR 35 YEARS   Vaping Use    Vaping status: Never Used   Substance and Sexual Activity    Alcohol use: Yes     Alcohol/week: 6.0 - 8.0 standard drinks of alcohol     Types: 6 - 8 Cans of beer per week     Comment: as of 24 was his last drink 8-14 DRINKS PER WEEK as of 2024, prior to 2024 he would drink 20 or more beers daily and did so for 15 years    Drug use: Not Currently    Sexual activity: Not Currently     Partners: Female     Birth control/protection: Condom     Family History   Problem Relation Age of Onset    Dementia Mother      No Known Problems Father     Colon cancer Neg Hx     Malig Hyperthermia Neg Hx        Results     Result Review   The following data was reviewed by: MELANIA Vyas on 07/11/2024:  Lab Results   Component Value Date    HGB 12.6 (L) 11/13/2024    HCT 37.5 11/13/2024    MCV 87.0 11/13/2024     11/13/2024    WBC 9.15 11/13/2024    NEUTROABS 5.87 11/13/2024    LYMPHSABS 1.91 11/13/2024    MONOSABS 0.95 (H) 11/13/2024    EOSABS 0.27 11/13/2024    BASOSABS 0.08 11/13/2024     Lab Results   Component Value Date    GLUCOSE 71 11/13/2024    BUN 12 11/13/2024    CREATININE 1.60 (H) 11/13/2024     (L) 11/13/2024    K 5.1 11/13/2024    CL 94 (L) 11/13/2024    CO2 26.2 11/13/2024    CALCIUM 9.3 11/13/2024    PROTEINTOT 6.9 11/13/2024    ALBUMIN 4.1 11/13/2024    BILITOT 1.0 11/13/2024    ALKPHOS 108 11/13/2024    AST 25 11/13/2024    ALT 19 11/13/2024     Lab Results   Component Value Date    MG 1.8 11/13/2024    PHOS 3.9 09/14/2024    FREET4 1.75 (H) 09/09/2024    TSH 0.804 09/09/2024       No radiology results for the last day       Assessment & Plan     Diagnoses and all orders for this visit:    1. Hyponatremia (Primary)  -     CBC & Differential; Future  -     Comprehensive Metabolic Panel; Future  -     CBC & Differential; Future  -     Comprehensive Metabolic Panel; Future  -     Magnesium; Future    2. Renal cell carcinoma, right  -     CBC & Differential; Future  -     Comprehensive Metabolic Panel; Future  -     Magnesium; Future             Paulo Leiva  is a 68 y.o. male who presents to [facility] HEMATOLOGY & ONCOLOGY for follow-up regarding stage II clear-cell renal cell carcinoma.    -Patient is status post radical nephrectomy in August 2024 by Dr. Zay Mcmanus at [facility].  -Per pathology report from surgery: Clear-cell renal cell carcinoma, stage II, pT2 pN0, M0, grade 3, diagnosed 8/2024.  -Discussed diagnosis, stage, prognosis, and  reviewed pathology report and PET/CT imaging with patient in detail today.  -Reviewed NCCN guidelines version 2.2025 as well as results of Keynote 564 published in [place] Journal of Medicine April 2024 (KELIN Riojas Et al. overall survival with adjuvant pembrolizumab and renal cell carcinoma.[facility] 2024; 390:2113-41), provided him a copy of abstract today  -Discussed option of further observation versus 17 cycles of adjuvant pembrolizumab, due to improvement overall survival and results of keynote 564, recommend patient proceed with Keytruda, patient shared he would like to think about it and contact our office next week regarding his decision regarding treatment.  -pt doesn't appear to meet criteria for genetic counseling now, but may consider this at a later date.    Assessment & Plan  2.  Hyponatremia  - Sodium of 129, thus ordered sodium tablets 3 times a day.  Continue oral NA tablets 3 times a day.   Plan to recheck lab work in 2 weeks.   Counseled patient on ER if he has worsening dizziness, logical symptoms, or seizure. Has follow with nephology in 2 months.     3. Falls and heart arrhythmia.  -The CT scan of the head showed no acute abnormalities, recommend pt discuss referral to neurology and/or geriatrics evaluation for evaluation for dementia or neurological condition with his PCP    Plan for patient follow-up in  2 weeks  with Dr. Moser after MRI shoulder given findings on PET/CT. Lab check in 2 weeks as well with CBC, CMP, Mag.       Follow Up     I spent 45 minutes caring for Paulo on this date of service. This time includes time spent by me in the following activities:preparing for the visit, reviewing tests, obtaining and/or reviewing a separately obtained history, performing a medically appropriate examination and/or evaluation , counseling and educating the patient/family/caregiver, ordering medications, tests, or procedures, referring and communicating with other health care professionals ,  documenting information in the medical record, independently interpreting results and communicating that information with the patient/family/caregiver, and care coordination    Any chemotherapy or immunotherapy or other systemic therapy treatment plan involves a high risk of complications and/or mortality of patient management.    The patient was seen and examined. Work by the provider also included review and/or ordering of lab tests, review and/or ordering of radiology tests, review and/or ordering of medicine tests, discussion with other physicians or providers, independent review of data, obtaining old records, review/summation of old records, and/or other review.    I have reviewed the family history, social history, and past medical history for this patient. Previous information and data has been reviewed and updated as needed. I have reviewed and verified the chief complaint, history, and other documentation. The patient was interviewed and examined in the clinic and the chart reviewed. The previous observations, recommendations, and conclusions were reviewed including those of other providers.     The plan was discussed with the patient and/or family. The patient was given time to ask questions and these questions were answered. At the conclusion of their visit they had no additional questions or concerns and all questions were answered to their satisfaction.    Patient was given instructions and counseling regarding his condition or for health maintenance advice. Please see specific information pulled into the AVS if appropriate.       Patient or patient representative verbalized consent for the use of Ambient Listening during the visit with  MELANIA Vyas for chart documentation. 11/13/2024  16:17 EDT

## 2024-11-15 ENCOUNTER — HOSPITAL ENCOUNTER (OUTPATIENT)
Dept: MRI IMAGING | Facility: HOSPITAL | Age: 69
Discharge: HOME | End: 2024-11-15
Payer: MEDICARE

## 2024-11-15 DIAGNOSIS — R94.8 ABNORMAL POSITRON EMISSION TOMOGRAPHY (PET) SCAN: ICD-10-CM

## 2024-11-15 PROCEDURE — 25510000002 GADOBENATE DIMEGLUMINE 529 MG/ML SOLUTION: Performed by: INTERNAL MEDICINE

## 2024-11-15 PROCEDURE — 73223 MRI JOINT UPR EXTR W/O&W/DYE: CPT

## 2024-11-15 PROCEDURE — A9577 INJ MULTIHANCE: HCPCS | Performed by: INTERNAL MEDICINE

## 2024-11-15 RX ADMIN — GADOBENATE DIMEGLUMINE 20 ML: 529 INJECTION, SOLUTION INTRAVENOUS at 11:24

## 2024-11-22 ENCOUNTER — TELEPHONE (OUTPATIENT)
Dept: CARDIOLOGY | Facility: CLINIC | Age: 69
End: 2024-11-22
Payer: MEDICARE

## 2024-11-22 ENCOUNTER — CLINICAL SUPPORT (OUTPATIENT)
Dept: FAMILY MEDICINE CLINIC | Facility: CLINIC | Age: 69
End: 2024-11-22
Payer: MEDICARE

## 2024-11-22 DIAGNOSIS — C64.1 RENAL CELL CARCINOMA, RIGHT: ICD-10-CM

## 2024-11-22 DIAGNOSIS — E87.1 HYPONATREMIA: ICD-10-CM

## 2024-11-22 LAB
ALBUMIN SERPL-MCNC: 4 G/DL (ref 3.5–5.2)
ALBUMIN/GLOB SERPL: 1.5 G/DL
ALP SERPL-CCNC: 115 U/L (ref 39–117)
ALT SERPL W P-5'-P-CCNC: 14 U/L (ref 1–41)
ANION GAP SERPL CALCULATED.3IONS-SCNC: 8.3 MMOL/L (ref 5–15)
AST SERPL-CCNC: 22 U/L (ref 1–40)
BASOPHILS # BLD AUTO: 0.09 10*3/MM3 (ref 0–0.2)
BASOPHILS NFR BLD AUTO: 1 % (ref 0–1.5)
BILIRUB SERPL-MCNC: 1 MG/DL (ref 0–1.2)
BUN SERPL-MCNC: 22 MG/DL (ref 8–23)
BUN/CREAT SERPL: 12.7 (ref 7–25)
CALCIUM SPEC-SCNC: 9.4 MG/DL (ref 8.6–10.5)
CHLORIDE SERPL-SCNC: 93 MMOL/L (ref 98–107)
CO2 SERPL-SCNC: 24.7 MMOL/L (ref 22–29)
CREAT SERPL-MCNC: 1.73 MG/DL (ref 0.76–1.27)
DEPRECATED RDW RBC AUTO: 53.9 FL (ref 37–54)
EGFRCR SERPLBLD CKD-EPI 2021: 42.7 ML/MIN/1.73
EOSINOPHIL # BLD AUTO: 0.29 10*3/MM3 (ref 0–0.4)
EOSINOPHIL NFR BLD AUTO: 3.2 % (ref 0.3–6.2)
ERYTHROCYTE [DISTWIDTH] IN BLOOD BY AUTOMATED COUNT: 17 % (ref 12.3–15.4)
GLOBULIN UR ELPH-MCNC: 2.7 GM/DL
GLUCOSE SERPL-MCNC: 96 MG/DL (ref 65–99)
HCT VFR BLD AUTO: 38.1 % (ref 37.5–51)
HGB BLD-MCNC: 12.8 G/DL (ref 13–17.7)
IMM GRANULOCYTES # BLD AUTO: 0.1 10*3/MM3 (ref 0–0.05)
IMM GRANULOCYTES NFR BLD AUTO: 1.1 % (ref 0–0.5)
LYMPHOCYTES # BLD AUTO: 2.2 10*3/MM3 (ref 0.7–3.1)
LYMPHOCYTES NFR BLD AUTO: 24.1 % (ref 19.6–45.3)
MCH RBC QN AUTO: 29.4 PG (ref 26.6–33)
MCHC RBC AUTO-ENTMCNC: 33.6 G/DL (ref 31.5–35.7)
MCV RBC AUTO: 87.4 FL (ref 79–97)
MONOCYTES # BLD AUTO: 0.95 10*3/MM3 (ref 0.1–0.9)
MONOCYTES NFR BLD AUTO: 10.4 % (ref 5–12)
NEUTROPHILS NFR BLD AUTO: 5.49 10*3/MM3 (ref 1.7–7)
NEUTROPHILS NFR BLD AUTO: 60.2 % (ref 42.7–76)
NRBC BLD AUTO-RTO: 0 /100 WBC (ref 0–0.2)
PLATELET # BLD AUTO: 303 10*3/MM3 (ref 140–450)
PMV BLD AUTO: 10.7 FL (ref 6–12)
POTASSIUM SERPL-SCNC: 5 MMOL/L (ref 3.5–5.2)
PROT SERPL-MCNC: 6.7 G/DL (ref 6–8.5)
RBC # BLD AUTO: 4.36 10*6/MM3 (ref 4.14–5.8)
SODIUM SERPL-SCNC: 126 MMOL/L (ref 136–145)
WBC NRBC COR # BLD AUTO: 9.12 10*3/MM3 (ref 3.4–10.8)

## 2024-11-22 PROCEDURE — 36415 COLL VENOUS BLD VENIPUNCTURE: CPT | Performed by: NURSE PRACTITIONER

## 2024-11-22 PROCEDURE — 80053 COMPREHEN METABOLIC PANEL: CPT | Performed by: INTERNAL MEDICINE

## 2024-11-22 PROCEDURE — 85025 COMPLETE CBC W/AUTO DIFF WBC: CPT | Performed by: INTERNAL MEDICINE

## 2024-11-22 NOTE — PROGRESS NOTES
..  Venipuncture Blood Specimen Collection  Venipuncture performed in Lt arm by Kiera Aguillon MA with good hemostasis. Patient tolerated the procedure well without complications.   11/22/24   Kiera Aguillon MA

## 2024-11-22 NOTE — TELEPHONE ENCOUNTER
----- Message from Jenni Crawley sent at 11/22/2024  2:50 PM EST -----  Renal function continues to decline  Sodium and potassium are staying about the same, please forward lab results to nephrology

## 2024-11-26 ENCOUNTER — OFFICE VISIT (OUTPATIENT)
Dept: ONCOLOGY | Facility: HOSPITAL | Age: 69
End: 2024-11-26
Payer: MEDICARE

## 2024-11-26 VITALS
TEMPERATURE: 98 F | DIASTOLIC BLOOD PRESSURE: 77 MMHG | HEIGHT: 71 IN | OXYGEN SATURATION: 98 % | WEIGHT: 218 LBS | BODY MASS INDEX: 30.52 KG/M2 | HEART RATE: 59 BPM | RESPIRATION RATE: 18 BRPM | SYSTOLIC BLOOD PRESSURE: 152 MMHG

## 2024-11-26 DIAGNOSIS — C64.1 RENAL CELL CARCINOMA, RIGHT: Primary | ICD-10-CM

## 2024-11-26 PROCEDURE — G0463 HOSPITAL OUTPT CLINIC VISIT: HCPCS | Performed by: INTERNAL MEDICINE

## 2024-11-26 NOTE — PROGRESS NOTES
Chief Complaint/Care Team    Right renal mass    ОльгаVilma vargas MELANIA SANTANA  Vilma Rolon, MELANIA    History of Present Illness     Diagnosis: Clear-cell renal cell carcinoma, stage II, pT2 pN0, M0, grade 3, diagnosed 8/2024.    Current Treatment: Active Surveillance    Previous Treatment: Status post right radical nephrectomy on 8/12/2024 4 by Dr. Zay Mcmanus  -pt declined adjuvant Keytruda IV every 3 weeks for 17 cycles on 11/26/24    Paulo Leiva Jr. is a 68 y.o. male who presents to [facility] HEMATOLOGY & ONCOLOGY for evaluation for suspected renal cell carcinoma seen on CT chest abdomen and pelvis from May 2024.    History of Present Illness  The patient is here for discussion of treatment for kidney cancer that was seen via CT CAP imaging in 5/2024. He is accompanied by his son.  Patient with a history of smoking approximately 2 to 3 packs/day for over 30 years, he quit 13 years ago.  He underwent CT chest for lung cancer screening imaging that incidentally revealed suspicious lesion on his right kidney.  The incidental mass arising in the right kidney measures 8.2 x 6.2 cm, no evidence of metastatic disease in the abdomen chest or pelvis.  CT head also was negative from May 2024.  He is scheduled for a radical nephrectomy in 07/2024 at [facility] with Dr. Mcmanus.       He has a history of COPD/emphysema, vertigo along with hypertension.  He has a heart arrhythmia and is on Eliquis. He was evaluation by multiple cardiologists.  Patient also reports memory impairment, frequent falls over the past 3-4 months, he has difficulty texting and typing on the keyboard.    He quit smoking 14 years ago. He used to smoke 2 to 3 cartons a day. He is a [employer] , he reports no exposure to chemicals or agent Orange.   FH: his maternal grandfather had stomach cancer.       Interval history: Patient here to discuss adjuvant treatment options after undergoing right radical nephrectomy by   "Yonathan at [facility] medicine.  He reports recovering well from surgery.  He is here with his neighbor.  Patient underwent MRI of shoulder and he is here to discuss those results and discuss his decision regarding if he would like to receive adjuvant immunotherapy.    Review of Systems     Oncology/Hematology History    No history exists.       Objective     Vitals:    11/26/24 1144   BP: 152/77   Pulse: 59   Resp: 18   Temp: 98 °F (36.7 °C)   TempSrc: Temporal   SpO2: 98%   Weight: 98.9 kg (218 lb)   Height: 180.3 cm (71\")   PainSc: 0-No pain         ECOG score: 0         PHQ-9 Total Score:         Physical Exam  Vitals reviewed. Exam conducted with a chaperone present.   Constitutional:       General: He is not in acute distress.     Appearance: Normal appearance.   HENT:      Head: Normocephalic and atraumatic.   Eyes:      Extraocular Movements: Extraocular movements intact.      Conjunctiva/sclera: Conjunctivae normal.   Cardiovascular:      Pulses: Normal pulses.      Heart sounds: Normal heart sounds.   Pulmonary:      Effort: Pulmonary effort is normal.   Musculoskeletal:      Cervical back: Normal range of motion and neck supple.   Skin:     General: Skin is warm and dry.   Neurological:      Mental Status: He is oriented to person, place, and time.         Physical Exam        Past Medical History     Past Medical History:   Diagnosis Date    Benign essential hypertension     COPD (chronic obstructive pulmonary disease)     Erectile dysfunction Apprx: 5 yrs ago    Generalized convulsive seizures 12/27/2023    Hyperlipidemia 12/14/2018    Hyponatremia     Implantable loop recorder present     Long term (current) use of non-steroidal anti-inflammatories (nsaid) 04/16/2018    Renal insufficiency     Syncope     Syncope and collapse 09/02/2022    Total of 5 episdoes with the last one 10/22    Outside Source Comment: Overview:  Formatting of this note might be different from the original.  Total of 5 " gladys with the last one 10/22  Last Assessment & Plan:  Formatting of this note might be different from the original.  No recurrent episodes Lipitor does not show any significant dysrhythmias at this point continue with monitoring      Vertigo 02/17/2021    Vision loss      Current Outpatient Medications on File Prior to Visit   Medication Sig Dispense Refill    apixaban (ELIQUIS) 5 MG tablet tablet Take 1 tablet by mouth 2 (Two) Times a Day.      citalopram (CeleXA) 10 MG tablet Take 1 tablet by mouth Daily. 90 tablet 1    meclizine (ANTIVERT) 25 MG tablet Take 2 tablets by mouth Daily. 180 tablet 1    metoprolol succinate XL (TOPROL-XL) 50 MG 24 hr tablet TAKE 1 TABLET DAILY 90 tablet 3    QUEtiapine (SEROquel) 50 MG tablet Take 1 tablet by mouth Every Night. 90 tablet 1    rivastigmine (EXELON) 1.5 MG capsule Take 1 capsule by mouth 2 (Two) Times a Day With Meals. 180 capsule 1    simvastatin (ZOCOR) 20 MG tablet Take 1 tablet by mouth Daily. 90 tablet 1    sodium chloride 1 g tablet Take 1 tablet by mouth 3 (Three) Times a Day. 90 tablet 0    Spiriva Respimat 1.25 MCG/ACT aerosol solution inhaler Inhale 2 puffs Daily. 12 g 1    thiamine (VITAMIN B1) 100 MG tablet Take 1 tablet by mouth Daily. 30 tablet 0    valsartan (DIOVAN) 80 MG tablet Take 0.5 tablets by mouth Daily. 45 tablet 1     No current facility-administered medications on file prior to visit.      No Known Allergies  Past Surgical History:   Procedure Laterality Date    CARDIAC CATHETERIZATION N/A 09/26/2022    Procedure: Left Heart Cath;  Surgeon: Randell Casiano MD;  Location: [facility] INVASIVE LOCATION;  Service: Cardiovascular;  Laterality: N/A;    CARDIAC ELECTROPHYSIOLOGY PROCEDURE N/A 09/26/2022    Procedure: Loop insertion;  Surgeon: Randell Casiano MD;  Location: [facility] INVASIVE LOCATION;  Service: Cardiovascular;  Laterality: N/A;    COLONOSCOPY  2007    COLONOSCOPY N/A 10/26/2022    Procedure: COLONOSCOPY FOR SCREENING;  Surgeon:  Roque Whitlock MD;  Location: [facility] ENDOSCOPY;  Service: Gastroenterology;  Laterality: N/A;  NORMAL COLON    NEPHRECTOMY Right 2024     Social History     Socioeconomic History    Marital status:    Tobacco Use    Smoking status: Former     Current packs/day: 0.00     Average packs/day: 2.0 packs/day for 36.3 years (72.7 ttl pk-yrs)     Types: Cigarettes     Start date: 6/15/1974     Quit date: 10/20/2010     Years since quittin.1     Passive exposure: Past    Smokeless tobacco: Never    Tobacco comments:     SMOKES MORE THAN 2 PPD FOR 35 YEARS   Vaping Use    Vaping status: Never Used   Substance and Sexual Activity    Alcohol use: Yes     Alcohol/week: 6.0 - 8.0 standard drinks of alcohol     Types: 6 - 8 Cans of beer per week     Comment: as of 24 was his last drink 8-14 DRINKS PER WEEK as of 2024, prior to 2024 he would drink 20 or more beers daily and did so for 15 years    Drug use: Not Currently    Sexual activity: Not Currently     Partners: Female     Birth control/protection: Condom     Family History   Problem Relation Age of Onset    Dementia Mother     No Known Problems Father     Colon cancer Neg Hx     Malig Hyperthermia Neg Hx        Results     Result Review   The following data was reviewed by: Nabor Moser MD on 2024:  Lab Results   Component Value Date    HGB 12.8 (L) 2024    HCT 38.1 2024    MCV 87.4 2024     2024    WBC 9.12 2024    NEUTROABS 5.49 2024    LYMPHSABS 2.20 2024    MONOSABS 0.95 (H) 2024    EOSABS 0.29 2024    BASOSABS 0.09 2024     Lab Results   Component Value Date    GLUCOSE 96 2024    BUN 22 2024    CREATININE 1.73 (H) 2024     (L) 2024    K 5.0 2024    CL 93 (L) 2024    CO2 24.7 2024    CALCIUM 9.4 2024    PROTEINTOT 6.7 2024    ALBUMIN 4.0 2024    BILITOT 1.0 2024    ALKPHOS 115 2024     AST 22 11/22/2024    ALT 14 11/22/2024     Lab Results   Component Value Date    MG 1.8 11/13/2024    PHOS 3.9 09/14/2024    FREET4 1.75 (H) 09/09/2024    TSH 0.804 09/09/2024       No radiology results for the last day  MRI Shoulder Left With & Without Contrast    Result Date: 11/19/2024  Impression: Impression: 1.No definite mass or other suspicious abnormality is identified on this exam in the area of activity on prior PET/CT. 2.Rotator cuff tendinopathy without definite high-grade tendon defect. 3.Mild to moderate glenohumeral osteoarthritis. 4.Advanced degenerative changes at the acromioclavicular joint with mild periarticular edema at the acromioclavicular joint which could indicate active arthropathy. 5.Thickening of the axillary joint capsule which may be associated with chronic capsulitis. Electronically Signed: Osman Sharma  11/19/2024 8:18 AM EST  Workstation ID: [id number]     Assessment & Plan     Diagnoses and all orders for this visit:    1. Renal cell carcinoma, right (Primary)  -     CBC & Differential; Future  -     Comprehensive Metabolic Panel; Future  -     CT chest w contrast; Future  -     CT abdomen pelvis w contrast; Future             Paulo ESTHER Cali Atkinson. is a 68 y.o. male who presents to [facility] HEMATOLOGY & ONCOLOGY for follow-up regarding stage II clear-cell renal cell carcinoma.    -Patient is status post radical nephrectomy in August 2024 by Dr. Zay Mcmanus at [facility].  -Per pathology report from surgery: Clear-cell renal cell carcinoma, stage II, pT2 pN0, M0, grade 3, diagnosed 8/2024.  -Discussed diagnosis, stage, prognosis, and reviewed pathology report and PET/CT imaging with patient in detail today.  -Reviewed NCCN guidelines version 2.2025 as well as results of Keynote 564 published in [place] Journal of Medicine April 2024 (KELIN Riojas Et al. overall survival with adjuvant pembrolizumab and renal cell carcinoma.NEJM 2024;  390:9569-94), provided him a copy of abstract today  -Discussed option of further observation versus 17 cycles of adjuvant pembrolizumab, due to improvement overall survival and results of keynote 564, recommend patient proceed with Keytruda, patient shared he would like to think about it and discuss at a later date  -pt doesn't appear to meet criteria for genetic counseling now, but may consider this at a later date  -pt underwent MRI left shoulder which was negative for evidence of metastatic disease  -pt decided on 11/26/24 to not receive pembrolizumab (Keytruda in the adjuvant setting)  -will order restaging imaging for surveillance with CT CAP in 4 months.     Assessment & Plan  2.  Hyponatremia  - Sodium of 126, thus ordered sodium tablets 3 times a day  - encouraged pt to discuss nephrology referral urgently with his PCP, patient nephrology urgently.  I have already referred pt in 10/2024 for this evaluation. Counseled patient on ER if he has worsening dizziness, logical symptoms, or seizure.      3. Falls and heart arrhythmia.  -The CT scan of the head showed no acute abnormalities, recommend pt discuss referral to neurology and/or geriatrics evaluation for evaluation for dementia or neurological condition with his PCP        Pt with high distress score, thus placed SW consult.    Plan for patient follow-up in 4 months with labs CBC, CMP and restaging imaging with CT CAP.    Please note that portions of this note were completed with a voice recognition program.    Electronically signed by Nabor Moser MD, 11/26/24, 12:26 PM EST.        Follow Up     I spent 30 minutes caring for Paulo on this date of service. This time includes time spent by me in the following activities:preparing for the visit, reviewing tests, obtaining and/or reviewing a separately obtained history, performing a medically appropriate examination and/or evaluation , counseling and educating the patient/family/caregiver, ordering  medications, tests, or procedures, referring and communicating with other health care professionals , documenting information in the medical record, independently interpreting results and communicating that information with the patient/family/caregiver, and care coordination    Any chemotherapy or immunotherapy or other systemic therapy treatment plan involves a high risk of complications and/or mortality of patient management.    The patient was seen and examined. Work by the provider also included review and/or ordering of lab tests, review and/or ordering of radiology tests, review and/or ordering of medicine tests, discussion with other physicians or providers, independent review of data, obtaining old records, review/summation of old records, and/or other review.    I have reviewed the family history, social history, and past medical history for this patient. Previous information and data has been reviewed and updated as needed. I have reviewed and verified the chief complaint, history, and other documentation. The patient was interviewed and examined in the clinic and the chart reviewed. The previous observations, recommendations, and conclusions were reviewed including those of other providers.     The plan was discussed with the patient and/or family. The patient was given time to ask questions and these questions were answered. At the conclusion of their visit they had no additional questions or concerns and all questions were answered to their satisfaction.    Patient was given instructions and counseling regarding his condition or for health maintenance advice. Please see specific information pulled into the AVS if appropriate.       Patient or patient representative verbalized consent for the use of Ambient Listening during the visit with  Nabor Moser MD for chart documentation. 11/26/2024  16:17 EDT

## 2024-12-04 ENCOUNTER — TRANSCRIBE ORDERS (OUTPATIENT)
Dept: FAMILY MEDICINE CLINIC | Facility: CLINIC | Age: 69
End: 2024-12-04
Payer: MEDICARE

## 2024-12-04 ENCOUNTER — OFFICE VISIT (OUTPATIENT)
Dept: FAMILY MEDICINE CLINIC | Facility: CLINIC | Age: 69
End: 2024-12-04
Payer: MEDICARE

## 2024-12-04 VITALS
SYSTOLIC BLOOD PRESSURE: 148 MMHG | BODY MASS INDEX: 30.7 KG/M2 | HEART RATE: 88 BPM | TEMPERATURE: 97.9 F | OXYGEN SATURATION: 98 % | WEIGHT: 220.1 LBS | DIASTOLIC BLOOD PRESSURE: 78 MMHG

## 2024-12-04 DIAGNOSIS — N18.31 CHRONIC KIDNEY DISEASE, STAGE 3A: Primary | ICD-10-CM

## 2024-12-04 DIAGNOSIS — E55.9 VITAMIN D DEFICIENCY: ICD-10-CM

## 2024-12-04 DIAGNOSIS — E87.1 HYPONATREMIA: Primary | ICD-10-CM

## 2024-12-04 DIAGNOSIS — I10 ESSENTIAL HYPERTENSION: Primary | ICD-10-CM

## 2024-12-04 DIAGNOSIS — E87.1 HYPONATREMIA: ICD-10-CM

## 2024-12-04 DIAGNOSIS — N18.31 STAGE 3A CHRONIC KIDNEY DISEASE: ICD-10-CM

## 2024-12-04 PROCEDURE — 1160F RVW MEDS BY RX/DR IN RCRD: CPT | Performed by: NURSE PRACTITIONER

## 2024-12-04 PROCEDURE — 99214 OFFICE O/P EST MOD 30 MIN: CPT | Performed by: NURSE PRACTITIONER

## 2024-12-04 PROCEDURE — 3078F DIAST BP <80 MM HG: CPT | Performed by: NURSE PRACTITIONER

## 2024-12-04 PROCEDURE — 3077F SYST BP >= 140 MM HG: CPT | Performed by: NURSE PRACTITIONER

## 2024-12-04 PROCEDURE — 1126F AMNT PAIN NOTED NONE PRSNT: CPT | Performed by: NURSE PRACTITIONER

## 2024-12-04 PROCEDURE — 1159F MED LIST DOCD IN RCRD: CPT | Performed by: NURSE PRACTITIONER

## 2024-12-04 PROCEDURE — G2211 COMPLEX E/M VISIT ADD ON: HCPCS | Performed by: NURSE PRACTITIONER

## 2024-12-04 NOTE — PROGRESS NOTES
Chief Complaint  Hypertension and Chronic Kidney Disease (Brought records from other providers to discuss his treatments)    Pasquale Leiva Jr. is a 69 y.o. male who presents to [facility]     History of Present Illness  The patient is a 69-year-old male here to follow up and discuss appointments with his nephrologist and other specialists.    He has been advised by his nephrologist, Dr. Moctezuma, to discontinue certain medications, including Celexa and sodium chloride, due to their potential impact on his kidney function. He reports no feelings of depression. Dr. Moctezuma has also recommended reducing his intake of water, coffee, and tea, and advised against the use of NSAIDs such as ibuprofen and naproxen. He has expressed a desire to reduce his medication intake.    He has been under the care of Dr. Moser from hematology, who ordered an x-ray of his shoulder. The results indicated a small tear in the rotator cuff, but no cancerous lesions were found. Despite the shoulder issue, he maintains full range of motion and experiences no pain. He has declined adjunctive immunotherapy suggested by Dr. Moser. He has upcoming CT scans of the abdomen, pelvis, and chest.    He has reduced his alcohol consumption by 90 percent, now averaging 3 to 4 beers per week. His blood pressure readings at home have been normal, with occasional high readings.    He has plans for motorcycle trips in March 2025 to [place] and in August 2025 to [place].        Objective   Vital Signs:   Vitals:    12/04/24 1117   BP: 148/78   BP Location: Left arm   Patient Position: Sitting   Pulse: 88   Temp: 97.9 °F (36.6 °C)   SpO2: 98%   Weight: 99.8 kg (220 lb 1.6 oz)     Body mass index is 30.7 kg/m².    Wt Readings from Last 3 Encounters:   12/04/24 99.8 kg (220 lb 1.6 oz)   11/26/24 98.9 kg (218 lb)   11/13/24 98.2 kg (216 lb 7.9 oz)     BP Readings from Last 3 Encounters:   12/04/24 148/78   11/26/24  152/77   11/13/24 176/88       Health Maintenance   Topic Date Due    ZOSTER VACCINE (2 of 2) 02/11/2015    COVID-19 Vaccine (1 - 2024-25 season) Never done    LIPID PANEL  04/10/2025    LUNG CANCER SCREENING  05/22/2025    BMI FOLLOWUP  09/04/2025    ANNUAL WELLNESS VISIT  10/28/2025    TDAP/TD VACCINES (3 - Td or Tdap) 04/27/2031    COLORECTAL CANCER SCREENING  10/26/2032    HEPATITIS C SCREENING  Completed    INFLUENZA VACCINE  Completed    Pneumococcal Vaccine 65+  Completed    AAA SCREEN (ONE-TIME)  Completed       Physical Exam  Vitals reviewed.   Constitutional:       General: He is not in acute distress.     Appearance: Normal appearance. He is well-developed.   HENT:      Head: Normocephalic and atraumatic.      Right Ear: External ear normal.      Left Ear: External ear normal.   Eyes:      Conjunctiva/sclera: Conjunctivae normal.      Pupils: Pupils are equal, round, and reactive to light.   Cardiovascular:      Rate and Rhythm: Normal rate and regular rhythm.      Heart sounds: Normal heart sounds.   Pulmonary:      Effort: Pulmonary effort is normal.      Breath sounds: Normal breath sounds.   Musculoskeletal:      Right lower leg: No edema.      Left lower leg: No edema.   Skin:     General: Skin is warm and dry.   Neurological:      Mental Status: He is alert and oriented to person, place, and time.   Psychiatric:         Mood and Affect: Mood and affect normal.         Behavior: Behavior normal.         Thought Content: Thought content normal.         Judgment: Judgment normal.          Result Review :  The following data was reviewed by: MELANIA Santiago on 12/04/2024:  Common labs          10/28/2024    15:34 11/13/2024    12:05 11/22/2024    10:57   Common Labs   Glucose 90  71  96    BUN 21  12  22    Creatinine 1.67  1.60  1.73    Sodium 125  129  126    Potassium 4.5  5.1  5.0    Chloride 87  94  93    Calcium 9.8  9.3  9.4    Albumin 4.2  4.1  4.0    Total Bilirubin 1.1  1.0  1.0     Alkaline Phosphatase 117  108  115    AST (SGOT) 27  25  22    ALT (SGPT) 27  19  14    WBC 11.81  9.15  9.12    Hemoglobin 13.5  12.6  12.8    Hematocrit 39.8  37.5  38.1    Platelets 350  275  303      TSH          8/7/2024    11:53 9/9/2024    13:38   TSH   TSH 0.770  0.804      Data reviewed : Radiologic studies   and Consultant notes        Results  Imaging  MRI of the left shoulder showed rotator cuff tendinopathy without a high grade tendon defect, osteoarthritis, advanced degenerative changes of the AC joint, and suspected degeneration of the labrum with probable posterior to superior labral tear.    MRI Shoulder Left With & Without Contrast    Result Date: 11/19/2024  Impression: 1.No definite mass or other suspicious abnormality is identified on this exam in the area of activity on prior PET/CT. 2.Rotator cuff tendinopathy without definite high-grade tendon defect. 3.Mild to moderate glenohumeral osteoarthritis. 4.Advanced degenerative changes at the acromioclavicular joint with mild periarticular edema at the acromioclavicular joint which could indicate active arthropathy. 5.Thickening of the axillary joint capsule which may be associated with chronic capsulitis. Electronically Signed: Osman Sharma  11/19/2024 8:18 AM EST  Workstation ID: [id number]        Procedures         Assessment & Plan  Essential hypertension           Stage 3a chronic kidney disease           Hyponatremia              Assessment & Plan  1. Rotator Cuff Tendinopathy.  His MRI of the left shoulder revealed rotator cuff tendinopathy without a high-grade tendon defect, along with osteoarthritis and advanced degenerative changes of the AC joint. A labral tear is suspected. He is advised to monitor his shoulder condition. If it becomes bothersome, a referral will be made.    2. Hypertension.  His blood pressure today was 148/78, which is better than it has been but still slightly elevated. His blood pressure will be rechecked and  compared to his home readings.    3. Depression and Anxiety.  He is not currently feeling down, depressed, or anxious. He was instructed to report any symptoms of depression or anxiety. No new medications will be started at this time.    4. Sodium Imbalance.  He has stopped taking Celexa and sodium chloride as per nephrology's instructions. A basic metabolic panel and urine osmolality are to be repeated around [place] to reassess his sodium levels after discontinuing Celexa. This will be done approximately a month after stopping the medication.    5. Health Maintenance.  A repeat CMP and CBC are scheduled for April 2025. A CBC, renal function, vitamin D, and PTH are to be done at the end of this month.    Follow-up  Return in February for follow-up with nephrology.              I spent 30 minutes caring for Paulo on this date of service. This time includes time spent by me in the following activities:preparing for the visit, reviewing tests, obtaining and/or reviewing a separately obtained history, and counseling and educating the patient/family/caregiver  FOLLOW UP  Return for Next scheduled follow up.  Patient was given instructions and counseling regarding his condition or for health maintenance advice. Please see specific information pulled into the AVS if appropriate.       Vilma Rolon, APRN  12/04/24  13:57 EST    CURRENT & DISCONTINUED MEDICATIONS  Current Outpatient Medications   Medication Instructions    apixaban (ELIQUIS) 5 mg, 2 Times Daily    meclizine (ANTIVERT) 50 mg, Oral, Daily    metoprolol succinate XL (TOPROL-XL) 50 mg, Oral, Daily    QUEtiapine (SEROQUEL) 50 mg, Oral, Nightly    rivastigmine (EXELON) 1.5 mg, Oral, 2 Times Daily With Meals    simvastatin (ZOCOR) 20 mg, Oral, Daily    Spiriva Respimat 1.25 MCG/ACT aerosol solution inhaler 2 puffs, Inhalation, Daily - RT    thiamine (VITAMIN B1) 100 mg, Oral, Daily    valsartan (DIOVAN) 40 mg, Oral, Daily       Medications Discontinued  During This Encounter   Medication Reason    citalopram (CeleXA) 10 MG tablet *Therapy completed    sodium chloride 1 g tablet *Therapy completed        Patient or patient representative verbalized consent for the use of Ambient Listening during the visit with  MELANIA Santiago for chart documentation. 12/4/2024  12:13 EST  Answers submitted by the patient for this visit:  Primary Reason for Visit (Submitted on 12/1/2024)  What is the primary reason for your visit?: Problem Not Listed  Problem not listed (Submitted on 12/1/2024)  Chief Complaint: Other medical problem  Reason for appointment: Go over some prior Visits with outside Doctors

## 2025-01-14 RX ORDER — APIXABAN 5 MG/1
5 TABLET, FILM COATED ORAL 2 TIMES DAILY
Qty: 180 TABLET | Refills: 3 | Status: SHIPPED | OUTPATIENT
Start: 2025-01-14

## 2025-01-17 ENCOUNTER — OFFICE VISIT (OUTPATIENT)
Dept: FAMILY MEDICINE CLINIC | Facility: CLINIC | Age: 70
End: 2025-01-17
Payer: MEDICARE

## 2025-01-17 VITALS
DIASTOLIC BLOOD PRESSURE: 66 MMHG | OXYGEN SATURATION: 96 % | BODY MASS INDEX: 31.08 KG/M2 | SYSTOLIC BLOOD PRESSURE: 130 MMHG | WEIGHT: 222 LBS | HEIGHT: 71 IN | HEART RATE: 98 BPM | TEMPERATURE: 98.4 F

## 2025-01-17 DIAGNOSIS — R06.2 WHEEZING: Primary | ICD-10-CM

## 2025-01-17 DIAGNOSIS — R05.1 ACUTE COUGH: ICD-10-CM

## 2025-01-17 DIAGNOSIS — J44.1 COPD WITH EXACERBATION: ICD-10-CM

## 2025-01-17 DIAGNOSIS — R09.81 NASAL CONGESTION: ICD-10-CM

## 2025-01-17 PROCEDURE — 3078F DIAST BP <80 MM HG: CPT

## 2025-01-17 PROCEDURE — 1159F MED LIST DOCD IN RCRD: CPT

## 2025-01-17 PROCEDURE — 99213 OFFICE O/P EST LOW 20 MIN: CPT

## 2025-01-17 PROCEDURE — 3075F SYST BP GE 130 - 139MM HG: CPT

## 2025-01-17 PROCEDURE — 1126F AMNT PAIN NOTED NONE PRSNT: CPT

## 2025-01-17 PROCEDURE — 1160F RVW MEDS BY RX/DR IN RCRD: CPT

## 2025-01-17 RX ORDER — CITALOPRAM HYDROBROMIDE 10 MG/1
10 TABLET ORAL DAILY
COMMUNITY
Start: 2025-01-03

## 2025-01-17 RX ORDER — AZITHROMYCIN 250 MG/1
TABLET, FILM COATED ORAL
Qty: 6 TABLET | Refills: 0 | Status: SHIPPED | OUTPATIENT
Start: 2025-01-17

## 2025-01-17 RX ORDER — BENZONATATE 100 MG/1
200 CAPSULE ORAL 3 TIMES DAILY PRN
Qty: 21 CAPSULE | Refills: 0 | Status: SHIPPED | OUTPATIENT
Start: 2025-01-17 | End: 2025-01-24

## 2025-01-17 RX ORDER — FLUTICASONE PROPIONATE 50 MCG
2 SPRAY, SUSPENSION (ML) NASAL DAILY
Qty: 16 G | Refills: 0 | Status: SHIPPED | OUTPATIENT
Start: 2025-01-17

## 2025-01-17 RX ORDER — PREDNISONE 20 MG/1
20 TABLET ORAL DAILY
Qty: 3 TABLET | Refills: 0 | Status: SHIPPED | OUTPATIENT
Start: 2025-01-17 | End: 2025-01-20

## 2025-01-17 NOTE — PROGRESS NOTES
Chief Complaint  Cough (X 3 days)    PHQ-2 Total Score:    PHQ-9 Total Score:      History of Present Illness:  Paulo Leiva Jr. is a 69 y.o. male who presents to [facility] with a past medical history of  Past Medical History:   Diagnosis Date    Benign essential hypertension     Cancer August 1994    Mass on Kidney..    COPD (chronic obstructive pulmonary disease)     Erectile dysfunction Apprx: 5 yrs ago    Generalized convulsive seizures 12/27/2023    Hyperlipidemia 12/14/2018    Hyponatremia     Implantable loop recorder present     Long term (current) use of non-steroidal anti-inflammatories (nsaid) 04/16/2018    Renal insufficiency     Syncope     Syncope and collapse 09/02/2022    Total of 5 episdoes with the last one 10/22    Outside Source Comment: Overview:  Formatting of this note might be different from the original.  Total of 5 episdoes with the last one 10/22  Last Assessment & Plan:  Formatting of this note might be different from the original.  No recurrent episodes Lipitor does not show any significant dysrhythmias at this point continue with monitoring      Vertigo 02/17/2021    Vision loss         History of Present Illness  The patient is a 69-year-old male who presents today for complaints of a cough.    He has been experiencing a persistent cough for the past 3 days, which he attributes to a cold. He reports no fever or body aches. The cough is occasionally productive, yielding mucus, but without any specific coloration. He also reports intermittent nasal drainage. He has not been exposed to any sick individuals, although he has been visiting his wife in the hospital daily for the past week. He has not previously used cough medications. He does not smoke, having quit approximately 15 years ago.    He has a known history of COPD and continues to use his prescribed inhalers. He does not utilize nasal sprays. He has no wheezing.    Supplemental  "Information  He has high blood pressure.     SOCIAL HISTORY  He does not smoke, having quit about 15 years ago.    ALLERGIES  The patient has no known allergies.      Objective   Vital Signs:   Vitals:    01/17/25 1651   BP: 130/66   Pulse: 98   Temp: 98.4 °F (36.9 °C)   SpO2: 96%   Weight: 101 kg (222 lb)   Height: 180.3 cm (71\")     Body mass index is 30.96 kg/m².    Wt Readings from Last 3 Encounters:   01/17/25 101 kg (222 lb)   12/04/24 99.8 kg (220 lb 1.6 oz)   11/26/24 98.9 kg (218 lb)     BP Readings from Last 3 Encounters:   01/17/25 130/66   12/04/24 148/78   11/26/24 152/77       Health Maintenance   Topic Date Due    ZOSTER VACCINE (2 of 2) 02/11/2015    COVID-19 Vaccine (1 - 2024-25 season) 01/17/2026 (Originally 9/1/2024)    LIPID PANEL  04/10/2025    BMI FOLLOWUP  09/04/2025    ANNUAL WELLNESS VISIT  10/28/2025    LUNG CANCER SCREENING  11/20/2025    TDAP/TD VACCINES (3 - Td or Tdap) 04/27/2031    COLORECTAL CANCER SCREENING  10/26/2032    HEPATITIS C SCREENING  Completed    INFLUENZA VACCINE  Completed    Pneumococcal Vaccine 65+  Completed    AAA SCREEN ONCE  Completed       Review of Systems   Physical Exam  Vitals reviewed.   Constitutional:       Appearance: Normal appearance.   Eyes:      Pupils: Pupils are equal, round, and reactive to light.   Cardiovascular:      Rate and Rhythm: Normal rate and regular rhythm.   Pulmonary:      Effort: Pulmonary effort is normal.      Breath sounds: Wheezing present.   Skin:     General: Skin is warm and dry.   Neurological:      General: No focal deficit present.      Mental Status: He is alert and oriented to person, place, and time.   Psychiatric:         Mood and Affect: Mood normal.            Result Review :  The following data was reviewed by: MELANIA Crow on 01/17/2025:  No visits with results within 1 Month(s) from this visit.   Latest known visit with results is:   Clinical Support on 11/22/2024   Component Date Value    Glucose 11/22/2024 " 96     BUN 11/22/2024 22     Creatinine 11/22/2024 1.73 (H)     Sodium 11/22/2024 126 (L)     Potassium 11/22/2024 5.0     Chloride 11/22/2024 93 (L)     CO2 11/22/2024 24.7     Calcium 11/22/2024 9.4     Total Protein 11/22/2024 6.7     Albumin 11/22/2024 4.0     ALT (SGPT) 11/22/2024 14     AST (SGOT) 11/22/2024 22     Alkaline Phosphatase 11/22/2024 115     Total Bilirubin 11/22/2024 1.0     Globulin 11/22/2024 2.7     A/G Ratio 11/22/2024 1.5     BUN/Creatinine Ratio 11/22/2024 12.7     Anion Gap 11/22/2024 8.3     eGFR 11/22/2024 42.7 (L)     WBC 11/22/2024 9.12     RBC 11/22/2024 4.36     Hemoglobin 11/22/2024 12.8 (L)     Hematocrit 11/22/2024 38.1     MCV 11/22/2024 87.4     MCH 11/22/2024 29.4     MCHC 11/22/2024 33.6     RDW 11/22/2024 17.0 (H)     RDW-SD 11/22/2024 53.9     MPV 11/22/2024 10.7     Platelets 11/22/2024 303     Neutrophil % 11/22/2024 60.2     Lymphocyte % 11/22/2024 24.1     Monocyte % 11/22/2024 10.4     Eosinophil % 11/22/2024 3.2     Basophil % 11/22/2024 1.0     Immature Grans % 11/22/2024 1.1 (H)     Neutrophils, Absolute 11/22/2024 5.49     Lymphocytes, Absolute 11/22/2024 2.20     Monocytes, Absolute 11/22/2024 0.95 (H)     Eosinophils, Absolute 11/22/2024 0.29     Basophils, Absolute 11/22/2024 0.09     Immature Grans, Absolute 11/22/2024 0.10 (H)     nRBC 11/22/2024 0.0        Results        Procedures        Assessment and Plan   Diagnoses and all orders for this visit:    1. Wheezing (Primary)  -     predniSONE (DELTASONE) 20 MG tablet; Take 1 tablet by mouth Daily for 3 days.  Dispense: 3 tablet; Refill: 0    2. COPD with exacerbation  -     predniSONE (DELTASONE) 20 MG tablet; Take 1 tablet by mouth Daily for 3 days.  Dispense: 3 tablet; Refill: 0  -     azithromycin (Zithromax) 250 MG tablet; Take as directed  Dispense: 6 tablet; Refill: 0    3. Acute cough  -     benzonatate (Tessalon Perles) 100 MG capsule; Take 2 capsules by mouth 3 (Three) Times a Day As Needed for Cough  for up to 7 days.  Dispense: 21 capsule; Refill: 0    4. Nasal congestion  -     fluticasone (FLONASE) 50 MCG/ACT nasal spray; Administer 2 sprays into the nostril(s) as directed by provider Daily.  Dispense: 16 g; Refill: 0        Assessment & Plan  1. Cough.  He reports a persistent cough for about three days, with occasional mucus but no fever or wheezing. Examination revealed slight wheezing and fluid in the ear. There is no evidence of infection. A prescription for a Z-Gonzales (azithromycin) has been provided, to be filled at [facility]. A nasal steroid spray will be prescribed to address the fluid in the ear. He is advised to maintain adequate hydration and expectorate any sputum produced.    2. Chronic Obstructive Pulmonary Disease (COPD).  He has a history of COPD but reports no current wheezing. He is currently using his inhalers as prescribed. A short course of steroids will be prescribed to prevent worsening exacerbation of COPD.      FOLLOW UP  Return if symptoms worsen or fail to improve.    Patient was given instructions and counseling regarding his condition or for health maintenance advice. Please see specific information pulled into the AVS if appropriate.       MELANIA Crow  01/17/25  17:25 EST    CURRENT & DISCONTINUED MEDICATIONS  Current Outpatient Medications   Medication Instructions    azithromycin (Zithromax) 250 MG tablet Take as directed    benzonatate (TESSALON PERLES) 200 mg, Oral, 3 Times Daily PRN    citalopram (CELEXA) 10 mg, Daily    Eliquis 5 mg, Oral, 2 Times Daily    fluticasone (FLONASE) 50 MCG/ACT nasal spray 2 sprays, Nasal, Daily    meclizine (ANTIVERT) 50 mg, Oral, Daily    metoprolol succinate XL (TOPROL-XL) 50 mg, Oral, Daily    predniSONE (DELTASONE) 20 mg, Oral, Daily    QUEtiapine (SEROQUEL) 50 mg, Oral, Nightly    rivastigmine (EXELON) 1.5 mg, Oral, 2 Times Daily With Meals    simvastatin (ZOCOR) 20 mg, Oral, Daily    Spiriva Respimat 1.25 MCG/ACT aerosol solution  inhaler 2 puffs, Inhalation, Daily - RT    thiamine (VITAMIN B1) 100 mg, Oral, Daily    valsartan (DIOVAN) 40 mg, Oral, Daily       There are no discontinued medications.     EMR Dragon/Transcription disclaimer:  Parts of this encounter note are electronic transcription/translation of spoken language to printed text.     Patient or patient representative verbalized consent for the use of Ambient Listening during the visit with  MELANIA Crow for chart documentation. 1/17/2025  17:04 EST

## 2025-01-27 ENCOUNTER — OFFICE VISIT (OUTPATIENT)
Dept: FAMILY MEDICINE CLINIC | Facility: CLINIC | Age: 70
End: 2025-01-27
Payer: MEDICARE

## 2025-01-27 VITALS
BODY MASS INDEX: 30.6 KG/M2 | DIASTOLIC BLOOD PRESSURE: 82 MMHG | WEIGHT: 218.6 LBS | HEART RATE: 92 BPM | OXYGEN SATURATION: 96 % | SYSTOLIC BLOOD PRESSURE: 142 MMHG | HEIGHT: 71 IN | TEMPERATURE: 97.8 F

## 2025-01-27 DIAGNOSIS — E55.9 VITAMIN D DEFICIENCY: ICD-10-CM

## 2025-01-27 DIAGNOSIS — E87.1 HYPONATREMIA: ICD-10-CM

## 2025-01-27 DIAGNOSIS — Z02.9 ENCOUNTERS FOR ADMINISTRATIVE PURPOSES: ICD-10-CM

## 2025-01-27 DIAGNOSIS — F03.918 DEMENTIA WITH OTHER BEHAVIORAL DISTURBANCE, UNSPECIFIED DEMENTIA SEVERITY, UNSPECIFIED DEMENTIA TYPE: Primary | ICD-10-CM

## 2025-01-27 DIAGNOSIS — N18.31 CHRONIC KIDNEY DISEASE, STAGE 3A: ICD-10-CM

## 2025-01-27 LAB
25(OH)D3 SERPL-MCNC: 57.9 NG/ML (ref 30–100)
ALBUMIN SERPL-MCNC: 3.9 G/DL (ref 3.5–5.2)
ANION GAP SERPL CALCULATED.3IONS-SCNC: 10.2 MMOL/L (ref 5–15)
BACTERIA UR QL AUTO: NORMAL /HPF
BASOPHILS # BLD MANUAL: 0.2 10*3/MM3 (ref 0–0.2)
BASOPHILS NFR BLD MANUAL: 2 % (ref 0–1.5)
BILIRUB UR QL STRIP: NEGATIVE
BUN SERPL-MCNC: 11 MG/DL (ref 8–23)
BUN/CREAT SERPL: 6.1 (ref 7–25)
CALCIUM SPEC-SCNC: 9.5 MG/DL (ref 8.6–10.5)
CHLORIDE SERPL-SCNC: 94 MMOL/L (ref 98–107)
CLARITY UR: CLEAR
CO2 SERPL-SCNC: 22.8 MMOL/L (ref 22–29)
COLOR UR: YELLOW
CREAT SERPL-MCNC: 1.8 MG/DL (ref 0.76–1.27)
CREAT UR-MCNC: 55.5 MG/DL
DEPRECATED RDW RBC AUTO: 44.2 FL (ref 37–54)
EGFRCR SERPLBLD CKD-EPI 2021: 40.5 ML/MIN/1.73
EOSINOPHIL # BLD MANUAL: 0.3 10*3/MM3 (ref 0–0.4)
EOSINOPHIL NFR BLD MANUAL: 3 % (ref 0.3–6.2)
ERYTHROCYTE [DISTWIDTH] IN BLOOD BY AUTOMATED COUNT: 13.2 % (ref 12.3–15.4)
GLUCOSE SERPL-MCNC: 53 MG/DL (ref 65–99)
GLUCOSE UR STRIP-MCNC: NEGATIVE MG/DL
HCT VFR BLD AUTO: 46.1 % (ref 37.5–51)
HGB BLD-MCNC: 14.9 G/DL (ref 13–17.7)
HGB UR QL STRIP.AUTO: NEGATIVE
HYALINE CASTS UR QL AUTO: NORMAL /LPF
KETONES UR QL STRIP: NEGATIVE
LEUKOCYTE ESTERASE UR QL STRIP.AUTO: NEGATIVE
LYMPHOCYTES # BLD MANUAL: 1.7 10*3/MM3 (ref 0.7–3.1)
LYMPHOCYTES NFR BLD MANUAL: 14 % (ref 5–12)
MCH RBC QN AUTO: 30 PG (ref 26.6–33)
MCHC RBC AUTO-ENTMCNC: 32.3 G/DL (ref 31.5–35.7)
MCV RBC AUTO: 92.8 FL (ref 79–97)
MONOCYTES # BLD: 1.4 10*3/MM3 (ref 0.1–0.9)
MYELOCYTES NFR BLD MANUAL: 1 % (ref 0–0)
NEUTROPHILS # BLD AUTO: 6.3 10*3/MM3 (ref 1.7–7)
NEUTROPHILS NFR BLD MANUAL: 63 % (ref 42.7–76)
NITRITE UR QL STRIP: NEGATIVE
NRBC BLD AUTO-RTO: 0 /100 WBC (ref 0–0.2)
NRBC SPEC MANUAL: 1 /100 WBC (ref 0–0.2)
OSMOLALITY UR: 184 MOSM/KG (ref 50–1400)
PH UR STRIP.AUTO: 6.5 [PH] (ref 5–8)
PHOSPHATE SERPL-MCNC: 3 MG/DL (ref 2.5–4.5)
PLAT MORPH BLD: NORMAL
PLATELET # BLD AUTO: 418 10*3/MM3 (ref 140–450)
PMV BLD AUTO: 9.8 FL (ref 6–12)
POTASSIUM SERPL-SCNC: 5.6 MMOL/L (ref 3.5–5.2)
PROT ?TM UR-MCNC: 4.8 MG/DL
PROT UR QL STRIP: NEGATIVE
PROT/CREAT UR: 0.09 MG/G{CREAT}
PTH-INTACT SERPL-MCNC: 26.9 PG/ML (ref 15–65)
RBC # BLD AUTO: 4.97 10*6/MM3 (ref 4.14–5.8)
RBC # UR STRIP: NORMAL /HPF
RBC MORPH BLD: NORMAL
REF LAB TEST METHOD: NORMAL
SMUDGE CELLS IN BLOOD BY LIGHT MICROSCOPY: 6 /100 WBC
SODIUM SERPL-SCNC: 127 MMOL/L (ref 136–145)
SP GR UR STRIP: 1.01 (ref 1–1.03)
SQUAMOUS #/AREA URNS HPF: NORMAL /HPF
UROBILINOGEN UR QL STRIP: NORMAL
VARIANT LYMPHS NFR BLD MANUAL: 17 % (ref 19.6–45.3)
WBC # UR STRIP: NORMAL /HPF
WBC MORPH BLD: NORMAL
WBC NRBC COR # BLD AUTO: 10 10*3/MM3 (ref 3.4–10.8)

## 2025-01-27 PROCEDURE — 36415 COLL VENOUS BLD VENIPUNCTURE: CPT | Performed by: NURSE PRACTITIONER

## 2025-01-27 PROCEDURE — 83970 ASSAY OF PARATHORMONE: CPT | Performed by: INTERNAL MEDICINE

## 2025-01-27 PROCEDURE — 80069 RENAL FUNCTION PANEL: CPT | Performed by: INTERNAL MEDICINE

## 2025-01-27 PROCEDURE — 99214 OFFICE O/P EST MOD 30 MIN: CPT | Performed by: NURSE PRACTITIONER

## 2025-01-27 PROCEDURE — 85007 BL SMEAR W/DIFF WBC COUNT: CPT | Performed by: INTERNAL MEDICINE

## 2025-01-27 PROCEDURE — 3079F DIAST BP 80-89 MM HG: CPT | Performed by: NURSE PRACTITIONER

## 2025-01-27 PROCEDURE — 83935 ASSAY OF URINE OSMOLALITY: CPT | Performed by: INTERNAL MEDICINE

## 2025-01-27 PROCEDURE — 81001 URINALYSIS AUTO W/SCOPE: CPT | Performed by: INTERNAL MEDICINE

## 2025-01-27 PROCEDURE — 82570 ASSAY OF URINE CREATININE: CPT | Performed by: INTERNAL MEDICINE

## 2025-01-27 PROCEDURE — 3077F SYST BP >= 140 MM HG: CPT | Performed by: NURSE PRACTITIONER

## 2025-01-27 PROCEDURE — 84156 ASSAY OF PROTEIN URINE: CPT | Performed by: INTERNAL MEDICINE

## 2025-01-27 PROCEDURE — 82306 VITAMIN D 25 HYDROXY: CPT | Performed by: INTERNAL MEDICINE

## 2025-01-27 PROCEDURE — 1160F RVW MEDS BY RX/DR IN RCRD: CPT | Performed by: NURSE PRACTITIONER

## 2025-01-27 PROCEDURE — 85025 COMPLETE CBC W/AUTO DIFF WBC: CPT | Performed by: INTERNAL MEDICINE

## 2025-01-27 PROCEDURE — 1126F AMNT PAIN NOTED NONE PRSNT: CPT | Performed by: NURSE PRACTITIONER

## 2025-01-27 PROCEDURE — 1159F MED LIST DOCD IN RCRD: CPT | Performed by: NURSE PRACTITIONER

## 2025-01-27 RX ORDER — CITALOPRAM HYDROBROMIDE 10 MG/1
10 TABLET ORAL DAILY
Qty: 90 TABLET | Refills: 1 | Status: SHIPPED | OUTPATIENT
Start: 2025-01-27

## 2025-01-27 NOTE — PROGRESS NOTES
Chief Complaint  Hypertension, Dementia, and Hyperlipidemia    The PHQ has not been completed during this encounter.       History of Present Illness  Paulo ESTHER Leiva Jr. is a 69 y.o. male who presents to [facility] with a past medical history of  Past Medical History:   Diagnosis Date    Benign essential hypertension     Cancer August 1994    Mass on Kidney..    COPD (chronic obstructive pulmonary disease)     Erectile dysfunction Apprx: 5 yrs ago    Generalized convulsive seizures 12/27/2023    Hyperlipidemia 12/14/2018    Hyponatremia     Implantable loop recorder present     Long term (current) use of non-steroidal anti-inflammatories (nsaid) 04/16/2018    Renal insufficiency     Syncope     Syncope and collapse 09/02/2022    Total of 5 episdoes with the last one 10/22    Outside Source Comment: Overview:  Formatting of this note might be different from the original.  Total of 5 episdoes with the last one 10/22  Last Assessment & Plan:  Formatting of this note might be different from the original.  No recurrent episodes Lipitor does not show any significant dysrhythmias at this point continue with monitoring      Vertigo 02/17/2021    Vision loss        HPI     The patient is a 69-year-old male who presents for evaluation of cough, dizziness, memory issues, and medication management.    He reports a significant improvement in his cough, which he never perceived as severe. His respiratory function has also improved. He does not experience any epistaxis or hematochezia while on Eliquis.    His dizziness is well-managed at present.    He continues to take Seroquel at night, which he believes contributes to his good sleep quality. He has abstained from alcohol consumption for an unspecified duration. He occasionally experiences excessive sleepiness. He does not experience any chest pain or palpitations.    He has resumed his Celexa medication, which he believes aids in mood  "regulation. He does not endorse any feelings of depression, hopelessness, or suicidal ideation.    He has a scheduled appointment with his nephrologist in February 2024 for follow-up regarding his kidney cancer.    He occasionally experiences memory lapses, such as forgetting names.    He continues to take over-the-counter thiamine and B12 supplements.    SOCIAL HISTORY  He does not use alcohol.    MEDICATIONS  Current: Seroquel, thiamine, B12, Eliquis, Celexa       Objective   Vital Signs:   Vitals:    01/27/25 1108   BP: 142/82   BP Location: Left arm   Patient Position: Sitting   Pulse: 92   Temp: 97.8 °F (36.6 °C)   SpO2: 96%   Weight: 99.2 kg (218 lb 9.6 oz)   Height: 180.3 cm (71\")   PainSc: 0-No pain     Body mass index is 30.49 kg/m².    Wt Readings from Last 3 Encounters:   01/27/25 99.2 kg (218 lb 9.6 oz)   01/17/25 101 kg (222 lb)   12/04/24 99.8 kg (220 lb 1.6 oz)     BP Readings from Last 3 Encounters:   01/27/25 142/82   01/17/25 130/66   12/04/24 148/78       Health Maintenance   Topic Date Due    ZOSTER VACCINE (2 of 2) 03/31/2025 (Originally 2/11/2015)    COVID-19 Vaccine (1 - 2024-25 season) 01/17/2026 (Originally 9/1/2024)    LIPID PANEL  04/10/2025    BMI FOLLOWUP  09/04/2025    ANNUAL WELLNESS VISIT  10/28/2025    LUNG CANCER SCREENING  11/20/2025    TDAP/TD VACCINES (3 - Td or Tdap) 04/27/2031    COLORECTAL CANCER SCREENING  10/26/2032    HEPATITIS C SCREENING  Completed    INFLUENZA VACCINE  Completed    Pneumococcal Vaccine 65+  Completed    AAA SCREEN ONCE  Completed       Physical Exam  Vitals reviewed.   Constitutional:       General: He is not in acute distress.     Appearance: Normal appearance. He is well-developed.   HENT:      Head: Normocephalic and atraumatic.      Right Ear: External ear normal.      Left Ear: External ear normal.   Eyes:      Conjunctiva/sclera: Conjunctivae normal.      Pupils: Pupils are equal, round, and reactive to light.   Cardiovascular:      Rate and " Rhythm: Normal rate and regular rhythm.      Heart sounds: Normal heart sounds.   Pulmonary:      Effort: Pulmonary effort is normal.      Breath sounds: Normal breath sounds.   Musculoskeletal:      Cervical back: Neck supple.      Right lower leg: No edema.      Left lower leg: No edema.   Skin:     General: Skin is warm and dry.   Neurological:      Mental Status: He is alert and oriented to person, place, and time.   Psychiatric:         Mood and Affect: Mood and affect normal.         Behavior: Behavior normal.         Thought Content: Thought content normal.         Judgment: Judgment normal.            Result Review :  The following data was reviewed by: MELANIA Santiago on 01/27/2025:  Results       Common labs          10/28/2024    15:34 11/13/2024    12:05 11/22/2024    10:57   Common Labs   Glucose 90  71  96    BUN 21  12  22    Creatinine 1.67  1.60  1.73    Sodium 125  129  126    Potassium 4.5  5.1  5.0    Chloride 87  94  93    Calcium 9.8  9.3  9.4    Albumin 4.2  4.1  4.0    Total Bilirubin 1.1  1.0  1.0    Alkaline Phosphatase 117  108  115    AST (SGOT) 27  25  22    ALT (SGPT) 27  19  14    WBC 11.81  9.15  9.12    Hemoglobin 13.5  12.6  12.8    Hematocrit 39.8  37.5  38.1    Platelets 350  275  303      TSH          8/7/2024    11:53 9/9/2024    13:38   TSH   TSH 0.770  0.804        Procedures        Assessment and Plan   Diagnoses and all orders for this visit:    1. Dementia with other behavioral disturbance, unspecified dementia severity, unspecified dementia type (Primary)  -     citalopram (CeleXA) 10 MG tablet; Take 1 tablet by mouth Daily.  Dispense: 90 tablet; Refill: 1    2. Chronic kidney disease, stage 3a  -     CBC w AUTO Differential  -     PTH, Intact  -     Vitamin D 25 hydroxy  -     Renal Function Panel    3. Vitamin D deficiency  -     CBC w AUTO Differential  -     PTH, Intact  -     Vitamin D 25 hydroxy  -     Renal Function Panel    4. Hyponatremia  -     Cancel:  Basic metabolic panel    5. Encounters for administrative purposes         1. Cough.  The cough has significantly improved and is almost completely resolved. He is advised to continue deep breathing exercises due to a slight wheeze detected in the right lower quadrant.    2. Dizziness.  The dizziness is well under control.    3. Medication Management.  He is currently taking Seroquel at night to help with sleep, thiamine (B1) over the counter, and Celexa (citalopram) for mood. A refill for Celexa has been sent to [facility]. He is advised to inform his nephrologist about the resumption of Celexa during his upcoming appointment in February 2024.    4. Kidney Cancer.  He is scheduled to follow up with his nephrologist regarding his kidney cancer. Labs ordered by Dr. Moctezuma on December 4th will be drawn today.    5. Memory Issues.  He reports occasional memory lapses, such as forgetting names.    6. Handicap Placard.  A handicap placard form has been completed for him.               FOLLOW UP  Return in about 6 months (around 7/27/2025) for Refills and fasting labs.    Patient was given instructions and counseling regarding his condition or for health maintenance advice. Please see specific information pulled into the AVS if appropriate.       Vilma VICENTE Rloon, APRN  01/27/25  11:49 EST    CURRENT & DISCONTINUED MEDICATIONS  Current Outpatient Medications   Medication Instructions    citalopram (CELEXA) 10 mg, Oral, Daily    Eliquis 5 mg, Oral, 2 Times Daily    fluticasone (FLONASE) 50 MCG/ACT nasal spray 2 sprays, Nasal, Daily    meclizine (ANTIVERT) 50 mg, Oral, Daily    metoprolol succinate XL (TOPROL-XL) 50 mg, Oral, Daily    QUEtiapine (SEROQUEL) 50 mg, Oral, Nightly    rivastigmine (EXELON) 1.5 mg, Oral, 2 Times Daily With Meals    simvastatin (ZOCOR) 20 mg, Oral, Daily    Spiriva Respimat 1.25 MCG/ACT aerosol solution inhaler 2 puffs, Inhalation, Daily - RT    thiamine (VITAMIN B1) 100 mg, Oral, Daily    valsartan  (DIOVAN) 40 mg, Oral, Daily       Medications Discontinued During This Encounter   Medication Reason    azithromycin (Zithromax) 250 MG tablet *Therapy completed    citalopram (CeleXA) 10 MG tablet Reorder        Patient or patient representative verbalized consent for the use of Ambient Listening during the visit with  MELANIA Santiago for chart documentation. 1/27/2025  11:48 EST

## 2025-01-27 NOTE — PROGRESS NOTES
..  Venipuncture Blood Specimen Collection  Venipuncture performed in Lt arm by Cadence Mayer with good hemostasis. Patient tolerated the procedure well without complications.   01/27/25   Kiera Aguillon MA

## 2025-03-06 ENCOUNTER — OFFICE VISIT (OUTPATIENT)
Dept: FAMILY MEDICINE CLINIC | Facility: CLINIC | Age: 70
End: 2025-03-06
Payer: MEDICARE

## 2025-03-06 VITALS
SYSTOLIC BLOOD PRESSURE: 110 MMHG | HEIGHT: 71 IN | HEART RATE: 62 BPM | DIASTOLIC BLOOD PRESSURE: 82 MMHG | BODY MASS INDEX: 31.35 KG/M2 | TEMPERATURE: 97.8 F | WEIGHT: 223.9 LBS | OXYGEN SATURATION: 95 %

## 2025-03-06 DIAGNOSIS — R53.1 WEAKNESS: Primary | ICD-10-CM

## 2025-03-06 DIAGNOSIS — C64.1 MALIGNANT NEOPLASM OF RIGHT KIDNEY, EXCEPT RENAL PELVIS: ICD-10-CM

## 2025-03-06 DIAGNOSIS — G89.29 CHRONIC PAIN OF BOTH KNEES: ICD-10-CM

## 2025-03-06 DIAGNOSIS — M79.604 BILATERAL LEG PAIN: ICD-10-CM

## 2025-03-06 DIAGNOSIS — Z87.891 HISTORY OF NICOTINE DEPENDENCE: ICD-10-CM

## 2025-03-06 DIAGNOSIS — M79.605 BILATERAL LEG PAIN: ICD-10-CM

## 2025-03-06 DIAGNOSIS — R73.03 PREDIABETES: ICD-10-CM

## 2025-03-06 DIAGNOSIS — I83.93 ASYMPTOMATIC VARICOSE VEINS OF BOTH LOWER EXTREMITIES: ICD-10-CM

## 2025-03-06 DIAGNOSIS — N18.31 CHRONIC KIDNEY DISEASE, STAGE 3A: ICD-10-CM

## 2025-03-06 DIAGNOSIS — J44.9 CHRONIC OBSTRUCTIVE PULMONARY DISEASE, UNSPECIFIED COPD TYPE: ICD-10-CM

## 2025-03-06 DIAGNOSIS — E78.2 MIXED HYPERLIPIDEMIA: ICD-10-CM

## 2025-03-06 DIAGNOSIS — M25.561 CHRONIC PAIN OF BOTH KNEES: ICD-10-CM

## 2025-03-06 DIAGNOSIS — M25.562 CHRONIC PAIN OF BOTH KNEES: ICD-10-CM

## 2025-03-06 DIAGNOSIS — I70.213 ATHEROSCLEROSIS OF NATIVE ARTERY OF BOTH LOWER EXTREMITIES WITH INTERMITTENT CLAUDICATION: ICD-10-CM

## 2025-03-06 LAB
25(OH)D3 SERPL-MCNC: 61.4 NG/ML (ref 30–100)
ALBUMIN SERPL-MCNC: 4.2 G/DL (ref 3.5–5.2)
ALBUMIN/GLOB SERPL: 1.2 G/DL
ALP SERPL-CCNC: 142 U/L (ref 39–117)
ALT SERPL W P-5'-P-CCNC: 10 U/L (ref 1–41)
ANION GAP SERPL CALCULATED.3IONS-SCNC: 9.8 MMOL/L (ref 5–15)
AST SERPL-CCNC: 19 U/L (ref 1–40)
BASOPHILS # BLD AUTO: 0.08 10*3/MM3 (ref 0–0.2)
BASOPHILS NFR BLD AUTO: 1 % (ref 0–1.5)
BILIRUB SERPL-MCNC: 0.9 MG/DL (ref 0–1.2)
BUN SERPL-MCNC: 12 MG/DL (ref 8–23)
BUN/CREAT SERPL: 7 (ref 7–25)
CALCIUM SPEC-SCNC: 10.4 MG/DL (ref 8.6–10.5)
CHLORIDE SERPL-SCNC: 90 MMOL/L (ref 98–107)
CHOLEST SERPL-MCNC: 186 MG/DL (ref 0–200)
CO2 SERPL-SCNC: 25.2 MMOL/L (ref 22–29)
CREAT SERPL-MCNC: 1.71 MG/DL (ref 0.76–1.27)
DEPRECATED RDW RBC AUTO: 45.7 FL (ref 37–54)
EGFRCR SERPLBLD CKD-EPI 2021: 43.1 ML/MIN/1.73
EOSINOPHIL # BLD AUTO: 0.1 10*3/MM3 (ref 0–0.4)
EOSINOPHIL NFR BLD AUTO: 1.2 % (ref 0.3–6.2)
ERYTHROCYTE [DISTWIDTH] IN BLOOD BY AUTOMATED COUNT: 13.6 % (ref 12.3–15.4)
FERRITIN SERPL-MCNC: 97.5 NG/ML (ref 30–400)
FOLATE SERPL-MCNC: 9.32 NG/ML (ref 4.78–24.2)
GLOBULIN UR ELPH-MCNC: 3.6 GM/DL
GLUCOSE SERPL-MCNC: 98 MG/DL (ref 65–99)
HBA1C MFR BLD: 5.3 % (ref 4.8–5.6)
HCT VFR BLD AUTO: 52.4 % (ref 37.5–51)
HDLC SERPL-MCNC: 58 MG/DL (ref 40–60)
HGB BLD-MCNC: 17.7 G/DL (ref 13–17.7)
IMM GRANULOCYTES # BLD AUTO: 0.06 10*3/MM3 (ref 0–0.05)
IMM GRANULOCYTES NFR BLD AUTO: 0.7 % (ref 0–0.5)
IRON 24H UR-MRATE: 39 MCG/DL (ref 59–158)
IRON SATN MFR SERPL: 10 % (ref 20–50)
LDLC SERPL CALC-MCNC: 110 MG/DL (ref 0–100)
LDLC/HDLC SERPL: 1.86 {RATIO}
LYMPHOCYTES # BLD AUTO: 1.46 10*3/MM3 (ref 0.7–3.1)
LYMPHOCYTES NFR BLD AUTO: 17.7 % (ref 19.6–45.3)
MCH RBC QN AUTO: 31.2 PG (ref 26.6–33)
MCHC RBC AUTO-ENTMCNC: 33.8 G/DL (ref 31.5–35.7)
MCV RBC AUTO: 92.3 FL (ref 79–97)
MONOCYTES # BLD AUTO: 0.83 10*3/MM3 (ref 0.1–0.9)
MONOCYTES NFR BLD AUTO: 10 % (ref 5–12)
NEUTROPHILS NFR BLD AUTO: 5.74 10*3/MM3 (ref 1.7–7)
NEUTROPHILS NFR BLD AUTO: 69.4 % (ref 42.7–76)
NRBC BLD AUTO-RTO: 0 /100 WBC (ref 0–0.2)
PLATELET # BLD AUTO: 288 10*3/MM3 (ref 140–450)
PMV BLD AUTO: 9.7 FL (ref 6–12)
POTASSIUM SERPL-SCNC: 5.9 MMOL/L (ref 3.5–5.2)
PROT SERPL-MCNC: 7.8 G/DL (ref 6–8.5)
RBC # BLD AUTO: 5.68 10*6/MM3 (ref 4.14–5.8)
SODIUM SERPL-SCNC: 125 MMOL/L (ref 136–145)
TIBC SERPL-MCNC: 384 MCG/DL (ref 298–536)
TRANSFERRIN SERPL-MCNC: 258 MG/DL (ref 200–360)
TRIGL SERPL-MCNC: 100 MG/DL (ref 0–150)
TSH SERPL DL<=0.05 MIU/L-ACNC: 0.74 UIU/ML (ref 0.27–4.2)
VIT B12 BLD-MCNC: 1281 PG/ML (ref 211–946)
VLDLC SERPL-MCNC: 18 MG/DL (ref 5–40)
WBC NRBC COR # BLD AUTO: 8.27 10*3/MM3 (ref 3.4–10.8)

## 2025-03-06 PROCEDURE — 1160F RVW MEDS BY RX/DR IN RCRD: CPT | Performed by: NURSE PRACTITIONER

## 2025-03-06 PROCEDURE — 82306 VITAMIN D 25 HYDROXY: CPT | Performed by: NURSE PRACTITIONER

## 2025-03-06 PROCEDURE — 83036 HEMOGLOBIN GLYCOSYLATED A1C: CPT | Performed by: NURSE PRACTITIONER

## 2025-03-06 PROCEDURE — 82607 VITAMIN B-12: CPT | Performed by: NURSE PRACTITIONER

## 2025-03-06 PROCEDURE — 3074F SYST BP LT 130 MM HG: CPT | Performed by: NURSE PRACTITIONER

## 2025-03-06 PROCEDURE — 3079F DIAST BP 80-89 MM HG: CPT | Performed by: NURSE PRACTITIONER

## 2025-03-06 PROCEDURE — 82728 ASSAY OF FERRITIN: CPT | Performed by: NURSE PRACTITIONER

## 2025-03-06 PROCEDURE — 84443 ASSAY THYROID STIM HORMONE: CPT | Performed by: NURSE PRACTITIONER

## 2025-03-06 PROCEDURE — 84466 ASSAY OF TRANSFERRIN: CPT | Performed by: NURSE PRACTITIONER

## 2025-03-06 PROCEDURE — 1159F MED LIST DOCD IN RCRD: CPT | Performed by: NURSE PRACTITIONER

## 2025-03-06 PROCEDURE — 80053 COMPREHEN METABOLIC PANEL: CPT | Performed by: NURSE PRACTITIONER

## 2025-03-06 PROCEDURE — 83540 ASSAY OF IRON: CPT | Performed by: NURSE PRACTITIONER

## 2025-03-06 PROCEDURE — 1126F AMNT PAIN NOTED NONE PRSNT: CPT | Performed by: NURSE PRACTITIONER

## 2025-03-06 PROCEDURE — 82746 ASSAY OF FOLIC ACID SERUM: CPT | Performed by: NURSE PRACTITIONER

## 2025-03-06 PROCEDURE — 36415 COLL VENOUS BLD VENIPUNCTURE: CPT | Performed by: NURSE PRACTITIONER

## 2025-03-06 PROCEDURE — 80061 LIPID PANEL: CPT | Performed by: NURSE PRACTITIONER

## 2025-03-06 PROCEDURE — 99214 OFFICE O/P EST MOD 30 MIN: CPT | Performed by: NURSE PRACTITIONER

## 2025-03-06 PROCEDURE — 85025 COMPLETE CBC W/AUTO DIFF WBC: CPT | Performed by: NURSE PRACTITIONER

## 2025-03-06 NOTE — PROGRESS NOTES
..  Venipuncture Blood Specimen Collection  Venipuncture performed in Lt arm by Kiera Aguillon MA with good hemostasis. Patient tolerated the procedure well without complications.   03/06/25   Kiera Aguillon MA

## 2025-03-06 NOTE — PROGRESS NOTES
Chief Complaint  Extremity Weakness (With pain when first getting up or standing )    The PHQ has not been completed during this encounter.         leg issues  Symptoms are: recurrent.   Onset was 1 to 6 months.   Symptoms occur: daily.  Pertinent negative symptoms include no abdominal pain, no anorexia, no joint pain, no change in stool, no chest pain, no chills, no congestion, no cough, no diaphoresis, no fatigue, no fever, no headaches, no joint swelling, no myalgias, no nausea, no neck pain, no numbness, no rash, no sore throat, no swollen glands, no dysuria, no vertigo, no visual change, no vomiting and no weakness.     Paulo Leiva  is a 69 y.o. male who presents to [facility] with a past medical history of  Past Medical History:   Diagnosis Date    Benign essential hypertension     Cancer August 1994    Mass on Kidney..    COPD (chronic obstructive pulmonary disease)     Erectile dysfunction Apprx: 5 yrs ago    Generalized convulsive seizures 12/27/2023    Hyperlipidemia 12/14/2018    Hyponatremia     Implantable loop recorder present     Long term (current) use of non-steroidal anti-inflammatories (nsaid) 04/16/2018    Renal insufficiency     Syncope     Syncope and collapse 09/02/2022    Total of 5 episdoes with the last one 10/22    Outside Source Comment: Overview:  Formatting of this note might be different from the original.  Total of 5 episdoes with the last one 10/22  Last Assessment & Plan:  Formatting of this note might be different from the original.  No recurrent episodes Lipitor does not show any significant dysrhythmias at this point continue with monitoring      Vertigo 02/17/2021    Vision loss        HPI     The patient is a 69-year-old male who presents for evaluation of leg weakness and pain.    He reports experiencing leg weakness and pain, particularly upon standing. The onset of these symptoms was noted during his last hospital stay in September  "or October, and they have remained consistent since then. He describes an unusual sensation in his legs, as if he is not walking correctly, which intensifies after standing for more than 15 minutes. Resting seems to alleviate the discomfort. He has not experienced any falls but feels as though his knees might give way. He does not experience pain radiating into his feet or ankles. He reports no calf pain. He has varicose veins, which are not painful or uncomfortable. He has been under increased stress recently due to a family member's hospitalization. He has a scheduled appointment with Dr. Moser on 04/29/2025. He has not been diagnosed with peripheral artery disease or peripheral vascular disease. He has a history of smoking but has since quit. He reports no shortness of breath, except during strenuous outdoor activities. He reports no wheezing. Resting seems to alleviate the discomfort. He has been managing the pain with Tylenol Arthritis, taken once in the morning and once in the evening, which provides some relief. He uses a foot peddler for exercise, which he finds beneficial.    He has a history of renal cancer in the right kidney, which is currently in remission, and he has an upcoming appointment with his oncologist tomorrow. He did not require chemotherapy.    Supplemental Information  He has no history of diabetes, anemia, or iron deficiency. He has a history of prediabetes.    SOCIAL HISTORY  He has a history of smoking but has since quit.    MEDICATIONS  Current: Eliquis, Tylenol arthritis       Objective   Vital Signs:   Vitals:    03/06/25 0943   BP: 110/82   BP Location: Right arm   Patient Position: Sitting   Pulse: 62   Temp: 97.8 °F (36.6 °C)   SpO2: 95%   Weight: 102 kg (223 lb 14.4 oz)   Height: 180.3 cm (71\")   PainSc: 0-No pain     Body mass index is 31.23 kg/m².    Wt Readings from Last 3 Encounters:   03/06/25 102 kg (223 lb 14.4 oz)   01/27/25 99.2 kg (218 lb 9.6 oz)   01/17/25 101 kg (222 " lb)     BP Readings from Last 3 Encounters:   03/06/25 110/82   01/27/25 142/82   01/17/25 130/66       Health Maintenance   Topic Date Due    ZOSTER VACCINE (2 of 2) 03/31/2025 (Originally 2/11/2015)    COVID-19 Vaccine (1 - 2024-25 season) 01/17/2026 (Originally 9/1/2024)    LIPID PANEL  04/10/2025    BMI FOLLOWUP  09/04/2025    ANNUAL WELLNESS VISIT  10/28/2025    LUNG CANCER SCREENING  11/20/2025    TDAP/TD VACCINES (3 - Td or Tdap) 04/27/2031    COLORECTAL CANCER SCREENING  10/26/2032    HEPATITIS C SCREENING  Completed    INFLUENZA VACCINE  Completed    Pneumococcal Vaccine 50+  Completed    AAA SCREEN ONCE  Completed       Physical Exam  Vitals reviewed.   Constitutional:       General: He is not in acute distress.     Appearance: Normal appearance. He is well-developed.   HENT:      Head: Normocephalic and atraumatic.      Right Ear: External ear normal.      Left Ear: External ear normal.   Eyes:      Conjunctiva/sclera: Conjunctivae normal.      Pupils: Pupils are equal, round, and reactive to light.   Cardiovascular:      Rate and Rhythm: Normal rate and regular rhythm.      Heart sounds: Normal heart sounds.   Pulmonary:      Effort: Pulmonary effort is normal.      Breath sounds: Normal breath sounds.   Musculoskeletal:      Cervical back: Neck supple.      Right lower leg: No edema.      Left lower leg: No edema.   Feet:      Comments: Bilateral lower extremity with equal strength.   Skin:     General: Skin is warm and dry.   Neurological:      Mental Status: He is alert and oriented to person, place, and time.   Psychiatric:         Mood and Affect: Mood and affect normal.         Behavior: Behavior normal.         Thought Content: Thought content normal.         Judgment: Judgment normal.            Result Review :  The following data was reviewed by: MELANIA Santiago on 03/06/2025:  Results  Imaging  X-rays of knees show significant amount of arthritis. Ultrasound of lower extremities shows  some buildup in veins or arteries in the back of the legs.     Common labs          11/13/2024    12:05 11/22/2024    10:57 1/27/2025    11:42   Common Labs   Glucose 71  96  53    BUN 12  22  11    Creatinine 1.60  1.73  1.80    Sodium 129  126  127    Potassium 5.1  5.0  5.6    Chloride 94  93  94    Calcium 9.3  9.4  9.5    Albumin 4.1  4.0  3.9    Total Bilirubin 1.0  1.0     Alkaline Phosphatase 108  115     AST (SGOT) 25  22     ALT (SGPT) 19  14     WBC 9.15  9.12  10.00    Hemoglobin 12.6  12.8  14.9    Hematocrit 37.5  38.1  46.1    Platelets 275  303  418      TSH          8/7/2024    11:53 9/9/2024    13:38   TSH   TSH 0.770  0.804        Procedures        Assessment and Plan   Diagnoses and all orders for this visit:    1. Weakness (Primary)  -     CBC & Differential  -     Comprehensive Metabolic Panel  -     TSH Rfx On Abnormal To Free T4  -     Vitamin D,25-Hydroxy  -     Vitamin B12 & Folate  -     Iron Profile  -     Ferritin  -     Doppler Arterial Multi Level Lower Extremity - Bilateral CAR; Future    2. Asymptomatic varicose veins of both lower extremities  -     Doppler Arterial Multi Level Lower Extremity - Bilateral CAR; Future    3. Chronic pain of both knees  -     XR Knee 1 or 2 View Bilateral (In Office)    4. Chronic kidney disease, stage 3a  -     Vitamin D,25-Hydroxy  -     Vitamin B12 & Folate  -     Iron Profile  -     Ferritin  -     Hemoglobin A1c  -     Doppler Arterial Multi Level Lower Extremity - Bilateral CAR; Future    5. Prediabetes  -     Vitamin B12 & Folate  -     Iron Profile  -     Ferritin  -     Hemoglobin A1c    6. Mixed hyperlipidemia  -     Lipid Panel    7. Bilateral leg pain  -     Doppler Arterial Multi Level Lower Extremity - Bilateral CAR; Future    8. Chronic obstructive pulmonary disease, unspecified COPD type  -     Doppler Arterial Multi Level Lower Extremity - Bilateral CAR; Future    9. Malignant neoplasm of right kidney, except renal pelvis    10.  History of nicotine dependence  -     Doppler Arterial Multi Level Lower Extremity - Bilateral CAR; Future    11. Atherosclerosis of native artery of both lower extremities with intermittent claudication  -     Doppler Arterial Multi Level Lower Extremity - Bilateral CAR; Future         1. Leg weakness and pain.  The etiology of the leg weakness and pain could be multifactorial, potentially involving arthritis, neuropathy, or peripheral artery disease. Given his non-diabetic status, neuropathy is less likely, but his history of smoking may increase this risk. He reports that Tylenol Arthritis provides some relief. He is advised to continue using Tylenol Arthritis as needed for pain management. He is instructed to avoid ibuprofen, Aleve, and naproxen due to his use of Eliquis. A comprehensive blood workup will be conducted to assess iron levels, ferritin, blood counts, blood sugar, and vitamin deficiencies that could be contributing to the weakness. X-rays of the knees will be performed to evaluate for significant arthritis. An ultrasound of the lower extremities will be ordered to assess the flow and functionality of the veins and arteries, and to rule out peripheral artery disease.    2. Renal cancer.  He reports that his renal cancer is in remission and that he has an upcoming appointment with his oncologist tomorrow.               FOLLOW UP  No follow-ups on file.    Patient was given instructions and counseling regarding his condition or for health maintenance advice. Please see specific information pulled into the AVS if appropriate.       Vilma Rolon, MELANIA  03/06/25  16:40 EST    CURRENT & DISCONTINUED MEDICATIONS  Current Outpatient Medications   Medication Instructions    citalopram (CELEXA) 10 mg, Oral, Daily    Eliquis 5 mg, Oral, 2 Times Daily    fluticasone (FLONASE) 50 MCG/ACT nasal spray 2 sprays, Nasal, Daily    meclizine (ANTIVERT) 50 mg, Oral, Daily    metoprolol succinate XL (TOPROL-XL) 50 mg,  Oral, Daily    QUEtiapine (SEROQUEL) 50 mg, Oral, Nightly    rivastigmine (EXELON) 1.5 mg, Oral, 2 Times Daily With Meals    simvastatin (ZOCOR) 20 mg, Oral, Daily    Spiriva Respimat 1.25 MCG/ACT aerosol solution inhaler 2 puffs, Inhalation, Daily - RT    thiamine (VITAMIN B1) 100 mg, Oral, Daily    valsartan (DIOVAN) 40 mg, Oral, Daily       There are no discontinued medications.     Patient or patient representative verbalized consent for the use of Ambient Listening during the visit with  MELANIA Santiago for chart documentation. 3/6/2025  10:12 EST

## 2025-03-11 ENCOUNTER — RESULTS FOLLOW-UP (OUTPATIENT)
Dept: FAMILY MEDICINE CLINIC | Facility: CLINIC | Age: 70
End: 2025-03-11
Payer: MEDICARE

## 2025-03-11 DIAGNOSIS — M25.562 CHRONIC PAIN OF BOTH KNEES: ICD-10-CM

## 2025-03-11 DIAGNOSIS — M25.561 CHRONIC PAIN OF BOTH KNEES: ICD-10-CM

## 2025-03-11 DIAGNOSIS — E61.1 IRON DEFICIENCY: Primary | ICD-10-CM

## 2025-03-11 DIAGNOSIS — G89.29 CHRONIC PAIN OF BOTH KNEES: ICD-10-CM

## 2025-03-11 RX ORDER — FERROUS SULFATE 324(65)MG
324 TABLET, DELAYED RELEASE (ENTERIC COATED) ORAL
Qty: 90 TABLET | Refills: 0 | Status: SHIPPED | OUTPATIENT
Start: 2025-03-11

## 2025-03-17 DIAGNOSIS — F03.918 DEMENTIA WITH OTHER BEHAVIORAL DISTURBANCE, UNSPECIFIED DEMENTIA SEVERITY, UNSPECIFIED DEMENTIA TYPE: ICD-10-CM

## 2025-03-17 RX ORDER — CITALOPRAM HYDROBROMIDE 10 MG/1
10 TABLET ORAL DAILY
Qty: 90 TABLET | Refills: 0 | Status: SHIPPED | OUTPATIENT
Start: 2025-03-17

## 2025-03-20 ENCOUNTER — OFFICE VISIT (OUTPATIENT)
Dept: ORTHOPEDIC SURGERY | Facility: CLINIC | Age: 70
End: 2025-03-20
Payer: MEDICARE

## 2025-03-20 VITALS
SYSTOLIC BLOOD PRESSURE: 182 MMHG | WEIGHT: 223 LBS | HEIGHT: 71 IN | OXYGEN SATURATION: 94 % | BODY MASS INDEX: 31.22 KG/M2 | HEART RATE: 60 BPM | DIASTOLIC BLOOD PRESSURE: 107 MMHG

## 2025-03-20 DIAGNOSIS — M17.0 PRIMARY OSTEOARTHRITIS OF KNEES, BILATERAL: Primary | ICD-10-CM

## 2025-03-20 RX ADMIN — LIDOCAINE HYDROCHLORIDE 5 ML: 10 INJECTION, SOLUTION INFILTRATION; PERINEURAL at 14:24

## 2025-03-20 NOTE — PROGRESS NOTES
Chief Complaint  Pain and Initial Evaluation of the Left Knee and Pain and Initial Evaluation of the Right Knee       Subjective      Paulo ESTHER Leiva Jr. presents to [facility] for an evaluation  of bilateral knee. His knee's have been bothering him for several weeks but denies any specific injury, trauma, falls or prior surgery to his knee's. He has been taking an over the counter medication for pain control with some relief. He has pain with prolonged walking and going up and down stairs.     No Known Allergies     Social History     Socioeconomic History    Marital status:    Tobacco Use    Smoking status: Former     Current packs/day: 0.00     Average packs/day: 2.0 packs/day for 36.3 years (72.7 ttl pk-yrs)     Types: Cigarettes     Start date: 6/15/1974     Quit date: 10/20/2010     Years since quittin.4     Passive exposure: Past    Smokeless tobacco: Never    Tobacco comments:     SMOKES MORE THAN 2 PPD FOR 35 YEARS   Vaping Use    Vaping status: Never Used   Substance and Sexual Activity    Alcohol use: Not Currently     Alcohol/week: 6.0 - 8.0 standard drinks of alcohol     Types: 6 - 8 Cans of beer per week     Comment: as of 25 was his last drink 8-14 DRINKS PER WEEK as of 2024, prior to 2024 he would drink 20 or more beers daily and did so for 15 years    Drug use: Not Currently    Sexual activity: Not Currently     Partners: Female     Birth control/protection: Condom        I reviewed the patient's chief complaint, history of present illness, review of systems, past medical history, surgical history, family history, social history, medications, and allergy list.     Review of Systems     Constitutional: Denies fevers, chills, weight loss  Cardiovascular: Denies chest pain, shortness of breath  Skin: Denies rashes, acute skin changes  Neurologic: Denies headache, loss of consciousness  MSK: bilateral knee pain       Vital Signs:   BP (!) 182/107  "Comment: advised to see pcp  Pulse 60   Ht 180.3 cm (71\")   Wt 101 kg (223 lb)   SpO2 94%   BMI 31.10 kg/m²            Ortho Exam    Physical Exam  General:Alert. No acute distress     Right lower extremity: -5 degrees extension, flexion  to 130 degrees, stable to varus/valgus stress, stable to anterior/posterior drawer , negative  Lachman's and Ginny's, no swelling or effusion,  non tender to the medial joint line  and lateral joint line, calf soft, distal neurovascularly intact, positive  pulses, positive EHL, FHL, GS, and TA. Sensation intact to all 5 nerves of the fo    Left lower extremity: full extension, flexion  to 130 degrees, stable to varus/valgus stress, stable to anterior/posterior drawer , negative  Lachman's and Ginny's, no swelling or effusion, non tender to the medial joint line  and lateral joint line, calf soft, distal neurovascularly intact, positive  pulses, positive EHL, FHL, GS, and TA. Sensation intact to all 5 nerves of the foot.      Large Joint: R knee  Date/Time: 3/20/2025 2:24 PM  Consent given by: patient  Site marked: site marked  Timeout: Immediately prior to procedure a time out was called to verify the correct patient, procedure, equipment, support staff and site/side marked as required   Supporting Documentation  Indications: pain   Procedure Details  Location: knee - R knee  Preparation: Patient was prepped and draped in the usual sterile fashion  Needle gauge: 21 G.  Medications administered: 32 mg Triamcinolone Acetonide 32 MG; 5 mL lidocaine 1 %  Patient tolerance: patient tolerated the procedure well with no immediate complications      Large Joint: L knee  Date/Time: 3/20/2025 2:24 PM  Consent given by: patient  Site marked: site marked  Timeout: Immediately prior to procedure a time out was called to verify the correct patient, procedure, equipment, support staff and site/side marked as required   Supporting Documentation  Indications: pain   Procedure " Details  Location: knee - L knee  Preparation: Patient was prepped and draped in the usual sterile fashion  Needle gauge: 21 G.  Medications administered: 32 mg Triamcinolone Acetonide 32 MG; 5 mL lidocaine 1 %  Patient tolerance: patient tolerated the procedure well with no immediate complications    This injection documentation was Scribed for Randell Landon MD by Juana Bonds MA.  03/20/25   14:25 EDT   Imaging Results (Most Recent)       None             Result Review :       XR Knee 1 or 2 View Bilateral (In Office)  Result Date: 3/10/2025  Narrative: XR KNEE 1 OR 2 VW BILATERAL Date of Exam: 3/6/2025 10:40 AM EST Indication: bilateral knee pain Comparison: None available. FINDINGS: Right: There appears to be mild tricompartmental hip osteophyte formation. There is suspected mild joint space narrowing. No definite knee effusion. There is calcific atherosclerosis. There are suspected venous varicosities in the medial subcutaneous tissues. No displaced fracture is identified. Mineralization and alignment appear within normal limits. Left: Mild tricompartmental osteophyte formation and joint space narrowing. No definite knee effusion. There is calcific atherosclerosis. There appear to be venous varicosities in the medial subcutaneous tissues. No displaced fracture is identified. Mineralization and alignment appear within normal limits.     Impression: 1.Mild bilateral tricompartmental osteoarthritis. 2.Calcific atherosclerosis and venous varicosities. 3.No radiographic findings of acute osseous knee abnormality. Electronically Signed: Osman Sharma  3/10/2025 2:01 PM EDT  Workstation ID: [id number]             Assessment and Plan     Diagnoses and all orders for this visit:    1. Primary osteoarthritis of knees, bilateral (Primary)        The patient presents here today for an evaluation  of bilateral knee.     Discussed the risks and benefits of bilateral knee Zilretta injections today in the office.  Patient expressed understanding and wishes to proceed. Patient tolerted the injection well and without any complications.     Home exercises given today and will continue over the counter medications for pain control as he is not able to take anti inflammatories due to being on blood thinners.      Call or return if worsening symptoms.    Follow Up     6 - 8 weeks     Patient was given instructions and counseling regarding his condition or for health maintenance advice. Please see specific information pulled into the AVS if appropriate.     Scribed for Randell Landon MD by Toyin Lopez.  03/20/25   14:14 EDT    I have personally performed the services described in this document as scribed by the above individual and it is both accurate and complete. Randell Landon MD 03/22/25

## 2025-03-22 RX ORDER — LIDOCAINE HYDROCHLORIDE 10 MG/ML
5 INJECTION, SOLUTION INFILTRATION; PERINEURAL
Status: COMPLETED | OUTPATIENT
Start: 2025-03-20 | End: 2025-03-20

## 2025-03-27 DIAGNOSIS — I10 ESSENTIAL HYPERTENSION: ICD-10-CM

## 2025-03-27 DIAGNOSIS — F03.918 DEMENTIA WITH OTHER BEHAVIORAL DISTURBANCE, UNSPECIFIED DEMENTIA SEVERITY, UNSPECIFIED DEMENTIA TYPE: ICD-10-CM

## 2025-03-27 RX ORDER — RIVASTIGMINE TARTRATE 1.5 MG/1
1.5 CAPSULE ORAL 2 TIMES DAILY WITH MEALS
Qty: 180 CAPSULE | Refills: 3 | Status: SHIPPED | OUTPATIENT
Start: 2025-03-27

## 2025-03-27 RX ORDER — VALSARTAN 80 MG/1
40 TABLET ORAL DAILY
Qty: 45 TABLET | Refills: 3 | Status: SHIPPED | OUTPATIENT
Start: 2025-03-27

## 2025-03-27 RX ORDER — QUETIAPINE FUMARATE 50 MG/1
50 TABLET, FILM COATED ORAL NIGHTLY
Qty: 90 TABLET | Refills: 3 | Status: SHIPPED | OUTPATIENT
Start: 2025-03-27

## 2025-04-07 ENCOUNTER — OFFICE VISIT (OUTPATIENT)
Dept: FAMILY MEDICINE CLINIC | Facility: CLINIC | Age: 70
End: 2025-04-07
Payer: MEDICARE

## 2025-04-07 VITALS
DIASTOLIC BLOOD PRESSURE: 80 MMHG | TEMPERATURE: 98.2 F | WEIGHT: 214 LBS | HEIGHT: 71 IN | HEART RATE: 68 BPM | SYSTOLIC BLOOD PRESSURE: 118 MMHG | BODY MASS INDEX: 29.96 KG/M2 | OXYGEN SATURATION: 96 %

## 2025-04-07 DIAGNOSIS — E61.1 IRON DEFICIENCY: ICD-10-CM

## 2025-04-07 DIAGNOSIS — R53.1 WEAKNESS: ICD-10-CM

## 2025-04-07 DIAGNOSIS — E87.5 HYPERKALEMIA: ICD-10-CM

## 2025-04-07 DIAGNOSIS — F03.918 DEMENTIA WITH OTHER BEHAVIORAL DISTURBANCE, UNSPECIFIED DEMENTIA SEVERITY, UNSPECIFIED DEMENTIA TYPE: Primary | ICD-10-CM

## 2025-04-07 LAB
ALBUMIN SERPL-MCNC: 3.7 G/DL (ref 3.5–5.2)
ALBUMIN/GLOB SERPL: 1.1 G/DL
ALP SERPL-CCNC: 139 U/L (ref 39–117)
ALT SERPL W P-5'-P-CCNC: 15 U/L (ref 1–41)
ANION GAP SERPL CALCULATED.3IONS-SCNC: 10.6 MMOL/L (ref 5–15)
AST SERPL-CCNC: 22 U/L (ref 1–40)
BASOPHILS # BLD AUTO: 0.07 10*3/MM3 (ref 0–0.2)
BASOPHILS NFR BLD AUTO: 0.8 % (ref 0–1.5)
BILIRUB SERPL-MCNC: 1 MG/DL (ref 0–1.2)
BUN SERPL-MCNC: 15 MG/DL (ref 8–23)
BUN/CREAT SERPL: 8.8 (ref 7–25)
CALCIUM SPEC-SCNC: 9.7 MG/DL (ref 8.6–10.5)
CHLORIDE SERPL-SCNC: 88 MMOL/L (ref 98–107)
CO2 SERPL-SCNC: 22.4 MMOL/L (ref 22–29)
CREAT SERPL-MCNC: 1.71 MG/DL (ref 0.76–1.27)
DEPRECATED RDW RBC AUTO: 45.6 FL (ref 37–54)
EGFRCR SERPLBLD CKD-EPI 2021: 43.1 ML/MIN/1.73
EOSINOPHIL # BLD AUTO: 0.07 10*3/MM3 (ref 0–0.4)
EOSINOPHIL NFR BLD AUTO: 0.8 % (ref 0.3–6.2)
ERYTHROCYTE [DISTWIDTH] IN BLOOD BY AUTOMATED COUNT: 15 % (ref 12.3–15.4)
GLOBULIN UR ELPH-MCNC: 3.3 GM/DL
GLUCOSE SERPL-MCNC: 91 MG/DL (ref 65–99)
HCT VFR BLD AUTO: 57 % (ref 37.5–51)
HGB BLD-MCNC: 18.3 G/DL (ref 13–17.7)
IMM GRANULOCYTES # BLD AUTO: 0.08 10*3/MM3 (ref 0–0.05)
IMM GRANULOCYTES NFR BLD AUTO: 0.9 % (ref 0–0.5)
LYMPHOCYTES # BLD AUTO: 1.35 10*3/MM3 (ref 0.7–3.1)
LYMPHOCYTES NFR BLD AUTO: 14.9 % (ref 19.6–45.3)
MAGNESIUM SERPL-MCNC: 1.9 MG/DL (ref 1.6–2.4)
MCH RBC QN AUTO: 28.3 PG (ref 26.6–33)
MCHC RBC AUTO-ENTMCNC: 32.1 G/DL (ref 31.5–35.7)
MCV RBC AUTO: 88.1 FL (ref 79–97)
MONOCYTES # BLD AUTO: 0.87 10*3/MM3 (ref 0.1–0.9)
MONOCYTES NFR BLD AUTO: 9.6 % (ref 5–12)
NEUTROPHILS NFR BLD AUTO: 6.63 10*3/MM3 (ref 1.7–7)
NEUTROPHILS NFR BLD AUTO: 73 % (ref 42.7–76)
NRBC BLD AUTO-RTO: 0 /100 WBC (ref 0–0.2)
PLATELET # BLD AUTO: 237 10*3/MM3 (ref 140–450)
PMV BLD AUTO: 9.5 FL (ref 6–12)
POTASSIUM SERPL-SCNC: 5.1 MMOL/L (ref 3.5–5.2)
PROT SERPL-MCNC: 7 G/DL (ref 6–8.5)
RBC # BLD AUTO: 6.47 10*6/MM3 (ref 4.14–5.8)
SODIUM SERPL-SCNC: 121 MMOL/L (ref 136–145)
WBC NRBC COR # BLD AUTO: 9.07 10*3/MM3 (ref 3.4–10.8)

## 2025-04-07 PROCEDURE — 83735 ASSAY OF MAGNESIUM: CPT | Performed by: NURSE PRACTITIONER

## 2025-04-07 PROCEDURE — 85025 COMPLETE CBC W/AUTO DIFF WBC: CPT | Performed by: NURSE PRACTITIONER

## 2025-04-07 PROCEDURE — 36415 COLL VENOUS BLD VENIPUNCTURE: CPT | Performed by: NURSE PRACTITIONER

## 2025-04-07 PROCEDURE — 1126F AMNT PAIN NOTED NONE PRSNT: CPT | Performed by: NURSE PRACTITIONER

## 2025-04-07 PROCEDURE — 99214 OFFICE O/P EST MOD 30 MIN: CPT | Performed by: NURSE PRACTITIONER

## 2025-04-07 PROCEDURE — 80053 COMPREHEN METABOLIC PANEL: CPT | Performed by: NURSE PRACTITIONER

## 2025-04-07 PROCEDURE — 3074F SYST BP LT 130 MM HG: CPT | Performed by: NURSE PRACTITIONER

## 2025-04-07 PROCEDURE — 3079F DIAST BP 80-89 MM HG: CPT | Performed by: NURSE PRACTITIONER

## 2025-04-07 NOTE — PROGRESS NOTES
Venipuncture Blood Specimen Collection  Venipuncture performed in left arm  by Cadence Mayer with good hemostasis. Patient tolerated the procedure well without complications.   04/07/25   Cadence Mayer

## 2025-04-07 NOTE — PROGRESS NOTES
Chief Complaint  Extremity Weakness (Both legs are bothering him.  In the afternoon he starts to walk faster and almost falls. Maybe adjusting medicine. )    Little interest or pleasure in doing things? Not at all   Feeling down, depressed, or hopeless? Not at all   PHQ-2 Total Score 0          leg problem  Symptoms are: recurrent.   Onset was 6 to 12 months.   Symptoms occur: daily.    Paulo Leiva Jr. is a 69 y.o. male who presents to [facility] with a past medical history of  Past Medical History:   Diagnosis Date    Arthritis of back 2024    Benign essential hypertension     Cancer August 1994    Mass on Kidney..    COPD (chronic obstructive pulmonary disease)     Erectile dysfunction Apprx: 5 yrs ago    Generalized convulsive seizures 12/27/2023    Hip arthrosis     Hyperlipidemia 12/14/2018    Hyponatremia     Implantable loop recorder present     Knee swelling     Long term (current) use of non-steroidal anti-inflammatories (nsaid) 04/16/2018    Renal insufficiency     Syncope     Syncope and collapse 09/02/2022    Total of 5 episdoes with the last one 10/22    Outside Source Comment: Overview:  Formatting of this note might be different from the original.  Total of 5 episdoes with the last one 10/22  Last Assessment & Plan:  Formatting of this note might be different from the original.  No recurrent episodes Lipitor does not show any significant dysrhythmias at this point continue with monitoring      Vertigo 02/17/2021    Vision loss        HPI     The patient is a 69-year-old male who presents for evaluation of leg weakness, hypertension, dementia, and elevated potassium levels.    He reports experiencing leg weakness, particularly in the afternoons, which has been ongoing since his last hospital visit. His mobility becomes significantly impaired around 3:00 or 4:00 PM, to the extent that he requires assistance to remain upright. He has not had any recent changes in  "diet or increased urination. He is not currently taking potassium supplements or diuretics. He does not have any bleeding, bruising, nosebleeds, or blood in the stool. He does not have any back pain, although he has a history of lower back pain. He has been prescribed medication for sciatic nerve pain, which he started a few days ago. He has an upcoming appointment with neurology next week. He has been receiving injections from Dr. Landon for knee pain, which have provided some relief. He has a history of cancer.    He has been on iron supplements for approximately one month. He is not currently taking potassium supplements or diuretics.    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 He is currently on Exelon for Alzheimer's disease and has not had a follow-up with neurology since his discharge. He reports increased forgetfulness and difficulty using his phone. He adheres to his medication regimen, taking all prescribed medications in the morning except those designated for nighttime use. His last brain imaging was conducted prior to his hospital discharge.    His blood pressure was significantly elevated at 182/107 on 03/20/2025, as recorded by Dr. Landon using an electronic cuff.    MEDICATIONS  Exelon, valsartan, Seroquel, Celexa, iron supplement, Eliquis, meclizine, simvastatin, Spiriva, thiamine       Objective   Vital Signs:   Vitals:    04/07/25 1259   BP: 118/80   Pulse: 68   Temp: 98.2 °F (36.8 °C)   TempSrc: Temporal   SpO2: 96%   Weight: 97.1 kg (214 lb)   Height: 180.3 cm (71\")   PainSc: 0-No pain     Body mass index is 29.85 kg/m².    Wt Readings from Last 3 Encounters:   04/09/25 97.2 kg (214 lb 4.6 oz)   04/07/25 97.1 kg (214 lb)   03/20/25 101 kg (223 lb)     BP Readings from Last 3 Encounters:   04/09/25 148/76   04/07/25 118/80   03/20/25 (!) 182/107       Health Maintenance   Topic Date Due    ZOSTER VACCINE " (2 of 2) 02/11/2015    COVID-19 Vaccine (1 - 2024-25 season) 01/17/2026 (Originally 9/1/2024)    INFLUENZA VACCINE  07/01/2025    ANNUAL WELLNESS VISIT  10/28/2025    LUNG CANCER SCREENING  11/20/2025    LIPID PANEL  03/06/2026    TDAP/TD VACCINES (3 - Td or Tdap) 04/27/2031    COLORECTAL CANCER SCREENING  10/26/2032    HEPATITIS C SCREENING  Completed    Pneumococcal Vaccine 50+  Completed    AAA SCREEN ONCE  Completed       Physical Exam  Vitals reviewed.   Constitutional:       General: He is not in acute distress.     Appearance: Normal appearance. He is well-developed.   HENT:      Head: Normocephalic and atraumatic.      Right Ear: External ear normal.      Left Ear: External ear normal.   Eyes:      Conjunctiva/sclera: Conjunctivae normal.      Pupils: Pupils are equal, round, and reactive to light.   Cardiovascular:      Rate and Rhythm: Normal rate and regular rhythm.      Heart sounds: Normal heart sounds.   Pulmonary:      Effort: Pulmonary effort is normal.      Breath sounds: Normal breath sounds.   Musculoskeletal:      Cervical back: Neck supple.      Right lower leg: No edema.      Left lower leg: No edema.   Skin:     General: Skin is warm and dry.   Neurological:      Mental Status: He is alert and oriented to person, place, and time.      Motor: No weakness.      Comments: Slight sway with Romberg   Psychiatric:         Mood and Affect: Mood and affect normal.         Behavior: Behavior normal.         Thought Content: Thought content normal.         Judgment: Judgment normal.            Result Review :  The following data was reviewed by: MELANIA Santiago on 04/07/2025:  Results  Laboratory Studies  Iron levels were low on 03/06/2025. Potassium was slightly elevated. A1c was normal.    Imaging  MRI of the brain conducted in September 2024 did not reveal any abnormalities.     Common labs          3/6/2025    10:54 4/7/2025    13:44 4/8/2025    19:56 4/8/2025    23:35   Common Labs   Glucose  98  91  95  93    BUN 12  15  15  14    Creatinine 1.71  1.71  1.55  1.40    Sodium 125  121  119  122    Potassium 5.9  5.1  5.5  5.2    Chloride 90  88  84  89    Calcium 10.4  9.7  9.5  8.9    Albumin 4.2  3.7  3.7     Total Bilirubin 0.9  1.0  0.8     Alkaline Phosphatase 142  139  144     AST (SGOT) 19  22  17     ALT (SGPT) 10  15  13     WBC 8.27  9.07  9.75     Hemoglobin 17.7  18.3  18.0     Hematocrit 52.4  57.0  54.6     Platelets 288  237  233     Total Cholesterol 186       Triglycerides 100       HDL Cholesterol 58       LDL Cholesterol  110       Hemoglobin A1C 5.30         TSH          8/7/2024    11:53 9/9/2024    13:38 3/6/2025    10:54   TSH   TSH 0.770  0.804  0.741        Procedures        Assessment and Plan   Diagnoses and all orders for this visit:    1. Dementia with other behavioral disturbance, unspecified dementia severity, unspecified dementia type (Primary)  -     Cancel: Ambulatory Referral to Neurology  -     Ambulatory Referral to Neurology    2. Hyperkalemia  -     Comprehensive Metabolic Panel    3. Weakness  -     Magnesium  -     CBC & Differential    4. Iron deficiency  -     CBC & Differential         1. Leg weakness.  He reports experiencing leg weakness, particularly in the afternoons, which has been ongoing since his last hospital visit. His mobility becomes significantly impaired around 3:00 or 4:00 PM, to the extent that he requires assistance to remain upright. His weight has decreased by approximately 10 pounds over the past month. His A1c levels were within the normal range during the last assessment. He is currently on an iron supplement regimen due to previously low iron levels. His potassium levels were slightly elevated during the last check. He is not currently on any potassium or diuretic medications. He has a history of cancer. He is advised to monitor his blood pressure in the mid-afternoon to ensure it is not decreasing. A referral to neurology has been made  due to his dementia diagnosis to rule out any worsening of the condition or potential cause of the leg weakness. His potassium levels will be rechecked to determine if they are contributing to his symptoms. He is also advised to monitor his blood sugar levels when he experiences weakness to rule out any spikes or drops in blood sugar levels. His electrolyte levels, including magnesium, will be checked. His blood counts will be rechecked to ensure he is not anemic and that his blood levels are not decreasing. He is advised to use a walker for additional stability when he notices his symptoms worsening around 3:00 PM. If his lab results are normal or unchanged, an MRI of the brain without contrast will be ordered to rule out any abnormalities, especially given his history of cancer.    2. Dementia.  He reports increased forgetfulness and difficulty using his phone. He is currently on Exelon for Alzheimer's disease and has not had a follow-up with neurology since his discharge. A referral to neurology has been made to assess the progression of his dementia. He is advised to follow up with neurology as scheduled.    3. Hypertension.  His blood pressure was significantly elevated at 182/107 on 03/20/2025, as recorded by Dr. Landon using an electronic cuff. Today, his blood pressure is 118/80, which is within acceptable limits. He is advised to continue monitoring his blood pressure, especially in the mid-afternoon, to ensure it is not dropping.    4. Elevated potassium levels.  His potassium levels were slightly elevated during the last check. He is not currently on any potassium or diuretic medications. His potassium levels will be rechecked to determine if they are contributing to his symptoms.               FOLLOW UP  No follow-ups on file.    Patient was given instructions and counseling regarding his condition or for health maintenance advice. Please see specific information pulled into the AVS if appropriate.        MELANIA Santiago  04/09/25  05:29 EDT    CURRENT & DISCONTINUED MEDICATIONS  Current Outpatient Medications   Medication Instructions    citalopram (CELEXA) 10 mg, Oral, Daily    Eliquis 5 mg, Oral, 2 Times Daily    ferrous sulfate 324 mg, Oral, Daily With Breakfast    meclizine (ANTIVERT) 50 mg, Oral, Daily    metoprolol succinate XL (TOPROL-XL) 50 mg, Oral, Daily    QUEtiapine (SEROQUEL) 50 mg, Nightly    rivastigmine (EXELON) 1.5 mg, Oral, 2 Times Daily With Meals    simvastatin (ZOCOR) 20 mg, Oral, Daily    Spiriva Respimat 1.25 MCG/ACT aerosol solution inhaler 2 puffs, Inhalation, Daily - RT    thiamine (VITAMIN B1) 100 mg, Oral, Daily    valsartan (DIOVAN) 40 mg, Oral, Daily       There are no discontinued medications.     Patient or patient representative verbalized consent for the use of Ambient Listening during the visit with  MELANIA Santiago for chart documentation. 4/7/25 13:00 EDT

## 2025-04-08 ENCOUNTER — APPOINTMENT (OUTPATIENT)
Dept: GENERAL RADIOLOGY | Facility: HOSPITAL | Age: 70
DRG: 641 | End: 2025-04-08
Payer: MEDICARE

## 2025-04-08 ENCOUNTER — HOSPITAL ENCOUNTER (INPATIENT)
Facility: HOSPITAL | Age: 70
LOS: 3 days | Discharge: HOME | DRG: 641 | End: 2025-04-11
Attending: EMERGENCY MEDICINE | Admitting: STUDENT IN AN ORGANIZED HEALTH CARE EDUCATION/TRAINING PROGRAM
Payer: MEDICARE

## 2025-04-08 DIAGNOSIS — Z78.9 DECREASED ACTIVITIES OF DAILY LIVING (ADL): ICD-10-CM

## 2025-04-08 DIAGNOSIS — R26.2 DIFFICULTY IN WALKING: ICD-10-CM

## 2025-04-08 DIAGNOSIS — E87.1 HYPONATREMIA: Primary | ICD-10-CM

## 2025-04-08 LAB
ALBUMIN SERPL-MCNC: 3.7 G/DL (ref 3.5–5.2)
ALBUMIN/GLOB SERPL: 1 G/DL
ALP SERPL-CCNC: 144 U/L (ref 39–117)
ALT SERPL W P-5'-P-CCNC: 13 U/L (ref 1–41)
ANION GAP SERPL CALCULATED.3IONS-SCNC: 10.7 MMOL/L (ref 5–15)
AST SERPL-CCNC: 17 U/L (ref 1–40)
BASOPHILS # BLD AUTO: 0.08 10*3/MM3 (ref 0–0.2)
BASOPHILS NFR BLD AUTO: 0.8 % (ref 0–1.5)
BILIRUB SERPL-MCNC: 0.8 MG/DL (ref 0–1.2)
BILIRUB UR QL STRIP: NEGATIVE
BUN SERPL-MCNC: 15 MG/DL (ref 8–23)
BUN/CREAT SERPL: 9.7 (ref 7–25)
CALCIUM SPEC-SCNC: 9.5 MG/DL (ref 8.6–10.5)
CHLORIDE SERPL-SCNC: 84 MMOL/L (ref 98–107)
CLARITY UR: CLEAR
CO2 SERPL-SCNC: 24.3 MMOL/L (ref 22–29)
COLOR UR: YELLOW
CREAT SERPL-MCNC: 1.55 MG/DL (ref 0.76–1.27)
DEPRECATED RDW RBC AUTO: 47.2 FL (ref 37–54)
EGFRCR SERPLBLD CKD-EPI 2021: 48.5 ML/MIN/1.73
EOSINOPHIL # BLD AUTO: 0.11 10*3/MM3 (ref 0–0.4)
EOSINOPHIL NFR BLD AUTO: 1.1 % (ref 0.3–6.2)
ERYTHROCYTE [DISTWIDTH] IN BLOOD BY AUTOMATED COUNT: 15.4 % (ref 12.3–15.4)
GLOBULIN UR ELPH-MCNC: 3.6 GM/DL
GLUCOSE SERPL-MCNC: 95 MG/DL (ref 65–99)
GLUCOSE UR STRIP-MCNC: NEGATIVE MG/DL
HCT VFR BLD AUTO: 54.6 % (ref 37.5–51)
HGB BLD-MCNC: 18 G/DL (ref 13–17.7)
HGB UR QL STRIP.AUTO: NEGATIVE
HOLD SPECIMEN: NORMAL
IMM GRANULOCYTES # BLD AUTO: 0.07 10*3/MM3 (ref 0–0.05)
IMM GRANULOCYTES NFR BLD AUTO: 0.7 % (ref 0–0.5)
KETONES UR QL STRIP: NEGATIVE
LEUKOCYTE ESTERASE UR QL STRIP.AUTO: NEGATIVE
LYMPHOCYTES # BLD AUTO: 1.66 10*3/MM3 (ref 0.7–3.1)
LYMPHOCYTES NFR BLD AUTO: 17 % (ref 19.6–45.3)
MCH RBC QN AUTO: 28.8 PG (ref 26.6–33)
MCHC RBC AUTO-ENTMCNC: 33 G/DL (ref 31.5–35.7)
MCV RBC AUTO: 87.4 FL (ref 79–97)
MONOCYTES # BLD AUTO: 0.88 10*3/MM3 (ref 0.1–0.9)
MONOCYTES NFR BLD AUTO: 9 % (ref 5–12)
NEUTROPHILS NFR BLD AUTO: 6.95 10*3/MM3 (ref 1.7–7)
NEUTROPHILS NFR BLD AUTO: 71.4 % (ref 42.7–76)
NITRITE UR QL STRIP: NEGATIVE
NRBC BLD AUTO-RTO: 0 /100 WBC (ref 0–0.2)
PH UR STRIP.AUTO: 6.5 [PH] (ref 5–8)
PLATELET # BLD AUTO: 233 10*3/MM3 (ref 140–450)
PMV BLD AUTO: 9.4 FL (ref 6–12)
POTASSIUM SERPL-SCNC: 5.5 MMOL/L (ref 3.5–5.2)
PROT SERPL-MCNC: 7.3 G/DL (ref 6–8.5)
PROT UR QL STRIP: NEGATIVE
RBC # BLD AUTO: 6.25 10*6/MM3 (ref 4.14–5.8)
SODIUM SERPL-SCNC: 119 MMOL/L (ref 136–145)
SP GR UR STRIP: <=1.005 (ref 1–1.03)
UROBILINOGEN UR QL STRIP: NORMAL
WBC NRBC COR # BLD AUTO: 9.75 10*3/MM3 (ref 3.4–10.8)
WHOLE BLOOD HOLD COAG: NORMAL

## 2025-04-08 PROCEDURE — 81003 URINALYSIS AUTO W/O SCOPE: CPT | Performed by: EMERGENCY MEDICINE

## 2025-04-08 PROCEDURE — 80053 COMPREHEN METABOLIC PANEL: CPT | Performed by: EMERGENCY MEDICINE

## 2025-04-08 PROCEDURE — 25810000003 SODIUM CHLORIDE 0.9 % SOLUTION: Performed by: EMERGENCY MEDICINE

## 2025-04-08 PROCEDURE — 71045 X-RAY EXAM CHEST 1 VIEW: CPT

## 2025-04-08 PROCEDURE — 93005 ELECTROCARDIOGRAM TRACING: CPT | Performed by: EMERGENCY MEDICINE

## 2025-04-08 PROCEDURE — 99222 1ST HOSP IP/OBS MODERATE 55: CPT | Performed by: STUDENT IN AN ORGANIZED HEALTH CARE EDUCATION/TRAINING PROGRAM

## 2025-04-08 PROCEDURE — 99291 CRITICAL CARE FIRST HOUR: CPT

## 2025-04-08 PROCEDURE — 85025 COMPLETE CBC W/AUTO DIFF WBC: CPT | Performed by: EMERGENCY MEDICINE

## 2025-04-08 PROCEDURE — 93010 ELECTROCARDIOGRAM REPORT: CPT | Performed by: INTERNAL MEDICINE

## 2025-04-08 PROCEDURE — 99285 EMERGENCY DEPT VISIT HI MDM: CPT

## 2025-04-08 PROCEDURE — 36415 COLL VENOUS BLD VENIPUNCTURE: CPT | Performed by: STUDENT IN AN ORGANIZED HEALTH CARE EDUCATION/TRAINING PROGRAM

## 2025-04-08 RX ORDER — AMOXICILLIN 250 MG
2 CAPSULE ORAL 2 TIMES DAILY PRN
Status: DISCONTINUED | OUTPATIENT
Start: 2025-04-08 | End: 2025-04-11 | Stop reason: HOSPADM

## 2025-04-08 RX ORDER — SODIUM CHLORIDE 1 G/1
1 TABLET ORAL ONCE
Status: COMPLETED | OUTPATIENT
Start: 2025-04-08 | End: 2025-04-08

## 2025-04-08 RX ORDER — BISACODYL 5 MG/1
5 TABLET, DELAYED RELEASE ORAL DAILY PRN
Status: DISCONTINUED | OUTPATIENT
Start: 2025-04-08 | End: 2025-04-11 | Stop reason: HOSPADM

## 2025-04-08 RX ORDER — POLYETHYLENE GLYCOL 3350 17 G/17G
17 POWDER, FOR SOLUTION ORAL DAILY PRN
Status: DISCONTINUED | OUTPATIENT
Start: 2025-04-08 | End: 2025-04-11 | Stop reason: HOSPADM

## 2025-04-08 RX ORDER — BISACODYL 10 MG
10 SUPPOSITORY, RECTAL RECTAL DAILY PRN
Status: DISCONTINUED | OUTPATIENT
Start: 2025-04-08 | End: 2025-04-11 | Stop reason: HOSPADM

## 2025-04-08 RX ORDER — SODIUM CHLORIDE 0.9 % (FLUSH) 0.9 %
10 SYRINGE (ML) INJECTION AS NEEDED
Status: DISCONTINUED | OUTPATIENT
Start: 2025-04-08 | End: 2025-04-11 | Stop reason: HOSPADM

## 2025-04-08 RX ORDER — SODIUM CHLORIDE 9 MG/ML
40 INJECTION, SOLUTION INTRAVENOUS AS NEEDED
Status: DISCONTINUED | OUTPATIENT
Start: 2025-04-08 | End: 2025-04-11 | Stop reason: HOSPADM

## 2025-04-08 RX ORDER — SODIUM CHLORIDE 0.9 % (FLUSH) 0.9 %
10 SYRINGE (ML) INJECTION EVERY 12 HOURS SCHEDULED
Status: DISCONTINUED | OUTPATIENT
Start: 2025-04-08 | End: 2025-04-11 | Stop reason: HOSPADM

## 2025-04-08 RX ORDER — HEPARIN SODIUM 5000 [USP'U]/ML
5000 INJECTION, SOLUTION INTRAVENOUS; SUBCUTANEOUS EVERY 8 HOURS SCHEDULED
Status: DISCONTINUED | OUTPATIENT
Start: 2025-04-09 | End: 2025-04-09

## 2025-04-08 RX ADMIN — SODIUM CHLORIDE 1000 ML: 9 INJECTION, SOLUTION INTRAVENOUS at 21:31

## 2025-04-08 RX ADMIN — Medication 10 ML: at 21:45

## 2025-04-08 RX ADMIN — SODIUM CHLORIDE TAB 1 GM 1 G: 1 TAB at 22:44

## 2025-04-08 NOTE — ED PROVIDER NOTES
Time: 7:39 PM EDT  Date of encounter:  4/8/2025  Independent Historian/Clinical History and Information was obtained by:   Patient    History is limited by: N/A    Chief Complaint: Hyponatremia      History of Present Illness:  Patient is a 69 y.o. year old male who presents to the emergency department for evaluation of hyponatremia noted at his PCPs office.  Patient denies chest pain and shortness of breath.  Patient has no nausea or vomiting.  Patient has had no cough or hemoptysis.  Patient has had hyponatremia in the past and is taking salt tablets for this.  Patient has had no seizure-like activity.  Patient has had weakness.      Patient Care Team  Primary Care Provider: Vilma Rolon APRN    Past Medical History:     No Known Allergies  Past Medical History:   Diagnosis Date    Arthritis of back 2024    Benign essential hypertension     Cancer August 1994    Mass on Kidney..    COPD (chronic obstructive pulmonary disease)     Erectile dysfunction Apprx: 5 yrs ago    Generalized convulsive seizures 12/27/2023    Hip arthrosis     Hyperlipidemia 12/14/2018    Hyponatremia     Implantable loop recorder present     Knee swelling     Long term (current) use of non-steroidal anti-inflammatories (nsaid) 04/16/2018    Renal insufficiency     Syncope     Syncope and collapse 09/02/2022    Total of 5 episdoes with the last one 10/22    Outside Source Comment: Overview:  Formatting of this note might be different from the original.  Total of 5 episdoes with the last one 10/22  Last Assessment & Plan:  Formatting of this note might be different from the original.  No recurrent episodes Lipitor does not show any significant dysrhythmias at this point continue with monitoring      Vertigo 02/17/2021    Vision loss      Past Surgical History:   Procedure Laterality Date    CARDIAC CATHETERIZATION N/A 09/26/2022    Procedure: Left Heart Cath;  Surgeon: Randell Casiano MD;  Location: [facility] CATH INVASIVE LOCATION;   Service: Cardiovascular;  Laterality: N/A;    CARDIAC ELECTROPHYSIOLOGY PROCEDURE N/A 09/26/2022    Procedure: Loop insertion;  Surgeon: Randell Casiano MD;  Location: [facility] CATH INVASIVE LOCATION;  Service: Cardiovascular;  Laterality: N/A;    COLONOSCOPY  2007    COLONOSCOPY N/A 10/26/2022    Procedure: COLONOSCOPY FOR SCREENING;  Surgeon: Roque Whitlock MD;  Location: [facility] ENDOSCOPY;  Service: Gastroenterology;  Laterality: N/A;  NORMAL COLON    NEPHRECTOMY Right 08/12/2024     Family History   Problem Relation Age of Onset    Dementia Mother     No Known Problems Father     Colon cancer Neg Hx     Malig Hyperthermia Neg Hx        Home Medications:  Prior to Admission medications    Medication Sig Start Date End Date Taking? Authorizing Provider   citalopram (CeleXA) 10 MG tablet TAKE 1 TABLET DAILY 3/17/25   Vilma Rolon APRN   Eliquis 5 MG tablet tablet TAKE 1 TABLET TWICE A DAY 1/14/25   Randell Casiano MD   ferrous sulfate 324 (65 Fe) MG tablet delayed-release EC tablet Take 1 tablet by mouth Daily With Breakfast. 3/11/25   Vilma Rolon APRN   meclizine (ANTIVERT) 25 MG tablet Take 2 tablets by mouth Daily. 10/28/24   Vilma Rolon APRN   metoprolol succinate XL (TOPROL-XL) 50 MG 24 hr tablet TAKE 1 TABLET DAILY 8/12/24   Randell Casiano MD   QUEtiapine (SEROquel) 50 MG tablet TAKE 1 TABLET EVERY NIGHT 3/27/25   Christian Fox MD   rivastigmine (EXELON) 1.5 MG capsule TAKE 1 CAPSULE TWICE A DAY WITH MEALS 3/27/25   Christian Fox MD   simvastatin (ZOCOR) 20 MG tablet Take 1 tablet by mouth Daily. 10/28/24   Vilma Rolon APRN   Spiriva Respimat 1.25 MCG/ACT aerosol solution inhaler Inhale 2 puffs Daily. 10/28/24   Vilma Rolon APRN   thiamine (VITAMIN B1) 100 MG tablet Take 1 tablet by mouth Daily. 9/26/24   Vilma Rolon APRN   valsartan (DIOVAN) 80 MG tablet TAKE ONE-HALF (1/2) TABLET DAILY 3/27/25   Christian Fox MD        Social History:   Social History      Tobacco Use    Smoking status: Former     Current packs/day: 0.00     Average packs/day: 2.0 packs/day for 36.3 years (72.7 ttl pk-yrs)     Types: Cigarettes     Start date: 6/15/1974     Quit date: 10/20/2010     Years since quittin.4     Passive exposure: Past    Smokeless tobacco: Never    Tobacco comments:     SMOKES MORE THAN 2 PPD FOR 35 YEARS   Vaping Use    Vaping status: Never Used   Substance Use Topics    Alcohol use: Not Currently     Alcohol/week: 6.0 - 8.0 standard drinks of alcohol     Types: 6 - 8 Cans of beer per week     Comment: as of 25 was his last drink 8-14 DRINKS PER WEEK as of 2024, prior to 2024 he would drink 20 or more beers daily and did so for 15 years    Drug use: Not Currently         Review of Systems:  Review of Systems   Constitutional:  Negative for chills and fever.   HENT:  Negative for congestion, rhinorrhea and sore throat.    Eyes:  Negative for pain and visual disturbance.   Respiratory:  Negative for apnea, cough, chest tightness and shortness of breath.    Cardiovascular:  Negative for chest pain and palpitations.   Gastrointestinal:  Negative for abdominal pain, diarrhea, nausea and vomiting.   Genitourinary:  Negative for difficulty urinating and dysuria.   Musculoskeletal:  Negative for joint swelling and myalgias.   Skin:  Negative for color change.   Neurological:  Negative for seizures and headaches.   Psychiatric/Behavioral: Negative.     All other systems reviewed and are negative.       Physical Exam:  /91 (BP Location: Right arm, Patient Position: Lying)   Pulse 65   Temp 97.8 °F (36.6 °C) (Oral)   Resp 19   SpO2 95%     Physical Exam  Vitals and nursing note reviewed.   Constitutional:       General: He is not in acute distress.     Appearance: Normal appearance. He is not toxic-appearing.   HENT:      Head: Normocephalic and atraumatic.      Jaw: There is normal jaw occlusion.   Eyes:      General: Lids are normal.       Extraocular Movements: Extraocular movements intact.      Conjunctiva/sclera: Conjunctivae normal.      Pupils: Pupils are equal, round, and reactive to light.   Cardiovascular:      Rate and Rhythm: Normal rate and regular rhythm.      Pulses: Normal pulses.      Heart sounds: Normal heart sounds.   Pulmonary:      Effort: Pulmonary effort is normal. No respiratory distress.      Breath sounds: Normal breath sounds. No wheezing or rhonchi.   Abdominal:      General: Abdomen is flat.      Palpations: Abdomen is soft.      Tenderness: There is no abdominal tenderness. There is no guarding or rebound.   Musculoskeletal:         General: Normal range of motion.      Cervical back: Normal range of motion and neck supple.      Right lower leg: No edema.      Left lower leg: No edema.   Skin:     General: Skin is warm and dry.   Neurological:      Mental Status: He is alert and oriented to person, place, and time. Mental status is at baseline.   Psychiatric:         Mood and Affect: Mood normal.                    Medical Decision Making:      Comorbidities that affect care:    Hypertension    External Notes reviewed:    Previous Clinic Note: Patient was last seen in this clinic for weakness.      The following orders were placed and all results were independently analyzed by me:  Orders Placed This Encounter   Procedures    XR Chest 1 View    Comprehensive Metabolic Panel    CBC Auto Differential    Inpatient Hospitalist Consult    ECG 12 Lead Electrolyte Imbalance    CBC & Differential    Extra Tubes    Gold Top - SST    Light Blue Top       Medications Given in the Emergency Department:  Medications   sodium chloride 0.9 % bolus 1,000 mL (has no administration in time range)        ED Course:         Labs:    Lab Results (last 24 hours)       Procedure Component Value Units Date/Time    CBC & Differential [[id number]]  (Abnormal) Collected: 04/1956    Specimen: Blood Updated: 04/08/25 2008    Narrative:      The  following orders were created for panel order CBC & Differential.  Procedure                               Abnormality         Status                     ---------                               -----------         ------                     CBC Auto Differential[[id number]]        Abnormal            Final result                 Please view results for these tests on the individual orders.    Comprehensive Metabolic Panel [[id number]]  (Abnormal) Collected: 04/1956    Specimen: Blood Updated: 04/08/25 2038     Glucose 95 mg/dL      BUN 15 mg/dL      Creatinine 1.55 mg/dL      Sodium 119 mmol/L      Potassium 5.5 mmol/L      Chloride 84 mmol/L      CO2 24.3 mmol/L      Calcium 9.5 mg/dL      Total Protein 7.3 g/dL      Albumin 3.7 g/dL      ALT (SGPT) 13 U/L      AST (SGOT) 17 U/L      Alkaline Phosphatase 144 U/L      Total Bilirubin 0.8 mg/dL      Globulin 3.6 gm/dL      A/G Ratio 1.0 g/dL      BUN/Creatinine Ratio 9.7     Anion Gap 10.7 mmol/L      eGFR 48.5 mL/min/1.73     Narrative:      GFR Categories in Chronic Kidney Disease (CKD)      GFR Category          GFR (mL/min/1.73)    Interpretation  G1                     90 or greater         Normal or high (1)  G2                      60-89                Mild decrease (1)  G3a                   45-59                Mild to moderate decrease  G3b                   30-44                Moderate to severe decrease  G4                    15-29                Severe decrease  G5                    14 or less           Kidney failure          (1)In the absence of evidence of kidney disease, neither GFR category G1 or G2 fulfill the criteria for CKD.    eGFR calculation 2021 CKD-EPI creatinine equation, which does not include race as a factor    CBC Auto Differential [[id number]]  (Abnormal) Collected: 04/1956    Specimen: Blood Updated: 04/08/25 2008     WBC 9.75 10*3/mm3      RBC 6.25 10*6/mm3      Hemoglobin 18.0 g/dL      Hematocrit 54.6 %      MCV 87.4 fL       MCH 28.8 pg      MCHC 33.0 g/dL      RDW 15.4 %      RDW-SD 47.2 fl      MPV 9.4 fL      Platelets 233 10*3/mm3      Neutrophil % 71.4 %      Lymphocyte % 17.0 %      Monocyte % 9.0 %      Eosinophil % 1.1 %      Basophil % 0.8 %      Immature Grans % 0.7 %      Neutrophils, Absolute 6.95 10*3/mm3      Lymphocytes, Absolute 1.66 10*3/mm3      Monocytes, Absolute 0.88 10*3/mm3      Eosinophils, Absolute 0.11 10*3/mm3      Basophils, Absolute 0.08 10*3/mm3      Immature Grans, Absolute 0.07 10*3/mm3      nRBC 0.0 /100 WBC              Imaging:    XR Chest 1 View  Result Date: 4/8/2025  XR CHEST 1 VW Date of Exam: 4/8/2025 6:57 PM EDT Indication: Cough, persistent Cough cough Comparison: 9/9/2024 Findings: Lines: None Lungs: Poor respiratory effort accentuates the pulmonary vasculature and cardiomediastinal silhouette. No definite consolidation. Pleura: No pleural effusion or pneumothorax. Cardiomediastinum: The cardiomediastinal silhouette is normal Soft Tissues: Unremarkable. Bones: No acute osseous abnormality.     Impression: Poor respiratory effort without definite consolidation. Electronically Signed: Bar Davis DO  4/8/2025 7:32 PM EDT  Workstation ID: [id number]        Differential Diagnosis and Discussion:    Metabolic: Differential diagnosis includes but is not limited to hypertension, hyperglycemia, hyperkalemia, hypocalcemia, metabolic acidosis, hypokalemia, hypoglycemia, malnutrition, hypothyroidism, hyperthyroidism, and adrenal insufficiency.     PROCEDURES:    Labs were collected in the emergency department and all labs were reviewed and interpreted by me.    ECG 12 Lead Electrolyte Imbalance   Preliminary Result   HEART RATE=91  bpm   RR Vtawcczq=814  ms   WY Interval=  ms   P Horizontal Axis=  deg   P Front Axis=  deg   QRSD Interval=93  ms   QT Mgwmitpj=160  ms   ZZlE=794  ms   QRS Axis=-27  deg   T Wave Axis=37  deg   - ABNORMAL ECG -   Atrial fibrillation   Inferior infarct, old    Anteroseptal infarct, old   Date and Time of Study:2025-04-08 18:55:14          Procedures    MDM     The patient´s CBC that was reviewed and interpreted by me shows no abnormalities of critical concern. Of note, there is no anemia requiring a blood transfusion and the platelet count is acceptable.  CMP shows a creatinine of 1.55 and a sodium of 119.  Potassium is 5.5.  Patient started on a 1 L normal saline bolus.      Total Critical Care time of 35 minutes. Total critical care time documented does not include time spent on separately billed procedures for services of nurses or physician assistants. I personally saw and examined the patient. I have reviewed all diagnostic interpretations and treatment plans as written. I was present for the key portions of any procedures performed and the inclusive time noted in any critical care statement. Critical care time includes patient management by me, time spent at the patients bedside,  time to review lab and imaging results, discussing patient care, documentation in the medical record, and time spent with family or caregiver.          Patient Care Considerations:    None      Consultants/Shared Management Plan:    Case was discussed with the hospitalist who agrees with admission.    Social Determinants of Health:    Patient is independent, reliable, and has access to care.       Disposition and Care Coordination:    Admit:   Through independent evaluation of the patient's history, physical, and imperical data, the patient meets criteria for inpatient admission to the hospital.        Final diagnoses:   Hyponatremia        ED Disposition       ED Disposition   Intended Admit    Condition   --    Comment   --               This medical record created using voice recognition software.             Job Becker MD  04/08/25 [id number]

## 2025-04-09 LAB
ANION GAP SERPL CALCULATED.3IONS-SCNC: 10.3 MMOL/L (ref 5–15)
ANION GAP SERPL CALCULATED.3IONS-SCNC: 11.4 MMOL/L (ref 5–15)
ANION GAP SERPL CALCULATED.3IONS-SCNC: 12.5 MMOL/L (ref 5–15)
BASOPHILS # BLD AUTO: 0.07 10*3/MM3 (ref 0–0.2)
BASOPHILS NFR BLD AUTO: 0.8 % (ref 0–1.5)
BUN SERPL-MCNC: 13 MG/DL (ref 8–23)
BUN SERPL-MCNC: 13 MG/DL (ref 8–23)
BUN SERPL-MCNC: 14 MG/DL (ref 8–23)
BUN/CREAT SERPL: 10 (ref 7–25)
BUN/CREAT SERPL: 9.8 (ref 7–25)
BUN/CREAT SERPL: 9.8 (ref 7–25)
CALCIUM SPEC-SCNC: 8.9 MG/DL (ref 8.6–10.5)
CALCIUM SPEC-SCNC: 8.9 MG/DL (ref 8.6–10.5)
CALCIUM SPEC-SCNC: 9.2 MG/DL (ref 8.6–10.5)
CHLORIDE SERPL-SCNC: 89 MMOL/L (ref 98–107)
CHLORIDE SERPL-SCNC: 91 MMOL/L (ref 98–107)
CHLORIDE SERPL-SCNC: 92 MMOL/L (ref 98–107)
CHLORIDE UR-SCNC: 34 MMOL/L
CO2 SERPL-SCNC: 20.5 MMOL/L (ref 22–29)
CO2 SERPL-SCNC: 20.7 MMOL/L (ref 22–29)
CO2 SERPL-SCNC: 21.6 MMOL/L (ref 22–29)
CREAT SERPL-MCNC: 1.32 MG/DL (ref 0.76–1.27)
CREAT SERPL-MCNC: 1.32 MG/DL (ref 0.76–1.27)
CREAT SERPL-MCNC: 1.4 MG/DL (ref 0.76–1.27)
DEPRECATED RDW RBC AUTO: 45.8 FL (ref 37–54)
EGFRCR SERPLBLD CKD-EPI 2021: 54.7 ML/MIN/1.73
EGFRCR SERPLBLD CKD-EPI 2021: 58.8 ML/MIN/1.73
EGFRCR SERPLBLD CKD-EPI 2021: 58.8 ML/MIN/1.73
EOSINOPHIL # BLD AUTO: 0.08 10*3/MM3 (ref 0–0.4)
EOSINOPHIL NFR BLD AUTO: 0.9 % (ref 0.3–6.2)
ERYTHROCYTE [DISTWIDTH] IN BLOOD BY AUTOMATED COUNT: 14.7 % (ref 12.3–15.4)
GLUCOSE BLDC GLUCOMTR-MCNC: 111 MG/DL (ref 70–99)
GLUCOSE BLDC GLUCOMTR-MCNC: 94 MG/DL (ref 70–99)
GLUCOSE SERPL-MCNC: 102 MG/DL (ref 65–99)
GLUCOSE SERPL-MCNC: 114 MG/DL (ref 65–99)
GLUCOSE SERPL-MCNC: 93 MG/DL (ref 65–99)
HCT VFR BLD AUTO: 50.2 % (ref 37.5–51)
HGB BLD-MCNC: 17 G/DL (ref 13–17.7)
IMM GRANULOCYTES # BLD AUTO: 0.06 10*3/MM3 (ref 0–0.05)
IMM GRANULOCYTES NFR BLD AUTO: 0.7 % (ref 0–0.5)
LYMPHOCYTES # BLD AUTO: 1.51 10*3/MM3 (ref 0.7–3.1)
LYMPHOCYTES NFR BLD AUTO: 17.3 % (ref 19.6–45.3)
MCH RBC QN AUTO: 29 PG (ref 26.6–33)
MCHC RBC AUTO-ENTMCNC: 33.9 G/DL (ref 31.5–35.7)
MCV RBC AUTO: 85.7 FL (ref 79–97)
MONOCYTES # BLD AUTO: 0.9 10*3/MM3 (ref 0.1–0.9)
MONOCYTES NFR BLD AUTO: 10.3 % (ref 5–12)
NEUTROPHILS NFR BLD AUTO: 6.13 10*3/MM3 (ref 1.7–7)
NEUTROPHILS NFR BLD AUTO: 70 % (ref 42.7–76)
NRBC BLD AUTO-RTO: 0 /100 WBC (ref 0–0.2)
OSMOLALITY UR: 142 MOSM/KG (ref 50–1400)
PH UR STRIP.AUTO: 6.5 [PH] (ref 5–8)
PLATELET # BLD AUTO: 228 10*3/MM3 (ref 140–450)
PMV BLD AUTO: 9.2 FL (ref 6–12)
POTASSIUM SERPL-SCNC: 4.2 MMOL/L (ref 3.5–5.2)
POTASSIUM SERPL-SCNC: 4.9 MMOL/L (ref 3.5–5.2)
POTASSIUM SERPL-SCNC: 5.2 MMOL/L (ref 3.5–5.2)
POTASSIUM UR-SCNC: 9.4 MMOL/L
RBC # BLD AUTO: 5.86 10*6/MM3 (ref 4.14–5.8)
SODIUM SERPL-SCNC: 122 MMOL/L (ref 136–145)
SODIUM SERPL-SCNC: 123 MMOL/L (ref 136–145)
SODIUM SERPL-SCNC: 124 MMOL/L (ref 136–145)
SODIUM UR-SCNC: 38 MMOL/L
WBC NRBC COR # BLD AUTO: 8.75 10*3/MM3 (ref 3.4–10.8)

## 2025-04-09 PROCEDURE — 82948 REAGENT STRIP/BLOOD GLUCOSE: CPT

## 2025-04-09 PROCEDURE — 80048 BASIC METABOLIC PNL TOTAL CA: CPT | Performed by: STUDENT IN AN ORGANIZED HEALTH CARE EDUCATION/TRAINING PROGRAM

## 2025-04-09 PROCEDURE — 83935 ASSAY OF URINE OSMOLALITY: CPT | Performed by: STUDENT IN AN ORGANIZED HEALTH CARE EDUCATION/TRAINING PROGRAM

## 2025-04-09 PROCEDURE — 97161 PT EVAL LOW COMPLEX 20 MIN: CPT

## 2025-04-09 PROCEDURE — 84300 ASSAY OF URINE SODIUM: CPT | Performed by: STUDENT IN AN ORGANIZED HEALTH CARE EDUCATION/TRAINING PROGRAM

## 2025-04-09 PROCEDURE — 25810000003 LACTATED RINGERS PER 1000 ML: Performed by: STUDENT IN AN ORGANIZED HEALTH CARE EDUCATION/TRAINING PROGRAM

## 2025-04-09 PROCEDURE — 81003 URINALYSIS AUTO W/O SCOPE: CPT | Performed by: STUDENT IN AN ORGANIZED HEALTH CARE EDUCATION/TRAINING PROGRAM

## 2025-04-09 PROCEDURE — 99232 SBSQ HOSP IP/OBS MODERATE 35: CPT | Performed by: FAMILY MEDICINE

## 2025-04-09 PROCEDURE — 82436 ASSAY OF URINE CHLORIDE: CPT | Performed by: STUDENT IN AN ORGANIZED HEALTH CARE EDUCATION/TRAINING PROGRAM

## 2025-04-09 PROCEDURE — 97165 OT EVAL LOW COMPLEX 30 MIN: CPT

## 2025-04-09 PROCEDURE — 25010000002 LABETALOL 5 MG/ML SOLUTION: Performed by: STUDENT IN AN ORGANIZED HEALTH CARE EDUCATION/TRAINING PROGRAM

## 2025-04-09 PROCEDURE — 84133 ASSAY OF URINE POTASSIUM: CPT | Performed by: STUDENT IN AN ORGANIZED HEALTH CARE EDUCATION/TRAINING PROGRAM

## 2025-04-09 PROCEDURE — 85025 COMPLETE CBC W/AUTO DIFF WBC: CPT | Performed by: STUDENT IN AN ORGANIZED HEALTH CARE EDUCATION/TRAINING PROGRAM

## 2025-04-09 RX ORDER — VALSARTAN 80 MG/1
40 TABLET ORAL DAILY
Status: DISCONTINUED | OUTPATIENT
Start: 2025-04-09 | End: 2025-04-11 | Stop reason: HOSPADM

## 2025-04-09 RX ORDER — ATORVASTATIN CALCIUM 10 MG/1
10 TABLET, FILM COATED ORAL DAILY
Status: DISCONTINUED | OUTPATIENT
Start: 2025-04-09 | End: 2025-04-09

## 2025-04-09 RX ORDER — CITALOPRAM HYDROBROMIDE 20 MG/1
10 TABLET ORAL DAILY
Status: DISCONTINUED | OUTPATIENT
Start: 2025-04-09 | End: 2025-04-11 | Stop reason: HOSPADM

## 2025-04-09 RX ORDER — MECLIZINE HYDROCHLORIDE 25 MG/1
50 TABLET ORAL DAILY
Status: DISCONTINUED | OUTPATIENT
Start: 2025-04-09 | End: 2025-04-11 | Stop reason: HOSPADM

## 2025-04-09 RX ORDER — SODIUM CHLORIDE, SODIUM LACTATE, POTASSIUM CHLORIDE, CALCIUM CHLORIDE 600; 310; 30; 20 MG/100ML; MG/100ML; MG/100ML; MG/100ML
75 INJECTION, SOLUTION INTRAVENOUS CONTINUOUS
Status: ACTIVE | OUTPATIENT
Start: 2025-04-09 | End: 2025-04-09

## 2025-04-09 RX ORDER — RIVASTIGMINE TARTRATE 1.5 MG/1
1.5 CAPSULE ORAL 2 TIMES DAILY WITH MEALS
Status: DISCONTINUED | OUTPATIENT
Start: 2025-04-09 | End: 2025-04-11 | Stop reason: HOSPADM

## 2025-04-09 RX ORDER — FERROUS SULFATE 325(65) MG
325 TABLET ORAL
Status: DISCONTINUED | OUTPATIENT
Start: 2025-04-09 | End: 2025-04-11 | Stop reason: HOSPADM

## 2025-04-09 RX ORDER — ATORVASTATIN CALCIUM 10 MG/1
10 TABLET, FILM COATED ORAL EVERY EVENING
Status: DISCONTINUED | OUTPATIENT
Start: 2025-04-09 | End: 2025-04-11 | Stop reason: HOSPADM

## 2025-04-09 RX ORDER — METOPROLOL SUCCINATE 50 MG/1
50 TABLET, EXTENDED RELEASE ORAL DAILY
Status: DISCONTINUED | OUTPATIENT
Start: 2025-04-09 | End: 2025-04-11 | Stop reason: HOSPADM

## 2025-04-09 RX ORDER — LABETALOL HYDROCHLORIDE 5 MG/ML
20 INJECTION, SOLUTION INTRAVENOUS ONCE
Status: COMPLETED | OUTPATIENT
Start: 2025-04-09 | End: 2025-04-09

## 2025-04-09 RX ORDER — QUETIAPINE FUMARATE 25 MG/1
50 TABLET, FILM COATED ORAL NIGHTLY
Status: DISCONTINUED | OUTPATIENT
Start: 2025-04-09 | End: 2025-04-11 | Stop reason: HOSPADM

## 2025-04-09 RX ADMIN — MECLIZINE HYDROCHLORIDE 50 MG: 25 TABLET ORAL at 08:52

## 2025-04-09 RX ADMIN — QUETIAPINE FUMARATE 50 MG: 25 TABLET ORAL at 20:31

## 2025-04-09 RX ADMIN — ATORVASTATIN CALCIUM 10 MG: 10 TABLET, FILM COATED ORAL at 17:11

## 2025-04-09 RX ADMIN — SODIUM CHLORIDE, POTASSIUM CHLORIDE, SODIUM LACTATE AND CALCIUM CHLORIDE 75 ML/HR: 600; 310; 30; 20 INJECTION, SOLUTION INTRAVENOUS at 01:34

## 2025-04-09 RX ADMIN — Medication 10 ML: at 20:32

## 2025-04-09 RX ADMIN — RIVASTIGMINE TARTRATE 1.5 MG: 1.5 CAPSULE ORAL at 08:52

## 2025-04-09 RX ADMIN — SODIUM CHLORIDE, POTASSIUM CHLORIDE, SODIUM LACTATE AND CALCIUM CHLORIDE 75 ML/HR: 600; 310; 30; 20 INJECTION, SOLUTION INTRAVENOUS at 14:19

## 2025-04-09 RX ADMIN — QUETIAPINE FUMARATE 50 MG: 25 TABLET ORAL at 02:46

## 2025-04-09 RX ADMIN — APIXABAN 5 MG: 5 TABLET, FILM COATED ORAL at 20:32

## 2025-04-09 RX ADMIN — APIXABAN 5 MG: 5 TABLET, FILM COATED ORAL at 08:51

## 2025-04-09 RX ADMIN — Medication 10 ML: at 08:51

## 2025-04-09 RX ADMIN — Medication 100 MG: at 08:52

## 2025-04-09 RX ADMIN — FERROUS SULFATE TAB 325 MG (65 MG ELEMENTAL FE) 325 MG: 325 (65 FE) TAB at 08:50

## 2025-04-09 RX ADMIN — RIVASTIGMINE TARTRATE 1.5 MG: 1.5 CAPSULE ORAL at 17:11

## 2025-04-09 RX ADMIN — LABETALOL HYDROCHLORIDE 20 MG: 5 INJECTION, SOLUTION INTRAVENOUS at 02:45

## 2025-04-09 RX ADMIN — METOPROLOL SUCCINATE 50 MG: 50 TABLET, FILM COATED, EXTENDED RELEASE ORAL at 08:51

## 2025-04-09 NOTE — PLAN OF CARE
Goal Outcome Evaluation:  Plan of Care Reviewed With: patient, child   Pt has had unremarkable day. Pt is aaox4 and had adequate intake and output this shift. Pt worked well with therapy and is ax1 with walker. Pt was able to ambulate down kahn today. No c/o pain on this shift. VSS.

## 2025-04-09 NOTE — PLAN OF CARE
Goal Outcome Evaluation:  Plan of Care Reviewed With: patient        Progress: no change (first session for evaluation)  Outcome Evaluation: Patient presents with limitations of balance and endurance/activity tolerance which are impacting ADL/transfer independence. Skilled OT services are indicated to remediate/ compensate for deficits to maximize independence and safety with functional ADL tasks.    Anticipated Discharge Disposition (OT): home with assist, home with home health

## 2025-04-09 NOTE — THERAPY EVALUATION
Acute Care - Physical Therapy Initial Evaluation  KAROLINA Wadsworth     Patient Name: Paulo Leiva Jr.  : 1955  MRN: [id number]  Today's Date: 2025      Visit Dx:     ICD-10-CM ICD-9-CM   1. Hyponatremia  E87.1 276.1   2. Difficulty in walking  R26.2 719.7     Patient Active Problem List   Diagnosis    Chronic obstructive pulmonary disease    Essential hypertension    HLD (hyperlipidemia)    Hyponatremia    Vertigo    Colon cancer screening    Status post placement of implantable loop recorder    Class 1 obesity due to excess calories without serious comorbidity with body mass index (BMI) of 30.0 to 30.9 in adult    Generalized convulsive seizures    Renal mass    AMS (altered mental status)    Benign paroxysmal positional vertigo    Malignant neoplasm of unspecified kidney, except renal pelvis    Stage 3a chronic kidney disease    Generalized tonic-clonic seizure     Past Medical History:   Diagnosis Date    Arthritis of back     Benign essential hypertension     Cancer 1994    Mass on Kidney..    COPD (chronic obstructive pulmonary disease)     Erectile dysfunction Apprx: 5 yrs ago    Generalized convulsive seizures 2023    Hip arthrosis     Hyperlipidemia 2018    Hyponatremia     Implantable loop recorder present     Knee swelling     Long term (current) use of non-steroidal anti-inflammatories (nsaid) 2018    Renal insufficiency     Syncope     Syncope and collapse 2022    Total of 5 episdoes with the last one 10/22    Outside Source Comment: Overview:  Formatting of this note might be different from the original.  Total of 5 episdoes with the last one 10/22  Last Assessment & Plan:  Formatting of this note might be different from the original.  No recurrent episodes Lipitor does not show any significant dysrhythmias at this point continue with monitoring      Vertigo 2021    Vision loss      Past Surgical History:   Procedure Laterality Date    CARDIAC  CATHETERIZATION N/A 09/26/2022    Procedure: Left Heart Cath;  Surgeon: Randell Casiano MD;  Location: [facility] CATH INVASIVE LOCATION;  Service: Cardiovascular;  Laterality: N/A;    CARDIAC ELECTROPHYSIOLOGY PROCEDURE N/A 09/26/2022    Procedure: Loop insertion;  Surgeon: Randell Casiano MD;  Location: [facility] CATH INVASIVE LOCATION;  Service: Cardiovascular;  Laterality: N/A;    COLONOSCOPY  2007    COLONOSCOPY N/A 10/26/2022    Procedure: COLONOSCOPY FOR SCREENING;  Surgeon: Roque Whitlock MD;  Location: [facility] ENDOSCOPY;  Service: Gastroenterology;  Laterality: N/A;  NORMAL COLON    NEPHRECTOMY Right 08/12/2024     PT Assessment (Last 12 Hours)       PT Evaluation and Treatment       Row Name 04/09/25 1000          Physical Therapy Time and Intention    Subjective Information complains of;weakness (P)   -TM     Document Type evaluation (P)   -TM     Mode of Treatment individual therapy (P)   -TM     Patient Effort excellent (P)   -TM       Row Name 04/09/25 1000          General Information    Prior Level of Function independent:;all household mobility (P)   -TM     Existing Precautions/Restrictions no known precautions/restrictions (P)   -TM       Row Name 04/09/25 1000          Living Environment    Current Living Arrangements home (P)   -TM     Home Accessibility stairs to enter home (P)   -TM     People in Home spouse (P)   -TM     Primary Care Provided by self (P)   -TM       Row Name 04/09/25 1000          Home Main Entrance    Number of Stairs, Main Entrance three (P)   -TM     Stair Railings, Main Entrance railings safe and in good condition (P)   -TM       Row Name 04/09/25 1000          Range of Motion (ROM)    Range of Motion ROM is WNL;bilateral lower extremities (P)   -TM       Row Name 04/09/25 1000          Strength (Manual Muscle Testing)    Strength (Manual Muscle Testing) strength is WNL;bilateral lower extremities (P)   -TM       Row Name 04/09/25 1000          Bed Mobility    Bed Mobility  bed mobility (all) activities (P)   -TM     All Activities, [place] (Bed Mobility) standby assist (P)   -TM       Row Name 04/09/25 1000          Transfers    Transfers sit-stand transfer (P)   -TM       Row Name 04/09/25 1000          Sit-Stand Transfer    Sit-Stand [place] (Transfers) contact guard (P)   -TM     Assistive Device (Sit-Stand Transfers) walker, front-wheeled (P)   -TM       Row Name 04/09/25 1000          Gait/Stairs (Locomotion)    Gait/Stairs Locomotion gait/ambulation independence;gait/ambulation assistive device (P)   -TM     [place] Level (Gait) contact guard (P)   -TM     Assistive Device (Gait) walker, front-wheeled (P)   -TM     Patient was able to Ambulate yes (P)   -TM     Distance in Feet (Gait) 300 (P)   -       Row Name 04/09/25 1000          Safety Issues/Impairments Affecting Functional Mobility    Impairments Affecting Function (Mobility) balance;endurance/activity tolerance (P)   -       Row Name 04/09/25 1000          Balance    Balance Assessment standing dynamic balance (P)   -TM     Dynamic Standing Balance contact guard (P)   -TM     Position/Device Used, Standing Balance walker, front-wheeled (P)   -TM       Row Name 04/09/25 1000          Plan of Care Review    Plan of Care Reviewed With patient (P)   -TM     Outcome Evaluation Pt presents with balance and endurance deficits. Pt requires skilled therapy services to address these impairments (P)   -       Row Name 04/09/25 1000          Therapy Assessment/Plan (PT)    Patient/Family Therapy Goals Statement (PT) Return home (P)   -TM     Rehab Potential (PT) good (P)   -TM     Criteria for Skilled Interventions Met (PT) skilled treatment is necessary (P)   -TM     Therapy Frequency (PT) daily (P)   -TM     Predicted Duration of Therapy Intervention (PT) 10 days (P)   -TM     Problem List (PT) problems related to;balance;mobility (P)   -TM     Activity Limitations Related to Problem List (PT) unable to  ambulate safely (P)   -TM       Row Name 04/09/25 1000          PT Evaluation Complexity    History, PT Evaluation Complexity no personal factors and/or comorbidities (P)   -TM     Examination of Body Systems (PT Eval Complexity) total of 4 or more elements (P)   -TM     Clinical Presentation (PT Evaluation Complexity) stable (P)   -TM     Clinical Decision Making (PT Evaluation Complexity) low complexity (P)   -TM     Overall Complexity (PT Evaluation Complexity) low complexity (P)   -TM       Row Name 04/09/25 1000          Therapy Plan Review/Discharge Plan (PT)    Therapy Plan Review (PT) evaluation/treatment results reviewed;patient (P)   -TM       [facility] Name 04/09/25 1000          Physical Therapy Goals    Transfer Goal Selection (PT) transfer, PT goal 1 (P)   -TM     Gait Training Goal Selection (PT) gait training, PT goal 1 (P)   -TM     Balance Goal Selection (PT) balance, PT goal 1 (P)   -TM       Row Name 04/09/25 1000          Transfer Goal 1 (PT)    Activity/Assistive Device (Transfer Goal 1, PT) transfers, all (P)   -TM     [place] Level/Cues Needed (Transfer Goal 1, PT) independent (P)   -TM     Time Frame (Transfer Goal 1, PT) 10 days (P)   -TM       Row Name 04/09/25 1000          Gait Training Goal 1 (PT)    Activity/Assistive Device (Gait Training Goal 1, PT) gait (walking locomotion);assistive device use (P)   -TM     [place] Level (Gait Training Goal 1, PT) independent (P)   -TM     Distance (Gait Training Goal 1, PT) 500' (P)   -TM     Time Frame (Gait Training Goal 1, PT) 10 days (P)   -TM       Row Name 04/09/25 1000          Balance Goal 1 (PT)    Activity/Assistive Device (Balance Goal) standing dynamic balance (P)   -TM     [place] Level/Cues Needed (Balance Goal 1, PT) independent (P)   -TM     Time Frame (Balance Goal 1, PT) 2 weeks (P)   -TM               User Key  (r) = Recorded By, (t) = Taken By, (c) = Cosigned By      Initials Name Provider Type    TM Wei Tay, PT  Student PT Student                    Physical Therapy Education       Title: PT OT SLP Therapies (Done)       Topic: Physical Therapy (Done)       Point: Mobility training (Done)       Learning Progress Summary            Patient Acceptance, E,TB, VU by TM at 4/9/2025 1031                      Point: Home exercise program (Done)       Learning Progress Summary            Patient Acceptance, E,TB, VU by TM at 4/9/2025 1031                      Point: Body mechanics (Done)       Learning Progress Summary            Patient Acceptance, E,TB, VU by TM at 4/9/2025 1031                      Point: Precautions (Done)       Learning Progress Summary            Patient Acceptance, E,TB, VU by TM at 4/9/2025 1031                                      User Key       Initials Effective Dates Name Provider Type Discipline    TM 02/04/25 -  Wei Tay, PT Student PT Student PT                  PT Recommendation and Plan  Anticipated Discharge Disposition (PT): (P) home with home health  Planned Therapy Interventions (PT): (P) balance training, gait training, home exercise program, neuromuscular re-education, strengthening, transfer training  Therapy Frequency (PT): (P) daily  Plan of Care Reviewed With: (P) patient  Outcome Evaluation: (P) Pt presents with balance and endurance deficits. Pt requires skilled therapy services to address these impairments   Outcome Measures       Row Name 04/09/25 1000             How much help from another person do you currently need...    Turning from your back to your side while in flat bed without using bedrails? 4 (P)   -TM      Moving from lying on back to sitting on the side of a flat bed without bedrails? 4 (P)   -TM      Moving to and from a bed to a chair (including a wheelchair)? 4 (P)   -TM      Standing up from a chair using your arms (e.g., wheelchair, bedside chair)? 4 (P)   -TM      Climbing 3-5 steps with a railing? 4 (P)   -TM      To walk in hospital room? 4 (P)   -TM       AM-PAC 6 Clicks Score (PT) 24 (P)   -TM         Functional Assessment    Outcome Measure Options AM-PAC 6 Clicks Basic Mobility (PT) (P)   -TM                User Key  (r) = Recorded By, (t) = Taken By, (c) = Cosigned By      Initials Name Provider Type    TM Wei Tay PT Student PT Student                     Time Calculation:    PT Charges       Row Name 04/09/25 1027             Time Calculation    PT Received On 04/09/25 (P)   -TM      PT Goal Re-Cert Due Date 04/18/25 (P)   -TM         Untimed Charges    PT Eval/Re-eval Minutes 25 (P)   -TM         Total Minutes    Untimed Charges Total Minutes 25 (P)   -TM       Total Minutes 25 (P)   -TM                User Key  (r) = Recorded By, (t) = Taken By, (c) = Cosigned By      Initials Name Provider Type     Wei Tay PT Student PT Student                  Therapy Charges for Today       Code Description Service Date Service Provider Modifiers Qty    82957115199 HC PT EVAL LOW COMPLEXITY 2 4/9/2025 Wei Tay, PT Student GP 1            PT G-Codes  Outcome Measure Options: (P) AM-PAC 6 Clicks Basic Mobility (PT)  AM-PAC 6 Clicks Score (PT): (P) 24    CAMI Castelan  4/9/2025

## 2025-04-09 NOTE — H&P
[facility]  Date: 2025   Patient Name: Paulo Leiva Jr.  : 1955  MRN: [id number]  Primary Care Physician:  Vilma Rolon APRN  Date of admission: 2025    Subjective   Subjective     Chief Complaint: abnormal lab result    HPI:    Paulo Leiva Jr. is a 69 y.o. male significant past medical history of hypertension, renal cancer status post nephrectomy, COPD, hyponatremia, hyperlipidemia, vertigo was sent by his PCP to the ER due to low sodium.  Reports that he had lab work done yesterday and he was informed by his PCP that his sodium was 120 that he will need to come to the ER.  Patient denies any specific symptoms, no chest pain, no shortness of breath no palpitations, no nausea or vomiting, no focal weakness, no headaches.  Patient reports that he had a similar episode few months ago where he was diagnosed with hyponatremia and he was started on sodium tabs however this tabs were discontinued by her PCP about 2 months ago because his sodium went too high at that time.  In the ER patient noted to have a sodium level of 115, creatinine level 1.5, potassium 5.5, he was given 1 L of normal saline.      Personal History     Past Medical History:  Past Medical History:   Diagnosis Date    Arthritis of back     Benign essential hypertension     Cancer 1994    Mass on Kidney..    COPD (chronic obstructive pulmonary disease)     Erectile dysfunction Apprx: 5 yrs ago    Generalized convulsive seizures 2023    Hip arthrosis     Hyperlipidemia 2018    Hyponatremia     Implantable loop recorder present     Knee swelling     Long term (current) use of non-steroidal anti-inflammatories (nsaid) 2018    Renal insufficiency     Syncope     Syncope and collapse 2022    Total of 5 episdoes with the last one 10/22    Outside Source Comment: Overview:  Formatting of this note might be different from the original.  Total of 5 episdoes with  the last one 10/22  Last Assessment & Plan:  Formatting of this note might be different from the original.  No recurrent episodes Lipitor does not show any significant dysrhythmias at this point continue with monitoring      Vertigo 2021    Vision loss        Past Surgical History:  Past Surgical History:   Procedure Laterality Date    CARDIAC CATHETERIZATION N/A 2022    Procedure: Left Heart Cath;  Surgeon: Randell Casiano MD;  Location: [facility] CATH INVASIVE LOCATION;  Service: Cardiovascular;  Laterality: N/A;    CARDIAC ELECTROPHYSIOLOGY PROCEDURE N/A 2022    Procedure: Loop insertion;  Surgeon: Randell Casiano MD;  Location: [facility] CATH INVASIVE LOCATION;  Service: Cardiovascular;  Laterality: N/A;    COLONOSCOPY      COLONOSCOPY N/A 10/26/2022    Procedure: COLONOSCOPY FOR SCREENING;  Surgeon: Roque Whitlock MD;  Location: [facility] ENDOSCOPY;  Service: Gastroenterology;  Laterality: N/A;  NORMAL COLON    NEPHRECTOMY Right 2024       Family History:   Family History   Problem Relation Age of Onset    Dementia Mother     No Known Problems Father     Colon cancer Neg Hx     Malig Hyperthermia Neg Hx        Social History:   Social History     Socioeconomic History    Marital status:    Tobacco Use    Smoking status: Former     Current packs/day: 0.00     Average packs/day: 2.0 packs/day for 36.3 years (72.7 ttl pk-yrs)     Types: Cigarettes     Start date: 6/15/1974     Quit date: 10/20/2010     Years since quittin.4     Passive exposure: Past    Smokeless tobacco: Never    Tobacco comments:     SMOKES MORE THAN 2 PPD FOR 35 YEARS   Vaping Use    Vaping status: Never Used   Substance and Sexual Activity    Alcohol use: Not Currently     Alcohol/week: 6.0 - 8.0 standard drinks of alcohol     Types: 6 - 8 Cans of beer per week     Comment: as of 25 was his last drink 8-14 DRINKS PER WEEK as of 2024, prior to 2024 he would drink 20 or more beers daily and did  so for 15 years    Drug use: Not Currently    Sexual activity: Not Currently     Partners: Female     Birth control/protection: Condom       Home Medications:  QUEtiapine, Tiotropium Bromide Monohydrate, apixaban, citalopram, ferrous sulfate, meclizine, metoprolol succinate XL, rivastigmine, simvastatin, thiamine, and valsartan    Allergies:  No Known Allergies    Review of Systems   All systems were reviewed and negative except for: As indicated in HPI    Objective   Objective     Vitals:   Temp:  [97.8 °F (36.6 °C)] 97.8 °F (36.6 °C)  Heart Rate:  [62-67] 65  Resp:  [17-19] 19  BP: (177-194)/(91-98) 177/91    Physical Exam    Constitutional: Awake, alert, no acute distress   Eyes: Pupils equal, sclerae anicteric, no conjunctival injection   HENT: NCAT, mucous membranes moist   Neck: Supple, no thyromegaly, no lymphadenopathy, trachea midline   Respiratory: Clear to auscultation bilaterally, nonlabored respirations    Cardiovascular: RRR, no murmurs, rubs, or gallops, palpable pedal pulses bilaterally   Gastrointestinal: Positive bowel sounds, soft, nontender, nondistended   Musculoskeletal: No bilateral ankle edema, no clubbing or cyanosis to extremities   Psychiatric: Appropriate affect, cooperative   Neurologic: Oriented x 3, strength symmetric in all extremities, Cranial Nerves grossly intact to confrontation, speech clear   Skin: No rashes     Result Review    Result Review:  I have personally reviewed the results from the time of this admission to 4/8/2025 21:15 EDT and agree with these findings:  [x]  Laboratory  [x]  Microbiology  [x]  Radiology  [x]  EKG/Telemetry   [x]  Cardiology/Vascular   []  Pathology  []  Old records  []  Other:      Assessment & Plan   Assessment / Plan     Assessment/Plan:   Hyponatremia  Mild hyperkalemia  -Will give 1 sodium tab now  - Serial BMPs every 6 to monitor sodium levels to avoid overcorrection  - Hold citalopram and valsartan which both could cause hyponatremia  -  Nephrology consult  - Monitor potassium levels  - Telemetry  -urine electrolytes study    Chronic conditions:  hypertension, renal cancer status post nephrectomy, COPD, hyponatremia, hyperlipidemia, vertigo   -Continue home meds for chronic conditions as feasible.    VTE Prophylaxis:  Pharmacologic VTE prophylaxis orders are present.        CODE STATUS:    Code Status (Patient has no pulse and is not breathing): CPR (Attempt to Resuscitate)  Medical Interventions (Patient has pulse or is breathing): Full Support      Admission Status:  I believe this patient meets inpatient status.    Electronically signed by Junior Matthew MD, 04/08/25, 9:15 PM EDT.

## 2025-04-09 NOTE — THERAPY EVALUATION
Patient Name: Paulo Leiva Jr.  : 1955    MRN: [id number]                              Today's Date: 2025       Admit Date: 2025    Visit Dx:     ICD-10-CM ICD-9-CM   1. Hyponatremia  E87.1 276.1   2. Difficulty in walking  R26.2 719.7   3. Decreased activities of daily living (ADL)  Z78.9 V49.89     Patient Active Problem List   Diagnosis    Chronic obstructive pulmonary disease    Essential hypertension    HLD (hyperlipidemia)    Hyponatremia    Vertigo    Colon cancer screening    Status post placement of implantable loop recorder    Class 1 obesity due to excess calories without serious comorbidity with body mass index (BMI) of 30.0 to 30.9 in adult    Generalized convulsive seizures    Renal mass    AMS (altered mental status)    Benign paroxysmal positional vertigo    Malignant neoplasm of unspecified kidney, except renal pelvis    Stage 3a chronic kidney disease    Generalized tonic-clonic seizure     Past Medical History:   Diagnosis Date    Arthritis of back     Benign essential hypertension     Cancer 1994    Mass on Kidney..    COPD (chronic obstructive pulmonary disease)     Erectile dysfunction Apprx: 5 yrs ago    Generalized convulsive seizures 2023    Hip arthrosis     Hyperlipidemia 2018    Hyponatremia     Implantable loop recorder present     Knee swelling     Long term (current) use of non-steroidal anti-inflammatories (nsaid) 2018    Renal insufficiency     Syncope     Syncope and collapse 2022    Total of 5 episdoes with the last one 10/22    Outside Source Comment: Overview:  Formatting of this note might be different from the original.  Total of 5 episdoes with the last one 10/22  Last Assessment & Plan:  Formatting of this note might be different from the original.  No recurrent episodes Lipitor does not show any significant dysrhythmias at this point continue with monitoring      Vertigo 2021    Vision loss      Past Surgical  History:   Procedure Laterality Date    CARDIAC CATHETERIZATION N/A 09/26/2022    Procedure: Left Heart Cath;  Surgeon: Randell Casiano MD;  Location: [facility] CATH INVASIVE LOCATION;  Service: Cardiovascular;  Laterality: N/A;    CARDIAC ELECTROPHYSIOLOGY PROCEDURE N/A 09/26/2022    Procedure: Loop insertion;  Surgeon: Randell Casiano MD;  Location: [facility] CATH INVASIVE LOCATION;  Service: Cardiovascular;  Laterality: N/A;    COLONOSCOPY  2007    COLONOSCOPY N/A 10/26/2022    Procedure: COLONOSCOPY FOR SCREENING;  Surgeon: Roque Whitlock MD;  Location: [facility] ENDOSCOPY;  Service: Gastroenterology;  Laterality: N/A;  NORMAL COLON    NEPHRECTOMY Right 08/12/2024      General Information       Row Name 04/09/25 1133          OT Time and Intention    Document Type evaluation  -AV     Mode of Treatment individual therapy;occupational therapy  -AV     Patient Effort good  -AV       Row Name 04/09/25 1133          General Information    Patient Profile Reviewed yes  -AV     Prior Level of Function independent:;ADL's;transfer  stands to shower (tub w/ seat available). stands at sink to groom. standard commode. ambulates with either his QC or RW. no home O2.  -AV     Existing Precautions/Restrictions fall  -AV     Barriers to Rehab none identified  -AV       Row Name 04/09/25 1133          Occupational Profile    Reason for Services/Referral (Occupational Profile) Patient is a 69 year old male admitted to [facility] on 4/8/25 with abnormal labs. He is currently on 3W/ room air. OT consulted due to recent decline in ADL/transfer independence. No previous OT services for current condition.  -AV       Row Name 04/09/25 1133          Living Environment    People in Home spouse  -AV       Row Name 04/09/25 1133          Home Main Entrance    Number of Stairs, Main Entrance four  -AV       Row Name 04/09/25 1133          Stairs Within Home, Primary    Number of Stairs, Within Home, Primary none  -AV       Row Name  04/09/25 1133          Cognition    Orientation Status (Cognition) --  alert, pleasant and cooperative. able to retain information and follow commands.  -AV       Row Name 04/09/25 1133          Safety Issues/Impairments Affecting Functional Mobility    Impairments Affecting Function (Mobility) balance;endurance/activity tolerance  -AV               User Key  (r) = Recorded By, (t) = Taken By, (c) = Cosigned By      Initials Name Provider Type    Rene Chen OT Occupational Therapist                     Mobility/ADL's       Row Name 04/09/25 1135          Bed Mobility    Bed Mobility supine-sit  -AV     Supine-Sit Evangeline (Bed Mobility) independent  -AV       Row Name 04/09/25 1135          Transfers    Comment, (Transfers) CGA/min assist  -AV       Row Name 04/09/25 1135          Activities of Daily Living    BADL Assessment/Intervention --  Independent feeding and grooming with setup while seated. CGA/min assist bathing/ dressing. able to don/ doff slipper socks independently at bedside. CGA toilet hygiene (ambulates to bathroom instead of BSC use).  -AV               User Key  (r) = Recorded By, (t) = Taken By, (c) = Cosigned By      Initials Name Provider Type    AV Rene Nevarez OT Occupational Therapist                   Obj/Interventions       Row Name 04/09/25 1137          Sensory Assessment (Somatosensory)    Sensory Assessment (Somatosensory) UE sensation intact  -AV       Row Name 04/09/25 1137          Vision Assessment/Intervention    Visual Impairment/Limitations WFL  -AV     Vision Assessment Comment glasses  -AV       Row Name 04/09/25 1137          Range of Motion Comprehensive    General Range of Motion bilateral upper extremity ROM WFL  -AV     Comment, General Range of Motion AROM  -AV       Row Name 04/09/25 1137          Strength Comprehensive (MMT)    Comment, General Manual Muscle Testing (MMT) Assessment 4+/5 bilateral biceps, triceps and   -AV       Row Name 04/09/25  1137          Motor Skills    Motor Skills coordination;functional endurance  -AV     Coordination WFL  left dominant  -AV     Functional Endurance fair minus  -AV       Row Name 04/09/25 1137          Balance    Comment, Balance CGA/min assist  -AV               User Key  (r) = Recorded By, (t) = Taken By, (c) = Cosigned By      Initials Name Provider Type    AV Rene Nevarez OT Occupational Therapist                   Goals/Plan       Row Name 04/09/25 1139          Transfer Goal 1 (OT)    Activity/Assistive Device (Transfer Goal 1, OT) transfers, all;walker, rolling  -AV     [place] Level/Cues Needed (Transfer Goal 1, OT) modified independence  -AV     Time Frame (Transfer Goal 1, OT) long term goal (LTG);10 days  -AV       Row Name 04/09/25 1139          Bathing Goal 1 (OT)    Activity/Device (Bathing Goal 1, OT) bathing skills, all;tub bench  -AV     [place] Level/Cues Needed (Bathing Goal 1, OT) modified independence  -AV     Time Frame (Bathing Goal 1, OT) long term goal (LTG);10 days  -AV       Row Name 04/09/25 1139          Dressing Goal 1 (OT)    Activity/Device (Dressing Goal 1, OT) dressing skills, all  -AV     [place]/Cues Needed (Dressing Goal 1, OT) modified independence  -AV     Time Frame (Dressing Goal 1, OT) long term goal (LTG);10 days  -AV       Row Name 04/09/25 1139          Toileting Goal 1 (OT)    Activity/Device (Toileting Goal 1, OT) toileting skills, all;raised toilet seat  -AV     [place] Level/Cues Needed (Toileting Goal 1, OT) modified independence  -AV     Time Frame (Toileting Goal 1, OT) long term goal (LTG);10 days  -AV       Row Name 04/09/25 1139          Grooming Goal 1 (OT)    Activity/Device (Grooming Goal 1, OT) grooming skills, all  standing at sink  -AV     [place] (Grooming Goal 1, OT) modified independence  -AV     Time Frame (Grooming Goal 1, OT) long term goal (LTG);10 days  -AV       Row Name 04/09/25 1139          Problem Specific Goal 1  (OT)    Problem Specific Goal 1 (OT) Patient will demonstrate fair endurance/activity tolerance needed to support ADLs.  -AV     Time Frame (Problem Specific Goal 1, OT) long term goal (LTG);10 days  -AV       Row Name 04/09/25 1139          Therapy Assessment/Plan (OT)    Planned Therapy Interventions (OT) activity tolerance training;BADL retraining;functional balance retraining;occupation/activity based interventions;patient/caregiver education/training;ROM/therapeutic exercise;transfer/mobility retraining  -AV               User Key  (r) = Recorded By, (t) = Taken By, (c) = Cosigned By      Initials Name Provider Type    AV Rene Nevarez, BREANN Occupational Therapist                   Clinical Impression       Row Name 04/09/25 1138          Pain Assessment    Additional Documentation Pain Scale: FACES Pre/Post-Treatment (Group)  -AV       [facility] Name 04/09/25 1138          Pain Scale: FACES Pre/Post-Treatment    Pain: FACES Scale, Pretreatment 0-->no hurt  -AV     Posttreatment Pain Rating 0-->no hurt  -AV       [facility] Name 04/09/25 1138          Plan of Care Review    Plan of Care Reviewed With patient  -AV     Progress no change  first session for evaluation  -AV     Outcome Evaluation Patient presents with limitations of balance and endurance/activity tolerance which are impacting ADL/transfer independence. Skilled OT services are indicated to remediate/ compensate for deficits to maximize independence and safety with functional ADL tasks.  -AV       Row Name 04/09/25 1131          Therapy Assessment/Plan (OT)    Patient/Family Therapy Goal Statement (OT) none stated  -AV     Rehab Potential (OT) good  -AV     Criteria for Skilled Therapeutic Interventions Met (OT) yes;meets criteria;skilled treatment is necessary  -AV     Therapy Frequency (OT) 5 times/wk  -AV       Row Name 04/09/25 1133          Therapy Plan Review/Discharge Plan (OT)    Equipment Needs Upon Discharge (OT) tub bench;commode chair  -AV      Anticipated Discharge Disposition (OT) home with assist;home with home health  -AV       Row Name 04/09/25 1138          Vital Signs    O2 Delivery Pre Treatment room air  -AV     O2 Delivery Intra Treatment room air  -AV     O2 Delivery Post Treatment room air  -AV       Row Name 04/09/25 1138          Positioning and Restraints    Pre-Treatment Position in bed  -AV     Post Treatment Position bed  -AV     In Bed sitting EOB;call light within reach;encouraged to call for assist;exit alarm on  -AV               User Key  (r) = Recorded By, (t) = Taken By, (c) = Cosigned By      Initials Name Provider Type    Rene Chen OT Occupational Therapist                   Outcome Measures       Row Name 04/09/25 1140          How much help from another is currently needed...    Putting on and taking off regular lower body clothing? 3  -AV     Bathing (including washing, rinsing, and drying) 3  -AV     Toileting (which includes using toilet bed pan or urinal) 3  -AV     Putting on and taking off regular upper body clothing 4  -AV     Taking care of personal grooming (such as brushing teeth) 4  -AV     Eating meals 4  -AV     AM-PAC 6 Clicks Score (OT) 21  -AV       Row Name 04/09/25 1000 04/09/25 0811       How much help from another person do you currently need...    Turning from your back to your side while in flat bed without using bedrails? 4  -BRIDGET (r) TM (t) BRIDGET (c) 4  -KG    Moving from lying on back to sitting on the side of a flat bed without bedrails? 4  -BRIDGET (r) TM (t) BRIDGET (c) 4  -KG    Moving to and from a bed to a chair (including a wheelchair)? 4  -BRIDGET (r) TM (t) BRIDGET (c) 4  -KG    Standing up from a chair using your arms (e.g., wheelchair, bedside chair)? 4  -BRIDGET (r) TM (t) BRIDGET (c) 4  -KG    Climbing 3-5 steps with a railing? 4  -BRIDGET (r) TM (t) BRIDGET (c) 3  -KG    To walk in hospital room? 4  -BRIDGET (r) TM (t) BRIDGET (c) 3  -KG    AM-PAC 6 Clicks Score (PT) 24  -BRIDGET (r) TM (t) 22  -KG    Highest Level of Mobility Goal 8 -->  Walked 250 feet or more  -BRIDGET (r) TM (t) 7 --> Walk 25 feet or more  -KG      Row Name 04/09/25 0205 04/09/25 0153       How much help from another person do you currently need...    Turning from your back to your side while in flat bed without using bedrails? 4  -LR 4  -MT    Moving from lying on back to sitting on the side of a flat bed without bedrails? 4  -LR 4  -MT    Moving to and from a bed to a chair (including a wheelchair)? 4  -LR 4  -MT    Standing up from a chair using your arms (e.g., wheelchair, bedside chair)? 4  -LR 4  -MT    Climbing 3-5 steps with a railing? 3  -LR 3  -MT    To walk in hospital room? 3  -LR 3  -MT    AM-PAC 6 Clicks Score (PT) 22  -LR 22  -MT    Highest Level of Mobility Goal 7 --> Walk 25 feet or more  -LR 7 --> Walk 25 feet or more  -MT      Row Name 04/09/25 1140 04/09/25 1000       Functional Assessment    Outcome Measure Options AM-PAC 6 Clicks Daily Activity (OT);Optimal Instrument  -AV AM-PAC 6 Clicks Basic Mobility (PT)  -BRIDGET (r) TM (t) BRIDGET (c)      Row Name 04/09/25 1140          Optimal Instrument    Optimal Instrument Optimal - 3  -AV     Bending/Stooping 2  -AV     Standing 2  -AV     Reaching 1  -AV     From the list, choose the 3 activities you would most like to be able to do without any difficulty Bending/stooping;Standing;Reaching  -AV     Total Score Optimal - 3 5  -AV               User Key  (r) = Recorded By, (t) = Taken By, (c) = Cosigned By      Initials Name Provider Type    Anuja Valdovinos LPN Licensed Nurse    Rene Chen OT Occupational Therapist    James Saldivar, PT Physical Therapist    LR Sara Mancilla, RN Registered Nurse    KG Gilligan, Kaelyn, RN Registered Nurse    Wei Jurado, PT Student PT Student                    Occupational Therapy Education       Title: PT OT SLP Therapies (Done)       Topic: Occupational Therapy (Done)       Point: ADL training (Done)       Learning Progress Summary            Patient Acceptance, E, VU by  AV at 4/9/2025 1140                      Point: Home exercise program (Done)       Learning Progress Summary            Patient Acceptance, E, VU by AV at 4/9/2025 1140                      Point: Precautions (Done)       Learning Progress Summary            Patient Acceptance, E, VU by AV at 4/9/2025 1140                      Point: Body mechanics (Done)       Learning Progress Summary            Patient Acceptance, E, VU by AV at 4/9/2025 1140                                      User Key       Initials Effective Dates Name Provider Type Discipline    AV 06/16/21 -  Rene Nevarez, BREANN Occupational Therapist OT                  OT Recommendation and Plan  Planned Therapy Interventions (OT): activity tolerance training, BADL retraining, functional balance retraining, occupation/activity based interventions, patient/caregiver education/training, ROM/therapeutic exercise, transfer/mobility retraining  Therapy Frequency (OT): 5 times/wk  Plan of Care Review  Plan of Care Reviewed With: patient  Progress: no change (first session for evaluation)  Outcome Evaluation: Patient presents with limitations of balance and endurance/activity tolerance which are impacting ADL/transfer independence. Skilled OT services are indicated to remediate/ compensate for deficits to maximize independence and safety with functional ADL tasks.     Time Calculation:   Evaluation Complexity (OT)  Review Occupational Profile/Medical/Therapy History Complexity: expanded/moderate complexity  Assessment, Occupational Performance/Identification of Deficit Complexity: 1-3 performance deficits  Clinical Decision Making Complexity (OT): problem focused assessment/low complexity  Overall Complexity of Evaluation (OT): low complexity     Time Calculation- OT       Row Name 04/09/25 1141             Time Calculation- OT    OT Received On 04/09/25  -AV      OT Goal Re-Cert Due Date 04/18/25  -AV         Untimed Charges    OT Eval/Re-eval Minutes 32  -AV          Total Minutes    Untimed Charges Total Minutes 32  -AV       Total Minutes 32  -AV                User Key  (r) = Recorded By, (t) = Taken By, (c) = Cosigned By      Initials Name Provider Type    Rene Chen OT Occupational Therapist                  Therapy Charges for Today       Code Description Service Date Service Provider Modifiers Qty    [id number] HC OT EVAL LOW COMPLEXITY 3 4/9/2025 Rene Nevarez OT GO 1                 Rene Nevarez OT  4/9/2025

## 2025-04-09 NOTE — CONSULTS
[facility]   Nephrology Consult Note      Patient Name: Paulo Leiva Jr.  : 1955  MRN: [id number]  Primary Care Physician:  Vilma Rolon APRN  Referring Physician: No ref. provider found  Date of admission: 2025    Subjective   Subjective     Reason for Consult/ Chief Complaint: Hyponatremia    HPI:  Paulo Leiva Jr. is a 69 y.o. male with history of CKD stage III, renal cell cancers, status post right nephrectomy who was admitted with hyponatremia.  Apparently he was having episodes of confusion and worsening stability.  He was brought in for evaluation, sodium was 121.  Patient was given IV fluid and had Celexa, labetalol, Diovan held.  He is on rivastigmine.  He has been drinking quite a bit of fluids.  Mostly water and nonalcoholic beer, at least 4 to 8 cans/day.  He is feeling okay today.  According to his son he is doing better.  Blood pressure is on higher side.  Sodium is up to 124 this afternoon.    Last office visit 2024 reviewed.  He has right nephrectomy in  for renal cell cancer.  Baseline creatinine 1.3-1.7, known minimal proteinuria from before.  Urine osmolality 142 this admission.    Review of Systems   All systems were reviewed and negative except for: What is mentioned above.    Personal History     Past Medical History:   Diagnosis Date    Arthritis of back     Benign essential hypertension     Cancer 1994    Mass on Kidney..    COPD (chronic obstructive pulmonary disease)     Erectile dysfunction Apprx: 5 yrs ago    Generalized convulsive seizures 2023    Hip arthrosis     Hyperlipidemia 2018    Hyponatremia     Implantable loop recorder present     Knee swelling     Long term (current) use of non-steroidal anti-inflammatories (nsaid) 2018    Renal insufficiency     Syncope     Syncope and collapse 2022    Total of 5 episdoes with the last one 10/22    Outside Source Comment: Overview:  Formatting of this note might be  different from the original.  Total of 5 episdoes with the last one 10/22  Last Assessment & Plan:  Formatting of this note might be different from the original.  No recurrent episodes Lipitor does not show any significant dysrhythmias at this point continue with monitoring      Vertigo 02/17/2021    Vision loss        Past Surgical History:   Procedure Laterality Date    CARDIAC CATHETERIZATION N/A 09/26/2022    Procedure: Left Heart Cath;  Surgeon: Randell Casiano MD;  Location: [facility] CATH INVASIVE LOCATION;  Service: Cardiovascular;  Laterality: N/A;    CARDIAC ELECTROPHYSIOLOGY PROCEDURE N/A 09/26/2022    Procedure: Loop insertion;  Surgeon: Randell Casiano MD;  Location: [facility] CATH INVASIVE LOCATION;  Service: Cardiovascular;  Laterality: N/A;    COLONOSCOPY  2007    COLONOSCOPY N/A 10/26/2022    Procedure: COLONOSCOPY FOR SCREENING;  Surgeon: Roque Whitlock MD;  Location: [facility] ENDOSCOPY;  Service: Gastroenterology;  Laterality: N/A;  NORMAL COLON    NEPHRECTOMY Right 08/12/2024       Family History: family history includes Dementia in his mother; No Known Problems in his father. Otherwise pertinent FHx was reviewed and not pertinent to current issue.    Social History:  reports that he quit smoking about 14 years ago. His smoking use included cigarettes. He started smoking about 50 years ago. He has a 72.7 pack-year smoking history. He has been exposed to tobacco smoke. He has never used smokeless tobacco. He reports that he does not currently use alcohol after a past usage of about 6.0 - 8.0 standard drinks of alcohol per week. He reports that he does not currently use drugs.    Home Medications:  QUEtiapine, Tiotropium Bromide Monohydrate, apixaban, citalopram, ferrous sulfate, meclizine, metoprolol succinate XL, rivastigmine, simvastatin, thiamine, and valsartan    Allergies:  No Known Allergies    Objective    Objective     Vitals:   Temp:  [97.7 °F (36.5 °C)-98.1 °F (36.7 °C)] 98.1 °F (36.7  °C)  Heart Rate:  [62-82] 64  Resp:  [13-20] 18  BP: (136-196)/(74-98) 150/74     Physical Exam    Constitutional: Awake, alert, no distress, conversant and pleasant   Eyes: sclerae anicteric, no conjunctival injection   HENT: mucous membranes moist   Neck: Supple, no thyromegaly, no lymphadenopathy, trachea midline, No JVD   Respiratory: Clear to auscultation bilaterally, nonlabored respirations    Cardiovascular: RRR, no murmurs, rubs, or gallops.   Gastrointestinal: Positive bowel sounds, soft, nontender, nondistended   Musculoskeletal: No edema, no clubbing or cyanosis   Psychiatric: Appropriate affect, cooperative   Neurologic: Oriented x 3, moving all extremities, Cranial Nerves grossly intact, speech clear, minimal confusion noted.   Skin: warm and dry, no rashes     Result Review    Result Reviewed:  I have personally reviewed the results from the time of this admission to 4/9/2025 16:31 EDT and agree with these findings:  [x]  Laboratory  []  Microbiology  [x]  Radiology  []  EKG/Telemetry   []  Cardiology/Vascular   []  Pathology  [x]  Old records  []  Other:  LAB RESULTS:    LAB RESULTS:        Lab 04/09/25  1208 04/09/25  0609 04/08/25  2335 04/08/25  1955/25  1344   SODIUM 124* 123* 122* 119* 121*   POTASSIUM 4.9 4.2 5.2 5.5* 5.1   CHLORIDE 91* 92* 89* 84* 88*   CO2 20.5* 20.7* 21.6* 24.3 22.4   BUN 13 13 14 15 15   CREATININE 1.32* 1.32* 1.40* 1.55* 1.71*   GLUCOSE 102* 114* 93 95 91   EGFR 58.8* 58.8* 54.7* 48.5* 43.1*   ANION GAP 12.5 10.3 11.4 10.7 10.6   MAGNESIUM  --   --   --   --  1.9           Most notable findings include: As above.  Urine osmolality 142, urinalysis bland.    Assessment & Plan   Assessment / Plan     Brief Patient Summary:  Paulo Leiva Jr. is a 69 y.o. male who is admitted with severe hyponatremia accompanied by instability and intermittent confusion.  Has known CKD stage III.    Active Hospital Problems:  Active Hospital Problems    Diagnosis     **Hyponatremia    CKD  stage III.    Assessment and Plan:   -Euvolemic hyponatremia most likely secondary to polydipsia with low urine osmolality and large intake of water and nonalcoholic beer.  Status post IV fluid.  Sodium is up to 124 this afternoon.  Will add 1200 cc p.o. fluid restriction per day and monitor.  Recent TSH was okay.  His blood pressure is high so not much suspicion of adrenal insufficiency.  - Chronic kidney disease stage III with previously bland UA and minimal proteinuria secondary to hypertension and loss of renal mass after right nephrectomy in 2024 for renal cell cancer.  - Hypertension, blood pressure is above goal.  Will resume valsartan at 40 mg daily and monitor.  Discussed with family and primary.  Will follow,    Thank you very much for this consult!    Electronically signed by Kathy Moctezuma MD, 4/9/2025, 16:31 EDT.

## 2025-04-09 NOTE — PLAN OF CARE
Goal Outcome Evaluation:  Plan of Care Reviewed With: patient, child        Progress: improving  Outcome Evaluation: ED admit for hyponatremia, AAOx4, BP elevated, improved after IV Labetalol, hx of dementia, BMP q6h, falls precautions, alarms on, IVF LR @ 75 ml/hr, no c/o of pain, SOA or N/V/D, bed in low/locked position, alarm audible, call light within reach, continue with current POC at this time.

## 2025-04-09 NOTE — PROGRESS NOTES
[facility]   Hospitalist Progress Note  Date: 2025  Patient Name: Paulo Leiva Jr.  : 1955  MRN: [id number]  Date of admission: 2025      Subjective   Subjective     Chief Complaint: Hyponatremia follow-up    Summary:Paulo Leiva Jr. is a 69 y.o. male  significant past medical history of hypertension, renal cancer status post nephrectomy, COPD, hyponatremia, hyperlipidemia, vertigo was sent by his PCP to the ER due to low sodium.  Reports that he had lab work done yesterday and he was informed by his PCP that his sodium was 120 that he will need to come to the ER.  Patient denies any specific symptoms, no chest pain, no shortness of breath no palpitations, no nausea or vomiting, no focal weakness, no headaches.  Patient reports that he had a similar episode few months ago where he was diagnosed with hyponatremia and he was started on sodium tabs however this tabs were discontinued by her PCP about 2 months ago because his sodium went too high at that time.  In the ER patient noted to have a sodium level of 115, creatinine level 1.5, potassium 5.5, he was given 1 L of normal saline.  Patient admitted for further evaluation and treatment. Nephrology consulted.  Placed on fluid restriction due to history of polydipsia.  Serum sodium improving.  PT OT recommending returning home with home health.    Interval Followup: Patient sitting up in the bed visiting with his son resting comfortably. Patient states he's feeling better. Ambulating independently. Afebrile overnight. Sinus rhythm 60-80s on tele review. BP elevated. Satting well on room air. Serum sodium slowly improving. Cr improved. No other issues per nursing.    Review of Systems  Constitutional: Negative for fatigue and fever.   HENT: Negative for sore throat and trouble swallowing.    Eyes: Negative for pain and discharge.   Respiratory: Negative for cough and shortness of breath.    Cardiovascular: Negative for chest pain and  palpitations.   Gastrointestinal: Negative for abdominal pain, nausea and vomiting.   Endocrine: Negative for cold intolerance and heat intolerance.   Genitourinary: Negative for difficulty urinating and dysuria.   Musculoskeletal: Negative for back pain and neck stiffness.   Skin: Negative for color change and rash.   Neurological: Negative for syncope and headaches.   Hematological: Negative for adenopathy.   Psychiatric/Behavioral: Negative for confusion and hallucinations.    Objective   Objective     Vitals:   Temp:  [97.7 °F (36.5 °C)-98.1 °F (36.7 °C)] 98.1 °F (36.7 °C)  Heart Rate:  [62-82] 64  Resp:  [13-20] 18  BP: (136-196)/(74-98) 150/74  Physical Exam   General: well-developed appearing stated age in no acute distress  HEENT: Normocephalic atraumatic moist membranes pupils equal round reactive light, no scleral icterus no conjunctival injection  Cardiovascular: regular rate and rhythm no murmurs rubs or gallops S1-S2, no lower extremity edema appreciated  Pulmonary: Clear to auscultation bilaterally no wheezes rales or rhonchi symmetric chest expansion, unlabored, no conversational dyspnea appreciated  Gastrointestinal: Soft nontender nondistended positive bowel sounds all 4 quadrants no rebound or guarding  Musculoskeletal: No clubbing cyanosis, warm and well-perfused, calves soft symmetric nontender bilaterally  Skin: Clean dry without rashes  Neuro: Cranial nerves II through XII intact grossly no sensorimotor deficits appreciated bilateral upper and lower extremities  Psych: Patient is calm cooperative and appropriate with exam not responding to internal stimuli  : No Herrera catheter no bladder distention no suprapubic tenderness    Result Review    Result Review:  I have personally reviewed these results and agree with these findings:  [x]  Laboratory  LAB RESULTS:      Lab 04/09/25  0609 04/08/25  1955/25  1344   WBC 8.75 9.75 9.07   HEMOGLOBIN 17.0 18.0* 18.3*   HEMATOCRIT 50.2 54.6* 57.0*    PLATELETS 228 233 237   NEUTROS ABS 6.13 6.95 6.63   IMMATURE GRANS (ABS) 0.06* 0.07* 0.08*   LYMPHS ABS 1.51 1.66 1.35   MONOS ABS 0.90 0.88 0.87   EOS ABS 0.08 0.11 0.07   MCV 85.7 87.4 88.1         Lab 04/09/25  1208 04/09/25  0609 04/08/25 2335 04/1955 04/07/25  1344   SODIUM 124* 123* 122* 119* 121*   POTASSIUM 4.9 4.2 5.2 5.5* 5.1   CHLORIDE 91* 92* 89* 84* 88*   CO2 20.5* 20.7* 21.6* 24.3 22.4   ANION GAP 12.5 10.3 11.4 10.7 10.6   BUN 13 13 14 15 15   CREATININE 1.32* 1.32* 1.40* 1.55* 1.71*   EGFR 58.8* 58.8* 54.7* 48.5* 43.1*   GLUCOSE 102* 114* 93 95 91   CALCIUM 9.2 8.9 8.9 9.5 9.7   MAGNESIUM  --   --   --   --  1.9         Lab 04/1955 04/07/25  1344   TOTAL PROTEIN 7.3 7.0   ALBUMIN 3.7 3.7   GLOBULIN 3.6 3.3   ALT (SGPT) 13 15   AST (SGOT) 17 22   BILIRUBIN 0.8 1.0   ALK PHOS 144* 139*                     Brief Urine Lab Results  (Last result in the past 365 days)        Color   Clarity   Blood   Leuk Est   Nitrite   Protein   CREAT   Urine HCG        04/08/25 2245 Yellow   Clear   Negative   Negative   Negative   Negative                 Microbiology Results (last 10 days)       ** No results found for the last 240 hours. **            []  Microbiology  [x]  Radiology  XR Chest 1 View  Result Date: 4/8/2025  Impression: Poor respiratory effort without definite consolidation. Electronically Signed: Bar Davis DO  4/8/2025 7:32 PM EDT  Workstation ID: [id number]      [x]  EKG/Telemetry   []  Cardiology/Vascular   []  Pathology  []  Old records  [x]  Other:  Scheduled Meds:apixaban, 5 mg, Oral, BID  atorvastatin, 10 mg, Oral, Q PM  [Held by provider] citalopram, 10 mg, Oral, Daily  ferrous sulfate, 325 mg, Oral, Daily With Breakfast  meclizine, 50 mg, Oral, Daily  metoprolol succinate XL, 50 mg, Oral, Daily  QUEtiapine, 50 mg, Oral, Nightly  rivastigmine, 1.5 mg, Oral, BID With Meals  sodium chloride, 10 mL, Intravenous, Q12H  thiamine, 100 mg, Oral, Daily  valsartan, 40 mg, Oral,  Daily      Continuous Infusions:   PRN Meds:.  senna-docusate sodium **AND** polyethylene glycol **AND** bisacodyl **AND** bisacodyl    sodium chloride    sodium chloride      Assessment & Plan   Assessment / Plan     Assessment/Plan:  Hyponatremia euvolemic likely due to polydipsia  CKD3  Hypertension  Renal cancer status post nephrectomy  COPD without acute exacerbation  Coronary artery disease          Patient admitted for further evaluation and treatment  Nephrology consulted thank you for assistance  Repeat renal panel in a.m.  Continue fluid restriction per nephrology  Hold further IV fluids  Avoid nephrotoxics  Continue hold home Celexa  Continue home valsartan  Continue home metoprolol  Continue Eliquis atorvastatin  Further patient orders recommendations pending clinical course       Discussed plan with bedside RN as well as Dr. Moctezuma nephrology attending.    Disposition: Home with home health as serum sodium continues to improve.    VTE Prophylaxis:  Pharmacologic VTE prophylaxis orders are present.        CODE STATUS:   Code Status (Patient has no pulse and is not breathing): CPR (Attempt to Resuscitate)  Medical Interventions (Patient has pulse or is breathing): Full Support

## 2025-04-09 NOTE — PLAN OF CARE
Goal Outcome Evaluation:  Plan of Care Reviewed With: (P) patient           Outcome Evaluation: (P) Pt presents with balance and endurance deficits. Pt requires skilled therapy services to address these impairments    Anticipated Discharge Disposition (PT): (P) home with home health

## 2025-04-10 LAB
ANION GAP SERPL CALCULATED.3IONS-SCNC: 10.6 MMOL/L (ref 5–15)
BASOPHILS # BLD AUTO: 0.1 10*3/MM3 (ref 0–0.2)
BASOPHILS NFR BLD AUTO: 1.3 % (ref 0–1.5)
BUN SERPL-MCNC: 12 MG/DL (ref 8–23)
BUN/CREAT SERPL: 9 (ref 7–25)
CALCIUM SPEC-SCNC: 9.2 MG/DL (ref 8.6–10.5)
CHLORIDE SERPL-SCNC: 94 MMOL/L (ref 98–107)
CO2 SERPL-SCNC: 21.4 MMOL/L (ref 22–29)
CREAT SERPL-MCNC: 1.34 MG/DL (ref 0.76–1.27)
DEPRECATED RDW RBC AUTO: 45.7 FL (ref 37–54)
EGFRCR SERPLBLD CKD-EPI 2021: 57.7 ML/MIN/1.73
EOSINOPHIL # BLD AUTO: 0.2 10*3/MM3 (ref 0–0.4)
EOSINOPHIL NFR BLD AUTO: 2.6 % (ref 0.3–6.2)
ERYTHROCYTE [DISTWIDTH] IN BLOOD BY AUTOMATED COUNT: 14.8 % (ref 12.3–15.4)
GLUCOSE SERPL-MCNC: 89 MG/DL (ref 65–99)
HCT VFR BLD AUTO: 49.5 % (ref 37.5–51)
HGB BLD-MCNC: 17 G/DL (ref 13–17.7)
IMM GRANULOCYTES # BLD AUTO: 0.05 10*3/MM3 (ref 0–0.05)
IMM GRANULOCYTES NFR BLD AUTO: 0.7 % (ref 0–0.5)
LYMPHOCYTES # BLD AUTO: 1.56 10*3/MM3 (ref 0.7–3.1)
LYMPHOCYTES NFR BLD AUTO: 20.4 % (ref 19.6–45.3)
MAGNESIUM SERPL-MCNC: 1.7 MG/DL (ref 1.6–2.4)
MCH RBC QN AUTO: 29.4 PG (ref 26.6–33)
MCHC RBC AUTO-ENTMCNC: 34.3 G/DL (ref 31.5–35.7)
MCV RBC AUTO: 85.5 FL (ref 79–97)
MONOCYTES # BLD AUTO: 0.75 10*3/MM3 (ref 0.1–0.9)
MONOCYTES NFR BLD AUTO: 9.8 % (ref 5–12)
NEUTROPHILS NFR BLD AUTO: 4.97 10*3/MM3 (ref 1.7–7)
NEUTROPHILS NFR BLD AUTO: 65.2 % (ref 42.7–76)
NRBC BLD AUTO-RTO: 0 /100 WBC (ref 0–0.2)
PHOSPHATE SERPL-MCNC: 3.2 MG/DL (ref 2.5–4.5)
PLATELET # BLD AUTO: 227 10*3/MM3 (ref 140–450)
PMV BLD AUTO: 9.6 FL (ref 6–12)
POTASSIUM SERPL-SCNC: 4.6 MMOL/L (ref 3.5–5.2)
RBC # BLD AUTO: 5.79 10*6/MM3 (ref 4.14–5.8)
SODIUM SERPL-SCNC: 126 MMOL/L (ref 136–145)
WBC NRBC COR # BLD AUTO: 7.63 10*3/MM3 (ref 3.4–10.8)

## 2025-04-10 PROCEDURE — 84100 ASSAY OF PHOSPHORUS: CPT | Performed by: INTERNAL MEDICINE

## 2025-04-10 PROCEDURE — 97110 THERAPEUTIC EXERCISES: CPT

## 2025-04-10 PROCEDURE — 80048 BASIC METABOLIC PNL TOTAL CA: CPT | Performed by: INTERNAL MEDICINE

## 2025-04-10 PROCEDURE — 97116 GAIT TRAINING THERAPY: CPT

## 2025-04-10 PROCEDURE — 83735 ASSAY OF MAGNESIUM: CPT | Performed by: INTERNAL MEDICINE

## 2025-04-10 PROCEDURE — 99232 SBSQ HOSP IP/OBS MODERATE 35: CPT | Performed by: FAMILY MEDICINE

## 2025-04-10 PROCEDURE — 85025 COMPLETE CBC W/AUTO DIFF WBC: CPT | Performed by: STUDENT IN AN ORGANIZED HEALTH CARE EDUCATION/TRAINING PROGRAM

## 2025-04-10 RX ADMIN — MECLIZINE HYDROCHLORIDE 50 MG: 25 TABLET ORAL at 09:00

## 2025-04-10 RX ADMIN — VALSARTAN 40 MG: 80 TABLET, FILM COATED ORAL at 09:01

## 2025-04-10 RX ADMIN — RIVASTIGMINE TARTRATE 1.5 MG: 1.5 CAPSULE ORAL at 09:01

## 2025-04-10 RX ADMIN — QUETIAPINE FUMARATE 50 MG: 25 TABLET ORAL at 20:13

## 2025-04-10 RX ADMIN — ATORVASTATIN CALCIUM 10 MG: 10 TABLET, FILM COATED ORAL at 18:07

## 2025-04-10 RX ADMIN — RIVASTIGMINE TARTRATE 1.5 MG: 1.5 CAPSULE ORAL at 18:07

## 2025-04-10 RX ADMIN — Medication 100 MG: at 09:01

## 2025-04-10 RX ADMIN — METOPROLOL SUCCINATE 50 MG: 50 TABLET, FILM COATED, EXTENDED RELEASE ORAL at 09:01

## 2025-04-10 RX ADMIN — SENNOSIDES AND DOCUSATE SODIUM 2 TABLET: 50; 8.6 TABLET ORAL at 09:01

## 2025-04-10 RX ADMIN — APIXABAN 5 MG: 5 TABLET, FILM COATED ORAL at 09:01

## 2025-04-10 RX ADMIN — Medication 10 ML: at 09:01

## 2025-04-10 RX ADMIN — Medication 10 ML: at 20:13

## 2025-04-10 RX ADMIN — FERROUS SULFATE TAB 325 MG (65 MG ELEMENTAL FE) 325 MG: 325 (65 FE) TAB at 09:00

## 2025-04-10 RX ADMIN — APIXABAN 5 MG: 5 TABLET, FILM COATED ORAL at 20:13

## 2025-04-10 NOTE — THERAPY TREATMENT NOTE
Acute Care - Physical Therapy Treatment Note  [facility]     Patient Name: Paulo Leiva Jr.  : 1955  MRN: [id number]  Today's Date: 4/10/2025      Visit Dx:     ICD-10-CM ICD-9-CM   1. Hyponatremia  E87.1 276.1   2. Difficulty in walking  R26.2 719.7   3. Decreased activities of daily living (ADL)  Z78.9 V49.89     Patient Active Problem List   Diagnosis    Chronic obstructive pulmonary disease    Essential hypertension    HLD (hyperlipidemia)    Hyponatremia    Vertigo    Colon cancer screening    Status post placement of implantable loop recorder    Class 1 obesity due to excess calories without serious comorbidity with body mass index (BMI) of 30.0 to 30.9 in adult    Generalized convulsive seizures    Renal mass    AMS (altered mental status)    Benign paroxysmal positional vertigo    Malignant neoplasm of unspecified kidney, except renal pelvis    Stage 3a chronic kidney disease    Generalized tonic-clonic seizure     Past Medical History:   Diagnosis Date    Arthritis of back     Benign essential hypertension     Cancer 1994    Mass on Kidney..    COPD (chronic obstructive pulmonary disease)     Erectile dysfunction Apprx: 5 yrs ago    Generalized convulsive seizures 2023    Hip arthrosis     Hyperlipidemia 2018    Hyponatremia     Implantable loop recorder present     Knee swelling     Long term (current) use of non-steroidal anti-inflammatories (nsaid) 2018    Renal insufficiency     Syncope     Syncope and collapse 2022    Total of 5 episdoes with the last one 10/22    Outside Source Comment: Overview:  Formatting of this note might be different from the original.  Total of 5 episdoes with the last one 10/22  Last Assessment & Plan:  Formatting of this note might be different from the original.  No recurrent episodes Lipitor does not show any significant dysrhythmias at this point continue with monitoring      Vertigo 2021    Vision loss      Past  Surgical History:   Procedure Laterality Date    CARDIAC CATHETERIZATION N/A 09/26/2022    Procedure: Left Heart Cath;  Surgeon: Randell Casiano MD;  Location: [facility] CATH INVASIVE LOCATION;  Service: Cardiovascular;  Laterality: N/A;    CARDIAC ELECTROPHYSIOLOGY PROCEDURE N/A 09/26/2022    Procedure: Loop insertion;  Surgeon: Randell Casiano MD;  Location: [facility] CATH INVASIVE LOCATION;  Service: Cardiovascular;  Laterality: N/A;    COLONOSCOPY  2007    COLONOSCOPY N/A 10/26/2022    Procedure: COLONOSCOPY FOR SCREENING;  Surgeon: Roque Whitlock MD;  Location: [facility] ENDOSCOPY;  Service: Gastroenterology;  Laterality: N/A;  NORMAL COLON    NEPHRECTOMY Right 08/12/2024     PT Assessment (Last 12 Hours)       PT Evaluation and Treatment       Row Name 04/10/25 0941          Physical Therapy Time and Intention    Document Type therapy note (daily note)  -WM     Mode of Treatment individual therapy;physical therapy  -WM     Patient Effort good  -WM     Symptoms Noted During/After Treatment fatigue  -WM       Row Name 04/10/25 0941          Bed Mobility    Supine-Sit [place] (Bed Mobility) standby assist  -WM     Assistive Device (Bed Mobility) bed rails;head of bed elevated  -WM       Row Name 04/10/25 0941          Transfers    Transfers stand-sit transfer  -WM       Row Name 04/10/25 0941          Sit-Stand Transfer    Sit-Stand [place] (Transfers) contact guard  -WM     Assistive Device (Sit-Stand Transfers) walker, front-wheeled  -WM       Row Name 04/10/25 0941          Stand-Sit Transfer    Stand-Sit [place] (Transfers) contact guard  -WM     Assistive Device (Stand-Sit Transfers) walker, front-wheeled  -WM       Row Name 04/10/25 0941          Gait/Stairs (Locomotion)    [place] Level (Gait) standby assist  -WM     Assistive Device (Gait) walker, front-wheeled  -WM     Patient was able to Ambulate yes  -WM     Distance in Feet (Gait) 300  -WM     Pattern (Gait) 4-point;step-through  -WM      Deviations/Abnormal Patterns (Gait) gait speed decreased;stride length decreased  -       Row Name 04/10/25 0941          Safety Issues/Impairments Affecting Functional Mobility    Impairments Affecting Function (Mobility) balance;endurance/activity tolerance;strength  -       Row Name 04/10/25 0941          Motor Skills    Therapeutic Exercise knee;hip  -       Row Name 04/10/25 0941          Hip (Therapeutic Exercise)    Hip (Therapeutic Exercise) strengthening exercise  -     Hip Strengthening (Therapeutic Exercise) bilateral;aBduction;marching while seated;sitting;resistance band;green;20 repititions  -       Row Name 04/10/25 0941          Knee (Therapeutic Exercise)    Knee (Therapeutic Exercise) AROM (active range of motion);strengthening exercise  -     Knee AROM (Therapeutic Exercise) bilateral;LAQ (long arc quad);sitting;20 repititions  -     Knee Strengthening (Therapeutic Exercise) bilateral;hamstring curls;sitting;resistance band;green;20 repititions  -       Row Name 04/10/25 0941          Positioning and Restraints    Pre-Treatment Position in bed  -     Post Treatment Position bed  -     In Bed sitting EOB;call light within reach;encouraged to call for assist;exit alarm on  -       Row Name 04/10/25 0941          Progress Summary (PT)    Progress Toward Functional Goals (PT) progress toward functional goals is good  -               User Key  (r) = Recorded By, (t) = Taken By, (c) = Cosigned By      Initials Name Provider Type    Danny Fish PTA Physical Therapist Assistant                    Physical Therapy Education       Title: PT OT SLP Therapies (Done)       Topic: Physical Therapy (Done)       Point: Mobility training (Done)       Learning Progress Summary            Patient Acceptance, E, VU by AV at 4/9/2025 1140    Acceptance, E,TB, VU by TM at 4/9/2025 1031                      Point: Home exercise program (Done)       Learning Progress Summary             Patient Acceptance, E, VU by AV at 4/9/2025 1140    Acceptance, E,TB, VU by TM at 4/9/2025 1031                      Point: Body mechanics (Done)       Learning Progress Summary            Patient Acceptance, E, VU by AV at 4/9/2025 1140    Acceptance, E,TB, VU by TM at 4/9/2025 1031                      Point: Precautions (Done)       Learning Progress Summary            Patient Acceptance, E, VU by AV at 4/9/2025 1140    Acceptance, E,TB, VU by TM at 4/9/2025 1031                                      User Key       Initials Effective Dates Name Provider Type Discipline    AV 06/16/21 -  Rene Nevarez, OT Occupational Therapist OT    TM 02/04/25 -  Wei Tay, CAMI Student PT Student PT                  PT Recommendation and Plan     Progress Summary (PT)  Progress Toward Functional Goals (PT): progress toward functional goals is good   Outcome Measures       Row Name 04/10/25 0945 04/09/25 1000          How much help from another person do you currently need...    Turning from your back to your side while in flat bed without using bedrails? 4  - 4  -BRIDGET (r) TM (t) BRIDGET (c)     Moving from lying on back to sitting on the side of a flat bed without bedrails? 4  - 4  -BRIDGET (r) TM (t) BRIDGET (c)     Moving to and from a bed to a chair (including a wheelchair)? 4  - 4  -BRIDGET (r) TM (t) BRIDGET (c)     Standing up from a chair using your arms (e.g., wheelchair, bedside chair)? 4  - 4  -BRIDGET (r) TM (t) BRIDGET (c)     Climbing 3-5 steps with a railing? 4  - 4  -BRIDGET (r) TM (t) BRIDGET (c)     To walk in hospital room? 4  - 4  -BRIDGET (r) TM (t) BRIDGET (c)     AM-PAC 6 Clicks Score (PT) 24  - 24  -BRIDGET (r) TM (t)        Functional Assessment    Outcome Measure Options -- AM-PAC 6 Clicks Basic Mobility (PT)  -BRIDGET (r) TM (t) BRIDGET (c)               User Key  (r) = Recorded By, (t) = Taken By, (c) = Cosigned By      Initials Name Provider Type    Danny Fish, PTA Physical Therapist Assistant    James Saldivar, PT Physical Therapist    TM  Wei Tay, PT Student PT Student                     Time Calculation:    PT Charges       Row Name 04/10/25 0940             Time Calculation    PT Received On 04/10/25  -WM         Timed Charges    05007 - PT Therapeutic Exercise Minutes 10  -WM      29733 - Gait Training Minutes  8  -WM      65387 - PT Therapeutic Activity Minutes 5  -WM         Total Minutes    Timed Charges Total Minutes 23  -WM       Total Minutes 23  -WM                User Key  (r) = Recorded By, (t) = Taken By, (c) = Cosigned By      Initials Name Provider Type    Danny Fish PTA Physical Therapist Assistant                      PT G-Codes  Outcome Measure Options: AM-PAC 6 Clicks Daily Activity (OT), Optimal Instrument  AM-PAC 6 Clicks Score (PT): 24  AM-PAC 6 Clicks Score (OT): 21    Danny Estrada PTA  4/10/2025

## 2025-04-10 NOTE — PROGRESS NOTES
[facility]   Hospitalist Progress Note  Date: 4/10/2025  Patient Name: Paulo Leiva Jr.  : 1955  MRN: [id number]  Date of admission: 2025      Subjective   Subjective     Chief Complaint: Hyponatremia follow-up    Summary:Paulo Leiva Jr. is a 69 y.o. male  significant past medical history of hypertension, renal cancer status post nephrectomy, COPD, hyponatremia, hyperlipidemia, vertigo was sent by his PCP to the ER due to low sodium.  Reports that he had lab work done yesterday and he was informed by his PCP that his sodium was 120 that he will need to come to the ER.  Patient denies any specific symptoms, no chest pain, no shortness of breath no palpitations, no nausea or vomiting, no focal weakness, no headaches.  Patient reports that he had a similar episode few months ago where he was diagnosed with hyponatremia and he was started on sodium tabs however this tabs were discontinued by her PCP about 2 months ago because his sodium went too high at that time.  In the ER patient noted to have a sodium level of 115, creatinine level 1.5, potassium 5.5, he was given 1 L of normal saline.  Patient admitted for further evaluation and treatment. Nephrology consulted.  Placed on fluid restriction due to history of polydipsia.  Serum sodium improving.  PT OT recommending returning home with home health.    Interval Followup: Patient sitting up in the bed resting comfortably. Patient states he's feeling fine eager to return home. Afebrile overnight. Sinus rhythm PVCs 60-80s on tele review. BP remains slightly elevated. Satting well on room air. Serum sodium slowly improving. Cr stable. No other issues per nursing.    Review of Systems  Constitutional: Negative for fatigue and fever.   HENT: Negative for sore throat and trouble swallowing.    Eyes: Negative for pain and discharge.   Respiratory: Negative for cough and shortness of breath.    Cardiovascular: Negative for chest pain and palpitations.    Gastrointestinal: Negative for abdominal pain, nausea and vomiting.   Endocrine: Negative for cold intolerance and heat intolerance.   Genitourinary: Negative for difficulty urinating and dysuria.   Musculoskeletal: Negative for back pain and neck stiffness.   Skin: Negative for color change and rash.   Neurological: Negative for syncope and headaches.   Hematological: Negative for adenopathy.   Psychiatric/Behavioral: Negative for confusion and hallucinations.    Objective   Objective     Vitals:   Temp:  [97.7 °F (36.5 °C)-99.1 °F (37.3 °C)] 99.1 °F (37.3 °C)  Heart Rate:  [64-77] 76  Resp:  [18-20] 18  BP: (129-176)/(68-90) 157/87  Physical Exam   General: well-developed appearing stated age in no acute distress  HEENT: Normocephalic atraumatic moist membranes pupils equal round reactive light, no scleral icterus no conjunctival injection  Cardiovascular: regular rate and rhythm no murmurs rubs or gallops S1-S2, no lower extremity edema appreciated  Pulmonary: Clear to auscultation bilaterally no wheezes rales or rhonchi symmetric chest expansion, unlabored, no conversational dyspnea appreciated  Gastrointestinal: Soft nontender nondistended positive bowel sounds all 4 quadrants no rebound or guarding  Musculoskeletal: No clubbing cyanosis, warm and well-perfused, calves soft symmetric nontender bilaterally  Skin: Clean dry without rashes  Neuro: Cranial nerves II through XII intact grossly no sensorimotor deficits appreciated bilateral upper and lower extremities  Psych: Patient is calm cooperative and appropriate with exam not responding to internal stimuli  : No Herrera catheter no bladder distention no suprapubic tenderness    Result Review    Result Review:  I have personally reviewed these results and agree with these findings:  [x]  Laboratory  LAB RESULTS:      Lab 04/10/25  0521 04/09/25  0609 04/08/25  1955/25  1344   WBC 7.63 8.75 9.75 9.07   HEMOGLOBIN 17.0 17.0 18.0* 18.3*   HEMATOCRIT 49.5  50.2 54.6* 57.0*   PLATELETS 227 228 233 237   NEUTROS ABS 4.97 6.13 6.95 6.63   IMMATURE GRANS (ABS) 0.05 0.06* 0.07* 0.08*   LYMPHS ABS 1.56 1.51 1.66 1.35   MONOS ABS 0.75 0.90 0.88 0.87   EOS ABS 0.20 0.08 0.11 0.07   MCV 85.5 85.7 87.4 88.1         Lab 04/10/25  0521 04/09/25  1208 04/09/25  0609 04/08/25  2335 04/1955 04/07/25  1344   SODIUM 126* 124* 123* 122* 119* 121*   POTASSIUM 4.6 4.9 4.2 5.2 5.5* 5.1   CHLORIDE 94* 91* 92* 89* 84* 88*   CO2 21.4* 20.5* 20.7* 21.6* 24.3 22.4   ANION GAP 10.6 12.5 10.3 11.4 10.7 10.6   BUN 12 13 13 14 15 15   CREATININE 1.34* 1.32* 1.32* 1.40* 1.55* 1.71*   EGFR 57.7* 58.8* 58.8* 54.7* 48.5* 43.1*   GLUCOSE 89 102* 114* 93 95 91   CALCIUM 9.2 9.2 8.9 8.9 9.5 9.7   MAGNESIUM 1.7  --   --   --   --  1.9   PHOSPHORUS 3.2  --   --   --   --   --          Lab 04/1955 04/07/25  1344   TOTAL PROTEIN 7.3 7.0   ALBUMIN 3.7 3.7   GLOBULIN 3.6 3.3   ALT (SGPT) 13 15   AST (SGOT) 17 22   BILIRUBIN 0.8 1.0   ALK PHOS 144* 139*                     Brief Urine Lab Results  (Last result in the past 365 days)        Color   Clarity   Blood   Leuk Est   Nitrite   Protein   CREAT   Urine HCG        04/08/25 2245 Yellow   Clear   Negative   Negative   Negative   Negative                 Microbiology Results (last 10 days)       ** No results found for the last 240 hours. **            []  Microbiology  [x]  Radiology  XR Chest 1 View  Result Date: 4/8/2025  Impression: Poor respiratory effort without definite consolidation. Electronically Signed: Bar Davis DO  4/8/2025 7:32 PM EDT  Workstation ID: [id number]      [x]  EKG/Telemetry   []  Cardiology/Vascular   []  Pathology  []  Old records  [x]  Other:  Scheduled Meds:apixaban, 5 mg, Oral, BID  atorvastatin, 10 mg, Oral, Q PM  [Held by provider] citalopram, 10 mg, Oral, Daily  ferrous sulfate, 325 mg, Oral, Daily With Breakfast  meclizine, 50 mg, Oral, Daily  metoprolol succinate XL, 50 mg, Oral, Daily  QUEtiapine, 50  mg, Oral, Nightly  rivastigmine, 1.5 mg, Oral, BID With Meals  sodium chloride, 10 mL, Intravenous, Q12H  thiamine, 100 mg, Oral, Daily  valsartan, 40 mg, Oral, Daily      Continuous Infusions:   PRN Meds:.  senna-docusate sodium **AND** polyethylene glycol **AND** bisacodyl **AND** bisacodyl    sodium chloride    sodium chloride      Assessment & Plan   Assessment / Plan     Assessment/Plan:  Hyponatremia euvolemic likely due to polydipsia  CKD3  Hypertension  Renal cancer status post nephrectomy  COPD without acute exacerbation  Coronary artery disease          Patient admitted for further evaluation and treatment  Nephrology consulted thank you for assistance  Repeat renal panel in a.m.  Continue 1200 mL fluid restriction per nephrology  Avoid nephrotoxics  Continue hold home Celexa  Continue home valsartan  Continue home metoprolol  Continue Eliquis atorvastatin  Further patient orders recommendations pending clinical course       Discussed plan with bedside RN as well as Dr. Payne nephrology attending.    Disposition: Home with home health as serum sodium continues to improve.    VTE Prophylaxis:  Pharmacologic VTE prophylaxis orders are present.        CODE STATUS:   Code Status (Patient has no pulse and is not breathing): CPR (Attempt to Resuscitate)  Medical Interventions (Patient has pulse or is breathing): Full Support

## 2025-04-10 NOTE — PLAN OF CARE
Goal Outcome Evaluation:  Plan of Care Reviewed With: patient           Outcome Evaluation: Patient AAOx4. Patient needs PT/OT to work with him and possibly Rehab upon discharge. Fluids were d/c'd this shift. Rested well. VSS. Continue plan of care.                              Requested Prescriptions     Pending Prescriptions Disp Refills    indomethacin (INDOCIN) 50 MG capsule [Pharmacy Med Name: INDOMETHACIN 50 MG CAPSULE] 30 capsule 0     Sig: TAKE ONE CAPSULE BY MOUTH THREE TIMES A DAY       LOV: 4/9/2020  FOV: None

## 2025-04-10 NOTE — PROGRESS NOTES
" LOS: 2 days   Patient Care Team:  Vilma Rolon APRN as PCP - General (Nurse Practitioner)  Randell Casiano MD as Consulting Physician (Cardiology)  Braulio Rand MD as Consulting Physician (Urology)  Kathy Moctezuma MD as Consulting Physician (Nephrology)  Nabor Moser MD as Consulting Physician (Hematology)  Tatyana Smith APRN as Nurse Practitioner (Hematology)  Jenni Crawley APRN as Nurse Practitioner (Cardiology)    Chief Complaint: Hyponatremia    Subjective     Pt without any acute complaints  No new events.    History taken from: patient chart RN    Objective     Vital Sign Min/Max for last 24 hours  Temp  Min: 97.7 °F (36.5 °C)  Max: 98.1 °F (36.7 °C)   BP  Min: 129/68  Max: 163/89   Pulse  Min: 63  Max: 77   Resp  Min: 18  Max: 20   SpO2  Min: 95 %  Max: 98 %   No data recorded   No data recorded     Flowsheet Rows      Flowsheet Row First Filed Value   Admission Height 180.3 cm (71\") Documented at 04/09/2025 0056   Admission Weight 97.2 kg (214 lb 4.6 oz) Documented at 04/09/2025 0056            I/O this shift:  In: 120 [P.O.:120]  Out: -   I/O last 3 completed shifts:  In: 1662.5 [P.O.:360; I.V.:302.5; IV Piggyback:1000]  Out: 800 [Urine:800]    Objective:  General Appearance:  Comfortable.    Vital signs: (most recent): Blood pressure 163/89, pulse 76, temperature 97.7 °F (36.5 °C), temperature source Oral, resp. rate 20, height 180.3 cm (71\"), weight 97.2 kg (214 lb 4.6 oz), SpO2 95%.  Vital signs are normal.    Output: Producing urine.    HEENT: Normal HEENT exam.    Lungs:  Normal effort and normal respiratory rate.    Heart: Normal rate.  Regular rhythm.    Abdomen: Abdomen is soft.  Bowel sounds are normal.     Extremities: Normal range of motion.  There is no dependent edema.    Pulses: Distal pulses are intact.    Neurological: Patient is alert and oriented to person, place and time.    Pupils:  Pupils are equal, round, and reactive to light.    Skin:  Warm and dry. " "               Results Review:     I reviewed the patient's new clinical results.  I reviewed the patient's new imaging results and agree with the interpretation.  I reviewed the patient's other test results and agree with the interpretation    WBC WBC   Date Value Ref Range Status   04/10/2025 7.63 3.40 - 10.80 10*3/mm3 Final   04/09/2025 8.75 3.40 - 10.80 10*3/mm3 Final   04/08/2025 9.75 3.40 - 10.80 10*3/mm3 Final   04/07/2025 9.07 3.40 - 10.80 10*3/mm3 Final      HGB Hemoglobin   Date Value Ref Range Status   04/10/2025 17.0 13.0 - 17.7 g/dL Final   04/09/2025 17.0 13.0 - 17.7 g/dL Final   04/08/2025 18.0 (H) 13.0 - 17.7 g/dL Final   04/07/2025 18.3 (H) 13.0 - 17.7 g/dL Final      HCT Hematocrit   Date Value Ref Range Status   04/10/2025 49.5 37.5 - 51.0 % Final   04/09/2025 50.2 37.5 - 51.0 % Final   04/08/2025 54.6 (H) 37.5 - 51.0 % Final   04/07/2025 57.0 (H) 37.5 - 51.0 % Final      Platlets No results found for: \"LABPLAT\"   MCV MCV   Date Value Ref Range Status   04/10/2025 85.5 79.0 - 97.0 fL Final   04/09/2025 85.7 79.0 - 97.0 fL Final   04/08/2025 87.4 79.0 - 97.0 fL Final   04/07/2025 88.1 79.0 - 97.0 fL Final          Sodium Sodium   Date Value Ref Range Status   04/10/2025 126 (L) 136 - 145 mmol/L Final   04/09/2025 124 (L) 136 - 145 mmol/L Final   04/09/2025 123 (L) 136 - 145 mmol/L Final   04/08/2025 122 (L) 136 - 145 mmol/L Final   04/08/2025 119 (C) 136 - 145 mmol/L Final   04/07/2025 121 (L) 136 - 145 mmol/L Final      Potassium Potassium   Date Value Ref Range Status   04/10/2025 4.6 3.5 - 5.2 mmol/L Final   04/09/2025 4.9 3.5 - 5.2 mmol/L Final   04/09/2025 4.2 3.5 - 5.2 mmol/L Final   04/08/2025 5.2 3.5 - 5.2 mmol/L Final     Comment:     Slight hemolysis detected by analyzer. Result may be falsely elevated.   04/08/2025 5.5 (H) 3.5 - 5.2 mmol/L Final   04/07/2025 5.1 3.5 - 5.2 mmol/L Final      Chloride Chloride   Date Value Ref Range Status   04/10/2025 94 (L) 98 - 107 mmol/L Final " "  04/09/2025 91 (L) 98 - 107 mmol/L Final   04/09/2025 92 (L) 98 - 107 mmol/L Final   04/08/2025 89 (L) 98 - 107 mmol/L Final   04/08/2025 84 (L) 98 - 107 mmol/L Final   04/07/2025 88 (L) 98 - 107 mmol/L Final      CO2 CO2   Date Value Ref Range Status   04/10/2025 21.4 (L) 22.0 - 29.0 mmol/L Final   04/09/2025 20.5 (L) 22.0 - 29.0 mmol/L Final   04/09/2025 20.7 (L) 22.0 - 29.0 mmol/L Final   04/08/2025 21.6 (L) 22.0 - 29.0 mmol/L Final   04/08/2025 24.3 22.0 - 29.0 mmol/L Final   04/07/2025 22.4 22.0 - 29.0 mmol/L Final      BUN BUN   Date Value Ref Range Status   04/10/2025 12 8 - 23 mg/dL Final   04/09/2025 13 8 - 23 mg/dL Final   04/09/2025 13 8 - 23 mg/dL Final   04/08/2025 14 8 - 23 mg/dL Final   04/08/2025 15 8 - 23 mg/dL Final   04/07/2025 15 8 - 23 mg/dL Final      Creatinine Creatinine   Date Value Ref Range Status   04/10/2025 1.34 (H) 0.76 - 1.27 mg/dL Final   04/09/2025 1.32 (H) 0.76 - 1.27 mg/dL Final   04/09/2025 1.32 (H) 0.76 - 1.27 mg/dL Final   04/08/2025 1.40 (H) 0.76 - 1.27 mg/dL Final   04/08/2025 1.55 (H) 0.76 - 1.27 mg/dL Final   04/07/2025 1.71 (H) 0.76 - 1.27 mg/dL Final      Calcium Calcium   Date Value Ref Range Status   04/10/2025 9.2 8.6 - 10.5 mg/dL Final   04/09/2025 9.2 8.6 - 10.5 mg/dL Final   04/09/2025 8.9 8.6 - 10.5 mg/dL Final   04/08/2025 8.9 8.6 - 10.5 mg/dL Final   04/08/2025 9.5 8.6 - 10.5 mg/dL Final   04/07/2025 9.7 8.6 - 10.5 mg/dL Final      PO4 No results found for: \"CAPO4\"   Albumin Albumin   Date Value Ref Range Status   04/08/2025 3.7 3.5 - 5.2 g/dL Final   04/07/2025 3.7 3.5 - 5.2 g/dL Final      Magnesium Magnesium   Date Value Ref Range Status   04/10/2025 1.7 1.6 - 2.4 mg/dL Final   04/07/2025 1.9 1.6 - 2.4 mg/dL Final      Uric Acid No results found for: \"URICACID\"     Medication Review:   apixaban, 5 mg, Oral, BID  atorvastatin, 10 mg, Oral, Q PM  [Held by provider] citalopram, 10 mg, Oral, Daily  ferrous sulfate, 325 mg, Oral, Daily With Breakfast  meclizine, " 50 mg, Oral, Daily  metoprolol succinate XL, 50 mg, Oral, Daily  QUEtiapine, 50 mg, Oral, Nightly  rivastigmine, 1.5 mg, Oral, BID With Meals  sodium chloride, 10 mL, Intravenous, Q12H  thiamine, 100 mg, Oral, Daily  valsartan, 40 mg, Oral, Daily          Assessment & Plan       Hyponatremia      Assessment & Plan  -Euvolemic hyponatremia most likely secondary to polydipsia with low urine osmolality and large intake of water and nonalcoholic beer.  Status post IV fluid.  Sodium is up to 124->126. 1200 cc p.o. fluid restriction per day and monitor.  Urine osm normal at 142, urine sodium 38.  Recent TSH was okay.  His blood pressure is high so not much suspicion of adrenal insufficiency.    - Chronic kidney disease stage III with previously bland UA and minimal proteinuria secondary to hypertension and loss of renal mass after right nephrectomy in 2024 for renal cell cancer.    - Hypertension-  valsartan at 40 mg daily and monitor.    Douglas Payne MD  04/10/25  09:40 EDT

## 2025-04-10 NOTE — PLAN OF CARE
Goal Outcome Evaluation:              Outcome Evaluation: No changes this shift. Patient has rested throughout shift. Eager to go home.

## 2025-04-10 NOTE — THERAPY TREATMENT NOTE
Patient Name: Paulo Leiva Jr.  : 1955    MRN: [id number]                              Today's Date: 4/10/2025       Admit Date: 2025    Visit Dx:     ICD-10-CM ICD-9-CM   1. Hyponatremia  E87.1 276.1   2. Difficulty in walking  R26.2 719.7   3. Decreased activities of daily living (ADL)  Z78.9 V49.89     Patient Active Problem List   Diagnosis    Chronic obstructive pulmonary disease    Essential hypertension    HLD (hyperlipidemia)    Hyponatremia    Vertigo    Colon cancer screening    Status post placement of implantable loop recorder    Class 1 obesity due to excess calories without serious comorbidity with body mass index (BMI) of 30.0 to 30.9 in adult    Generalized convulsive seizures    Renal mass    AMS (altered mental status)    Benign paroxysmal positional vertigo    Malignant neoplasm of unspecified kidney, except renal pelvis    Stage 3a chronic kidney disease    Generalized tonic-clonic seizure     Past Medical History:   Diagnosis Date    Arthritis of back     Benign essential hypertension     Cancer 1994    Mass on Kidney..    COPD (chronic obstructive pulmonary disease)     Erectile dysfunction Apprx: 5 yrs ago    Generalized convulsive seizures 2023    Hip arthrosis     Hyperlipidemia 2018    Hyponatremia     Implantable loop recorder present     Knee swelling     Long term (current) use of non-steroidal anti-inflammatories (nsaid) 2018    Renal insufficiency     Syncope     Syncope and collapse 2022    Total of 5 episdoes with the last one 10/22    Outside Source Comment: Overview:  Formatting of this note might be different from the original.  Total of 5 episdoes with the last one 10/22  Last Assessment & Plan:  Formatting of this note might be different from the original.  No recurrent episodes Lipitor does not show any significant dysrhythmias at this point continue with monitoring      Vertigo 2021    Vision loss      Past Surgical  History:   Procedure Laterality Date    CARDIAC CATHETERIZATION N/A 09/26/2022    Procedure: Left Heart Cath;  Surgeon: Randell Casiano MD;  Location: [facility] CATH INVASIVE LOCATION;  Service: Cardiovascular;  Laterality: N/A;    CARDIAC ELECTROPHYSIOLOGY PROCEDURE N/A 09/26/2022    Procedure: Loop insertion;  Surgeon: Randell Casiano MD;  Location: [facility] CATH INVASIVE LOCATION;  Service: Cardiovascular;  Laterality: N/A;    COLONOSCOPY  2007    COLONOSCOPY N/A 10/26/2022    Procedure: COLONOSCOPY FOR SCREENING;  Surgeon: Roque Whitlock MD;  Location: [facility] ENDOSCOPY;  Service: Gastroenterology;  Laterality: N/A;  NORMAL COLON    NEPHRECTOMY Right 08/12/2024      General Information       Row Name 04/10/25 7750          OT Time and Intention    Document Type therapy note (daily note)  -AV     Mode of Treatment individual therapy;occupational therapy  -AV     Patient Effort good  -AV       Row Name 04/10/25 0950          General Information    Existing Precautions/Restrictions fall  -AV       Row Name 04/10/25 0918          Cognition    Orientation Status (Cognition) --  alert, pleasant and cooperative. able to perform therapeutic exercises with minimal cues/ demonstration.  -AV       Row Name 04/10/25 0976          Safety Issues/Impairments Affecting Functional Mobility    Impairments Affecting Function (Mobility) balance;endurance/activity tolerance  -AV               User Key  (r) = Recorded By, (t) = Taken By, (c) = Cosigned By      Initials Name Provider Type    AV Rene Nevarez OT Occupational Therapist                     Mobility/ADL's    No documentation.                  Obj/Interventions       Row Name 04/10/25 0928          Shoulder (Therapeutic Exercise)    Shoulder (Therapeutic Exercise) strengthening exercise  -AV     Shoulder Strengthening (Therapeutic Exercise) bilateral;flexion;horizontal aBduction/aDduction;resistance band;yellow;15 repititions  -AV       Row Name 04/10/25 0907           Elbow/Forearm (Therapeutic Exercise)    Elbow/Forearm (Therapeutic Exercise) strengthening exercise  -AV     Elbow/Forearm Strengthening (Therapeutic Exercise) bilateral;flexion;extension;resistance band;yellow;15 repititions  -AV       Row Name 04/10/25 0951          Motor Skills    Therapeutic Exercise shoulder;elbow/forearm  performed in high-Conroy's/ on room air  -AV               User Key  (r) = Recorded By, (t) = Taken By, (c) = Cosigned By      Initials Name Provider Type    Rene Chen OT Occupational Therapist                   Goals/Plan    No documentation.                  Clinical Impression       Row Name 04/10/25 0952          Pain Scale: FACES Pre/Post-Treatment    Pain: FACES Scale, Pretreatment 0-->no hurt  -AV     Posttreatment Pain Rating 0-->no hurt  -AV       Row Name 04/10/25 0952          Plan of Care Review    Progress no change  -AV     Outcome Evaluation Patient performed upper extremity exercises with light resistance theraband to improve endurance/activity tolerance needed to support ADLs. Continued OT is indicated to remediate/ compensate for deficits to maximize independence and safety with functional ADL tasks.  -AV       Row Name 04/10/25 0952          Vital Signs    O2 Delivery Pre Treatment room air  -AV     O2 Delivery Intra Treatment room air  -AV     O2 Delivery Post Treatment room air  -AV       Row Name 04/10/25 0952          Positioning and Restraints    Pre-Treatment Position in bed  -AV     Post Treatment Position bed  -AV     In Bed call light within reach;encouraged to call for assist;exit alarm on  -AV               User Key  (r) = Recorded By, (t) = Taken By, (c) = Cosigned By      Initials Name Provider Type    Rene Chen OT Occupational Therapist                   Outcome Measures       Row Name 04/10/25 0953          How much help from another is currently needed...    Putting on and taking off regular lower body clothing? 3  -AV     Bathing  (including washing, rinsing, and drying) 3  -AV     Toileting (which includes using toilet bed pan or urinal) 3  -AV     Putting on and taking off regular upper body clothing 4  -AV     Taking care of personal grooming (such as brushing teeth) 4  -AV     Eating meals 4  -AV     AM-PAC 6 Clicks Score (OT) 21  -AV       Row Name 04/10/25 0945          How much help from another person do you currently need...    Turning from your back to your side while in flat bed without using bedrails? 4  -WM     Moving from lying on back to sitting on the side of a flat bed without bedrails? 4  -WM     Moving to and from a bed to a chair (including a wheelchair)? 4  -WM     Standing up from a chair using your arms (e.g., wheelchair, bedside chair)? 4  -WM     Climbing 3-5 steps with a railing? 4  -WM     To walk in hospital room? 4  -WM     AM-PAC 6 Clicks Score (PT) 24  -WM     Highest Level of Mobility Goal 8 --> Walked 250 feet or more  -WM       Row Name 04/10/25 0953          Optimal Instrument    Bending/Stooping 2  -AV     Standing 2  -AV     Reaching 1  -AV               User Key  (r) = Recorded By, (t) = Taken By, (c) = Cosigned By      Initials Name Provider Type    Danny Fish PTA Physical Therapist Assistant    Rene Chen, OT Occupational Therapist                    Occupational Therapy Education       Title: PT OT SLP Therapies (Done)       Topic: Occupational Therapy (Done)       Point: ADL training (Done)       Learning Progress Summary            Patient Acceptance, E, VU by JAYNA at 4/9/2025 1140                      Point: Home exercise program (Done)       Learning Progress Summary            Patient Acceptance, E, VU by JAYNA at 4/9/2025 1140                      Point: Precautions (Done)       Learning Progress Summary            Patient Acceptance, E, VU by JAYNA at 4/9/2025 1140                      Point: Body mechanics (Done)       Learning Progress Summary            Patient Acceptance, E, VU by JAYNA  at 4/9/2025 1140                                      User Key       Initials Effective Dates Name Provider Type [facility] 06/16/21 -  Rene Nevarez OT Occupational Therapist OT                  OT Recommendation and Plan  Planned Therapy Interventions (OT): activity tolerance training, BADL retraining, functional balance retraining, occupation/activity based interventions, patient/caregiver education/training, ROM/therapeutic exercise, transfer/mobility retraining  Therapy Frequency (OT): 5 times/wk  Plan of Care Review  Plan of Care Reviewed With: patient  Progress: no change  Outcome Evaluation: Patient performed upper extremity exercises with light resistance theraband to improve endurance/activity tolerance needed to support ADLs. Continued OT is indicated to remediate/ compensate for deficits to maximize independence and safety with functional ADL tasks.     Time Calculation:   Evaluation Complexity (OT)  Review Occupational Profile/Medical/Therapy History Complexity: expanded/moderate complexity  Assessment, Occupational Performance/Identification of Deficit Complexity: 1-3 performance deficits  Clinical Decision Making Complexity (OT): problem focused assessment/low complexity  Overall Complexity of Evaluation (OT): low complexity     Time Calculation- OT       Row Name 04/10/25 0954 04/10/25 0940          Time Calculation- OT    OT Received On 04/10/25  -AV --     OT Goal Re-Cert Due Date 04/18/25  -AV --        Timed Charges    93398 - OT Therapeutic Exercise Minutes 10  -AV --     16963 - Gait Training Minutes  -- 8  -WM        Total Minutes    Timed Charges Total Minutes 10  -AV 8  -WM      Total Minutes 10  -AV 8  -WM               User Key  (r) = Recorded By, (t) = Taken By, (c) = Cosigned By      Initials Name Provider Type     Danny Estrada PTA Physical Therapist Assistant    AV Rene Nevarez OT Occupational Therapist                  Therapy Charges for Today       Code Description  Service Date Service Provider Modifiers Qty    66119113780 HC OT EVAL LOW COMPLEXITY 3 4/9/2025 Rene Nevarez, OT GO 1    20899755541 HC OT THER PROC EA 15 MIN 4/10/2025 Rene Nevarez OT GO 1                 Rene Nevarez OT  4/10/2025

## 2025-04-11 ENCOUNTER — READMISSION MANAGEMENT (OUTPATIENT)
Dept: CALL CENTER | Facility: HOSPITAL | Age: 70
End: 2025-04-11
Payer: MEDICARE

## 2025-04-11 VITALS
WEIGHT: 214.29 LBS | HEART RATE: 61 BPM | BODY MASS INDEX: 30 KG/M2 | SYSTOLIC BLOOD PRESSURE: 179 MMHG | HEIGHT: 71 IN | DIASTOLIC BLOOD PRESSURE: 87 MMHG | TEMPERATURE: 98.8 F | OXYGEN SATURATION: 95 % | RESPIRATION RATE: 20 BRPM

## 2025-04-11 LAB
ANION GAP SERPL CALCULATED.3IONS-SCNC: 12.3 MMOL/L (ref 5–15)
BUN SERPL-MCNC: 11 MG/DL (ref 8–23)
BUN/CREAT SERPL: 8 (ref 7–25)
CALCIUM SPEC-SCNC: 9.6 MG/DL (ref 8.6–10.5)
CHLORIDE SERPL-SCNC: 95 MMOL/L (ref 98–107)
CO2 SERPL-SCNC: 21.7 MMOL/L (ref 22–29)
CREAT SERPL-MCNC: 1.37 MG/DL (ref 0.76–1.27)
EGFRCR SERPLBLD CKD-EPI 2021: 56.2 ML/MIN/1.73
GLUCOSE SERPL-MCNC: 90 MG/DL (ref 65–99)
POTASSIUM SERPL-SCNC: 4.8 MMOL/L (ref 3.5–5.2)
SODIUM SERPL-SCNC: 129 MMOL/L (ref 136–145)
WHOLE BLOOD HOLD SPECIMEN: NORMAL

## 2025-04-11 PROCEDURE — 80048 BASIC METABOLIC PNL TOTAL CA: CPT | Performed by: INTERNAL MEDICINE

## 2025-04-11 PROCEDURE — 97116 GAIT TRAINING THERAPY: CPT

## 2025-04-11 PROCEDURE — 99238 HOSP IP/OBS DSCHRG MGMT 30/<: CPT | Performed by: FAMILY MEDICINE

## 2025-04-11 PROCEDURE — 97110 THERAPEUTIC EXERCISES: CPT

## 2025-04-11 RX ADMIN — METOPROLOL SUCCINATE 50 MG: 50 TABLET, FILM COATED, EXTENDED RELEASE ORAL at 09:28

## 2025-04-11 RX ADMIN — RIVASTIGMINE TARTRATE 1.5 MG: 1.5 CAPSULE ORAL at 09:27

## 2025-04-11 RX ADMIN — MECLIZINE HYDROCHLORIDE 50 MG: 25 TABLET ORAL at 09:28

## 2025-04-11 RX ADMIN — Medication 100 MG: at 09:28

## 2025-04-11 RX ADMIN — VALSARTAN 40 MG: 80 TABLET, FILM COATED ORAL at 09:28

## 2025-04-11 RX ADMIN — FERROUS SULFATE TAB 325 MG (65 MG ELEMENTAL FE) 325 MG: 325 (65 FE) TAB at 09:28

## 2025-04-11 RX ADMIN — APIXABAN 5 MG: 5 TABLET, FILM COATED ORAL at 09:28

## 2025-04-11 RX ADMIN — Medication 10 ML: at 09:28

## 2025-04-11 NOTE — PROGRESS NOTES
" LOS: 3 days   Patient Care Team:  Vilma Rolon APRN as PCP - General (Nurse Practitioner)  Randell Casiano MD as Consulting Physician (Cardiology)  Braulio Rand MD as Consulting Physician (Urology)  Kathy Moctezuma MD as Consulting Physician (Nephrology)  Nabor Moser MD as Consulting Physician (Hematology)  Tatyana Smith APRN as Nurse Practitioner (Hematology)  Jenni Crawley APRN as Nurse Practitioner (Cardiology)    Chief Complaint: Hyponatremia    Subjective     Pt without any acute complaints  No new events.  Hoping to go home today.  Blood pressure little bit on the higher side.    History taken from: patient chart RN    Objective     Vital Sign Min/Max for last 24 hours  Temp  Min: 97.5 °F (36.4 °C)  Max: 99.1 °F (37.3 °C)   BP  Min: 148/75  Max: 179/87   Pulse  Min: 61  Max: 89   Resp  Min: 16  Max: 20   SpO2  Min: 92 %  Max: 96 %   No data recorded   No data recorded     Flowsheet Rows      Flowsheet Row First Filed Value   Admission Height 180.3 cm (71\") Documented at 04/09/2025 0056   Admission Weight 97.2 kg (214 lb 4.6 oz) Documented at 04/09/2025 0056            I/O this shift:  In: 120 [P.O.:120]  Out: 500 [Urine:500]  I/O last 3 completed shifts:  In: 320 [P.O.:320]  Out: -     Objective:  General Appearance:  Comfortable, in no acute distress and not in pain.    Vital signs: (most recent): Blood pressure 179/87, pulse 61, temperature 98.8 °F (37.1 °C), temperature source Oral, resp. rate 20, height 180.3 cm (71\"), weight 97.2 kg (214 lb 4.6 oz), SpO2 95%.  Vital signs are normal.  No fever.    Output: Producing urine.    HEENT: Normal HEENT exam.    Lungs:  Normal effort and normal respiratory rate.  He is not in respiratory distress.    Heart: Normal rate.  Regular rhythm.    Abdomen: Abdomen is soft.  Bowel sounds are normal.   There is no abdominal tenderness.     Extremities: Normal range of motion.  There is no dependent edema.    Pulses: Distal pulses are " "intact.    Neurological: Patient is alert and oriented to person, place and time.    Pupils:  Pupils are equal, round, and reactive to light.    Skin:  Warm and dry.                Results Review:     I reviewed the patient's new clinical results.  I reviewed the patient's new imaging results and agree with the interpretation.  I reviewed the patient's other test results and agree with the interpretation    WBC WBC   Date Value Ref Range Status   04/10/2025 7.63 3.40 - 10.80 10*3/mm3 Final   04/09/2025 8.75 3.40 - 10.80 10*3/mm3 Final   04/08/2025 9.75 3.40 - 10.80 10*3/mm3 Final      HGB Hemoglobin   Date Value Ref Range Status   04/10/2025 17.0 13.0 - 17.7 g/dL Final   04/09/2025 17.0 13.0 - 17.7 g/dL Final   04/08/2025 18.0 (H) 13.0 - 17.7 g/dL Final      HCT Hematocrit   Date Value Ref Range Status   04/10/2025 49.5 37.5 - 51.0 % Final   04/09/2025 50.2 37.5 - 51.0 % Final   04/08/2025 54.6 (H) 37.5 - 51.0 % Final      Platlets No results found for: \"LABPLAT\"   MCV MCV   Date Value Ref Range Status   04/10/2025 85.5 79.0 - 97.0 fL Final   04/09/2025 85.7 79.0 - 97.0 fL Final   04/08/2025 87.4 79.0 - 97.0 fL Final          Sodium Sodium   Date Value Ref Range Status   04/11/2025 129 (L) 136 - 145 mmol/L Final   04/10/2025 126 (L) 136 - 145 mmol/L Final   04/09/2025 124 (L) 136 - 145 mmol/L Final   04/09/2025 123 (L) 136 - 145 mmol/L Final   04/08/2025 122 (L) 136 - 145 mmol/L Final   04/08/2025 119 (C) 136 - 145 mmol/L Final      Potassium Potassium   Date Value Ref Range Status   04/11/2025 4.8 3.5 - 5.2 mmol/L Final     Comment:     Slight hemolysis detected by analyzer. Result may be falsely elevated.   04/10/2025 4.6 3.5 - 5.2 mmol/L Final   04/09/2025 4.9 3.5 - 5.2 mmol/L Final   04/09/2025 4.2 3.5 - 5.2 mmol/L Final   04/08/2025 5.2 3.5 - 5.2 mmol/L Final     Comment:     Slight hemolysis detected by analyzer. Result may be falsely elevated.   04/08/2025 5.5 (H) 3.5 - 5.2 mmol/L Final      Chloride Chloride " "  Date Value Ref Range Status   04/11/2025 95 (L) 98 - 107 mmol/L Final   04/10/2025 94 (L) 98 - 107 mmol/L Final   04/09/2025 91 (L) 98 - 107 mmol/L Final   04/09/2025 92 (L) 98 - 107 mmol/L Final   04/08/2025 89 (L) 98 - 107 mmol/L Final   04/08/2025 84 (L) 98 - 107 mmol/L Final      CO2 CO2   Date Value Ref Range Status   04/11/2025 21.7 (L) 22.0 - 29.0 mmol/L Final   04/10/2025 21.4 (L) 22.0 - 29.0 mmol/L Final   04/09/2025 20.5 (L) 22.0 - 29.0 mmol/L Final   04/09/2025 20.7 (L) 22.0 - 29.0 mmol/L Final   04/08/2025 21.6 (L) 22.0 - 29.0 mmol/L Final   04/08/2025 24.3 22.0 - 29.0 mmol/L Final      BUN BUN   Date Value Ref Range Status   04/11/2025 11 8 - 23 mg/dL Final   04/10/2025 12 8 - 23 mg/dL Final   04/09/2025 13 8 - 23 mg/dL Final   04/09/2025 13 8 - 23 mg/dL Final   04/08/2025 14 8 - 23 mg/dL Final   04/08/2025 15 8 - 23 mg/dL Final      Creatinine Creatinine   Date Value Ref Range Status   04/11/2025 1.37 (H) 0.76 - 1.27 mg/dL Final   04/10/2025 1.34 (H) 0.76 - 1.27 mg/dL Final   04/09/2025 1.32 (H) 0.76 - 1.27 mg/dL Final   04/09/2025 1.32 (H) 0.76 - 1.27 mg/dL Final   04/08/2025 1.40 (H) 0.76 - 1.27 mg/dL Final   04/08/2025 1.55 (H) 0.76 - 1.27 mg/dL Final      Calcium Calcium   Date Value Ref Range Status   04/11/2025 9.6 8.6 - 10.5 mg/dL Final   04/10/2025 9.2 8.6 - 10.5 mg/dL Final   04/09/2025 9.2 8.6 - 10.5 mg/dL Final   04/09/2025 8.9 8.6 - 10.5 mg/dL Final   04/08/2025 8.9 8.6 - 10.5 mg/dL Final   04/08/2025 9.5 8.6 - 10.5 mg/dL Final      PO4 No results found for: \"CAPO4\"   Albumin Albumin   Date Value Ref Range Status   04/08/2025 3.7 3.5 - 5.2 g/dL Final      Magnesium Magnesium   Date Value Ref Range Status   04/10/2025 1.7 1.6 - 2.4 mg/dL Final      Uric Acid No results found for: \"URICACID\"     Medication Review:   apixaban, 5 mg, Oral, BID  atorvastatin, 10 mg, Oral, Q PM  [Held by provider] citalopram, 10 mg, Oral, Daily  ferrous sulfate, 325 mg, Oral, Daily With Breakfast  meclizine, 50 " mg, Oral, Daily  metoprolol succinate XL, 50 mg, Oral, Daily  QUEtiapine, 50 mg, Oral, Nightly  rivastigmine, 1.5 mg, Oral, BID With Meals  sodium chloride, 10 mL, Intravenous, Q12H  thiamine, 100 mg, Oral, Daily  valsartan, 40 mg, Oral, Daily          Assessment & Plan       Hyponatremia      Assessment & Plan  - Euvolemic hyponatremia most likely secondary to polydipsia with low urine osmolality and large intake of water and nonalcoholic beer.  Status post IV fluid.  Sodium is up to 124->126-->129.  Continue 1200 cc p.o. fluid restriction per day and monitor.    Recent TSH was okay.  His blood pressure is high so not much suspicion of adrenal insufficiency.    - Chronic kidney disease stage III with previously bland UA and minimal proteinuria secondary to hypertension and loss of renal mass after right nephrectomy in 2024 for renal cell cancer.    - Hypertension-  valsartan at 40 mg daily and monitor.  May need higher doses.    From renal standpoint, okay to DC.  Follow-up in the office 2 weeks.  Continue moderate p.o. fluid restriction, cut back on nonalcoholic beers.  Okay to resume SSRI if needed.  Please call with any questions.    Kathy Moctezuma MD  04/11/25  14:06 EDT

## 2025-04-11 NOTE — SIGNIFICANT NOTE
04/11/25 0853   OTHER   Discipline occupational therapist   Rehab Time/Intention   Session Not Performed patient unavailable for treatment  (with MD)

## 2025-04-11 NOTE — OUTREACH NOTE
Prep Survey      Flowsheet Row Responses   [facility] patient discharged from? [facility]   Is LACE score < 7 ? No   Eligibility [facility]   Date of Admission 04/08/25   Date of Discharge 04/11/25   Discharge Disposition Home or Self Care   Discharge diagnosis Hyponatremia   Does the patient have one of the following disease processes/diagnoses(primary or secondary)? Other   Does the patient have Home health ordered? No   Is there a DME ordered? No   Prep survey completed? Yes            Nikole LOPEZ - Registered Nurse

## 2025-04-11 NOTE — DISCHARGE SUMMARY
[facility]         HOSPITALIST  DISCHARGE SUMMARY    Patient Name: Paulo Leiva Jr.  : 1955  MRN: [id number]    Date of Admission: 2025  Date of Discharge: 2025  Primary Care Physician: Vilma Rolon APRN    Consults       Date and Time Order Name Status Description    2025  9:13 PM Inpatient Nephrology Consult Completed     2025  8:44 PM Inpatient Hospitalist Consult              Active and Resolved Hospital Problems:  Hyponatremia euvolemic likely due to polydipsia  CKD3  Hypertension  Renal cancer status post nephrectomy  COPD without acute exacerbation  Coronary artery disease  Active Hospital Problems    Diagnosis POA    **Hyponatremia [E87.1] Yes      Resolved Hospital Problems   No resolved problems to display.       Hospital Course     Hospital Course:  Paulo Leiva Jr. is a 69 y.o. male significant past medical history of hypertension, renal cancer status post nephrectomy, COPD, hyponatremia, hyperlipidemia, vertigo was sent by his PCP to the ER due to low sodium. Reports that he had lab work done yesterday and he was informed by his PCP that his sodium was 120 that he will need to come to the ER. Patient denies any specific symptoms, no chest pain, no shortness of breath no palpitations, no nausea or vomiting, no focal weakness, no headaches. Patient reports that he had a similar episode few months ago where he was diagnosed with hyponatremia and he was started on sodium tabs however this tabs were discontinued by her PCP about 2 months ago because his sodium went too high at that time. In the ER patient noted to have a sodium level of 115, creatinine level 1.5, potassium 5.5, he was given 1 L of normal saline. Patient admitted for further evaluation and treatment. Nephrology consulted. Placed on fluid restriction due to history of polydipsia. Serum sodium improving.  Cleared return home by PT OT.  Patient up in bed around the unit without difficulty.   Serum sodium slowly improved to 129.  Discussed with nephrology recommends continuing fluid restriction and follow-up in 2 weeks.  Patient seen and evaluated on day of discharge and thought stable for discharge home to follow-up with his primary care provider and nephrology as an outpatient.        DISCHARGE Follow Up Recommendations for labs and diagnostics: As above      Day of Discharge     Vital Signs:  Temp:  [97.5 °F (36.4 °C)-99.1 °F (37.3 °C)] 98.8 °F (37.1 °C)  Heart Rate:  [61-89] 61  Resp:  [16-20] 20  BP: (148-179)/(75-87) 179/87  Physical Exam:   Gen. well-developed appearing stated age in no acute distress  HEENT: Normocephalic atraumatic moist membranes pupils equal round reactive light, no scleral icterus no conjunctival injection  Cardiovascular: regular rate and rhythm no murmurs rubs or gallops S1-S2, no lower extremity edema appreciated  Pulmonary: Clear to auscultation bilaterally no wheezes rales or rhonchi symmetric chest expansion, unlabored, no conversational dyspnea appreciated  Gastrointestinal: Soft nontender nondistended positive bowel sounds all 4 quadrants no rebound or guarding  Musculoskeletal: No clubbing cyanosis, warm and well-perfused, calves soft symmetric nontender bilaterally  Skin: Clean dry without rashs  Neuro: Cranial nerves II through XII intact grossly no sensorimotor deficits appreciated bilateral upper and lower extremities  Psych: Patient is calm cooperative and appropriate with exam not responding to internal stimuli  : No Herrera catheter no bladder distention no suprapubic tenderness      Discharge Details        Discharge Medications        Continue These Medications        Instructions Start Date   citalopram 10 MG tablet  Commonly known as: CeleXA   10 mg, Oral, Daily      Eliquis 5 MG tablet tablet  Generic drug: apixaban   5 mg, Oral, 2 Times Daily      ferrous sulfate 324 (65 Fe) MG tablet delayed-release EC tablet   324 mg, Oral, Daily With Breakfast       meclizine 25 MG tablet  Commonly known as: ANTIVERT   50 mg, Oral, Daily      metoprolol succinate XL 50 MG 24 hr tablet  Commonly known as: TOPROL-XL   50 mg, Oral, Daily      QUEtiapine 50 MG tablet  Commonly known as: SEROquel   50 mg, Nightly      rivastigmine 1.5 MG capsule  Commonly known as: EXELON   1.5 mg, Oral, 2 Times Daily With Meals      simvastatin 20 MG tablet  Commonly known as: ZOCOR   20 mg, Oral, Daily      Spiriva Respimat 1.25 MCG/ACT aerosol solution inhaler  Generic drug: Tiotropium Bromide Monohydrate   2 puffs, Inhalation, Daily - RT      thiamine 100 MG tablet  Commonly known as: VITAMIN B1   100 mg, Oral, Daily      valsartan 80 MG tablet  Commonly known as: DIOVAN   40 mg, Oral, Daily               No Known Allergies    Discharge Disposition:  Home or Self Care    Diet:  Hospital:  Diet Order   Procedures    Diet: Regular/House, Fluid Restriction (240 mL/tray); Other (Specify mL/day) (1200ml daily); Fluid Consistency: Thin (IDDSI 0)       Discharge Activity:   Activity Instructions       Gradually Increase Activity Until at Pre-Hospitalization Level              CODE STATUS:  Code Status and Medical Interventions: CPR (Attempt to Resuscitate); Full Support   Ordered at: 04/08/25 2113     Code Status (Patient has no pulse and is not breathing):    CPR (Attempt to Resuscitate)     Medical Interventions (Patient has pulse or is breathing):    Full Support         Future Appointments   Date Time Provider Department Center   4/15/2025 10:30 AM [facility] STEPHENIE CT 1 [facility] BRNCT [facility]   4/24/2025  1:00 PM [facility] VASC PAV 1 [facility] OVHD [facility]   4/29/2025 11:30 AM Nabor Moser MD [facility] ONC ETWN [facility]   5/8/2025 11:45 AM Randell Casiano MD [facility] CD ETOWN [facility]   6/26/2025  1:00 PM Madhu Bloom PA [facility] ORS RING [facility]       Additional Instructions for the Follow-ups that You Need to Schedule       Discharge Follow-up with PCP   As directed       Currently Documented PCP:    Vilma Rolon APRN    PCP  Phone Number:    [phone]     Follow Up Details: HOSPITAL DISCHARGE FOLLOW UP 1-2 WEEKS        Discharge Follow-up with Specified Provider: Dr. Moctezuma nephrology; 2 Weeks   As directed      To: Dr. Moctezuma nephrology   Follow Up: 2 Weeks   Follow Up Details: Hospital discharge follow up hyponatremia                Pertinent  and/or Most Recent Results     PROCEDURES:   None    LAB RESULTS:      Lab 04/10/25  0521 04/09/25  0609 04/1955 04/07/25  1344   WBC 7.63 8.75 9.75 9.07   HEMOGLOBIN 17.0 17.0 18.0* 18.3*   HEMATOCRIT 49.5 50.2 54.6* 57.0*   PLATELETS 227 228 233 237   NEUTROS ABS 4.97 6.13 6.95 6.63   IMMATURE GRANS (ABS) 0.05 0.06* 0.07* 0.08*   LYMPHS ABS 1.56 1.51 1.66 1.35   MONOS ABS 0.75 0.90 0.88 0.87   EOS ABS 0.20 0.08 0.11 0.07   MCV 85.5 85.7 87.4 88.1         Lab 04/11/25  0449 04/10/25  0521 04/09/25  1208 04/09/25  0609 04/08/25  2335 04/1955 04/07/25  1344   SODIUM 129* 126* 124* 123* 122*   < > 121*   POTASSIUM 4.8 4.6 4.9 4.2 5.2   < > 5.1   CHLORIDE 95* 94* 91* 92* 89*   < > 88*   CO2 21.7* 21.4* 20.5* 20.7* 21.6*   < > 22.4   ANION GAP 12.3 10.6 12.5 10.3 11.4   < > 10.6   BUN 11 12 13 13 14   < > 15   CREATININE 1.37* 1.34* 1.32* 1.32* 1.40*   < > 1.71*   EGFR 56.2* 57.7* 58.8* 58.8* 54.7*   < > 43.1*   GLUCOSE 90 89 102* 114* 93   < > 91   CALCIUM 9.6 9.2 9.2 8.9 8.9   < > 9.7   MAGNESIUM  --  1.7  --   --   --   --  1.9   PHOSPHORUS  --  3.2  --   --   --   --   --     < > = values in this interval not displayed.         Lab 04/08/25  1956/25  1344   TOTAL PROTEIN 7.3 7.0   ALBUMIN 3.7 3.7   GLOBULIN 3.6 3.3   ALT (SGPT) 13 15   AST (SGOT) 17 22   BILIRUBIN 0.8 1.0   ALK PHOS 144* 139*                     Brief Urine Lab Results  (Last result in the past 365 days)        Color   Clarity   Blood   Leuk Est   Nitrite   Protein   CREAT   Urine HCG        04/08/25 2245 Yellow   Clear   Negative   Negative   Negative   Negative                 Microbiology Results (last 10  days)       ** No results found for the last 240 hours. **            XR Chest 1 View  Result Date: 4/8/2025  Impression: Impression: Poor respiratory effort without definite consolidation. Electronically Signed: Bar Davis DO  4/8/2025 7:32 PM EDT  Workstation ID: [id number]      Results for orders placed during the hospital encounter of 09/07/22    Duplex Carotid Ultrasound CAR    Interpretation Summary  · Proximal right internal carotid artery plaque without significant stenosis.  · Proximal left internal carotid artery plaque without significant stenosis.  · Vertebral flow is antegrade bilaterally.      Results for orders placed during the hospital encounter of 09/07/22    Duplex Carotid Ultrasound CAR    Interpretation Summary  · Proximal right internal carotid artery plaque without significant stenosis.  · Proximal left internal carotid artery plaque without significant stenosis.  · Vertebral flow is antegrade bilaterally.      Results for orders placed during the hospital encounter of 09/07/22    Adult Transthoracic Echo Complete W/ Cont if Necessary Per Protocol    Interpretation Summary  · Calculated left ventricular EF = 52.3% Estimated left ventricular EF was in agreement with the calculated left ventricular EF.  · The left ventricular cavity is mildly dilated.  · Left atrial dilation mild to moderate      Labs Pending at Discharge:        Time spent on Discharge including face to face service: 25 minutes    Electronically signed by Huseyin Nelson MD, 04/11/25, 12:53 PM EDT.

## 2025-04-11 NOTE — THERAPY TREATMENT NOTE
Acute Care - Physical Therapy Treatment Note  [facility]     Patient Name: Paulo Leiva Jr.  : 1955  MRN: [id number]  Today's Date: 2025      Visit Dx:     ICD-10-CM ICD-9-CM   1. Hyponatremia  E87.1 276.1   2. Difficulty in walking  R26.2 719.7   3. Decreased activities of daily living (ADL)  Z78.9 V49.89     Patient Active Problem List   Diagnosis    Chronic obstructive pulmonary disease    Essential hypertension    HLD (hyperlipidemia)    Hyponatremia    Vertigo    Colon cancer screening    Status post placement of implantable loop recorder    Class 1 obesity due to excess calories without serious comorbidity with body mass index (BMI) of 30.0 to 30.9 in adult    Generalized convulsive seizures    Renal mass    AMS (altered mental status)    Benign paroxysmal positional vertigo    Malignant neoplasm of unspecified kidney, except renal pelvis    Stage 3a chronic kidney disease    Generalized tonic-clonic seizure     Past Medical History:   Diagnosis Date    Arthritis of back     Benign essential hypertension     Cancer 1994    Mass on Kidney..    COPD (chronic obstructive pulmonary disease)     Erectile dysfunction Apprx: 5 yrs ago    Generalized convulsive seizures 2023    Hip arthrosis     Hyperlipidemia 2018    Hyponatremia     Implantable loop recorder present     Knee swelling     Long term (current) use of non-steroidal anti-inflammatories (nsaid) 2018    Renal insufficiency     Syncope     Syncope and collapse 2022    Total of 5 episdoes with the last one 10/22    Outside Source Comment: Overview:  Formatting of this note might be different from the original.  Total of 5 episdoes with the last one 10/22  Last Assessment & Plan:  Formatting of this note might be different from the original.  No recurrent episodes Lipitor does not show any significant dysrhythmias at this point continue with monitoring      Vertigo 2021    Vision loss      Past  Surgical History:   Procedure Laterality Date    CARDIAC CATHETERIZATION N/A 09/26/2022    Procedure: Left Heart Cath;  Surgeon: Randell Casiano MD;  Location: [facility] CATH INVASIVE LOCATION;  Service: Cardiovascular;  Laterality: N/A;    CARDIAC ELECTROPHYSIOLOGY PROCEDURE N/A 09/26/2022    Procedure: Loop insertion;  Surgeon: Randell Casiano MD;  Location: [facility] CATH INVASIVE LOCATION;  Service: Cardiovascular;  Laterality: N/A;    COLONOSCOPY  2007    COLONOSCOPY N/A 10/26/2022    Procedure: COLONOSCOPY FOR SCREENING;  Surgeon: Roque Whitlock MD;  Location: [facility] ENDOSCOPY;  Service: Gastroenterology;  Laterality: N/A;  NORMAL COLON    NEPHRECTOMY Right 08/12/2024     PT Assessment (Last 12 Hours)       PT Evaluation and Treatment       Row Name 04/11/25 0907          Physical Therapy Time and Intention    Document Type therapy note (daily note)  -WM     Mode of Treatment individual therapy;physical therapy  -WM     Patient Effort good  -WM     Symptoms Noted During/After Treatment fatigue  -WM       Row Name 04/11/25 0907          Bed Mobility    Supine-Sit [place] (Bed Mobility) standby assist  -WM     Assistive Device (Bed Mobility) bed rails;head of bed elevated  -WM       Row Name 04/11/25 0907          Sit-Stand Transfer    Sit-Stand [place] (Transfers) standby assist  -WM     Assistive Device (Sit-Stand Transfers) walker, front-wheeled  -WM       Row Name 04/11/25 0907          Stand-Sit Transfer    Stand-Sit [place] (Transfers) standby assist  -WM     Assistive Device (Stand-Sit Transfers) walker, front-wheeled  -WM       Row Name 04/11/25 0907          Gait/Stairs (Locomotion)    [place] Level (Gait) standby assist  -WM     Assistive Device (Gait) walker, front-wheeled  -WM     Patient was able to Ambulate yes  -WM     Distance in Feet (Gait) 400  -WM     Pattern (Gait) 4-point;step-through  -WM     Deviations/Abnormal Patterns (Gait) gait speed decreased;stride length decreased   -       Row Name 04/11/25 0907          Safety Issues/Impairments Affecting Functional Mobility    Impairments Affecting Function (Mobility) balance;endurance/activity tolerance;strength  -       Row Name 04/11/25 0907          Motor Skills    Therapeutic Exercise ankle  -       Row Name 04/11/25 0907          Hip (Therapeutic Exercise)    Hip Strengthening (Therapeutic Exercise) bilateral;aBduction;marching while standing;mini squats;standing;20 repititions  -       Row Name 04/11/25 0907          Ankle (Therapeutic Exercise)    Ankle (Therapeutic Exercise) strengthening exercise  -     Ankle Strengthening (Therapeutic Exercise) bilateral;standing;20 repititions  Heel/toe raises  -       Row Name 04/11/25 0907          Positioning and Restraints    Pre-Treatment Position in bed  -     Post Treatment Position bed  -     In Bed sitting EOB;call light within reach;encouraged to call for assist;exit alarm on  -       Row Name 04/11/25 0907          Progress Summary (PT)    Progress Toward Functional Goals (PT) progress toward functional goals is good  -               User Key  (r) = Recorded By, (t) = Taken By, (c) = Cosigned By      Initials Name Provider Type    Danny Fish PTA Physical Therapist Assistant                    Physical Therapy Education       Title: PT OT SLP Therapies (Done)       Topic: Physical Therapy (Done)       Point: Mobility training (Done)       Learning Progress Summary            Patient Acceptance, E, VU by AV at 4/9/2025 1140    Acceptance, E,TB, VU by TM at 4/9/2025 1031                      Point: Home exercise program (Done)       Learning Progress Summary            Patient Acceptance, E, VU by AV at 4/9/2025 1140    Acceptance, E,TB, VU by TM at 4/9/2025 1031                      Point: Body mechanics (Done)       Learning Progress Summary            Patient Acceptance, E, VU by AV at 4/9/2025 1140    Acceptance, E,TB, VU by TM at 4/9/2025 1031                       Point: Precautions (Done)       Learning Progress Summary            Patient Acceptance, E, VU by AV at 4/9/2025 1140    Acceptance, E,TB, VU by TM at 4/9/2025 1031                                      User Key       Initials Effective Dates Name Provider Type Discipline    AV 06/16/21 -  Rene Nevarez, OT Occupational Therapist OT    TM 02/04/25 -  Wei Tay, PT Student PT Student PT                  PT Recommendation and Plan     Progress Summary (PT)  Progress Toward Functional Goals (PT): progress toward functional goals is good   Outcome Measures       Row Name 04/11/25 0914 04/10/25 0945 04/09/25 1000       How much help from another person do you currently need...    Turning from your back to your side while in flat bed without using bedrails? 4  -WM 4  -WM 4  -BRIDGET (r) TM (t) BRIDGET (c)    Moving from lying on back to sitting on the side of a flat bed without bedrails? 4  -WM 4  -WM 4  -BRIDGET (r) TM (t) BRIDGET (c)    Moving to and from a bed to a chair (including a wheelchair)? 4  -WM 4  -WM 4  -BRIDGET (r) TM (t) BRIDGET (c)    Standing up from a chair using your arms (e.g., wheelchair, bedside chair)? 4  -WM 4  -WM 4  -BRIDGET (r) TM (t) BRIDGET (c)    Climbing 3-5 steps with a railing? 4  -WM 4  -WM 4  -BRIDGET (r) TM (t) BRIDGET (c)    To walk in hospital room? 4  -WM 4  -WM 4  -BRIDGET (r) TM (t) BRIDGET (c)    AM-PAC 6 Clicks Score (PT) 24  -WM 24  -WM 24  -BRIDGET (r) TM (t)       Functional Assessment    Outcome Measure Options -- -- AM-PAC 6 Clicks Basic Mobility (PT)  -BRIDGET (r) TM (t) BRIDGET (c)              User Key  (r) = Recorded By, (t) = Taken By, (c) = Cosigned By      Initials Name Provider Type    Danny Fish, PTA Physical Therapist Assistant    BRIDGETJames Hilliard, PT Physical Therapist    TM Wei Tay, PT Student PT Student                     Time Calculation:    PT Charges       Row Name 04/11/25 0906             Time Calculation    PT Received On 04/11/25  -WM         Timed Charges    04413 - PT Therapeutic Exercise  Minutes 12  -WM      99218 - Gait Training Minutes  9  -WM      72440 - PT Therapeutic Activity Minutes 4  -WM         Total Minutes    Timed Charges Total Minutes 25  -WM       Total Minutes 25  -WM                User Key  (r) = Recorded By, (t) = Taken By, (c) = Cosigned By      Initials Name Provider Type    Danny Fish PTA Physical Therapist Assistant                  Therapy Charges for Today       Code Description Service Date Service Provider Modifiers Qty    61260223991 HC PT THER PROC EA 15 MIN 4/10/2025 Danny Estrada PTA GP 1    12824738123 HC GAIT TRAINING EA 15 MIN 4/10/2025 Danny Estrada PTA GP 1            PT G-Codes  Outcome Measure Options: AM-PAC 6 Clicks Daily Activity (OT), Optimal Instrument  AM-PAC 6 Clicks Score (PT): 24  AM-PAC 6 Clicks Score (OT): 21    Danny Estrada PTA  4/11/2025

## 2025-04-11 NOTE — PLAN OF CARE
Goal Outcome Evaluation:              Outcome Evaluation: Patient being discharged home this day. No issues noted at this time.

## 2025-04-11 NOTE — PLAN OF CARE
Goal Outcome Evaluation:  Plan of Care Reviewed With: patient        Progress: no change  Outcome Evaluation: Patient AAOx4. Patient needs PT/OT to work with him and possibly Rehab upon discharge. Fluids were d/c'd this shift. Rested well. VSS. Continue plan of care.

## 2025-04-14 ENCOUNTER — TRANSITIONAL CARE MANAGEMENT TELEPHONE ENCOUNTER (OUTPATIENT)
Dept: CALL CENTER | Facility: HOSPITAL | Age: 70
End: 2025-04-14
Payer: MEDICARE

## 2025-04-14 LAB
QT INTERVAL: 406 MS
QTC INTERVAL: 500 MS

## 2025-04-14 NOTE — OUTREACH NOTE
Call Center TCM Note      Flowsheet Row Responses   [facility] patient discharged from? [facility]   Does the patient have one of the following disease processes/diagnoses(primary or secondary)? Other   TCM attempt successful? Yes  [vr for Michela and [facility] [place]]   Call start time 1515   Call end time 1519   Discharge diagnosis Hyponatremia   Meds reviewed with patient/caregiver? Yes   Does the patient have all medications ordered at discharge? N/A   Is the patient taking all medications as directed (includes completed medication regime)? Yes   Comments Hospital f/u 4/15/25@145pm (appt in place at time of call)   Does the patient have an appointment with their PCP within 7-14 days of discharge? Yes   Nursing Interventions Confirmed date/time of appointment   Has home health visited the patient within 72 hours of discharge? N/A   Psychosocial issues? No   Did the patient receive a copy of their discharge instructions? Yes   Nursing interventions Reviewed instructions with patient   What is the patient's perception of their health status since discharge? Same  [Pt reports he feels about the same, still having weakness to legs at times.  Adhering to diet as recommended with fluid restrictions.  Pt has walker and cane to assist with stability.  Taking meds as directed.  Next day PCP f/u]   Is the patient/caregiver able to teach back signs and symptoms related to disease process for when to call PCP? Yes   Is the patient/caregiver able to teach back signs and symptoms related to disease process for when to call 911? Yes   Additional teach back comments Pt aware of fluid restrictions of 1500ml /day   TCM call completed? Yes   Call end time 1519            INGE HAND - Registered Nurse    4/14/2025, 15:21 EDT

## 2025-04-15 ENCOUNTER — OFFICE VISIT (OUTPATIENT)
Dept: FAMILY MEDICINE CLINIC | Facility: CLINIC | Age: 70
End: 2025-04-15
Payer: MEDICARE

## 2025-04-15 ENCOUNTER — HOSPITAL ENCOUNTER (OUTPATIENT)
Dept: CT IMAGING | Facility: HOSPITAL | Age: 70
Discharge: HOME | End: 2025-04-15
Admitting: INTERNAL MEDICINE
Payer: MEDICARE

## 2025-04-15 VITALS
HEART RATE: 143 BPM | BODY MASS INDEX: 29.62 KG/M2 | OXYGEN SATURATION: 98 % | WEIGHT: 211.6 LBS | HEIGHT: 71 IN | SYSTOLIC BLOOD PRESSURE: 148 MMHG | TEMPERATURE: 98.4 F | DIASTOLIC BLOOD PRESSURE: 90 MMHG

## 2025-04-15 DIAGNOSIS — F03.918 DEMENTIA WITH OTHER BEHAVIORAL DISTURBANCE, UNSPECIFIED DEMENTIA SEVERITY, UNSPECIFIED DEMENTIA TYPE: ICD-10-CM

## 2025-04-15 DIAGNOSIS — C64.1 RENAL CELL CARCINOMA, RIGHT: ICD-10-CM

## 2025-04-15 DIAGNOSIS — E87.5 HYPERKALEMIA: ICD-10-CM

## 2025-04-15 DIAGNOSIS — E87.1 HYPONATREMIA: ICD-10-CM

## 2025-04-15 DIAGNOSIS — Z09 HOSPITAL DISCHARGE FOLLOW-UP: Primary | ICD-10-CM

## 2025-04-15 LAB
BASOPHILS # BLD AUTO: 0.08 10*3/MM3 (ref 0–0.2)
BASOPHILS NFR BLD AUTO: 0.9 % (ref 0–1.5)
CREAT BLDA-MCNC: 1.8 MG/DL (ref 0.6–1.3)
DEPRECATED RDW RBC AUTO: 45 FL (ref 37–54)
EGFRCR SERPLBLD CKD-EPI 2021: 40.5 ML/MIN/1.73
EOSINOPHIL # BLD AUTO: 0.12 10*3/MM3 (ref 0–0.4)
EOSINOPHIL NFR BLD AUTO: 1.4 % (ref 0.3–6.2)
ERYTHROCYTE [DISTWIDTH] IN BLOOD BY AUTOMATED COUNT: 15.6 % (ref 12.3–15.4)
HCT VFR BLD AUTO: 58.7 % (ref 37.5–51)
HGB BLD-MCNC: 18.8 G/DL (ref 13–17.7)
IMM GRANULOCYTES # BLD AUTO: 0.05 10*3/MM3 (ref 0–0.05)
IMM GRANULOCYTES NFR BLD AUTO: 0.6 % (ref 0–0.5)
LYMPHOCYTES # BLD AUTO: 1.12 10*3/MM3 (ref 0.7–3.1)
LYMPHOCYTES NFR BLD AUTO: 13.2 % (ref 19.6–45.3)
MCH RBC QN AUTO: 28 PG (ref 26.6–33)
MCHC RBC AUTO-ENTMCNC: 32 G/DL (ref 31.5–35.7)
MCV RBC AUTO: 87.5 FL (ref 79–97)
MONOCYTES # BLD AUTO: 0.83 10*3/MM3 (ref 0.1–0.9)
MONOCYTES NFR BLD AUTO: 9.8 % (ref 5–12)
NEUTROPHILS NFR BLD AUTO: 6.27 10*3/MM3 (ref 1.7–7)
NEUTROPHILS NFR BLD AUTO: 74.1 % (ref 42.7–76)
NRBC BLD AUTO-RTO: 0 /100 WBC (ref 0–0.2)
PLATELET # BLD AUTO: 266 10*3/MM3 (ref 140–450)
PMV BLD AUTO: 9.2 FL (ref 6–12)
RBC # BLD AUTO: 6.71 10*6/MM3 (ref 4.14–5.8)
WBC NRBC COR # BLD AUTO: 8.47 10*3/MM3 (ref 3.4–10.8)

## 2025-04-15 PROCEDURE — 80048 BASIC METABOLIC PNL TOTAL CA: CPT | Performed by: NURSE PRACTITIONER

## 2025-04-15 PROCEDURE — 85025 COMPLETE CBC W/AUTO DIFF WBC: CPT | Performed by: NURSE PRACTITIONER

## 2025-04-15 PROCEDURE — 71250 CT THORAX DX C-: CPT

## 2025-04-15 PROCEDURE — 74176 CT ABD & PELVIS W/O CONTRAST: CPT

## 2025-04-15 PROCEDURE — 82565 ASSAY OF CREATININE: CPT

## 2025-04-15 RX ORDER — IOPAMIDOL 755 MG/ML
100 INJECTION, SOLUTION INTRAVASCULAR
Status: DISCONTINUED | OUTPATIENT
Start: 2025-04-15 | End: 2025-04-15

## 2025-04-15 NOTE — PROGRESS NOTES
Transitional Care Follow Up Visit  Pasquale LANDEROS is a 69 y.o. male who presents for a transitional care management visit.    Within 48 business hours after discharge our office contacted him via telephone to coordinate his care and needs.      I reviewed and discussed the details of that call along with the discharge summary, hospital problems, inpatient lab results, inpatient diagnostic studies, and consultation reports with Paulo.     Current outpatient and discharge medications have been reconciled for the patient.  Reviewed by: MELANIA Anders          4/11/2025     4:15 PM   Date of TCM Phone Call   [facility]   Date of Admission 4/8/2025   Date of Discharge 4/11/2025   Discharge Disposition Home or Self Care       Risk for Readmission (LACE) Score: 12 (4/11/2025  6:00 AM)      History of Present Illness     Course During Hospital Stay The following information was reviewed by: MELANIA Santiago on 04/15/2025:     History of Present Illness  The patient is a 69-year-old male who presents for a hospital discharge follow-up. He is unaccompanied today as his son had to work.    Patient was seen in the office for leg weakness and was found to have hyponatremia and was sent to the ER for further evaluation and was noted to have an even further decreased sodium level.  Patient states while in the hospital his medications were not changed or adjusted and he was not started on any sodium replacement.    The chief complaint is leg weakness, which has not improved. He does not believe the leg weakness is related to his sodium levels. He was hospitalized for 4 to 5 days due to low sodium levels, which further decreased upon arrival at the ER. No changes in medication regimen or new allergies were reported during the hospital stay. He is currently on an iron supplement.    A fluid restriction of 1500 mL per day has been placed, which he finds challenging due to feelings of  "dehydration. No chest pain or palpitations are reported. An appointment with nephrology is scheduled for 05/23/2025.       The following portions of the patient's history were reviewed and updated as appropriate: allergies, current medications, past family history, past medical history, past social history, past surgical history, and problem list.     Vitals:    04/15/25 1347   BP: 148/90   BP Location: Left arm   Patient Position: Sitting   Pulse: (!) 143   Temp: 98.4 °F (36.9 °C)   SpO2: 98%   Weight: 96 kg (211 lb 9.6 oz)   Height: 180.3 cm (71\")   PainSc: 0-No pain       Review of Systems    Objective   Physical Exam  Vitals reviewed.   Constitutional:       General: He is not in acute distress.     Appearance: Normal appearance. He is well-developed.   HENT:      Head: Normocephalic and atraumatic.      Right Ear: External ear normal.      Left Ear: External ear normal.   Eyes:      Conjunctiva/sclera: Conjunctivae normal.      Pupils: Pupils are equal, round, and reactive to light.   Cardiovascular:      Rate and Rhythm: Normal rate and regular rhythm.      Heart sounds: Normal heart sounds.   Pulmonary:      Effort: Pulmonary effort is normal.      Breath sounds: Normal breath sounds.   Musculoskeletal:      Cervical back: Neck supple.      Right lower leg: No edema.      Left lower leg: No edema.   Skin:     General: Skin is warm and dry.   Neurological:      Mental Status: He is alert and oriented to person, place, and time.   Psychiatric:         Mood and Affect: Mood and affect normal.         Behavior: Behavior normal.         Thought Content: Thought content normal.         Judgment: Judgment normal.         Physical Exam          Result Review :  The following data was reviewed by: MELANIA Santiago on 04/15/2025:    Assessment & Plan     Diagnoses and all orders for this visit:    1. Hospital discharge follow-up (Primary)    2. Hyperkalemia  -     Basic Metabolic Panel  -     CBC & " Differential    3. Hyponatremia  -     Basic Metabolic Panel  -     CBC & Differential    4. Dementia with other behavioral disturbance, unspecified dementia severity, unspecified dementia type      Diagnoses and all orders for this visit:    1. Hospital discharge follow-up (Primary)    2. Hyperkalemia  -     Basic Metabolic Panel  -     CBC & Differential    3. Hyponatremia  -     Basic Metabolic Panel  -     CBC & Differential    4. Dementia with other behavioral disturbance, unspecified dementia severity, unspecified dementia type        Assessment & Plan  1. Post-hospitalization follow-up.  - Sodium levels were last assessed approximately 4 days ago.  - A1c levels are within the normal range, ferritin levels are normal, but iron profile reveals slightly low iron and iron saturation levels. GFR has decreased from the usual 50s to 40.  - Currently on an iron supplement. No new medications have been initiated, and no existing medications have been discontinued or altered.  - Scheduled appointment with nephrology on 05/23/2025. Laboratory tests will be conducted today to monitor blood counts, kidney function, and sodium levels. Fluid restriction set at 1500 mL/day.      Follow Up     Return if symptoms worsen or fail to improve.             Patient or patient representative verbalized consent for the use of Ambient Listening during the visit with  MELANIA Santiago for chart documentation. 4/15/2025  14:00 EDT

## 2025-04-15 NOTE — PROGRESS NOTES
..  Venipuncture Blood Specimen Collection  Venipuncture performed in LT arm by Cadence Mayer with good hemostasis. Patient tolerated the procedure well without complications.   04/15/25   Kiera Aguillon MA

## 2025-04-16 LAB
ANION GAP SERPL CALCULATED.3IONS-SCNC: 15.8 MMOL/L (ref 5–15)
BUN SERPL-MCNC: 17 MG/DL (ref 8–23)
BUN/CREAT SERPL: 9.6 (ref 7–25)
CALCIUM SPEC-SCNC: 10.3 MG/DL (ref 8.6–10.5)
CHLORIDE SERPL-SCNC: 92 MMOL/L (ref 98–107)
CO2 SERPL-SCNC: 24.2 MMOL/L (ref 22–29)
CREAT SERPL-MCNC: 1.77 MG/DL (ref 0.76–1.27)
EGFRCR SERPLBLD CKD-EPI 2021: 41.3 ML/MIN/1.73
GLUCOSE SERPL-MCNC: 91 MG/DL (ref 65–99)
POTASSIUM SERPL-SCNC: 5.2 MMOL/L (ref 3.5–5.2)
SODIUM SERPL-SCNC: 132 MMOL/L (ref 136–145)

## 2025-04-22 ENCOUNTER — READMISSION MANAGEMENT (OUTPATIENT)
Dept: CALL CENTER | Facility: HOSPITAL | Age: 70
End: 2025-04-22
Payer: MEDICARE

## 2025-04-22 NOTE — OUTREACH NOTE
Medical Week 2 Survey      Flowsheet Row Responses   [facility] patient discharged from? [facility]   Does the patient have one of the following disease processes/diagnoses(primary or secondary)? Other   Week 2 attempt successful? Yes   Call start time 1210   Discharge diagnosis Hyponatremia   Call end time 1213   Is patient permission given to speak with other caregiver? Yes   List who call center can speak with [facility]   Person spoke with today (if not patient) and relationship [facility]   Meds reviewed with patient/caregiver? Yes   Is the patient taking all medications as directed (includes completed medication regime)? Yes   Does the patient have a primary care provider?  Yes   Has the patient kept scheduled appointments due by today? Yes  [PCP apt since hosp dc]   Did the patient receive a copy of their discharge instructions? Yes   Nursing interventions Reviewed instructions with patient   What is the patient's perception of their health status since discharge? Improving   Is the patient/caregiver able to teach back signs and symptoms related to disease process for when to call PCP? Yes   Is the patient/caregiver able to teach back signs and symptoms related to disease process for when to call 911? Yes   Is the patient/caregiver able to teach back the hierarchy of who to call/visit for symptoms/problems? PCP, Specialist, Home health nurse, Urgent Care, ED, 911 Yes   If the patient is a current smoker, are they able to teach back resources for cessation? Not a smoker   Week 2 Call Completed? Yes   Graduated Yes   Did the patient feel the follow up calls were helpful during their recovery period? Yes   Graduated/Revoked comments Pt has had a FU with PCP since hosp dc   Call end time 1213            Rosa Elena ASHBY - Registered Nurse

## 2025-04-24 ENCOUNTER — HOSPITAL ENCOUNTER (OUTPATIENT)
Dept: CARDIOLOGY | Facility: HOSPITAL | Age: 70
Discharge: HOME | End: 2025-04-24
Admitting: NURSE PRACTITIONER
Payer: MEDICARE

## 2025-04-24 DIAGNOSIS — I70.213 ATHEROSCLEROSIS OF NATIVE ARTERY OF BOTH LOWER EXTREMITIES WITH INTERMITTENT CLAUDICATION: ICD-10-CM

## 2025-04-24 DIAGNOSIS — N18.31 CHRONIC KIDNEY DISEASE, STAGE 3A: ICD-10-CM

## 2025-04-24 DIAGNOSIS — I83.93 ASYMPTOMATIC VARICOSE VEINS OF BOTH LOWER EXTREMITIES: ICD-10-CM

## 2025-04-24 DIAGNOSIS — R53.1 WEAKNESS: ICD-10-CM

## 2025-04-24 DIAGNOSIS — M79.605 BILATERAL LEG PAIN: ICD-10-CM

## 2025-04-24 DIAGNOSIS — Z87.891 HISTORY OF NICOTINE DEPENDENCE: ICD-10-CM

## 2025-04-24 DIAGNOSIS — J44.9 CHRONIC OBSTRUCTIVE PULMONARY DISEASE, UNSPECIFIED COPD TYPE: ICD-10-CM

## 2025-04-24 DIAGNOSIS — M79.604 BILATERAL LEG PAIN: ICD-10-CM

## 2025-04-24 LAB
BH CV LOWER ARTERIAL LEFT ABI RATIO: 1.08
BH CV LOWER ARTERIAL LEFT DORSALIS PEDIS SYS MAX: 147
BH CV LOWER ARTERIAL LEFT GREAT TOE SYS MAX: 118
BH CV LOWER ARTERIAL LEFT POST TIBIAL SYS MAX: 154
BH CV LOWER ARTERIAL LEFT TBI RATIO: 0.83
BH CV LOWER ARTERIAL RIGHT ABI RATIO: 1.07
BH CV LOWER ARTERIAL RIGHT DORSALIS PEDIS SYS MAX: 148
BH CV LOWER ARTERIAL RIGHT GREAT TOE SYS MAX: 118
BH CV LOWER ARTERIAL RIGHT POST TIBIAL SYS MAX: 153
BH CV LOWER ARTERIAL RIGHT TBI RATIO: 0.83
UPPER ARTERIAL LEFT ARM BRACHIAL SYS MAX: 143
UPPER ARTERIAL RIGHT ARM BRACHIAL SYS MAX: 137

## 2025-04-24 PROCEDURE — 93922 UPR/L XTREMITY ART 2 LEVELS: CPT

## 2025-04-28 DIAGNOSIS — J44.9 CHRONIC OBSTRUCTIVE PULMONARY DISEASE, UNSPECIFIED COPD TYPE: ICD-10-CM

## 2025-04-28 RX ORDER — TIOTROPIUM BROMIDE INHALATION SPRAY 1.56 UG/1
SPRAY, METERED RESPIRATORY (INHALATION)
Qty: 12 G | Refills: 3 | Status: SHIPPED | OUTPATIENT
Start: 2025-04-28

## 2025-04-28 NOTE — PROGRESS NOTES
Chief Complaint/Care Team   Right renal mass    Ольга Vilma MELANIA SANTANAgett Vilma ESTHER, MELANIA    History of Present Illness     Diagnosis: Clear-cell renal cell carcinoma, stage II, pT2 pN0, M0, grade 3, diagnosed 8/2024.    Current Treatment:  Pending MRI Abdomen    Previous Treatment: Status post right radical nephrectomy on 8/12/2024 4 by Dr. Zay Mcmanus  -pt declined adjuvant Keytruda IV every 3 weeks for 17 cycles on 11/26/24    Paulo Leiva Jr. is a 69 y.o. male who presents to [facility] HEMATOLOGY & ONCOLOGY for evaluation for suspected renal cell carcinoma seen on CT chest abdomen and pelvis from May 2024.    History of Present Illness  The patient is here for discussion of treatment for kidney cancer that was seen via CT CAP imaging in 5/2024. He is accompanied by his son.  Patient with a history of smoking approximately 2 to 3 packs/day for over 30 years, he quit 13 years ago.  He underwent CT chest for lung cancer screening imaging that incidentally revealed suspicious lesion on his right kidney.  The incidental mass arising in the right kidney measures 8.2 x 6.2 cm, no evidence of metastatic disease in the abdomen chest or pelvis.  CT head also was negative from May 2024.  He is scheduled for a radical nephrectomy in 07/2024 at [facility] with Dr. Mcmanus.       He has a history of COPD/emphysema, vertigo along with hypertension.  He has a heart arrhythmia and is on Eliquis. He was evaluation by multiple cardiologists.  Patient also reports memory impairment, frequent falls over the past 3-4 months, he has difficulty texting and typing on the keyboard.    He quit smoking 14 years ago. He used to smoke 2 to 3 cartons a day. He is a [employer] , he reports no exposure to chemicals or agent Orange.   FH: his maternal grandfather had stomach cancer.       Interval history: Patient here to follow up for active surveillance after undergoing right radical nephrectomy by   "Yonathan at [facility] medicine.  He reports recovering well from surgery. Patient underwent restaging imaging and he is here to discuss these results. He otherwise reports feeling well.     Review of Systems     Oncology/Hematology History    No history exists.       Objective     Vitals:    04/29/25 1143   BP: 165/87   Pulse: 69   Resp: 18   Temp: 98.1 °F (36.7 °C)   TempSrc: Temporal   SpO2: 95%   Weight: 96.2 kg (212 lb)   Height: 180.3 cm (71\")   PainSc: 0-No pain           ECOG score: 0         PHQ-9 Total Score:         Physical Exam  Vitals reviewed. Exam conducted with a chaperone present.   Constitutional:       General: He is not in acute distress.     Appearance: Normal appearance.   HENT:      Head: Normocephalic and atraumatic.   Eyes:      Extraocular Movements: Extraocular movements intact.      Conjunctiva/sclera: Conjunctivae normal.   Cardiovascular:      Pulses: Normal pulses.      Heart sounds: Normal heart sounds.   Pulmonary:      Effort: Pulmonary effort is normal.   Musculoskeletal:      Cervical back: Normal range of motion and neck supple.   Skin:     General: Skin is warm and dry.   Neurological:      Mental Status: He is oriented to person, place, and time.         Physical Exam        Past Medical History     Past Medical History:   Diagnosis Date    Arthritis of back 2024    Benign essential hypertension     Cancer August 1994    Mass on Kidney..    COPD (chronic obstructive pulmonary disease)     Erectile dysfunction Apprx: 5 yrs ago    Generalized convulsive seizures 12/27/2023    Hip arthrosis     Hyperlipidemia 12/14/2018    Hyponatremia     Implantable loop recorder present     Knee swelling     Long term (current) use of non-steroidal anti-inflammatories (nsaid) 04/16/2018    Renal insufficiency     Syncope     Syncope and collapse 09/02/2022    Total of 5 episdoes with the last one 10/22    Outside Source Comment: Overview:  Formatting of this note might be different from the " original.  Total of 5 episdoes with the last one 10/22  Last Assessment & Plan:  Formatting of this note might be different from the original.  No recurrent episodes Lipitor does not show any significant dysrhythmias at this point continue with monitoring      Vertigo 02/17/2021    Vision loss      Current Outpatient Medications on File Prior to Visit   Medication Sig Dispense Refill    citalopram (CeleXA) 10 MG tablet TAKE 1 TABLET DAILY 90 tablet 0    Eliquis 5 MG tablet tablet TAKE 1 TABLET TWICE A  tablet 3    ferrous sulfate 324 (65 Fe) MG tablet delayed-release EC tablet Take 1 tablet by mouth Daily With Breakfast. 90 tablet 0    meclizine (ANTIVERT) 25 MG tablet Take 2 tablets by mouth Daily. 180 tablet 1    metoprolol succinate XL (TOPROL-XL) 50 MG 24 hr tablet TAKE 1 TABLET DAILY 90 tablet 3    QUEtiapine (SEROquel) 50 MG tablet TAKE 1 TABLET EVERY NIGHT 90 tablet 3    rivastigmine (EXELON) 1.5 MG capsule TAKE 1 CAPSULE TWICE A DAY WITH MEALS 180 capsule 3    simvastatin (ZOCOR) 20 MG tablet Take 1 tablet by mouth Daily. 90 tablet 1    Spiriva Respimat 1.25 MCG/ACT aerosol solution inhaler USE 2 INHALATIONS DAILY 12 g 3    thiamine (VITAMIN B1) 100 MG tablet Take 1 tablet by mouth Daily. 30 tablet 0    valsartan (DIOVAN) 80 MG tablet TAKE ONE-HALF (1/2) TABLET DAILY 45 tablet 3     No current facility-administered medications on file prior to visit.      No Known Allergies  Past Surgical History:   Procedure Laterality Date    CARDIAC CATHETERIZATION N/A 09/26/2022    Procedure: Left Heart Cath;  Surgeon: Randell Casiano MD;  Location: [facility] INVASIVE LOCATION;  Service: Cardiovascular;  Laterality: N/A;    CARDIAC ELECTROPHYSIOLOGY PROCEDURE N/A 09/26/2022    Procedure: Loop insertion;  Surgeon: Randell Casiano MD;  Location: [facility] INVASIVE LOCATION;  Service: Cardiovascular;  Laterality: N/A;    COLONOSCOPY  2007    COLONOSCOPY N/A 10/26/2022    Procedure: COLONOSCOPY FOR SCREENING;   Surgeon: Roque Whitlock MD;  Location: [facility] ENDOSCOPY;  Service: Gastroenterology;  Laterality: N/A;  NORMAL COLON    NEPHRECTOMY Right 2024     Social History     Socioeconomic History    Marital status:    Tobacco Use    Smoking status: Former     Current packs/day: 0.00     Average packs/day: 2.0 packs/day for 36.3 years (72.7 ttl pk-yrs)     Types: Cigarettes     Start date: 6/15/1974     Quit date: 10/20/2010     Years since quittin.5     Passive exposure: Past    Smokeless tobacco: Never    Tobacco comments:     SMOKES MORE THAN 2 PPD FOR 35 YEARS   Vaping Use    Vaping status: Never Used   Substance and Sexual Activity    Alcohol use: Not Currently     Alcohol/week: 6.0 - 8.0 standard drinks of alcohol     Types: 6 - 8 Cans of beer per week     Comment: as of 24 was his last drink 8-14 DRINKS PER WEEK as of 2024, prior to 2024 he would drink 20 or more beers daily and did so for 15 years    Drug use: Not Currently    Sexual activity: Not Currently     Partners: Female     Birth control/protection: Condom     Family History   Problem Relation Age of Onset    Dementia Mother     No Known Problems Father     Colon cancer Neg Hx     Malig Hyperthermia Neg Hx        Results     Result Review   The following data was reviewed by: Nabor Moser MD on 2024:  Lab Results   Component Value Date    HGB 18.8 (H) 04/15/2025    HCT 58.7 (H) 04/15/2025    MCV 87.5 04/15/2025     04/15/2025    WBC 8.47 04/15/2025    NEUTROABS 6.27 04/15/2025    LYMPHSABS 1.12 04/15/2025    MONOSABS 0.83 04/15/2025    EOSABS 0.12 04/15/2025    BASOSABS 0.08 04/15/2025     Lab Results   Component Value Date    GLUCOSE 91 04/15/2025    BUN 17 04/15/2025    CREATININE 1.77 (H) 04/15/2025     (L) 04/15/2025    K 5.2 04/15/2025    CL 92 (L) 04/15/2025    CO2 24.2 04/15/2025    CALCIUM 10.3 04/15/2025    PROTEINTOT 7.3 2025    ALBUMIN 3.7 2025    BILITOT 0.8 2025     ALKPHOS 144 (H) 04/08/2025    AST 17 04/08/2025    ALT 13 04/08/2025     Lab Results   Component Value Date    MG 1.7 04/10/2025    PHOS 3.2 04/10/2025    FREET4 1.75 (H) 09/09/2024    TSH 0.741 03/06/2025       No radiology results for the last day  CT Abdomen Pelvis Without Contrast  Result Date: 4/17/2025  Impression: Impression: 1.Abnormal hypodense 5.8 cm liver mass appears new compared to PET scan 10/1/2024 and is most concerning for primary or metastatic neoplasm. Contrast not utilized. Follow-up CT or MRI with contrast would be useful to further evaluate. Subsequent biopsy will likely be needed. 2.Previous right nephrectomy and adrenalectomy. 3.Cholelithiasis. 4.Fat-containing supraumbilical ventral hernias. There is diastases of the rectus muscles and possibly postoperative change. 5.Mild anterior bladder wall thickening versus underdistention. Other findings as above. Electronically Signed: Neftali Carrillo MD  4/17/2025 4:28 PM EDT  Workstation ID: [id number]    CT Chest Without Contrast Diagnostic  Result Date: 4/17/2025  Impression: Impression: CT scan of the chest without IV contrast demonstrating moderate emphysema. Consider LDCT chest in a year. No active disease is seen. Cholelithiasis. Electronically Signed: Audi Ireland MD  4/17/2025 10:23 AM EDT  Workstation ID: [id number]    XR Chest 1 View  Result Date: 4/8/2025  Impression: Impression: Poor respiratory effort without definite consolidation. Electronically Signed: Bar Davis DO  4/8/2025 7:32 PM EDT  Workstation ID: [id number]      Assessment & Plan     Diagnoses and all orders for this visit:    1. Renal cell carcinoma, right (Primary)  -     MRI Abdomen With & Without Contrast; Future  -     CBC & Differential; Future  -     Comprehensive Metabolic Panel; Future  -     CEA; Future  -     AFP Tumor Marker; Future    2. Encounter for follow-up surveillance of kidney cancer  -     CEA; Future    3. Abnormal liver diagnostic imaging  -      AFP Tumor Marker; Future        Paulo ESTHER Leiva Jr. is a 69 y.o. male who presents to [facility] HEMATOLOGY & ONCOLOGY for follow-up regarding stage II clear-cell renal cell carcinoma.    -Patient is status post radical nephrectomy in August 2024 by Dr. Zay Mcmanus at [facility].  -Per pathology report from surgery: Clear-cell renal cell carcinoma, stage II, pT2 pN0, M0, grade 3, diagnosed 8/2024.  -Discussed diagnosis, stage, prognosis, and reviewed pathology report and PET/CT imaging with patient in detail today.  -Reviewed NCCN guidelines version 2.2025 as well as results of Keynote 564 published in [place] Journal of Medicine April 2024 (Beulah T. Et al. overall survival with adjuvant pembrolizumab and renal cell carcinoma.[facility] 2024; [id number]), provided him a copy of abstract today  -Discussed option of further observation versus 17 cycles of adjuvant pembrolizumab, due to improvement overall survival and results of keynote 564, recommend patient proceed with Keytruda, patient shared he would like to think about it and discuss at a later date  -pt doesn't appear to meet criteria for genetic counseling now, but may consider this at a later date  -pt underwent MRI left shoulder which was negative for evidence of metastatic disease  -pt decided on 11/26/24 to not receive pembrolizumab (Keytruda in the adjuvant setting)  -4/29/2025: Patient on restaging imaging with CT chest abdomen pelvis on 4/15/2025 which CT chest was negative for metastatic disease, CT abd/pelvis showed a hypodense 5.8 cm liver mass new compared to PET CT scan from 10/1/2024, concerning for primary versus metastatic neoplasm, discussed results with patient today shared plan to obtain MRI with contrast to further assess, will consider biopsy based on MRI results vs referral to [facility]    Assessment & Plan  2.  Hyponatremia  - Sodium of 126, thus ordered sodium tablets 3 times a day  -sodium  improved on most recent check  - encouraged pt to continue follow up with nephrology      3. Falls and heart arrhythmia.  -The CT scan of the head showed no acute abnormalities, recommend pt discuss referral to neurology and/or geriatrics evaluation for evaluation for dementia or neurological condition with his PCP        Pt with high distress score, thus placed SW consult.    Plan for patient follow-up in 3 weeks to discuss results of MRI abdomen with labs CBC, CMP, AFP, CEA    Please note that portions of this note were completed with a voice recognition program.    Electronically signed by Nabor Moser MD, 04/29/25, 12:38 PM EDT.    Follow Up     I spent 30 minutes caring for Paulo on this date of service. This time includes time spent by me in the following activities:preparing for the visit, reviewing tests, obtaining and/or reviewing a separately obtained history, performing a medically appropriate examination and/or evaluation , counseling and educating the patient/family/caregiver, ordering medications, tests, or procedures, referring and communicating with other health care professionals , documenting information in the medical record, independently interpreting results and communicating that information with the patient/family/caregiver, and care coordination    Any chemotherapy or immunotherapy or other systemic therapy treatment plan involves a high risk of complications and/or mortality of patient management.    The patient was seen and examined. Work by the provider also included review and/or ordering of lab tests, review and/or ordering of radiology tests, review and/or ordering of medicine tests, discussion with other physicians or providers, independent review of data, obtaining old records, review/summation of old records, and/or other review.    I have reviewed the family history, social history, and past medical history for this patient. Previous information and data has been reviewed and  updated as needed. I have reviewed and verified the chief complaint, history, and other documentation. The patient was interviewed and examined in the clinic and the chart reviewed. The previous observations, recommendations, and conclusions were reviewed including those of other providers.     The plan was discussed with the patient and/or family. The patient was given time to ask questions and these questions were answered. At the conclusion of their visit they had no additional questions or concerns and all questions were answered to their satisfaction.    Patient was given instructions and counseling regarding his condition or for health maintenance advice. Please see specific information pulled into the AVS if appropriate.       Patient or patient representative verbalized consent for the use of Ambient Listening during the visit with  Nabor Moser MD for chart documentation. 4/29/2025  16:17 EDT

## 2025-04-29 ENCOUNTER — TRANSCRIBE ORDERS (OUTPATIENT)
Dept: ULTRASOUND IMAGING | Facility: HOSPITAL | Age: 70
End: 2025-04-29
Payer: MEDICARE

## 2025-04-29 ENCOUNTER — OFFICE VISIT (OUTPATIENT)
Dept: ONCOLOGY | Facility: HOSPITAL | Age: 70
End: 2025-04-29
Payer: MEDICARE

## 2025-04-29 VITALS
OXYGEN SATURATION: 95 % | DIASTOLIC BLOOD PRESSURE: 87 MMHG | HEART RATE: 69 BPM | TEMPERATURE: 98.1 F | RESPIRATION RATE: 18 BRPM | SYSTOLIC BLOOD PRESSURE: 165 MMHG | HEIGHT: 71 IN | BODY MASS INDEX: 29.68 KG/M2 | WEIGHT: 212 LBS

## 2025-04-29 DIAGNOSIS — R41.3 MEMORY LOSS: Primary | ICD-10-CM

## 2025-04-29 DIAGNOSIS — Z85.528 ENCOUNTER FOR FOLLOW-UP SURVEILLANCE OF KIDNEY CANCER: ICD-10-CM

## 2025-04-29 DIAGNOSIS — R93.2 ABNORMAL LIVER DIAGNOSTIC IMAGING: ICD-10-CM

## 2025-04-29 DIAGNOSIS — C64.1 RENAL CELL CARCINOMA, RIGHT: Primary | ICD-10-CM

## 2025-04-29 DIAGNOSIS — Z08 ENCOUNTER FOR FOLLOW-UP SURVEILLANCE OF KIDNEY CANCER: ICD-10-CM

## 2025-04-29 PROCEDURE — G0463 HOSPITAL OUTPT CLINIC VISIT: HCPCS | Performed by: INTERNAL MEDICINE

## 2025-05-06 ENCOUNTER — HOSPITAL ENCOUNTER (OUTPATIENT)
Dept: NEUROLOGY | Facility: HOSPITAL | Age: 70
Discharge: HOME | End: 2025-05-06
Admitting: PSYCHIATRY & NEUROLOGY
Payer: MEDICARE

## 2025-05-06 ENCOUNTER — HOSPITAL ENCOUNTER (EMERGENCY)
Facility: HOSPITAL | Age: 70
Discharge: HOME | End: 2025-05-06
Attending: EMERGENCY MEDICINE | Admitting: EMERGENCY MEDICINE
Payer: MEDICARE

## 2025-05-06 ENCOUNTER — APPOINTMENT (OUTPATIENT)
Dept: CT IMAGING | Facility: HOSPITAL | Age: 70
End: 2025-05-06
Payer: MEDICARE

## 2025-05-06 VITALS
SYSTOLIC BLOOD PRESSURE: 181 MMHG | OXYGEN SATURATION: 88 % | TEMPERATURE: 98.1 F | WEIGHT: 212.08 LBS | RESPIRATION RATE: 20 BRPM | HEIGHT: 71 IN | BODY MASS INDEX: 29.69 KG/M2 | DIASTOLIC BLOOD PRESSURE: 85 MMHG | HEART RATE: 68 BPM

## 2025-05-06 DIAGNOSIS — S01.112A EYEBROW LACERATION, LEFT, INITIAL ENCOUNTER: ICD-10-CM

## 2025-05-06 DIAGNOSIS — S00.03XA CONTUSION OF SCALP, INITIAL ENCOUNTER: ICD-10-CM

## 2025-05-06 DIAGNOSIS — R41.3 MEMORY LOSS: ICD-10-CM
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
## 2025-05-06 PROCEDURE — 95816 EEG AWAKE AND DROWSY: CPT

## 2025-05-06 PROCEDURE — 99284 EMERGENCY DEPT VISIT MOD MDM: CPT

## 2025-05-06 PROCEDURE — 70450 CT HEAD/BRAIN W/O DYE: CPT

## 2025-05-06 RX ORDER — GINSENG 100 MG
1 CAPSULE ORAL ONCE
Status: COMPLETED | OUTPATIENT
Start: 2025-05-06 | End: 2025-05-06

## 2025-05-06 RX ADMIN — BACITRACIN 0.9 G: 500 OINTMENT TOPICAL at 12:19

## 2025-05-06 NOTE — DISCHARGE INSTRUCTIONS
Follow wound care instructions.  Take Tylenol for pain.  Return for worsening symptoms.  Follow-up with  In 1 to 2 days if no better

## 2025-05-06 NOTE — ED PROVIDER NOTES
Time: 12:05 PM EDT  Date of encounter:  5/6/2025  Independent Historian/Clinical History and Information was obtained by:   Patient    History is limited by: N/A    Chief Complaint: Fall with head injury      History of Present Illness:  Patient is a 69 y.o. year old male who presents to the emergency department for evaluation of fall with head injury.  This patient presents to the emergency room after he tripped and fell and injured his forehead.  He did not lose consciousness however he did sustain a laceration to the left eyebrow and also an abrasion to the left forearm.  He denies any other pain or injury and he has been ambulatory since that time.      Patient Care Team  Primary Care Provider: Vilma Rolon APRN    Past Medical History:     No Known Allergies  Past Medical History:   Diagnosis Date    Arthritis of back 2024    Benign essential hypertension     Cancer August 1994    Mass on Kidney..    COPD (chronic obstructive pulmonary disease)     Erectile dysfunction Apprx: 5 yrs ago    Generalized convulsive seizures 12/27/2023    Hip arthrosis     Hyperlipidemia 12/14/2018    Hyponatremia     Implantable loop recorder present     Knee swelling     Long term (current) use of non-steroidal anti-inflammatories (nsaid) 04/16/2018    Renal insufficiency     Syncope     Syncope and collapse 09/02/2022    Total of 5 episdoes with the last one 10/22    Outside Source Comment: Overview:  Formatting of this note might be different from the original.  Total of 5 episdoes with the last one 10/22  Last Assessment & Plan:  Formatting of this note might be different from the original.  No recurrent episodes Lipitor does not show any significant dysrhythmias at this point continue with monitoring      Vertigo 02/17/2021    Vision loss      Past Surgical History:   Procedure Laterality Date    CARDIAC CATHETERIZATION N/A 09/26/2022    Procedure: Left Heart Cath;  Surgeon: Randell Casiano MD;  Location: [facility]  INVASIVE LOCATION;  Service: Cardiovascular;  Laterality: N/A;    CARDIAC ELECTROPHYSIOLOGY PROCEDURE N/A 09/26/2022    Procedure: Loop insertion;  Surgeon: Randell Casiano MD;  Location: [facility] CATH INVASIVE LOCATION;  Service: Cardiovascular;  Laterality: N/A;    COLONOSCOPY  2007    COLONOSCOPY N/A 10/26/2022    Procedure: COLONOSCOPY FOR SCREENING;  Surgeon: Roque Whitlock MD;  Location: [facility] ENDOSCOPY;  Service: Gastroenterology;  Laterality: N/A;  NORMAL COLON    NEPHRECTOMY Right 08/12/2024     Family History   Problem Relation Age of Onset    Dementia Mother     No Known Problems Father     Colon cancer Neg Hx     Malig Hyperthermia Neg Hx        Home Medications:  Prior to Admission medications    Medication Sig Start Date End Date Taking? Authorizing Provider   citalopram (CeleXA) 10 MG tablet TAKE 1 TABLET DAILY 3/17/25   Vilma Rolon APRN   Eliquis 5 MG tablet tablet TAKE 1 TABLET TWICE A DAY 1/14/25   Randell Casiano MD   ferrous sulfate 324 (65 Fe) MG tablet delayed-release EC tablet Take 1 tablet by mouth Daily With Breakfast. 3/11/25   Vilma Rolon APRN   meclizine (ANTIVERT) 25 MG tablet Take 2 tablets by mouth Daily. 10/28/24   Vilma Rolon APRN   metoprolol succinate XL (TOPROL-XL) 50 MG 24 hr tablet TAKE 1 TABLET DAILY 8/12/24   Randell Casiano MD   QUEtiapine (SEROquel) 50 MG tablet TAKE 1 TABLET EVERY NIGHT 3/27/25   Christian Fox MD   rivastigmine (EXELON) 1.5 MG capsule TAKE 1 CAPSULE TWICE A DAY WITH MEALS 3/27/25   Christian Fox MD   simvastatin (ZOCOR) 20 MG tablet Take 1 tablet by mouth Daily. 10/28/24   Vilma Rolon APRN   Spiriva Respimat 1.25 MCG/ACT aerosol solution inhaler USE 2 INHALATIONS DAILY 4/28/25   Vilma Rolon APRN   thiamine (VITAMIN B1) 100 MG tablet Take 1 tablet by mouth Daily. 9/26/24   Vilma Rolon APRN   valsartan (DIOVAN) 80 MG tablet TAKE ONE-HALF (1/2) TABLET DAILY 3/27/25   Christian Fox MD        Social  "History:   Social History     Tobacco Use    Smoking status: Former     Current packs/day: 0.00     Average packs/day: 2.0 packs/day for 36.3 years (72.7 ttl pk-yrs)     Types: Cigarettes     Start date: 6/15/1974     Quit date: 10/20/2010     Years since quittin.5     Passive exposure: Past    Smokeless tobacco: Never    Tobacco comments:     SMOKES MORE THAN 2 PPD FOR 35 YEARS   Vaping Use    Vaping status: Never Used   Substance Use Topics    Alcohol use: Not Currently     Alcohol/week: 6.0 - 8.0 standard drinks of alcohol     Types: 6 - 8 Cans of beer per week     Comment: as of 25 was his last drink 8-14 DRINKS PER WEEK as of 2024, prior to 2024 he would drink 20 or more beers daily and did so for 15 years    Drug use: Not Currently         Review of Systems:  Review of Systems   Constitutional:  Negative for chills and fever.   HENT:  Negative for congestion, ear pain and sore throat.    Eyes:  Negative for pain.   Respiratory:  Negative for cough, chest tightness and shortness of breath.    Cardiovascular:  Negative for chest pain.   Gastrointestinal:  Negative for abdominal pain, diarrhea, nausea and vomiting.   Genitourinary:  Negative for flank pain and hematuria.   Musculoskeletal:  Negative for joint swelling.   Skin:  Positive for wound. Negative for pallor.   Neurological:  Positive for headaches. Negative for seizures.   All other systems reviewed and are negative.       Physical Exam:  BP (!) 181/85   Pulse 68   Temp 98.1 °F (36.7 °C) (Oral)   Resp 20   Ht 180.3 cm (71\")   Wt 96.2 kg (212 lb 1.3 oz)   SpO2 (!) 88%   BMI 29.58 kg/m²     Physical Exam  Vitals and nursing note reviewed.   Constitutional:       General: He is not in acute distress.     Appearance: Normal appearance. He is not toxic-appearing.   HENT:      Head: Normocephalic.      Comments: 3 cm horizontal laceration through left eyebrow     Mouth/Throat:      Mouth: Mucous membranes are moist.   Eyes:      " General: No scleral icterus.  Cardiovascular:      Rate and Rhythm: Normal rate and regular rhythm.      Pulses: Normal pulses.      Heart sounds: Normal heart sounds.   Pulmonary:      Effort: Pulmonary effort is normal. No respiratory distress.      Breath sounds: Normal breath sounds.   Abdominal:      General: Abdomen is flat.      Palpations: Abdomen is soft.      Tenderness: There is no abdominal tenderness.   Musculoskeletal:         General: Normal range of motion.      Cervical back: Normal range of motion and neck supple.   Skin:     General: Skin is warm and dry.      Findings: Lesion present.      Comments: Abrasion to the left ulnar forearm   Neurological:      Mental Status: He is alert and oriented to person, place, and time. Mental status is at baseline.                    Medical Decision Making:      Comorbidities that affect care:    COPD    External Notes reviewed:    Previous Clinic Note: Office visit for renal cell carcinoma      The following orders were placed and all results were independently analyzed by me:  Orders Placed This Encounter   Procedures    Laceration Repair    CT Head Without Contrast       Medications Given in the Emergency Department:  Medications   bacitracin 500 UNIT/GM ointment 0.9 g (0.9 g Topical Given 5/6/25 1219)        ED Course:         Labs:    Lab Results (last 24 hours)       ** No results found for the last 24 hours. **             Imaging:    CT Head Without Contrast  Result Date: 5/6/2025  CT HEAD WO CONTRAST Date of Exam: 5/6/2025 10:10 AM EDT Indication: fall. Comparison: None available. Technique: Axial CT images were obtained of the head without contrast administration.  Reconstructed coronal and sagittal images were also obtained. Automated exposure control and iterative construction methods were used. Findings: There is no evidence of hemorrhage. There is no mass effect or midline shift. Diffuse brain atrophy. Periventricular hypodensities compatible  with chronic small vessel ischemia There is no extracerebral collection. Ventricles are normal in size and configuration for patient's stated age. Posterior fossa is within normal limits. Calvarium and skull base appear intact.  Visualized sinuses show no air fluid levels. Visualized orbits are unremarkable.     Impression: Brain atrophy with chronic microvascular ischemic changes No acute intracranial abnormality Electronically Signed: Mraiano Mendez MD  5/6/2025 10:23 AM EDT  Workstation ID: [id number]        Differential Diagnosis and Discussion:    Trauma:  Differential diagnosis considered but not limited to were subarachnoid hemorrhage, intracranial bleeding, pneumothorax, cardiac contusion, lung contusion, intra-abdominal bleeding, and compartment syndrome of any extremity or other significant traumatic pathology    PROCEDURES:    X-ray were performed in the emergency department and all X-ray impressions were independently interpreted by me.    No orders to display       Laceration Repair    Date/Time: 5/6/2025 6:56 PM    Performed by: Matthew Gallagher DO  Authorized by: Matthew Gallagher DO    Consent:     Consent obtained:  Verbal    Consent given by:  Patient    Risks, benefits, and alternatives were discussed: yes      Risks discussed:  Infection, poor cosmetic result, pain and poor wound healing    Alternatives discussed:  No treatment and delayed treatment  Universal protocol:     Procedure explained and questions answered to patient or proxy's satisfaction: yes      Relevant documents present and verified: yes      Test results available: yes      Imaging studies available: yes      Patient identity confirmed:  Verbally with patient and arm band  Anesthesia:     Anesthesia method:  None  Laceration details:     Location:  Face    Face location:  L eyebrow    Length (cm):  3    Depth (mm):  4  Pre-procedure details:     Preparation:  Imaging obtained to evaluate for foreign bodies  Exploration:      Limited defect created (wound extended): no      Hemostasis achieved with:  Direct pressure    Imaging obtained: x-ray      Imaging outcome: foreign body not noted      Wound exploration: entire depth of wound visualized      Contaminated: no    Treatment:     Area cleansed with:  Saline    Amount of cleaning:  Standard    Irrigation solution:  Sterile saline    Irrigation method:  Syringe    Visualized foreign bodies/material removed: no      Debridement:  None    Undermining:  None    Scar revision: no    Skin repair:     Repair method:  Tissue adhesive  Approximation:     Approximation:  Close  Repair type:     Repair type:  Simple  Post-procedure details:     Dressing:  Open (no dressing)    Procedure completion:  Tolerated well, no immediate complications      MDM     Amount and/or Complexity of Data Reviewed  Tests in the radiology section of CPT®: reviewed                       Patient Care Considerations:    CT ABDOMEN AND PELVIS: I considered ordering a CT scan of the abdomen and pelvis however the patient has no abdominal pain or tenderness      Consultants/Shared Management Plan:    None    Social Determinants of Health:    Patient is independent, reliable, and has access to care.       Disposition and Care Coordination:    Discharged: I considered escalation of care by admitting this patient to the hospital, however the patient has no sign of emergent condition requiring hospitalization.    I have explained discharge medications and the need for follow up with the patient/caretakers. This was also printed in the discharge instructions. Patient was discharged with the following medications and follow up:      Medication List      No changes were made to your prescriptions during this visit.      Vilma Rolon, APRN  [address] [place] [address] [phone]    In 2 days         Final diagnoses:   Fall, initial encounter   Contusion of scalp, initial encounter   Eyebrow laceration, left,  initial encounter        ED Disposition       ED Disposition   Discharge    Condition   Stable    Comment   --               This medical record created using voice recognition software.             Matthew Gallagher, DO  05/06/25 [id number]

## 2025-05-13 ENCOUNTER — TELEPHONE (OUTPATIENT)
Dept: ONCOLOGY | Facility: HOSPITAL | Age: 70
End: 2025-05-13
Payer: MEDICARE

## 2025-05-13 NOTE — TELEPHONE ENCOUNTER
Caller: SANTANA DING    Relationship to patient: Emergency Contact    Best call back number: [phone]    Type of visit: FU    Requested date: AFTER 6/3     If rescheduling, when is the original appointment: 5/23     Additional notes:PT'S MRI WAS MOVED TO 6/3.  SANTANA ALSO ASKING FOR CALLBACK FOR CLARIFICATION ON WHY THE MRI WAS R/S FROM 5/14 TO 6/3     PLEASE ADVISE

## 2025-05-14 ENCOUNTER — OFFICE VISIT (OUTPATIENT)
Dept: CARDIOLOGY | Facility: CLINIC | Age: 70
End: 2025-05-14
Payer: MEDICARE

## 2025-05-14 ENCOUNTER — LAB (OUTPATIENT)
Facility: HOSPITAL | Age: 70
End: 2025-05-14
Payer: MEDICARE

## 2025-05-14 ENCOUNTER — TRANSCRIBE ORDERS (OUTPATIENT)
Dept: ADMINISTRATIVE | Facility: HOSPITAL | Age: 70
End: 2025-05-14
Payer: MEDICARE

## 2025-05-14 ENCOUNTER — DOCUMENTATION (OUTPATIENT)
Dept: ONCOLOGY | Facility: HOSPITAL | Age: 70
End: 2025-05-14
Payer: MEDICARE

## 2025-05-14 ENCOUNTER — RESULTS FOLLOW-UP (OUTPATIENT)
Facility: HOSPITAL | Age: 70
End: 2025-05-14
Payer: MEDICARE

## 2025-05-14 VITALS
SYSTOLIC BLOOD PRESSURE: 176 MMHG | HEART RATE: 65 BPM | BODY MASS INDEX: 28.73 KG/M2 | WEIGHT: 205.2 LBS | HEIGHT: 71 IN | DIASTOLIC BLOOD PRESSURE: 87 MMHG

## 2025-05-14 DIAGNOSIS — C64.1 RENAL CELL CARCINOMA, RIGHT: ICD-10-CM

## 2025-05-14 DIAGNOSIS — R93.2 ABNORMAL LIVER DIAGNOSTIC IMAGING: ICD-10-CM

## 2025-05-14 DIAGNOSIS — R41.3 POOR MEMORY: Primary | ICD-10-CM

## 2025-05-14 DIAGNOSIS — E78.2 MIXED HYPERLIPIDEMIA: ICD-10-CM

## 2025-05-14 DIAGNOSIS — Z08 ENCOUNTER FOR FOLLOW-UP SURVEILLANCE OF KIDNEY CANCER: ICD-10-CM

## 2025-05-14 DIAGNOSIS — I48.0 PAROXYSMAL ATRIAL FIBRILLATION: Primary | ICD-10-CM

## 2025-05-14 DIAGNOSIS — Z95.818 STATUS POST PLACEMENT OF IMPLANTABLE LOOP RECORDER: ICD-10-CM

## 2025-05-14 DIAGNOSIS — R41.3 POOR MEMORY: ICD-10-CM

## 2025-05-14 DIAGNOSIS — Z85.528 ENCOUNTER FOR FOLLOW-UP SURVEILLANCE OF KIDNEY CANCER: ICD-10-CM

## 2025-05-14 DIAGNOSIS — I10 ESSENTIAL HYPERTENSION: ICD-10-CM

## 2025-05-14 PROBLEM — E55.9 VITAMIN D DEFICIENCY: Status: ACTIVE | Noted: 2024-11-27

## 2025-05-14 LAB
25(OH)D3 SERPL-MCNC: 55.9 NG/ML (ref 30–100)
ALBUMIN SERPL-MCNC: 3.7 G/DL (ref 3.5–5.2)
ALBUMIN/GLOB SERPL: 1.1 G/DL
ALP SERPL-CCNC: 195 U/L (ref 39–117)
ALPHA-FETOPROTEIN: 5.3 NG/ML (ref 0–8.3)
ALT SERPL W P-5'-P-CCNC: 11 U/L (ref 1–41)
ANION GAP SERPL CALCULATED.3IONS-SCNC: 9.3 MMOL/L (ref 5–15)
AST SERPL-CCNC: 17 U/L (ref 1–40)
BASOPHILS # BLD AUTO: 0.09 10*3/MM3 (ref 0–0.2)
BASOPHILS NFR BLD AUTO: 1.2 % (ref 0–1.5)
BILIRUB CONJ SERPL-MCNC: 0.3 MG/DL (ref 0–0.3)
BILIRUB SERPL-MCNC: 1.1 MG/DL (ref 0–1.2)
BUN SERPL-MCNC: 13 MG/DL (ref 8–23)
BUN/CREAT SERPL: 9.2 (ref 7–25)
CALCIUM SPEC-SCNC: 9.6 MG/DL (ref 8.6–10.5)
CEA SERPL-MCNC: 0.94 NG/ML
CHLORIDE SERPL-SCNC: 93 MMOL/L (ref 98–107)
CHOLEST SERPL-MCNC: 138 MG/DL (ref 0–200)
CO2 SERPL-SCNC: 20.7 MMOL/L (ref 22–29)
CREAT SERPL-MCNC: 1.41 MG/DL (ref 0.76–1.27)
CRP SERPL-MCNC: 5.19 MG/DL (ref 0–0.5)
DEPRECATED RDW RBC AUTO: 49.7 FL (ref 37–54)
EGFRCR SERPLBLD CKD-EPI 2021: 54.3 ML/MIN/1.73
EOSINOPHIL # BLD AUTO: 0.13 10*3/MM3 (ref 0–0.4)
EOSINOPHIL NFR BLD AUTO: 1.8 % (ref 0.3–6.2)
ERYTHROCYTE [DISTWIDTH] IN BLOOD BY AUTOMATED COUNT: 18.9 % (ref 12.3–15.4)
FOLATE SERPL-MCNC: 10.3 NG/ML (ref 4.78–24.2)
GLOBULIN UR ELPH-MCNC: 3.4 GM/DL
GLUCOSE SERPL-MCNC: 92 MG/DL (ref 65–99)
HCT VFR BLD AUTO: 55.6 % (ref 37.5–51)
HDLC SERPL-MCNC: 47 MG/DL (ref 40–60)
HGB BLD-MCNC: 18.8 G/DL (ref 13–17.7)
HIV 1+2 AB+HIV1 P24 AG SERPL QL IA: NORMAL
IMM GRANULOCYTES # BLD AUTO: 0.05 10*3/MM3 (ref 0–0.05)
IMM GRANULOCYTES NFR BLD AUTO: 0.7 % (ref 0–0.5)
IRON 24H UR-MRATE: 25 MCG/DL (ref 59–158)
IRON SATN MFR SERPL: 8 % (ref 20–50)
LDLC SERPL CALC-MCNC: 76 MG/DL (ref 0–100)
LDLC/HDLC SERPL: 1.6 {RATIO}
LYMPHOCYTES # BLD AUTO: 1.17 10*3/MM3 (ref 0.7–3.1)
LYMPHOCYTES NFR BLD AUTO: 16.1 % (ref 19.6–45.3)
MCH RBC QN AUTO: 27.8 PG (ref 26.6–33)
MCHC RBC AUTO-ENTMCNC: 33.8 G/DL (ref 31.5–35.7)
MCV RBC AUTO: 82.1 FL (ref 79–97)
MONOCYTES # BLD AUTO: 0.93 10*3/MM3 (ref 0.1–0.9)
MONOCYTES NFR BLD AUTO: 12.8 % (ref 5–12)
NEUTROPHILS NFR BLD AUTO: 4.9 10*3/MM3 (ref 1.7–7)
NEUTROPHILS NFR BLD AUTO: 67.4 % (ref 42.7–76)
NRBC BLD AUTO-RTO: 0 /100 WBC (ref 0–0.2)
PLATELET # BLD AUTO: 211 10*3/MM3 (ref 140–450)
PMV BLD AUTO: 9.1 FL (ref 6–12)
POTASSIUM SERPL-SCNC: 5.6 MMOL/L (ref 3.5–5.2)
PROT SERPL-MCNC: 7.1 G/DL (ref 6–8.5)
RBC # BLD AUTO: 6.77 10*6/MM3 (ref 4.14–5.8)
SODIUM SERPL-SCNC: 123 MMOL/L (ref 136–145)
T4 FREE SERPL-MCNC: 1.78 NG/DL (ref 0.92–1.68)
TIBC SERPL-MCNC: 311 MCG/DL (ref 298–536)
TRANSFERRIN SERPL-MCNC: 209 MG/DL (ref 200–360)
TRIGL SERPL-MCNC: 79 MG/DL (ref 0–150)
TSH SERPL DL<=0.05 MIU/L-ACNC: 0.81 UIU/ML (ref 0.27–4.2)
VIT B12 BLD-MCNC: 1385 PG/ML (ref 211–946)
VLDLC SERPL-MCNC: 15 MG/DL (ref 5–40)
WBC NRBC COR # BLD AUTO: 7.27 10*3/MM3 (ref 3.4–10.8)

## 2025-05-14 PROCEDURE — 1160F RVW MEDS BY RX/DR IN RCRD: CPT | Performed by: NURSE PRACTITIONER

## 2025-05-14 PROCEDURE — 85025 COMPLETE CBC W/AUTO DIFF WBC: CPT

## 2025-05-14 PROCEDURE — 86769 SARS-COV-2 COVID-19 ANTIBODY: CPT

## 2025-05-14 PROCEDURE — 36415 COLL VENOUS BLD VENIPUNCTURE: CPT

## 2025-05-14 PROCEDURE — 83540 ASSAY OF IRON: CPT

## 2025-05-14 PROCEDURE — 80053 COMPREHEN METABOLIC PANEL: CPT

## 2025-05-14 PROCEDURE — 83825 ASSAY OF MERCURY: CPT

## 2025-05-14 PROCEDURE — 84443 ASSAY THYROID STIM HORMONE: CPT

## 2025-05-14 PROCEDURE — 86038 ANTINUCLEAR ANTIBODIES: CPT

## 2025-05-14 PROCEDURE — 82746 ASSAY OF FOLIC ACID SERUM: CPT

## 2025-05-14 PROCEDURE — 86140 C-REACTIVE PROTEIN: CPT

## 2025-05-14 PROCEDURE — 82378 CARCINOEMBRYONIC ANTIGEN: CPT

## 2025-05-14 PROCEDURE — 86757 RICKETTSIA ANTIBODY: CPT

## 2025-05-14 PROCEDURE — 84466 ASSAY OF TRANSFERRIN: CPT

## 2025-05-14 PROCEDURE — 87469 BABESIA MICROTI AMP PRB: CPT

## 2025-05-14 PROCEDURE — 83655 ASSAY OF LEAD: CPT

## 2025-05-14 PROCEDURE — 99214 OFFICE O/P EST MOD 30 MIN: CPT | Performed by: NURSE PRACTITIONER

## 2025-05-14 PROCEDURE — 82607 VITAMIN B-12: CPT

## 2025-05-14 PROCEDURE — 80061 LIPID PANEL: CPT

## 2025-05-14 PROCEDURE — 82248 BILIRUBIN DIRECT: CPT

## 2025-05-14 PROCEDURE — 86788 WEST NILE VIRUS AB IGM: CPT

## 2025-05-14 PROCEDURE — 3077F SYST BP >= 140 MM HG: CPT | Performed by: NURSE PRACTITIONER

## 2025-05-14 PROCEDURE — 87798 DETECT AGENT NOS DNA AMP: CPT

## 2025-05-14 PROCEDURE — 84439 ASSAY OF FREE THYROXINE: CPT

## 2025-05-14 PROCEDURE — 1159F MED LIST DOCD IN RCRD: CPT | Performed by: NURSE PRACTITIONER

## 2025-05-14 PROCEDURE — 82105 ALPHA-FETOPROTEIN SERUM: CPT

## 2025-05-14 PROCEDURE — 3079F DIAST BP 80-89 MM HG: CPT | Performed by: NURSE PRACTITIONER

## 2025-05-14 PROCEDURE — 87484 EHRLICHA CHAFFEENSIS AMP PRB: CPT

## 2025-05-14 PROCEDURE — 86618 LYME DISEASE ANTIBODY: CPT

## 2025-05-14 PROCEDURE — 86789 WEST NILE VIRUS ANTIBODY: CPT

## 2025-05-14 PROCEDURE — 86611 BARTONELLA ANTIBODY: CPT

## 2025-05-14 PROCEDURE — 82306 VITAMIN D 25 HYDROXY: CPT

## 2025-05-14 PROCEDURE — 82175 ASSAY OF ARSENIC: CPT

## 2025-05-14 PROCEDURE — G0432 EIA HIV-1/HIV-2 SCREEN: HCPCS

## 2025-05-14 RX ORDER — VALSARTAN 80 MG/1
80 TABLET ORAL DAILY
Qty: 90 TABLET | Refills: 3 | Status: SHIPPED | OUTPATIENT
Start: 2025-05-14

## 2025-05-14 RX ORDER — SODIUM CHLORIDE 1 G/1
1 TABLET ORAL 3 TIMES DAILY
Qty: 90 TABLET | Refills: 1 | Status: SHIPPED | OUTPATIENT
Start: 2025-05-14

## 2025-05-14 RX ORDER — METOPROLOL SUCCINATE 50 MG/1
50 TABLET, EXTENDED RELEASE ORAL DAILY
Qty: 90 TABLET | Refills: 3 | Status: SHIPPED | OUTPATIENT
Start: 2025-05-14

## 2025-05-14 NOTE — PROGRESS NOTES
Chief Complaint  Follow-up, Hypertension, and Hyperlipidemia    Subjective            History of Present Illness  Paulo Leiva Jr. Is a 69-year-old male patient who presents to the office today for follow up.    History of Present Illness  The patient presents today for a follow-up visit.    He has a loop recorder, which on 04/15/2025, showed atrial fibrillation. He is taking Eliquis and metoprolol for rate control and as a blood thinner. He is going to continue those medications.    His blood pressure was elevated today. He will increase the valsartan dose to 80 mg daily. He is going to do a 2-week blood pressure log and turn that back in. When we get that back in 2 weeks, we will decide if anything else needs to be done for the blood pressure. If blood pressure is still not well controlled in 2 weeks, he will follow up sooner.    MEDICATIONS  Eliquis, metoprolol, valsartan        PMH  Past Medical History:   Diagnosis Date    Arthritis of back 2024    Benign essential hypertension     Cancer August 1994    Mass on Kidney..    COPD (chronic obstructive pulmonary disease)     Erectile dysfunction Apprx: 5 yrs ago    Generalized convulsive seizures 12/27/2023    Hip arthrosis     Hyperlipidemia 12/14/2018    Hyponatremia     Implantable loop recorder present     Knee swelling     Long term (current) use of non-steroidal anti-inflammatories (nsaid) 04/16/2018    Paroxysmal atrial fibrillation 5/14/2025    Renal insufficiency     Syncope     Syncope and collapse 09/02/2022    Total of 5 episdoes with the last one 10/22    Outside Source Comment: Overview:  Formatting of this note might be different from the original.  Total of 5 episdoes with the last one 10/22  Last Assessment & Plan:  Formatting of this note might be different from the original.  No recurrent episodes Lipitor does not show any significant dysrhythmias at this point continue with monitoring      Vertigo 02/17/2021    Vision loss           ALLERGY  No Known Allergies       SURGICALHX  Past Surgical History:   Procedure Laterality Date    CARDIAC CATHETERIZATION N/A 2022    Procedure: Left Heart Cath;  Surgeon: Randell Casiano MD;  Location: [facility] CATH INVASIVE LOCATION;  Service: Cardiovascular;  Laterality: N/A;    CARDIAC ELECTROPHYSIOLOGY PROCEDURE N/A 2022    Procedure: Loop insertion;  Surgeon: Randell Casiano MD;  Location: [facility] CATH INVASIVE LOCATION;  Service: Cardiovascular;  Laterality: N/A;    COLONOSCOPY      COLONOSCOPY N/A 10/26/2022    Procedure: COLONOSCOPY FOR SCREENING;  Surgeon: Roque Whitlock MD;  Location: [facility] ENDOSCOPY;  Service: Gastroenterology;  Laterality: N/A;  NORMAL COLON    NEPHRECTOMY Right 2024          SOC  Social History     Socioeconomic History    Marital status:    Tobacco Use    Smoking status: Former     Current packs/day: 0.00     Average packs/day: 2.0 packs/day for 36.3 years (72.7 ttl pk-yrs)     Types: Cigarettes     Start date: 6/15/1974     Quit date: 10/20/2010     Years since quittin.5     Passive exposure: Past    Smokeless tobacco: Never    Tobacco comments:     SMOKES MORE THAN 2 PPD FOR 35 YEARS   Vaping Use    Vaping status: Never Used   Substance and Sexual Activity    Alcohol use: Not Currently     Alcohol/week: 6.0 - 8.0 standard drinks of alcohol     Types: 6 - 8 Cans of beer per week     Comment: as of 24 was his last drink 8-14 DRINKS PER WEEK as of 2024, prior to 2024 he would drink 20 or more beers daily and did so for 15 years    Drug use: Not Currently    Sexual activity: Not Currently     Partners: Female     Birth control/protection: Condom         FAMHX  Family History   Problem Relation Age of Onset    Dementia Mother     No Known Problems Father     Colon cancer Neg Hx     Malig Hyperthermia Neg Hx           MEDSIGONLY  Current Outpatient Medications on File Prior to Visit   Medication Sig    citalopram (CeleXA) 10 MG  "tablet TAKE 1 TABLET DAILY    Eliquis 5 MG tablet tablet TAKE 1 TABLET TWICE A DAY    ferrous sulfate 324 (65 Fe) MG tablet delayed-release EC tablet Take 1 tablet by mouth Daily With Breakfast.    meclizine (ANTIVERT) 25 MG tablet Take 2 tablets by mouth Daily.    QUEtiapine (SEROquel) 50 MG tablet TAKE 1 TABLET EVERY NIGHT    rivastigmine (EXELON) 1.5 MG capsule TAKE 1 CAPSULE TWICE A DAY WITH MEALS    simvastatin (ZOCOR) 20 MG tablet Take 1 tablet by mouth Daily.    Spiriva Respimat 1.25 MCG/ACT aerosol solution inhaler USE 2 INHALATIONS DAILY    thiamine (VITAMIN B1) 100 MG tablet Take 1 tablet by mouth Daily.    [DISCONTINUED] metoprolol succinate XL (TOPROL-XL) 50 MG 24 hr tablet TAKE 1 TABLET DAILY    [DISCONTINUED] valsartan (DIOVAN) 80 MG tablet TAKE ONE-HALF (1/2) TABLET DAILY     No current facility-administered medications on file prior to visit.         Objective   /87   Pulse 65   Ht 180.3 cm (70.98\")   Wt 93.1 kg (205 lb 3.2 oz)   BMI 28.63 kg/m²       Physical Exam  HENT:      Head: Normocephalic.   Neck:      Vascular: No carotid bruit.   Cardiovascular:      Rate and Rhythm: Normal rate and regular rhythm.      Pulses: Normal pulses.      Heart sounds: Normal heart sounds. No murmur heard.  Pulmonary:      Effort: Pulmonary effort is normal.      Breath sounds: Normal breath sounds.   Musculoskeletal:      Cervical back: Neck supple.      Right lower leg: No edema.      Left lower leg: No edema.   Skin:     General: Skin is dry.   Neurological:      Mental Status: He is alert and oriented to person, place, and time.   Psychiatric:         Behavior: Behavior normal.         Result Review :   The following data was reviewed by: MELANIA Bustillos on 05/14/2025:  No results found for: \"PROBNP\"  CMP          4/15/2025    14:20 5/14/2025    13:20   CMP   Glucose 91  92    BUN 17  13    Creatinine 1.77  1.41    EGFR 41.3  54.3    Sodium 132  123    Potassium 5.2  5.6    Chloride 92  93  " "  Calcium 10.3  9.6    Total Protein  7.1    Albumin  3.7    Globulin  3.4    Total Bilirubin  1.1    Alkaline Phosphatase  195    AST (SGOT)  17    ALT (SGPT)  11    Albumin/Globulin Ratio  1.1    BUN/Creatinine Ratio 9.6  9.2    Anion Gap 15.8  9.3      CBC w/diff          4/15/2025    14:20 5/14/2025    13:20   CBC w/Diff   WBC 8.47  7.27    RBC 6.71  6.77    Hemoglobin 18.8  18.8    Hematocrit 58.7  55.6    MCV 87.5  82.1    MCH 28.0  27.8    MCHC 32.0  33.8    RDW 15.6  18.9    Platelets 266  211    Neutrophil Rel % 74.1  67.4    Immature Granulocyte Rel % 0.6  0.7    Lymphocyte Rel % 13.2  16.1    Monocyte Rel % 9.8  12.8    Eosinophil Rel % 1.4  1.8    Basophil Rel % 0.9  1.2       Lab Results   Component Value Date    TSH 0.741 03/06/2025      Lab Results   Component Value Date    FREET4 1.75 (H) 09/09/2024      No results found for: \"DDIMERQUANT\"  Magnesium   Date Value Ref Range Status   04/10/2025 1.7 1.6 - 2.4 mg/dL Final      No results found for: \"DIGOXIN\"   Lab Results   Component Value Date    TROPONINT 9 05/11/2024           Lipid Panel          3/6/2025    10:54   Lipid Panel   Total Cholesterol 186    Triglycerides 100    HDL Cholesterol 58    VLDL Cholesterol 18    LDL Cholesterol  110    LDL/HDL Ratio 1.86        Results for orders placed during the hospital encounter of 09/07/22    Adult Transthoracic Echo Complete W/ Cont if Necessary Per Protocol    Interpretation Summary  · Calculated left ventricular EF = 52.3% Estimated left ventricular EF was in agreement with the calculated left ventricular EF.  · The left ventricular cavity is mildly dilated.  · Left atrial dilation mild to moderate      [id number] Score: 2        Assessment and Plan    Diagnoses and all orders for this visit:    1. Paroxysmal atrial fibrillation (Primary)    2. Status post placement of implantable loop recorder    3. Essential hypertension  -     valsartan (DIOVAN) 80 MG tablet; Take 1 tablet by mouth Daily.  " Dispense: 90 tablet; Refill: 3    Other orders  -     metoprolol succinate XL (TOPROL-XL) 50 MG 24 hr tablet; Take 1 tablet by mouth Daily.  Dispense: 90 tablet; Refill: 3      Assessment & Plan  1. Atrial Fibrillation.  He has a loop recorder that showed atrial fibrillation on 04/15/2025. He is currently taking Eliquis and metoprolol for rate control and as a blood thinner. He will continue these medications.    2. Elevated Blood Pressure.  His blood pressure was elevated today. He will increase the valsartan dose to 80 mg daily. He will maintain a 2-week blood pressure log and submit it for review. Based on the results of the blood pressure log, further adjustments to his treatment plan may be considered. If blood pressure is still not well controlled in 2 weeks, he will follow up sooner.    Follow-up  He will follow up with Dr. Casiano in 6 months as long as he is doing well symptomatically.      Follow Up   Return in about 6 months (around 11/14/2025) for Follow up with Dr Casiano.    Patient was given instructions and counseling regarding his condition or for health maintenance advice. Please see specific information pulled into the AVS if appropriate.     Paulo SANTANA Cali Finch  reports that he quit smoking about 14 years ago. His smoking use included cigarettes. He started smoking about 50 years ago. He has a 72.7 pack-year smoking history. He has been exposed to tobacco smoke. He has never used smokeless tobacco.        Patient or patient representative verbalized consent for the use of Ambient Listening during the visit with  MELANIA Bustillos for chart documentation. 5/15/2025  09:00 EDT    MELANIA Bustillos  05/14/25  14:34 EDT    Dictated Utilizing Dragon Dictation

## 2025-05-15 LAB
ANA SER QL: NEGATIVE
B BURGDOR IGG+IGM SER QL IA: NEGATIVE

## 2025-05-19 LAB
A PHAGOCYTOPH DNA BLD QL NAA+PROBE: NEGATIVE
ANA SER QL IF: NEGATIVE
BABESIA SPEC: NEGATIVE
EHRLICHIA DNA SPEC QL NAA+PROBE: NEGATIVE
SARS-COV-2 AB SERPL IA-ACNC: 914 U/ML
SARS-COV-2 AB SERPL IA-ACNC: NORMAL U/ML
SARS-COV-2 AB SERPL-IMP: POSITIVE
SARS-COV-2 AB SERPL-IMP: POSITIVE
SARS-COV-2 IGG SERPL IA-ACNC: 70.1 AU/ML

## 2025-05-20 LAB
B HENSELAE IGG TITR SER IF: NEGATIVE TITER
B HENSELAE IGM TITR SER IF: NEGATIVE TITER
B QUINTANA IGG TITR SER IF: NEGATIVE TITER
B QUINTANA IGM TITR SER IF: NEGATIVE TITER
RESULT COMMENT:: ABNORMAL
RICK SF IGG TITR SER: ABNORMAL {TITER}
RICK SF IGM TITR SER: ABNORMAL {TITER}
WNV IGG SER QL IA: NEGATIVE
WNV IGM SER QL IA: NEGATIVE

## 2025-05-21 ENCOUNTER — HOSPITAL ENCOUNTER (OUTPATIENT)
Dept: MRI IMAGING | Facility: HOSPITAL | Age: 70
Discharge: HOME | End: 2025-05-21
Admitting: INTERNAL MEDICINE
Payer: MEDICARE

## 2025-05-21 DIAGNOSIS — C64.1 RENAL CELL CARCINOMA, RIGHT: ICD-10-CM

## 2025-05-21 PROCEDURE — 74183 MRI ABD W/O CNTR FLWD CNTR: CPT

## 2025-05-21 PROCEDURE — 25510000002 GADOBENATE DIMEGLUMINE 529 MG/ML SOLUTION: Performed by: INTERNAL MEDICINE

## 2025-05-21 PROCEDURE — A9577 INJ MULTIHANCE: HCPCS | Performed by: INTERNAL MEDICINE

## 2025-05-21 RX ADMIN — GADOBENATE DIMEGLUMINE 20 ML: 529 INJECTION, SOLUTION INTRAVENOUS at 09:16

## 2025-05-23 ENCOUNTER — OFFICE VISIT (OUTPATIENT)
Dept: ONCOLOGY | Facility: HOSPITAL | Age: 70
End: 2025-05-23
Payer: MEDICARE

## 2025-05-23 ENCOUNTER — LAB (OUTPATIENT)
Dept: ONCOLOGY | Facility: HOSPITAL | Age: 70
End: 2025-05-23
Payer: MEDICARE

## 2025-05-23 ENCOUNTER — TELEPHONE (OUTPATIENT)
Dept: ONCOLOGY | Facility: HOSPITAL | Age: 70
End: 2025-05-23
Payer: MEDICARE

## 2025-05-23 VITALS
HEIGHT: 71 IN | TEMPERATURE: 97.8 F | HEART RATE: 71 BPM | RESPIRATION RATE: 18 BRPM | DIASTOLIC BLOOD PRESSURE: 82 MMHG | SYSTOLIC BLOOD PRESSURE: 161 MMHG | OXYGEN SATURATION: 94 % | BODY MASS INDEX: 29.12 KG/M2 | WEIGHT: 208 LBS

## 2025-05-23 DIAGNOSIS — C64.1 RENAL CELL CARCINOMA, RIGHT: ICD-10-CM

## 2025-05-23 DIAGNOSIS — E87.1 HYPONATREMIA: ICD-10-CM

## 2025-05-23 DIAGNOSIS — K76.9 HEPATIC LESION: ICD-10-CM

## 2025-05-23 DIAGNOSIS — E87.1 HYPONATREMIA: Primary | ICD-10-CM

## 2025-05-23 DIAGNOSIS — C64.1 RENAL CELL CARCINOMA, RIGHT: Primary | ICD-10-CM

## 2025-05-23 LAB
ALBUMIN SERPL-MCNC: 3.7 G/DL (ref 3.5–5.2)
ALBUMIN/GLOB SERPL: 1.1 G/DL
ALP SERPL-CCNC: 202 U/L (ref 39–117)
ALT SERPL W P-5'-P-CCNC: 13 U/L (ref 1–41)
ANION GAP SERPL CALCULATED.3IONS-SCNC: 13.9 MMOL/L (ref 5–15)
AST SERPL-CCNC: 20 U/L (ref 1–40)
BASOPHILS # BLD AUTO: 0.1 10*3/MM3 (ref 0–0.2)
BASOPHILS NFR BLD AUTO: 1.4 % (ref 0–1.5)
BILIRUB SERPL-MCNC: 1.1 MG/DL (ref 0–1.2)
BUN SERPL-MCNC: 16 MG/DL (ref 8–23)
BUN/CREAT SERPL: 10.7 (ref 7–25)
CALCIUM SPEC-SCNC: 9.9 MG/DL (ref 8.6–10.5)
CHLORIDE SERPL-SCNC: 92 MMOL/L (ref 98–107)
CO2 SERPL-SCNC: 19.1 MMOL/L (ref 22–29)
CREAT SERPL-MCNC: 1.49 MG/DL (ref 0.76–1.27)
DEPRECATED RDW RBC AUTO: 50.4 FL (ref 37–54)
EGFRCR SERPLBLD CKD-EPI 2021: 50.8 ML/MIN/1.73
EOSINOPHIL # BLD AUTO: 0.11 10*3/MM3 (ref 0–0.4)
EOSINOPHIL NFR BLD AUTO: 1.5 % (ref 0.3–6.2)
ERYTHROCYTE [DISTWIDTH] IN BLOOD BY AUTOMATED COUNT: 19.1 % (ref 12.3–15.4)
GLOBULIN UR ELPH-MCNC: 3.4 GM/DL
GLUCOSE SERPL-MCNC: 94 MG/DL (ref 65–99)
HCT VFR BLD AUTO: 55.7 % (ref 37.5–51)
HGB BLD-MCNC: 18.6 G/DL (ref 13–17.7)
IMM GRANULOCYTES # BLD AUTO: 0.05 10*3/MM3 (ref 0–0.05)
IMM GRANULOCYTES NFR BLD AUTO: 0.7 % (ref 0–0.5)
LYMPHOCYTES # BLD AUTO: 1.05 10*3/MM3 (ref 0.7–3.1)
LYMPHOCYTES NFR BLD AUTO: 14.7 % (ref 19.6–45.3)
MCH RBC QN AUTO: 27.1 PG (ref 26.6–33)
MCHC RBC AUTO-ENTMCNC: 33.4 G/DL (ref 31.5–35.7)
MCV RBC AUTO: 81.2 FL (ref 79–97)
MONOCYTES # BLD AUTO: 0.71 10*3/MM3 (ref 0.1–0.9)
MONOCYTES NFR BLD AUTO: 9.9 % (ref 5–12)
NEUTROPHILS NFR BLD AUTO: 5.12 10*3/MM3 (ref 1.7–7)
NEUTROPHILS NFR BLD AUTO: 71.8 % (ref 42.7–76)
NRBC BLD AUTO-RTO: 0 /100 WBC (ref 0–0.2)
PLATELET # BLD AUTO: 267 10*3/MM3 (ref 140–450)
PMV BLD AUTO: 8.8 FL (ref 6–12)
POTASSIUM SERPL-SCNC: 5.7 MMOL/L (ref 3.5–5.2)
PROT SERPL-MCNC: 7.1 G/DL (ref 6–8.5)
RBC # BLD AUTO: 6.86 10*6/MM3 (ref 4.14–5.8)
SODIUM SERPL-SCNC: 125 MMOL/L (ref 136–145)
WBC NRBC COR # BLD AUTO: 7.14 10*3/MM3 (ref 3.4–10.8)

## 2025-05-23 PROCEDURE — 36415 COLL VENOUS BLD VENIPUNCTURE: CPT

## 2025-05-23 PROCEDURE — 85025 COMPLETE CBC W/AUTO DIFF WBC: CPT

## 2025-05-23 PROCEDURE — 80053 COMPREHEN METABOLIC PANEL: CPT

## 2025-05-23 RX ORDER — SODIUM CHLORIDE 1 G/1
1 TABLET ORAL 3 TIMES DAILY
Qty: 90 TABLET | Refills: 1 | Status: SHIPPED | OUTPATIENT
Start: 2025-05-23

## 2025-05-23 NOTE — TELEPHONE ENCOUNTER
Called patient's son with Na result of 125 today; reminded pt son to please  Na tablets at [facility] in [place] and to have patient follow up with nephrologist as scheduled as discussed today at appointment; pt son verbalized understanding; can call us back with any questions/concerns

## 2025-05-23 NOTE — PROGRESS NOTES
Chief Complaint/Care Team   Right renal mass    Ольга, Vilma MELANIA SANTANA  Vilma Rolon, MELANIA    History of Present Illness     Diagnosis: Clear-cell renal cell carcinoma, stage II, pT2 pN0, M0, grade 3, diagnosed 8/2024.    Current Treatment:  Pending liver biopsy, considering Axitinib and Pembrolizumab if stage IV confirmed    Previous Treatment: Status post right radical nephrectomy on 8/12/2024  by Dr. Zay Mcmanus  -pt declined adjuvant Keytruda IV every 3 weeks for 17 cycles on 11/26/24    Paulo Leiva Jr. is a 69 y.o. male who presents to [facility] HEMATOLOGY & ONCOLOGY for evaluation for suspected renal cell carcinoma seen on CT chest abdomen and pelvis from May 2024.    History of Present Illness  The patient is here for discussion of treatment for kidney cancer that was seen via CT CAP imaging in 5/2024. He is accompanied by his son.  Patient with a history of smoking approximately 2 to 3 packs/day for over 30 years, he quit 13 years ago.  He underwent CT chest for lung cancer screening imaging that incidentally revealed suspicious lesion on his right kidney.  The incidental mass arising in the right kidney measures 8.2 x 6.2 cm, no evidence of metastatic disease in the abdomen chest or pelvis.  CT head also was negative from May 2024.  He is scheduled for a radical nephrectomy in 07/2024 at [facility] with Dr. Mcmanus.       He has a history of COPD/emphysema, vertigo along with hypertension.  He has a heart arrhythmia and is on Eliquis. He was evaluation by multiple cardiologists.  Patient also reports memory impairment, frequent falls over the past 3-4 months, he has difficulty texting and typing on the keyboard.    He quit smoking 14 years ago. He used to smoke 2 to 3 cartons a day. He is a [employer] , he reports no exposure to chemicals or agent Orange.   FH: his maternal grandfather had stomach cancer.       Interval history: Patient here to follow up for active  "surveillance after undergoing right radical nephrectomy by Dr. Mcmanus at [facility] medicine.  He reports recovering well from surgery. Patient underwent MRI abdomen and he is here to discuss these results. He otherwise reports feeling well. He is here with his son.    Review of Systems     Oncology/Hematology History    No history exists.       Objective     Vitals:    05/23/25 0919   BP: 161/82   Pulse: 71   Resp: 18   Temp: 97.8 °F (36.6 °C)   TempSrc: Temporal   SpO2: 94%   Weight: 94.3 kg (208 lb)   Height: 180.3 cm (71\")   PainSc: 0-No pain             ECOG score: 1         PHQ-9 Total Score:         Physical Exam  Vitals reviewed. Exam conducted with a chaperone present.   Constitutional:       General: He is not in acute distress.     Appearance: Normal appearance.   HENT:      Head: Normocephalic and atraumatic.   Eyes:      Extraocular Movements: Extraocular movements intact.      Conjunctiva/sclera: Conjunctivae normal.   Cardiovascular:      Pulses: Normal pulses.      Heart sounds: Normal heart sounds.   Pulmonary:      Effort: Pulmonary effort is normal.   Musculoskeletal:      Cervical back: Normal range of motion and neck supple.   Skin:     General: Skin is warm and dry.   Neurological:      Mental Status: He is oriented to person, place, and time.         Physical Exam        Past Medical History     Past Medical History:   Diagnosis Date    Arthritis of back 2024    Benign essential hypertension     Cancer August 1994    Mass on Kidney..    COPD (chronic obstructive pulmonary disease)     Erectile dysfunction Apprx: 5 yrs ago    Generalized convulsive seizures 12/27/2023    Hip arthrosis     Hyperlipidemia 12/14/2018    Hyponatremia     Implantable loop recorder present     Knee swelling     Long term (current) use of non-steroidal anti-inflammatories (nsaid) 04/16/2018    Paroxysmal atrial fibrillation 5/14/2025    Renal insufficiency     Syncope     Syncope and collapse 09/02/2022    Total of " 5 episdoes with the last one 10/22    Outside Source Comment: Overview:  Formatting of this note might be different from the original.  Total of 5 episdoes with the last one 10/22  Last Assessment & Plan:  Formatting of this note might be different from the original.  No recurrent episodes Lipitor does not show any significant dysrhythmias at this point continue with monitoring      Vertigo 02/17/2021    Vision loss      Current Outpatient Medications on File Prior to Visit   Medication Sig Dispense Refill    citalopram (CeleXA) 10 MG tablet TAKE 1 TABLET DAILY 90 tablet 0    Eliquis 5 MG tablet tablet TAKE 1 TABLET TWICE A  tablet 3    ferrous sulfate 324 (65 Fe) MG tablet delayed-release EC tablet Take 1 tablet by mouth Daily With Breakfast. 90 tablet 0    meclizine (ANTIVERT) 25 MG tablet Take 2 tablets by mouth Daily. 180 tablet 1    metoprolol succinate XL (TOPROL-XL) 50 MG 24 hr tablet Take 1 tablet by mouth Daily. 90 tablet 3    QUEtiapine (SEROquel) 50 MG tablet TAKE 1 TABLET EVERY NIGHT 90 tablet 3    rivastigmine (EXELON) 1.5 MG capsule TAKE 1 CAPSULE TWICE A DAY WITH MEALS 180 capsule 3    simvastatin (ZOCOR) 20 MG tablet Take 1 tablet by mouth Daily. 90 tablet 1    Spiriva Respimat 1.25 MCG/ACT aerosol solution inhaler USE 2 INHALATIONS DAILY 12 g 3    thiamine (VITAMIN B1) 100 MG tablet Take 1 tablet by mouth Daily. 30 tablet 0    valsartan (DIOVAN) 80 MG tablet Take 1 tablet by mouth Daily. 90 tablet 3    [DISCONTINUED] sodium chloride 1 g tablet Take 1 tablet by mouth 3 (Three) Times a Day. 90 tablet 1     No current facility-administered medications on file prior to visit.      No Known Allergies  Past Surgical History:   Procedure Laterality Date    CARDIAC CATHETERIZATION N/A 09/26/2022    Procedure: Left Heart Cath;  Surgeon: Randell Casiano MD;  Location: [facility] INVASIVE LOCATION;  Service: Cardiovascular;  Laterality: N/A;    CARDIAC ELECTROPHYSIOLOGY PROCEDURE N/A 09/26/2022     Procedure: Loop insertion;  Surgeon: Randell Casiano MD;  Location: [facility] CATH INVASIVE LOCATION;  Service: Cardiovascular;  Laterality: N/A;    COLONOSCOPY      COLONOSCOPY N/A 10/26/2022    Procedure: COLONOSCOPY FOR SCREENING;  Surgeon: Roque Whitlock MD;  Location: [facility] ENDOSCOPY;  Service: Gastroenterology;  Laterality: N/A;  NORMAL COLON    NEPHRECTOMY Right 2024     Social History     Socioeconomic History    Marital status:    Tobacco Use    Smoking status: Former     Current packs/day: 0.00     Average packs/day: 2.0 packs/day for 36.3 years (72.7 ttl pk-yrs)     Types: Cigarettes     Start date: 6/15/1974     Quit date: 10/20/2010     Years since quittin.6     Passive exposure: Past    Smokeless tobacco: Never    Tobacco comments:     SMOKES MORE THAN 2 PPD FOR 35 YEARS   Vaping Use    Vaping status: Never Used   Substance and Sexual Activity    Alcohol use: Not Currently     Alcohol/week: 6.0 - 8.0 standard drinks of alcohol     Types: 6 - 8 Cans of beer per week     Comment: as of 25 was his last drink 8-14 DRINKS PER WEEK as of 2024, prior to 2024 he would drink 20 or more beers daily and did so for 15 years    Drug use: Not Currently    Sexual activity: Not Currently     Partners: Female     Birth control/protection: Condom     Family History   Problem Relation Age of Onset    Dementia Mother     No Known Problems Father     Colon cancer Neg Hx     Malig Hyperthermia Neg Hx        Results     Result Review   The following data was reviewed by: Nabor Moser MD on 2024:  Lab Results   Component Value Date    HGB 18.6 (H) 2025    HCT 55.7 (H) 2025    MCV 81.2 2025     2025    WBC 7.14 2025    NEUTROABS 5.12 2025    LYMPHSABS 1.05 2025    MONOSABS 0.71 2025    EOSABS 0.11 2025    BASOSABS 0.10 2025     Lab Results   Component Value Date    GLUCOSE 94 2025    BUN 16 2025     CREATININE 1.49 (H) 05/23/2025     (C) 05/23/2025    K 5.7 (H) 05/23/2025    CL 92 (L) 05/23/2025    CO2 19.1 (L) 05/23/2025    CALCIUM 9.9 05/23/2025    PROTEINTOT 7.1 05/23/2025    ALBUMIN 3.7 05/23/2025    BILITOT 1.1 05/23/2025    ALKPHOS 202 (H) 05/23/2025    AST 20 05/23/2025    ALT 13 05/23/2025     Lab Results   Component Value Date    MG 1.7 04/10/2025    PHOS 3.2 04/10/2025    FREET4 1.78 (H) 05/14/2025    TSH 0.812 05/14/2025       No radiology results for the last day    MRI Abdomen With & Without Contrast  Result Date: 5/21/2025  Impression: Impression: 1. Two hepatic masses largest measuring up to 8.6 cm in the right hepatic lobe, highly suspicious for metastatic disease in patient with history of prior right renal cell carcinoma status post nephrectomy. 2. No suspicious adenopathy. 3. Nondisplaced left anterior ninth rib fracture stable from recent CT, probably subacute based on appearance. 4. No other acute MRI findings. Chronic/ancillary findings as above. Electronically Signed: Neftali Sharp MD  5/21/2025 9:53 AM EDT  Workstation ID: [id number]    CT Head Without Contrast  Result Date: 5/6/2025  Impression: Impression: Brain atrophy with chronic microvascular ischemic changes No acute intracranial abnormality Electronically Signed: Mariano Mendez MD  5/6/2025 10:23 AM EDT  Workstation ID: [id number]      Assessment & Plan     Diagnoses and all orders for this visit:    1. Renal cell carcinoma, right (Primary)  -     CT Needle Biopsy Soft Tissue; Future  -     Tissue Pathology Exam; Standing  -     CBC & Differential; Future  -     CBC & Differential; Future    2. Hyponatremia  -     Comprehensive Metabolic Panel; Future  -     Comprehensive Metabolic Panel; Future    3. Hepatic lesion          Paulo ESTHER Leiva Jr. is a 69 y.o. male who presents to [facility] HEMATOLOGY & ONCOLOGY for follow-up regarding stage II clear-cell renal cell carcinoma.    -Patient is status post radical  nephrectomy in August 2024 by Dr. Zay Mcmanus at [facility].  -Per pathology report from surgery: Clear-cell renal cell carcinoma, stage II, pT2 pN0, M0, grade 3, diagnosed 8/2024.  -Discussed diagnosis, stage, prognosis, and reviewed pathology report and PET/CT imaging with patient in detail today.  -Reviewed NCCN guidelines version 2.2025 as well as results of Keynote 564 published in [place] Journal of Medicine April 2024 (SAILAJA Riojas. Et al. overall survival with adjuvant pembrolizumab and renal cell carcinoma.[facility] 2024; [id number]), provided him a copy of abstract today  -Discussed option of further observation versus 17 cycles of adjuvant pembrolizumab, due to improvement overall survival and results of keynote 564, recommend patient proceed with Keytruda, patient shared he would like to think about it and discuss at a later date  -pt doesn't appear to meet criteria for genetic counseling now, but may consider this at a later date  -pt underwent MRI left shoulder which was negative for evidence of metastatic disease  -pt decided on 11/26/24 to not receive pembrolizumab (Keytruda in the adjuvant setting)  -4/29/2025: Patient on restaging imaging with CT chest abdomen pelvis on 4/15/2025 which CT chest was negative for metastatic disease, CT abd/pelvis showed a hypodense 5.8 cm liver mass new compared to PET CT scan from 10/1/2024, concerning for primary versus metastatic neoplasm, discussed results with patient today shared plan to obtain MRI with contrast to further assess, will consider biopsy based on MRI results vs referral to [facility]    5/23/2025: Discussed results of MRI abdomen from 5/21/2025 which revealed 2 hepatic masses largest measuring up to 8.6 cm in the right hepatic lobe, very suspicious for metastatic disease given his history of RCC, otherwise no other evidence of metastatic disease, discussed plan to perform IR CT-guided biopsy of liver to confirm stage IV  renal cell carcinoma, discussed treatment with axitinib and pembrolizumab, I discussed common side effects of this treatment regiment, patient was provided handouts.  Also ordered [employer] NGS testing to look for other treatment options, especially considering monotherapy immunotherapy with Keytruda if TMB or patient MSI high on this test result, plan patient follow-up after liver biopsy to discuss final systemic therapy treatment plan. Ok for pt to hold Eliquis 3 days prior to biopsy and resume 2 days liver biopsy or once bleeding is well controlled.   Assessment & Plan  2.  Hyponatremia  - Sodium of 125, thus recommend pt continue sodium tablets 3 times a day  -sodium improved on most recent check  - encouraged pt to continue follow up with nephrology  - Recommend patient continue fluid restriction      3. Falls and heart arrhythmia.  -The CT scan of the head showed no acute abnormalities, recommend pt discuss referral to neurology and/or geriatrics evaluation for evaluation for dementia or neurological condition with his PCP      Plan for patient follow-up in 3 weeks to discuss results of liver biopsy with labs CBC, CMP, and to consider starting systemic therapy.    Please note that portions of this note were completed with a voice recognition program.    Electronically signed by Nabor Moser MD, 05/23/25, 1:17 PM EDT.      Follow Up     I spent 30 minutes caring for Paulo on this date of service. This time includes time spent by me in the following activities:preparing for the visit, reviewing tests, obtaining and/or reviewing a separately obtained history, performing a medically appropriate examination and/or evaluation , counseling and educating the patient/family/caregiver, ordering medications, tests, or procedures, referring and communicating with other health care professionals , documenting information in the medical record, independently interpreting results and communicating that information with the  patient/family/caregiver, and care coordination    Any chemotherapy or immunotherapy or other systemic therapy treatment plan involves a high risk of complications and/or mortality of patient management.    The patient was seen and examined. Work by the provider also included review and/or ordering of lab tests, review and/or ordering of radiology tests, review and/or ordering of medicine tests, discussion with other physicians or providers, independent review of data, obtaining old records, review/summation of old records, and/or other review.    I have reviewed the family history, social history, and past medical history for this patient. Previous information and data has been reviewed and updated as needed. I have reviewed and verified the chief complaint, history, and other documentation. The patient was interviewed and examined in the clinic and the chart reviewed. The previous observations, recommendations, and conclusions were reviewed including those of other providers.     The plan was discussed with the patient and/or family. The patient was given time to ask questions and these questions were answered. At the conclusion of their visit they had no additional questions or concerns and all questions were answered to their satisfaction.    Patient was given instructions and counseling regarding his condition or for health maintenance advice. Please see specific information pulled into the AVS if appropriate.       Patient or patient representative verbalized consent for the use of Ambient Listening during the visit with  Nabor Moser MD for chart documentation. 5/23/2025  16:17 EDT

## 2025-05-27 LAB
ARSENIC BLD-MCNC: NORMAL UG/L
LEAD BLDV-MCNC: 3 UG/DL (ref 0–3.4)
MERCURY BLD-MCNC: NORMAL UG/L

## 2025-05-30 ENCOUNTER — TELEPHONE (OUTPATIENT)
Dept: FAMILY MEDICINE CLINIC | Facility: CLINIC | Age: 70
End: 2025-05-30
Payer: MEDICARE

## 2025-05-30 NOTE — TELEPHONE ENCOUNTER
Patient and his son just called trying to reach you. They said that his BP was 193/90 earlier today while getting imaging done at [facility] and they said that the employees at the imaging center said they spoke to you and they advised him to double up on his statin medication, they are calling for clarification on this

## 2025-05-30 NOTE — TELEPHONE ENCOUNTER
[facility] called with concerns of sarah's /95 and asked what he needed to do. I spoke with Vilam and she said to double his valsartan and keep a log of his bp.

## 2025-05-30 NOTE — TELEPHONE ENCOUNTER
Spoke with pt son and he stated pt has white coat syndrome and this is isolated incidence when he goes to doctor visits. Do you still want him to double up on Valsartan? I did tell pt to hold until he heard from you because if this is only at visits he may not need this. Thank you

## 2025-06-02 ENCOUNTER — HOSPITAL ENCOUNTER (OUTPATIENT)
Dept: MRI IMAGING | Facility: HOSPITAL | Age: 70
Discharge: HOME | End: 2025-06-02
Payer: MEDICARE

## 2025-06-02 DIAGNOSIS — R41.3 MEMORY LOSS: ICD-10-CM

## 2025-06-02 DIAGNOSIS — R42 VERTIGO: ICD-10-CM

## 2025-06-02 DIAGNOSIS — E78.5 HYPERLIPIDEMIA, UNSPECIFIED HYPERLIPIDEMIA TYPE: ICD-10-CM

## 2025-06-02 PROCEDURE — 70544 MR ANGIOGRAPHY HEAD W/O DYE: CPT

## 2025-06-02 PROCEDURE — 70547 MR ANGIOGRAPHY NECK W/O DYE: CPT

## 2025-06-02 PROCEDURE — 70551 MRI BRAIN STEM W/O DYE: CPT

## 2025-06-02 RX ORDER — SIMVASTATIN 20 MG
20 TABLET ORAL DAILY
Qty: 90 TABLET | Refills: 0 | Status: SHIPPED | OUTPATIENT
Start: 2025-06-02

## 2025-06-03 RX ORDER — MECLIZINE HYDROCHLORIDE 25 MG/1
50 TABLET ORAL DAILY
Qty: 180 TABLET | Refills: 0 | Status: SHIPPED | OUTPATIENT
Start: 2025-06-03

## 2025-06-05 ENCOUNTER — HOSPITAL ENCOUNTER (OUTPATIENT)
Dept: CT IMAGING | Facility: HOSPITAL | Age: 70
Discharge: HOME | End: 2025-06-05
Payer: MEDICARE

## 2025-06-05 VITALS
DIASTOLIC BLOOD PRESSURE: 90 MMHG | RESPIRATION RATE: 22 BRPM | OXYGEN SATURATION: 95 % | SYSTOLIC BLOOD PRESSURE: 157 MMHG | WEIGHT: 208 LBS | BODY MASS INDEX: 29.12 KG/M2 | HEART RATE: 74 BPM | HEIGHT: 71 IN

## 2025-06-05 DIAGNOSIS — C64.1 RENAL CELL CARCINOMA, RIGHT: ICD-10-CM

## 2025-06-05 LAB
APTT PPP: 35.9 SECONDS (ref 24.2–34.2)
INR PPP: 1.09 (ref 0.86–1.15)
PROTHROMBIN TIME: 14.5 SECONDS (ref 11.8–14.9)

## 2025-06-05 PROCEDURE — 85730 THROMBOPLASTIN TIME PARTIAL: CPT | Performed by: RADIOLOGY

## 2025-06-05 PROCEDURE — 25010000002 FENTANYL CITRATE (PF) 50 MCG/ML SOLUTION: Performed by: RADIOLOGY

## 2025-06-05 PROCEDURE — 77012 CT SCAN FOR NEEDLE BIOPSY: CPT

## 2025-06-05 PROCEDURE — 88342 IMHCHEM/IMCYTCHM 1ST ANTB: CPT | Performed by: INTERNAL MEDICINE

## 2025-06-05 PROCEDURE — 25010000002 MIDAZOLAM PER 1MG: Performed by: RADIOLOGY

## 2025-06-05 PROCEDURE — 88307 TISSUE EXAM BY PATHOLOGIST: CPT | Performed by: INTERNAL MEDICINE

## 2025-06-05 PROCEDURE — 85610 PROTHROMBIN TIME: CPT | Performed by: RADIOLOGY

## 2025-06-05 RX ORDER — FENTANYL CITRATE 50 UG/ML
INJECTION, SOLUTION INTRAMUSCULAR; INTRAVENOUS AS NEEDED
Status: COMPLETED | OUTPATIENT
Start: 2025-06-05 | End: 2025-06-05

## 2025-06-05 RX ORDER — LIDOCAINE HYDROCHLORIDE 20 MG/ML
INJECTION, SOLUTION INFILTRATION; PERINEURAL
Status: DISPENSED
Start: 2025-06-05 | End: 2025-06-05

## 2025-06-05 RX ORDER — MIDAZOLAM HYDROCHLORIDE 2 MG/2ML
INJECTION, SOLUTION INTRAMUSCULAR; INTRAVENOUS AS NEEDED
Status: COMPLETED | OUTPATIENT
Start: 2025-06-05 | End: 2025-06-05

## 2025-06-05 RX ADMIN — MIDAZOLAM HYDROCHLORIDE 1 MG: 1 INJECTION, SOLUTION INTRAMUSCULAR; INTRAVENOUS at 10:19

## 2025-06-05 RX ADMIN — FENTANYL CITRATE 50 MCG: 50 INJECTION, SOLUTION INTRAMUSCULAR; INTRAVENOUS at 10:21

## 2025-06-05 NOTE — DISCHARGE INSTRUCTIONS
-Remove dressing and shower tomorrow, 6/6  -Do not submerge biopsy site in water, such as bath tub, hot tub, swimming pool, etc.  -Do not drive or stay by yourself for 24 hours.  -Do not lift anything heavier than 10 lbs or participate in strenuous activity for at least 1 week.  -Monitor biopsy site for signs of infection: redness, heat, drainage, odor, increased pain, fever, nausea, vomiting, chills.  -Keep all follow up appointments with ordering doctor to receive results of this test.   -Return to ER for any complications, such as bleeding, severe pain, bloating, or feeling faint or lightheaded.

## 2025-06-05 NOTE — H&P
[facility]   Interventional Radiology H&P    Patient Name: Paulo Leiva Jr.  : 1955  MRN: [id number]  Primary Care Physician:  Vilma Rolon APRN  Referring Physician: Nabor Moser MD  Date of admission: 2025    Subjective   Subjective     HPI:  Paulo Leiva Jr. is a 69 y.o. male with live rmass    Review of Systems:   Constitutional no fever,  no weight loss       Otolaryngeal no difficulty swallowing   Cardiovascular no chest pain   Pulmonary no cough, no sputum production   Gastrointestinal no constipation, no diarrhea                         Personal History       Past Medical/Surgical History:   Past Medical History:   Diagnosis Date    Arthritis of back     Benign essential hypertension     Cancer 1994    Mass on Kidney..    COPD (chronic obstructive pulmonary disease)     Erectile dysfunction Apprx: 5 yrs ago    Generalized convulsive seizures 2023    Hip arthrosis     Hyperlipidemia 2018    Hyponatremia     Implantable loop recorder present     Knee swelling     Long term (current) use of non-steroidal anti-inflammatories (nsaid) 2018    Paroxysmal atrial fibrillation 2025    Renal insufficiency     Syncope     Syncope and collapse 2022    Total of 5 episdoes with the last one 10/22    Outside Source Comment: Overview:  Formatting of this note might be different from the original.  Total of 5 episdoes with the last one 10/22  Last Assessment & Plan:  Formatting of this note might be different from the original.  No recurrent episodes Lipitor does not show any significant dysrhythmias at this point continue with monitoring      Vertigo 2021    Vision loss      Past Surgical History:   Procedure Laterality Date    CARDIAC CATHETERIZATION N/A 2022    Procedure: Left Heart Cath;  Surgeon: Randell Casiano MD;  Location: [facility] CATH INVASIVE LOCATION;  Service: Cardiovascular;  Laterality: N/A;    CARDIAC ELECTROPHYSIOLOGY PROCEDURE  "N/A 09/26/2022    Procedure: Loop insertion;  Surgeon: Randell Casiano MD;  Location: [facility] CATH INVASIVE LOCATION;  Service: Cardiovascular;  Laterality: N/A;    COLONOSCOPY  2007    COLONOSCOPY N/A 10/26/2022    Procedure: COLONOSCOPY FOR SCREENING;  Surgeon: Roque Whitlock MD;  Location: [facility] ENDOSCOPY;  Service: Gastroenterology;  Laterality: N/A;  NORMAL COLON    NEPHRECTOMY Right 08/12/2024       Social History:  reports that he quit smoking about 14 years ago. His smoking use included cigarettes. He started smoking about 51 years ago. He has a 72.7 pack-year smoking history. He has been exposed to tobacco smoke. He has never used smokeless tobacco. He reports that he does not currently use alcohol after a past usage of about 6.0 - 8.0 standard drinks of alcohol per week. He reports that he does not currently use drugs.    Medications:  (Not in a hospital admission)    Current medications:    Current IV drips:  No current facility-administered medications for this encounter.      Allergies:  No Known Allergies    Objective    Objective     Vitals:   Heart Rate:  [77] 77  Resp:  [26] 26  BP: (170)/(85) 170/85      Physical Exam:   Constitutional: Awake, alert, No acute distress    Respiratory: Clear to auscultation bilaterally, nonlabored respirations    Cardiovascular: RRR, no murmurs, rubs, or gallops, palpable pedal pulses bilaterally   Gastrointestinal: Positive bowel sounds, soft, nontender, nondistended        ASA SCALE ASSESSMENT:  3-Severe systemic disease that results in functional limitation    MALLAMPATI CLASSIFICATION:  4-Only the soft palate can be seen (the uvula is not visualized)       Result Review        Result Review:     No results found for: \"NA\"    No results found for: \"K\"    No results found for: \"CL\"    No results found for: \"PLASMABICARB\"    No results found for: \"BUN\"    No results found for: \"CREATININE\"    No results found for: \"CALCIUM\"        No components found for: " "\"GLUCOSE.*\"       Results from last 7 days   Lab Units 06/05/25  0918   INR  1.09           Assessment / Plan     Assesment:   Liver mass      Plan:   Ct guided biopsy of liver mass    The risks and benefits of the procedure were discussed with the patient.    Electronically signed by Neftali Carrillo MD, 06/05/25, 9:55 AM EDT.   "

## 2025-06-06 ENCOUNTER — TELEPHONE (OUTPATIENT)
Dept: ONCOLOGY | Facility: HOSPITAL | Age: 70
End: 2025-06-06
Payer: MEDICARE

## 2025-06-06 LAB
CYTO UR: NORMAL
LAB AP CASE REPORT: NORMAL
LAB AP CLINICAL INFORMATION: NORMAL
LAB AP SPECIAL STAINS: NORMAL
PATH REPORT.FINAL DX SPEC: NORMAL
PATH REPORT.GROSS SPEC: NORMAL

## 2025-06-06 NOTE — TELEPHONE ENCOUNTER
Received notification from pathology regarding results of liver biopsy which unfortunately revealed metastatic clear cell renal cell carcinoma, I called patient to relay results did not receive an answer left voicemail message for patient to call me back.    Please note that portions of this note were completed with a voice recognition program.    Electronically signed by Nabor Moser MD, 06/06/25, 2:59 PM EDT.

## 2025-06-06 NOTE — PROGRESS NOTES
Radiology Department Procedure Follow-up Call Note    Paulo Leiva   1955 06/06/25    Paulo SANTANA Cali Finch was contacted via telephone to follow-up after a procedure performed by the radiologist.     Pain Management  What is your pain level today? 2    Bleeding issues  Have you had any bleeding? no    Signs of infection  Have you had any signs of infection(including fever, chills, redness, drainage, rash)? none    Emergency Department Visit or Other Provider  Have returned to the ED or seen another provider related to this procedure? no    Satisfaction with Care  Are you satisfied with the care you received? yes    Other Comments/Questions:     Additional or Follow up Instructions:  Instructed to remove dressing to day and monitor for signs of infection      Electronically signed by Irene Boone RN, 06/06/25, 5:28 PM EDT.

## 2025-06-13 ENCOUNTER — APPOINTMENT (OUTPATIENT)
Facility: HOSPITAL | Age: 70
End: 2025-06-13
Payer: MEDICARE

## 2025-06-13 ENCOUNTER — OFFICE VISIT (OUTPATIENT)
Dept: ONCOLOGY | Facility: HOSPITAL | Age: 70
End: 2025-06-13
Payer: MEDICARE

## 2025-06-13 ENCOUNTER — LAB (OUTPATIENT)
Dept: ONCOLOGY | Facility: HOSPITAL | Age: 70
End: 2025-06-13
Payer: MEDICARE

## 2025-06-13 ENCOUNTER — SPECIALTY PHARMACY (OUTPATIENT)
Dept: PHARMACY | Facility: HOSPITAL | Age: 70
End: 2025-06-13
Payer: MEDICARE

## 2025-06-13 VITALS
HEIGHT: 71 IN | SYSTOLIC BLOOD PRESSURE: 172 MMHG | DIASTOLIC BLOOD PRESSURE: 86 MMHG | WEIGHT: 203.4 LBS | RESPIRATION RATE: 16 BRPM | HEART RATE: 77 BPM | OXYGEN SATURATION: 96 % | BODY MASS INDEX: 28.48 KG/M2 | TEMPERATURE: 97.8 F

## 2025-06-13 DIAGNOSIS — Z79.899 HIGH RISK MEDICATION USE: ICD-10-CM

## 2025-06-13 DIAGNOSIS — C64.1 RENAL CELL CARCINOMA, RIGHT: ICD-10-CM

## 2025-06-13 DIAGNOSIS — C64.1 RENAL CELL CARCINOMA OF RIGHT KIDNEY METASTATIC TO OTHER SITE: Primary | ICD-10-CM

## 2025-06-13 DIAGNOSIS — E87.1 HYPONATREMIA: ICD-10-CM

## 2025-06-13 DIAGNOSIS — C64.9 METASTATIC RENAL CELL CARCINOMA, UNSPECIFIED LATERALITY: Primary | ICD-10-CM

## 2025-06-13 DIAGNOSIS — D64.9 ANEMIA, UNSPECIFIED TYPE: ICD-10-CM

## 2025-06-13 LAB
ALBUMIN SERPL-MCNC: 3.5 G/DL (ref 3.5–5.2)
ALBUMIN/GLOB SERPL: 0.9 G/DL
ALP SERPL-CCNC: 211 U/L (ref 39–117)
ALT SERPL W P-5'-P-CCNC: 12 U/L (ref 1–41)
ANION GAP SERPL CALCULATED.3IONS-SCNC: 12.5 MMOL/L (ref 5–15)
AST SERPL-CCNC: 17 U/L (ref 1–40)
BASOPHILS # BLD AUTO: 0.08 10*3/MM3 (ref 0–0.2)
BASOPHILS NFR BLD AUTO: 0.9 % (ref 0–1.5)
BILIRUB SERPL-MCNC: 1.1 MG/DL (ref 0–1.2)
BUN SERPL-MCNC: 12.2 MG/DL (ref 8–23)
BUN/CREAT SERPL: 8.4 (ref 7–25)
CALCIUM SPEC-SCNC: 9.8 MG/DL (ref 8.6–10.5)
CHLORIDE SERPL-SCNC: 98 MMOL/L (ref 98–107)
CO2 SERPL-SCNC: 18.5 MMOL/L (ref 22–29)
CREAT SERPL-MCNC: 1.46 MG/DL (ref 0.76–1.27)
DEPRECATED RDW RBC AUTO: 53.8 FL (ref 37–54)
EGFRCR SERPLBLD CKD-EPI 2021: 52.1 ML/MIN/1.73
EOSINOPHIL # BLD AUTO: 0.14 10*3/MM3 (ref 0–0.4)
EOSINOPHIL NFR BLD AUTO: 1.7 % (ref 0.3–6.2)
ERYTHROCYTE [DISTWIDTH] IN BLOOD BY AUTOMATED COUNT: 20.3 % (ref 12.3–15.4)
GLOBULIN UR ELPH-MCNC: 3.9 GM/DL
GLUCOSE SERPL-MCNC: 112 MG/DL (ref 65–99)
HCT VFR BLD AUTO: 58.8 % (ref 37.5–51)
HGB BLD-MCNC: 19.1 G/DL (ref 13–17.7)
IMM GRANULOCYTES # BLD AUTO: 0.05 10*3/MM3 (ref 0–0.05)
IMM GRANULOCYTES NFR BLD AUTO: 0.6 % (ref 0–0.5)
LYMPHOCYTES # BLD AUTO: 1.08 10*3/MM3 (ref 0.7–3.1)
LYMPHOCYTES NFR BLD AUTO: 12.7 % (ref 19.6–45.3)
MCH RBC QN AUTO: 26.5 PG (ref 26.6–33)
MCHC RBC AUTO-ENTMCNC: 32.5 G/DL (ref 31.5–35.7)
MCV RBC AUTO: 81.4 FL (ref 79–97)
MONOCYTES # BLD AUTO: 0.85 10*3/MM3 (ref 0.1–0.9)
MONOCYTES NFR BLD AUTO: 10 % (ref 5–12)
NEUTROPHILS NFR BLD AUTO: 6.28 10*3/MM3 (ref 1.7–7)
NEUTROPHILS NFR BLD AUTO: 74.1 % (ref 42.7–76)
NRBC BLD AUTO-RTO: 0 /100 WBC (ref 0–0.2)
PLATELET # BLD AUTO: 276 10*3/MM3 (ref 140–450)
PMV BLD AUTO: 9.6 FL (ref 6–12)
POTASSIUM SERPL-SCNC: 4.7 MMOL/L (ref 3.5–5.2)
PROT SERPL-MCNC: 7.4 G/DL (ref 6–8.5)
RBC # BLD AUTO: 7.22 10*6/MM3 (ref 4.14–5.8)
SODIUM SERPL-SCNC: 129 MMOL/L (ref 136–145)
WBC NRBC COR # BLD AUTO: 8.48 10*3/MM3 (ref 3.4–10.8)

## 2025-06-13 PROCEDURE — 80053 COMPREHEN METABOLIC PANEL: CPT

## 2025-06-13 PROCEDURE — 85025 COMPLETE CBC W/AUTO DIFF WBC: CPT

## 2025-06-13 PROCEDURE — 36415 COLL VENOUS BLD VENIPUNCTURE: CPT

## 2025-06-13 RX ORDER — ONDANSETRON 8 MG/1
8 TABLET, FILM COATED ORAL 3 TIMES DAILY PRN
Qty: 30 TABLET | Refills: 5 | Status: SHIPPED | OUTPATIENT
Start: 2025-06-13

## 2025-06-13 RX ORDER — SODIUM CHLORIDE 1 G/1
1 TABLET ORAL 2 TIMES DAILY
Qty: 90 TABLET | Refills: 1 | Status: SHIPPED | OUTPATIENT
Start: 2025-06-13

## 2025-06-13 RX ORDER — FERROUS GLUCONATE 324(38)MG
324 TABLET ORAL
Qty: 90 TABLET | Refills: 1 | Status: SHIPPED | OUTPATIENT
Start: 2025-06-13

## 2025-06-13 NOTE — PROGRESS NOTES
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    Current Treatment:  Pending liver biopsy, considering Axitinib and Pembrolizumab if stage IV confirmed    Previous Treatment: Status post right radical nephrectomy on 8/12/2024  by Dr. Zay Mcmanus  -pt declined adjuvant Keytruda IV every 3 weeks for 17 cycles on 11/26/24    Paulo Leiva Jr. is a 69 y.o. male who presents to [facility] HEMATOLOGY & ONCOLOGY for evaluation for suspected renal cell carcinoma seen on CT chest abdomen and pelvis from May 2024.    History of Present Illness  The patient is here for discussion of treatment for kidney cancer that was seen via CT CAP imaging in 5/2024. He is accompanied by his son.  Patient with a history of smoking approximately 2 to 3 packs/day for over 30 years, he quit 13 years ago.  He underwent CT chest for lung cancer screening imaging that incidentally revealed suspicious lesion on his right kidney.  The incidental mass arising in the right kidney measures 8.2 x 6.2 cm, no evidence of metastatic disease in the abdomen chest or pelvis.  CT head also was negative from May 2024.  He is scheduled for a radical nephrectomy in 07/2024 at [facility] with Dr. Mcmanus.       He has a history of COPD/emphysema, vertigo along with hypertension.  He has a heart arrhythmia and is on Eliquis. He was evaluation by multiple cardiologists.  Patient also reports memory impairment, frequent falls over the past 3-4 months, he has difficulty texting and typing on the keyboard.    He quit smoking 14 years ago. He used to smoke 2 to 3 cartons a day. He is a [employer] , he reports no exposure to chemicals or agent Orange.   FH: his maternal  "grandfather had stomach cancer.       Interval history: Patient is right radical nephrectomy by Dr. Mcmanus at [facility]. He is here with his son.  Patient underwent liver biopsy after suspicious lesion was seen on recent MRI abdomen pelvis, he is here to discuss the results.     Review of Systems   Constitutional:  Positive for unexpected weight loss.        Oncology/Hematology History   Metastatic renal cell carcinoma   6/13/2025 Initial Diagnosis    Metastatic renal cell carcinoma     6/13/2025 -  Chemotherapy    OP KIDNEY Axitinib / Pembrolizumab 200 mg         Objective     Vitals:    06/13/25 0832   BP: 172/86   Pulse: 77   Resp: 16   Temp: 97.8 °F (36.6 °C)   TempSrc: Temporal   SpO2: 96%   Weight: 92.3 kg (203 lb 6.4 oz)   Height: 180.3 cm (71\")   PainSc: 0-No pain               ECOG score: 1         PHQ-9 Total Score:         Physical Exam  Vitals reviewed. Exam conducted with a chaperone present.   Constitutional:       General: He is not in acute distress.     Appearance: Normal appearance.   HENT:      Head: Normocephalic and atraumatic.   Eyes:      Extraocular Movements: Extraocular movements intact.      Conjunctiva/sclera: Conjunctivae normal.   Cardiovascular:      Pulses: Normal pulses.      Heart sounds: Normal heart sounds.   Pulmonary:      Effort: Pulmonary effort is normal.   Musculoskeletal:      Cervical back: Normal range of motion and neck supple.   Skin:     General: Skin is warm and dry.   Neurological:      Mental Status: He is oriented to person, place, and time.         Physical Exam        Past Medical History     Past Medical History:   Diagnosis Date    Arthritis of back 2024    Benign essential hypertension     Cancer August 1994    Mass on Kidney..    COPD (chronic obstructive pulmonary disease)     Erectile dysfunction Apprx: 5 yrs ago    Generalized convulsive seizures 12/27/2023    Hip arthrosis     Hyperlipidemia 12/14/2018    Hyponatremia     Implantable loop " recorder present     Knee swelling     Long term (current) use of non-steroidal anti-inflammatories (nsaid) 04/16/2018    Paroxysmal atrial fibrillation 5/14/2025    Renal insufficiency     Syncope     Syncope and collapse 09/02/2022    Total of 5 episdoes with the last one 10/22    Outside Source Comment: Overview:  Formatting of this note might be different from the original.  Total of 5 episdoes with the last one 10/22  Last Assessment & Plan:  Formatting of this note might be different from the original.  No recurrent episodes Lipitor does not show any significant dysrhythmias at this point continue with monitoring      Vertigo 02/17/2021    Vision loss      Current Outpatient Medications on File Prior to Visit   Medication Sig Dispense Refill    citalopram (CeleXA) 10 MG tablet TAKE 1 TABLET DAILY 90 tablet 0    Eliquis 5 MG tablet tablet TAKE 1 TABLET TWICE A  tablet 3    ferrous sulfate 324 (65 Fe) MG tablet delayed-release EC tablet Take 1 tablet by mouth Daily With Breakfast. 90 tablet 0    metoprolol succinate XL (TOPROL-XL) 50 MG 24 hr tablet Take 1 tablet by mouth Daily. 90 tablet 3    QUEtiapine (SEROquel) 50 MG tablet TAKE 1 TABLET EVERY NIGHT 90 tablet 3    rivastigmine (EXELON) 1.5 MG capsule TAKE 1 CAPSULE TWICE A DAY WITH MEALS 180 capsule 3    simvastatin (ZOCOR) 20 MG tablet TAKE 1 TABLET DAILY 90 tablet 0    Spiriva Respimat 1.25 MCG/ACT aerosol solution inhaler USE 2 INHALATIONS DAILY 12 g 3    thiamine (VITAMIN B1) 100 MG tablet Take 1 tablet by mouth Daily. 30 tablet 0    valsartan (DIOVAN) 80 MG tablet Take 1 tablet by mouth Daily. 90 tablet 3    [DISCONTINUED] sodium chloride 1 g tablet Take 1 tablet by mouth 3 (Three) Times a Day. 90 tablet 1    meclizine (ANTIVERT) 25 MG tablet TAKE 2 TABLETS DAILY (Patient not taking: Reported on 6/13/2025) 180 tablet 0     No current facility-administered medications on file prior to visit.      No Known Allergies  Past Surgical History:    Procedure Laterality Date    CARDIAC CATHETERIZATION N/A 2022    Procedure: Left Heart Cath;  Surgeon: Randell Casiano MD;  Location: [facility] CATH INVASIVE LOCATION;  Service: Cardiovascular;  Laterality: N/A;    CARDIAC ELECTROPHYSIOLOGY PROCEDURE N/A 2022    Procedure: Loop insertion;  Surgeon: Randell Casiano MD;  Location: [facility] CATH INVASIVE LOCATION;  Service: Cardiovascular;  Laterality: N/A;    COLONOSCOPY      COLONOSCOPY N/A 10/26/2022    Procedure: COLONOSCOPY FOR SCREENING;  Surgeon: Roque Whitlock MD;  Location: [facility] ENDOSCOPY;  Service: Gastroenterology;  Laterality: N/A;  NORMAL COLON    NEPHRECTOMY Right 2024     Social History     Socioeconomic History    Marital status:    Tobacco Use    Smoking status: Former     Current packs/day: 0.00     Average packs/day: 2.0 packs/day for 36.3 years (72.7 ttl pk-yrs)     Types: Cigarettes     Start date: 6/15/1974     Quit date: 10/20/2010     Years since quittin.6     Passive exposure: Past    Smokeless tobacco: Never    Tobacco comments:     SMOKES MORE THAN 2 PPD FOR 35 YEARS   Vaping Use    Vaping status: Never Used   Substance and Sexual Activity    Alcohol use: Not Currently     Alcohol/week: 6.0 - 8.0 standard drinks of alcohol     Types: 6 - 8 Cans of beer per week     Comment: as of 25 was his last drink 8-14 DRINKS PER WEEK as of 2024, prior to 2024 he would drink 20 or more beers daily and did so for 15 years    Drug use: Not Currently    Sexual activity: Not Currently     Partners: Female     Birth control/protection: Condom     Family History   Problem Relation Age of Onset    Dementia Mother     No Known Problems Father     Colon cancer Neg Hx     Malig Hyperthermia Neg Hx        Results     Result Review   The following data was reviewed by: Nabor Moser MD on 2024:  Lab Results   Component Value Date    HGB 19.1 (H) 2025    HCT 58.8 (H) 2025    MCV 81.4 2025      06/13/2025    WBC 8.48 06/13/2025    NEUTROABS 6.28 06/13/2025    LYMPHSABS 1.08 06/13/2025    MONOSABS 0.85 06/13/2025    EOSABS 0.14 06/13/2025    BASOSABS 0.08 06/13/2025     Lab Results   Component Value Date    GLUCOSE 112 (H) 06/13/2025    BUN 12.2 06/13/2025    CREATININE 1.46 (H) 06/13/2025     (L) 06/13/2025    K 4.7 06/13/2025    CL 98 06/13/2025    CO2 18.5 (L) 06/13/2025    CALCIUM 9.8 06/13/2025    PROTEINTOT 7.4 06/13/2025    ALBUMIN 3.5 06/13/2025    BILITOT 1.1 06/13/2025    ALKPHOS 211 (H) 06/13/2025    AST 17 06/13/2025    ALT 12 06/13/2025     Lab Results   Component Value Date    MG 1.7 04/10/2025    PHOS 3.2 04/10/2025    FREET4 1.78 (H) 05/14/2025    TSH 0.812 05/14/2025       No radiology results for the last day    CT Needle Biopsy Liver  Result Date: 6/5/2025  Impression: Impression: 1.Successful CT-guided percutaneous liver biopsy of dominant right liver mass with no immediate complications. Electronically Signed: Neftali Carrillo MD  6/5/2025 11:19 AM EDT  Workstation ID: [id number]    MRI Angiogram Neck Without Contrast  Result Date: 6/2/2025  Impression: Impression: Mild to moderate typical chronic and age-related changes of the brain. No evidence of recent infarct, hemorrhage, mass or mass effect. Essentially normal MR angiogram of the head and neck. There is no evidence of flow-limiting stenosis, large vessel occlusion or aneurysm. Electronically Signed: Fabrizio Reed MD  6/2/2025 4:26 PM EDT  Workstation ID: [id number]    MRI Brain Without Contrast  Result Date: 6/2/2025  Impression: Impression: Mild to moderate typical chronic and age-related changes of the brain. No evidence of recent infarct, hemorrhage, mass or mass effect. Essentially normal MR angiogram of the head and neck. There is no evidence of flow-limiting stenosis, large vessel occlusion or aneurysm. Electronically Signed: Fabrizio Reed MD  6/2/2025 4:26 PM EDT  Workstation ID: [id number]    MRI Angiogram Head  Without Contrast  Result Date: 6/2/2025  Impression: Impression: Mild to moderate typical chronic and age-related changes of the brain. No evidence of recent infarct, hemorrhage, mass or mass effect. Essentially normal MR angiogram of the head and neck. There is no evidence of flow-limiting stenosis, large vessel occlusion or aneurysm. Electronically Signed: Fabrizio Reed MD  6/2/2025 4:26 PM EDT  Workstation ID: [id number]    Ultrasound ankle / brachial indices extremity complete Bilateral  Result Date: 5/30/2025  Impression: Normal left and right LAZARO. Images reviewed, interpreted, and dictated by Dr. VICENTE Pratt. Transcribed by Angélica Harrison PA-C.    MRI Abdomen With & Without Contrast  Result Date: 5/21/2025  Impression: Impression: 1. Two hepatic masses largest measuring up to 8.6 cm in the right hepatic lobe, highly suspicious for metastatic disease in patient with history of prior right renal cell carcinoma status post nephrectomy. 2. No suspicious adenopathy. 3. Nondisplaced left anterior ninth rib fracture stable from recent CT, probably subacute based on appearance. 4. No other acute MRI findings. Chronic/ancillary findings as above. Electronically Signed: Neftali Sharp MD  5/21/2025 9:53 AM EDT  Workstation ID: [id number]      Assessment & Plan     Diagnoses and all orders for this visit:    1. Renal cell carcinoma of right kidney metastatic to other site (Primary)    2. Hyponatremia  -     sodium chloride 1 g tablet; Take 1 tablet by mouth 2 (Two) Times a Day.  Dispense: 90 tablet; Refill: 1    3. Anemia, unspecified type  -     ferrous gluconate (FERGON) 324 MG tablet; Take 1 tablet by mouth Daily With Breakfast.  Dispense: 90 tablet; Refill: 1    4. Renal cell carcinoma, right  -     [facility] xT DNA And RNA - Tissue,; Future  -     [facility] xT DNA And RNA - Tissue,    5. High risk medication use            Paulo ESTHER Leiva Jr. is a 69 y.o. male who presents to [facility]  HEMATOLOGY & ONCOLOGY for follow-up regarding stage II clear-cell renal cell carcinoma.    -Patient is status post radical nephrectomy in August 2024 by Dr. Zay Mcmanus at [facility].  -Per pathology report from surgery: Clear-cell renal cell carcinoma, stage II, pT2 pN0, M0, grade 3, diagnosed 8/2024.  -Discussed diagnosis, stage, prognosis, and reviewed pathology report and PET/CT imaging with patient in detail today.  -Reviewed NCCN guidelines version 2.2025 as well as results of Keynote 564 published in [place] Journal of Medicine April 2024 (SAILAJA Riojas. Et al. overall survival with adjuvant pembrolizumab and renal cell carcinoma.[facility] 2024; [id number]), provided him a copy of abstract today  -Discussed option of further observation versus 17 cycles of adjuvant pembrolizumab, due to improvement overall survival and results of keynote 564, recommend patient proceed with Keytruda, patient shared he would like to think about it and discuss at a later date  -pt doesn't appear to meet criteria for genetic counseling now, but may consider this at a later date  -pt underwent MRI left shoulder which was negative for evidence of metastatic disease  -pt decided on 11/26/24 to not receive pembrolizumab (Keytruda in the adjuvant setting)  -4/29/2025: Patient on restaging imaging with CT chest abdomen pelvis on 4/15/2025 which CT chest was negative for metastatic disease, CT abd/pelvis showed a hypodense 5.8 cm liver mass new compared to PET CT scan from 10/1/2024, concerning for primary versus metastatic neoplasm, discussed results with patient today shared plan to obtain MRI with contrast to further assess, will consider biopsy based on MRI results vs referral to [facility]    5/23/2025: Discussed results of MRI abdomen from 5/21/2025 which revealed 2 hepatic masses largest measuring up to 8.6 cm in the right hepatic lobe, very suspicious for metastatic disease given his history of RCC,  otherwise no other evidence of metastatic disease, discussed plan to perform IR CT-guided biopsy of liver to confirm stage IV renal cell carcinoma, discussed treatment with axitinib and pembrolizumab, I discussed common side effects of this treatment regiment, patient was provided handouts.  Also ordered [facility] NGS testing to look for other treatment options, especially considering monotherapy immunotherapy with Keytruda if TMB or patient MSI high on this test result, plan patient follow-up after liver biopsy to discuss final systemic therapy treatment plan. Ok for pt to hold Eliquis 3 days prior to biopsy and resume 2 days liver biopsy or once bleeding is well controlled.     6/13/2025: Patient underwent liver biopsy which unfortunately returned positive for metastatic clear-cell renal cell carcinoma, indicating stage IV disease.  Discussed results with patient and son in detail.  Discussed palliative intent of treatment.  Discussed treatment with axitinib and pembrolizumab.  I discussed common side effects, risks versus benefits and alternatives of chemotherapy/immunotherapy regimen with pt. Pt was provided handout with additional information regarding these medications.   - Also discussed plans to obtain [facility] NGS testing for other treatment options.  -Orders placed for treatment with axitinib and pembrolizumab today.    Assessment & Plan  2.  Hyponatremia  - Sodium of 125, thus recommend pt continue sodium tablets 3 times a day  -sodium improved on most recent check  - encouraged pt to continue follow up with nephrology  - Recommend patient continue fluid restriction      3. Falls and heart arrhythmia.  -The CT scan of the head showed no acute abnormalities, recommend pt discuss referral to neurology and/or geriatrics evaluation for evaluation for dementia or neurological condition with his PCP      Plan for patient follow-up with cycle 1 day 1 of treatment.    Please note that portions of this note were  completed with a voice recognition program.      Electronically signed by Nabor Moser MD, 06/13/25, 12:58 PM EDT.      Follow Up     I spent 30 minutes caring for Paulo on this date of service. This time includes time spent by me in the following activities:preparing for the visit, reviewing tests, obtaining and/or reviewing a separately obtained history, performing a medically appropriate examination and/or evaluation , counseling and educating the patient/family/caregiver, ordering medications, tests, or procedures, referring and communicating with other health care professionals , documenting information in the medical record, independently interpreting results and communicating that information with the patient/family/caregiver, and care coordination    Any chemotherapy or immunotherapy or other systemic therapy treatment plan involves a high risk of complications and/or mortality of patient management.    The patient was seen and examined. Work by the provider also included review and/or ordering of lab tests, review and/or ordering of radiology tests, review and/or ordering of medicine tests, discussion with other physicians or providers, independent review of data, obtaining old records, review/summation of old records, and/or other review.    I have reviewed the family history, social history, and past medical history for this patient. Previous information and data has been reviewed and updated as needed. I have reviewed and verified the chief complaint, history, and other documentation. The patient was interviewed and examined in the clinic and the chart reviewed. The previous observations, recommendations, and conclusions were reviewed including those of other providers.     The plan was discussed with the patient and/or family. The patient was given time to ask questions and these questions were answered. At the conclusion of their visit they had no additional questions or concerns and all questions  were answered to their satisfaction.    Patient was given instructions and counseling regarding his condition or for health maintenance advice. Please see specific information pulled into the AVS if appropriate.       Patient or patient representative verbalized consent for the use of Ambient Listening during the visit with  Nabor Moser MD for chart documentation. 6/13/2025  16:17 EDT

## 2025-06-13 NOTE — ADDENDUM NOTE
Addended by: KATHIE PUGH on: 6/13/2025 02:25 PM     Modules accepted: Orders     Call placed to [facility] assisted living, they will fax over pt medication list.

## 2025-06-13 NOTE — PROGRESS NOTES
Specialty Pharmacy Patient Management Program  Oral Chemotherapy - New Referral     Received a referral from Dr. Moser    Treatment Plan: Inlyta (axitinib) 5mg by mouth every 12 hours   Start date of treatment planned for: As soon as oral specialty medication is available.  Indication: RCC (Renal Cell Carcinoma)  Relevant past treatments:   S/p R radical nephrectomy (8/12/24) > declined adjuvant Keytruda at that time  Is the therapy appropriate based on treatment guidelines and FDA labeling?: YES  Therapeutic Goals: Continue treatment until progression or intolerable toxicity  Patient can self-administer oral medications: Yes    Drug-Drug Interactions: The current medication list was reviewed and there are no relevant drug-drug interactions with the oral specialty medication that will be discussed during education; however, there are some category X interactions with his other medications including:  Category X : Quetiapine may increase the Qtc-prolonging effects of citalopram and also may increase the anticholinergic effects of Spiriva. It appears the patient has been on each of these medications for at least a couple of months, but will assess symptoms at education visit.  Category X: Rivastigmine may increase bradycardic effects of Beta-Blockers. Will alert patient to be aware of syncope in particular with this interaction.   Medication Allergies: The patient has NKDA  Review of Labs/Dose Adjustments: The patient's most recent labs were reviewed and are appropriate to start treatment at this dose    A prescription was released to [facility] for:    Requested Prescriptions     Signed Prescriptions Disp Refills    axitinib (INLYTA) 5 MG chemo tablet 60 tablet 11     Sig: Take 1 tablet by mouth Every 12 (Twelve) Hours.    ondansetron (ZOFRAN) 8 MG tablet 30 tablet 5     Sig: Take 1 tablet by mouth 3 (Three) Times a Day As Needed for Nausea or Vomiting.     Prescription orders above were sent to  the pharmacy per Collaborative Care Agreement Protocol.     Pharmacy education to be scheduled. CCA and consent will be signed at that time.    UPDATE: Patient has [employer] insurance so we are unable to fill at our [facility] location or our associated [facility] pharmacies.    Valorie Garcia PharmD  Oncology Clinical Specialty Pharmacist   6/13/2025 10:47 EDT

## 2025-06-13 NOTE — PROGRESS NOTES
Specialty Pharmacy Patient Management Program  Double Check     Patient will be filling or currently fills medications at [facility] and is enrolled in the Patient Management Program while taking Inlyta (axitinib).    Requested Prescriptions     Signed Prescriptions Disp Refills    ondansetron (ZOFRAN) 8 MG tablet 30 tablet 5     Sig: Take 1 tablet by mouth 3 (Three) Times a Day As Needed for Nausea or Vomiting.    axitinib (INLYTA) 5 MG chemo tablet 60 tablet 11     Sig: Take 1 tablet by mouth Every 12 (Twelve) Hours.     Prescription orders above were sent to the pharmacy per Collaborative Care Agreement Protocol.     Completed independent double check on medication order/RX.    Jose Colon RPH  06/13/2511:36 EDT

## 2025-06-16 ENCOUNTER — DOCUMENTATION (OUTPATIENT)
Dept: PHARMACY | Facility: HOSPITAL | Age: 70
End: 2025-06-16
Payer: MEDICARE

## 2025-06-16 NOTE — PROGRESS NOTES
"PHARMACY C1D1 PRE VISIT ASSESSMENT    Patient will present for C1D1 of pembrolizumab 200 mg Q21D     69 y.o. male  Estimated body surface area is 2.12 meters squared as calculated from the following:    Height as of 6/13/25: 180.3 cm (71\").    Weight as of 6/13/25: 92.3 kg (203 lb 6.4 oz).    Past Medical History:   Diagnosis Date    Arthritis of back 2024    Benign essential hypertension     Cancer August 1994    Mass on Kidney..    COPD (chronic obstructive pulmonary disease)     Erectile dysfunction Apprx: 5 yrs ago    Generalized convulsive seizures 12/27/2023    Hip arthrosis     Hyperlipidemia 12/14/2018    Hyponatremia     Implantable loop recorder present     Knee swelling     Long term (current) use of non-steroidal anti-inflammatories (nsaid) 04/16/2018    Paroxysmal atrial fibrillation 5/14/2025    Renal insufficiency     Syncope     Syncope and collapse 09/02/2022    Total of 5 episdoes with the last one 10/22    Outside Source Comment: Overview:  Formatting of this note might be different from the original.  Total of 5 episdoes with the last one 10/22  Last Assessment & Plan:  Formatting of this note might be different from the original.  No recurrent episodes Lipitor does not show any significant dysrhythmias at this point continue with monitoring      Vertigo 02/17/2021    Vision loss        Oncology/Hematology History   Metastatic renal cell carcinoma   6/13/2025 Initial Diagnosis    Metastatic renal cell carcinoma     6/13/2025 -  Chemotherapy    OP KIDNEY Axitinib / Pembrolizumab 200 mg         ONC Staging:  Metastatic clear cell renal cell carcinoma     Relevant Pathology:   Final Diagnosis   Liver lesion, core biopsy:               - Metastatic clear cell renal cell carcinoma     REMARKS: The above positive (malignant) diagnosis was called to Shellie ASHBY in Dr. SIVAKUMAR Moser's office at 14:49 EDT on 6/6/2025 by Zay CINTRON         Electronically signed by Suzy Huynh MD on 6/6/2025 at 1451 EDT "     Potentially Actionable Mutation Present: NO - TEMPUS PENDING    Relevant Imaging:    CT Needle Biopsy Liver  Result Date: 6/5/2025  Impression: 1.Successful CT-guided percutaneous liver biopsy of dominant right liver mass with no immediate complications. Electronically Signed: Neftali Carrillo MD  6/5/2025 11:19 AM EDT  Workstation ID: [id number]    MRI Angiogram Neck Without Contrast  Result Date: 6/2/2025  Impression: Mild to moderate typical chronic and age-related changes of the brain. No evidence of recent infarct, hemorrhage, mass or mass effect. Essentially normal MR angiogram of the head and neck. There is no evidence of flow-limiting stenosis, large vessel occlusion or aneurysm. Electronically Signed: Fabrizio Reed MD  6/2/2025 4:26 PM EDT  Workstation ID: [id number]    MRI Brain Without Contrast  Result Date: 6/2/2025  Impression: Mild to moderate typical chronic and age-related changes of the brain. No evidence of recent infarct, hemorrhage, mass or mass effect. Essentially normal MR angiogram of the head and neck. There is no evidence of flow-limiting stenosis, large vessel occlusion or aneurysm. Electronically Signed: Fabrizio Reed MD  6/2/2025 4:26 PM EDT  Workstation ID: [id number]    MRI Angiogram Head Without Contrast  Result Date: 6/2/2025  Impression: Mild to moderate typical chronic and age-related changes of the brain. No evidence of recent infarct, hemorrhage, mass or mass effect. Essentially normal MR angiogram of the head and neck. There is no evidence of flow-limiting stenosis, large vessel occlusion or aneurysm. Electronically Signed: Fabrizio Reed MD  6/2/2025 4:26 PM EDT  Workstation ID: [id number]    Ultrasound ankle / brachial indices extremity complete Bilateral  Result Date: 5/30/2025  Normal left and right LAZARO. Images reviewed, interpreted, and dictated by Dr. VICENTE Pratt. Transcribed by Angélica Harrison PA-C.    MRI Abdomen With & Without Contrast  Result Date:  5/21/2025  Impression: 1. Two hepatic masses largest measuring up to 8.6 cm in the right hepatic lobe, highly suspicious for metastatic disease in patient with history of prior right renal cell carcinoma status post nephrectomy. 2. No suspicious adenopathy. 3. Nondisplaced left anterior ninth rib fracture stable from recent CT, probably subacute based on appearance. 4. No other acute MRI findings. Chronic/ancillary findings as above. Electronically Signed: Neftali Sharp MD  5/21/2025 9:53 AM EDT  Workstation ID: [id number]       Current treatment regimen has insurance authorization approval? NO AUTH REQUIRED    Medication treatment plan entered is appropriate per NCCN Guidelines    Pharmacy Follow-up Considerations:   Patient with history of clear cell renal cell carcinoma s/p radical nephrectomy. Patient declined adjuvant pembrolizumab. Patient now with progression of disease with metastatic liver lesion. Plan for axitinib + pembrolizumab. Pharmacy will continue to monitor labs while on treatment and will  patient prior to first infusion on cycle 1 day 1.     Ananda Boykin, JaminD, BCPS  6/16/2025  15:20 EDT

## 2025-06-17 ENCOUNTER — TELEPHONE (OUTPATIENT)
Dept: ONCOLOGY | Facility: HOSPITAL | Age: 70
End: 2025-06-17

## 2025-06-17 NOTE — TELEPHONE ENCOUNTER
Caller: SANTANA DING    Relationship: Emergency Contact    Best call back number: [phone]      What was the call regarding: PATIENT IS HAVING TROUBLE GETTING HIS CHEMO MEDICATION, 3 PHARMACIES HAVE CALLED PATIENT TO LET HIM KNOW THEY CAN'T GET IT    PLEASE CALL TO ADVISE

## 2025-06-19 ENCOUNTER — SPECIALTY PHARMACY (OUTPATIENT)
Dept: PHARMACY | Facility: HOSPITAL | Age: 70
End: 2025-06-19
Payer: MEDICARE

## 2025-06-19 NOTE — PROGRESS NOTES
Specialty Pharmacy Patient Management Program  Oncology Initial Assessment     Paulo ESTHER Leiva Jr. was referred by their provider to the Oncology Patient Management program offered by [facility] for RCC (Renal Cell Carcinoma) on 06/19/25.  An initial outreach was conducted in-person, including assessment of therapy appropriateness and specialty medication education for Inlyta (axitinib). The patient was introduced to services offered by [facility], including: regular assessments, refill coordination, curbside pick-up or mail order delivery options, prior authorization maintenance, and financial assistance programs as applicable. The patient was also provided with contact information for the pharmacy team.     Regimen: Inlyta (axitinib) 5mg every 12 hours + IV Pembrolizumab 200mg every 21 days    Start date of oral specialty medication: As soon as oral specialty medication is available.    Insurance Coverage & Financial Support   for Life     Relevant Past Medical History, Comorbidities, and Vaccines  Relevant medical history and concomitant health conditions were discussed with the patient. The patient's chart has been reviewed for relevant past medical history and comorbid conditions and updated as necessary.    Past Medical History:   Diagnosis Date    Arthritis of back 2024    Benign essential hypertension     Cancer August 1994    Mass on Kidney..    COPD (chronic obstructive pulmonary disease)     Erectile dysfunction Apprx: 5 yrs ago    Generalized convulsive seizures 12/27/2023    Hip arthrosis     Hyperlipidemia 12/14/2018    Hyponatremia     Implantable loop recorder present     Knee swelling     Long term (current) use of non-steroidal anti-inflammatories (nsaid) 04/16/2018    Paroxysmal atrial fibrillation 5/14/2025    Renal insufficiency     Syncope     Syncope and collapse 09/02/2022    Total of 5 episdoes with the last one 10/22    Outside Source  Comment: Overview:  Formatting of this note might be different from the original.  Total of 5 episdoes with the last one 10/22  Last Assessment & Plan:  Formatting of this note might be different from the original.  No recurrent episodes Lipitor does not show any significant dysrhythmias at this point continue with monitoring      Vertigo 2021    Vision loss      Social History     Socioeconomic History    Marital status:    Tobacco Use    Smoking status: Former     Current packs/day: 0.00     Average packs/day: 2.0 packs/day for 36.3 years (72.7 ttl pk-yrs)     Types: Cigarettes     Start date: 6/15/1974     Quit date: 10/20/2010     Years since quittin.6     Passive exposure: Past    Smokeless tobacco: Never    Tobacco comments:     SMOKES MORE THAN 2 PPD FOR 35 YEARS   Vaping Use    Vaping status: Never Used   Substance and Sexual Activity    Alcohol use: Not Currently     Alcohol/week: 6.0 - 8.0 standard drinks of alcohol     Types: 6 - 8 Cans of beer per week     Comment: as of 25 was his last drink 8-14 DRINKS PER WEEK as of 2024, prior to 2024 he would drink 20 or more beers daily and did so for 15 years    Drug use: Not Currently    Sexual activity: Not Currently     Partners: Female     Birth control/protection: Condom     Problem list reviewed by Valorie Garcia PharmD on 2025 at 10:11 AM    Allergies  Known allergies and reactions were discussed with the patient. The patient's chart has been reviewed for  allergy information and updated as necessary.   No Known Allergies  Allergies reviewed by Valorie Garcia PharmD on 2025 at 10:08 AM    Relevant Laboratory Values  Lab Results   Component Value Date    GLUCOSE 112 (H) 2025    CALCIUM 9.8 2025     (L) 2025    K 4.7 2025    CO2 18.5 (L) 2025    CL 98 2025    BUN 12.2 2025    CREATININE 1.46 (H) 2025    EGFRIFNONA 83 2021    BCR 8.4 2025     ANIONGAP 12.5 06/13/2025     Lab Results   Component Value Date    WBC 8.48 06/13/2025    RBC 7.22 (H) 06/13/2025    HGB 19.1 (H) 06/13/2025    HCT 58.8 (H) 06/13/2025    MCV 81.4 06/13/2025    MCH 26.5 (L) 06/13/2025    MCHC 32.5 06/13/2025    RDW 20.3 (H) 06/13/2025    RDWSD 53.8 06/13/2025    MPV 9.6 06/13/2025     06/13/2025    NEUTRORELPCT 74.1 06/13/2025    LYMPHORELPCT 12.7 (L) 06/13/2025    MONORELPCT 10.0 06/13/2025    EOSRELPCT 1.7 06/13/2025    BASORELPCT 0.9 06/13/2025    AUTOIGPER 0.6 (H) 06/13/2025    NEUTROABS 6.28 06/13/2025    LYMPHSABS 1.08 06/13/2025    MONOSABS 0.85 06/13/2025    EOSABS 0.14 06/13/2025    BASOSABS 0.08 06/13/2025    AUTOIGNUM 0.05 06/13/2025    NRBC 0.0 06/13/2025      The above labs have been reviewed. No dose adjustments are needed for the oral specialty medication(s) based on the labs.    Current Medication List  This medication list has been reviewed with the patient and evaluated for any interactions or necessary modifications/recommendations, and updated to include all prescription medications, OTC medications, and supplements the patient is currently taking.  This list reflects what is contained in the patient's profile, which has also been marked as reviewed to communicate to other providers it is the most up to date version of the patient's current medication therapy.     Current Outpatient Medications:     axitinib (INLYTA) 5 MG chemo tablet, Take 1 tablet by mouth Every 12 (Twelve) Hours., Disp: 60 tablet, Rfl: 11    citalopram (CeleXA) 10 MG tablet, TAKE 1 TABLET DAILY, Disp: 90 tablet, Rfl: 0    Eliquis 5 MG tablet tablet, TAKE 1 TABLET TWICE A DAY, Disp: 180 tablet, Rfl: 3    meclizine (ANTIVERT) 25 MG tablet, TAKE 2 TABLETS DAILY (Patient taking differently: Take 2 tablets by mouth Daily As Needed for Dizziness.), Disp: 180 tablet, Rfl: 0    metoprolol succinate XL (TOPROL-XL) 50 MG 24 hr tablet, Take 1 tablet by mouth Daily., Disp: 90 tablet, Rfl: 3     ondansetron (ZOFRAN) 8 MG tablet, Take 1 tablet by mouth 3 (Three) Times a Day As Needed for Nausea or Vomiting., Disp: 30 tablet, Rfl: 5    QUEtiapine (SEROquel) 50 MG tablet, TAKE 1 TABLET EVERY NIGHT, Disp: 90 tablet, Rfl: 3    rivastigmine (EXELON) 1.5 MG capsule, TAKE 1 CAPSULE TWICE A DAY WITH MEALS, Disp: 180 capsule, Rfl: 3    simvastatin (ZOCOR) 20 MG tablet, TAKE 1 TABLET DAILY, Disp: 90 tablet, Rfl: 0    sodium chloride 1 g tablet, Take 1 tablet by mouth 2 (Two) Times a Day., Disp: 90 tablet, Rfl: 1    Spiriva Respimat 1.25 MCG/ACT aerosol solution inhaler, USE 2 INHALATIONS DAILY, Disp: 12 g, Rfl: 3    thiamine (VITAMIN B1) 100 MG tablet, Take 1 tablet by mouth Daily., Disp: 30 tablet, Rfl: 0    valsartan (DIOVAN) 80 MG tablet, Take 1 tablet by mouth Daily., Disp: 90 tablet, Rfl: 3    ferrous gluconate (FERGON) 324 MG tablet, Take 1 tablet by mouth Daily With Breakfast., Disp: 90 tablet, Rfl: 1  No current facility-administered medications for this visit.  Medicines reviewed by Valorie Garcia, PharmD on 6/19/2025 at 10:11 AM    Drug Interactions  Reviewed concomitant medications, allergies, labs, comorbidities/medical history, quality of life, and immunization history.   Drug-drug interactions noted and discussed during education: The current medication list was reviewed and there are no relevant drug-drug interactions with the oral specialty medication that will be discussed during education; however, there are some category X interactions with his other medications including:  Category X : Quetiapine may increase the Qtc-prolonging effects of citalopram and also may increase the anticholinergic effects of Spiriva. It appears the patient has been on each of these medications for at least a couple of months, but will assess symptoms at education visit.  Category X: Rivastigmine may increase bradycardic effects of Beta-Blockers. Will alert patient to be aware of syncope in particular with this  interaction.   Reminded the patient to let us know before making any changes or starting any new prescription or OTC medications so we can first assess drug interactions.  Drug-food interactions noted and discussed during education: Patient was instructed to avoid eating grapefruit and drinking grapefruit juice    Recommended Medications Assessment  Bone Health (such as calcium/vitamin D, bisphosphonate, RANKL inhibitor) - Not Indicated   VTE prophylaxis - Not Indicated  Prophylactic antimicrobials - Not Indicated  Tumor lysis syndrome prophylaxis - Risk for TLS is Low.    Initial Education Provided for Specialty Medication  The patient has been provided with the following education. All questions and concerns have been addressed prior to the patient receiving the medication, and the patient has verbalized understanding of the education and any materials provided.  Additional patient education shall be provided and documented upon request by the patient, provider or payer.      Provided patient with:   Chemo calendar to help improve adherence., Education sheets about the medication, 24-hour clinic phone number and my contact information and instructions to call should additional questions arise.     Medication Education Sheets Provided: (select all that apply)  Oral Specialty Medication: Inlyta (axitinib)  IV: Pembrolizumab    Other Education Sheets Provided: (select all that apply)  Blood Pressure Log, CINV, and Diarrhea, Proper Disposal Information & Adherence Sheet    TOPICS COMMENTS   Storage and Handling of Oral Specialty Medication Store in the original container, in a dry location out of direct sunlight, and out of reach of children or pets. Store at room temperature.  Discussed safe handling and what to do with any unused medication.   Administration of Oral Specialty Medication Take with or without food at the same time(s) each day. Take with a full glass of water. Do not crush or chew tablets.   Adherence  to Oral Specialty Regimen and Handling Missed Doses Patient is likely to have good treatment adherence; reinforced the importance of adherence. Reviewed how to address missed doses and to let us know of any missed doses.   Anemia: role of RBC, cause, s/s, ways to manage, role of transfusion Reviewed the role of RBC and the use of transfusions if hemoglobin decreases too much.  Patient to notify us if they experience shortness of breath, dizziness, or palpitations.  Also let patient know they could feel more tired than usual and to try to stay active, but rest if they need to.    Thrombocytopenia: role of platelet, cause, s/s, ways to prevent bleeding, things to avoid, when to seek help Reviewed the role of platelets in blood clotting and when to call clinic (bloody nose that bleeds for 5 mins despite pressure, a cut that won't stop bleeding despite pressure, gums that bleed excessively with brushing or flossing). Recommended using an electric razor, soft bristle toothbrush, and blowing your nose gently.    Neutropenia: role of WBC, cause, infection precautions, s/s of infection, when to call MD Reviewed the role of WBC, good infection prevention practices, and when to call the clinic (temperature 100.4F, sore throat, burning urination, etc)  COVID Vaccines: Declined COVID-19 vaccination  Flu Vaccine: 9454-2024 flu vaccine received   Nutrition and Appetite Changes:  importance of maintaining healthy diet & weight, ways to manage to improve intake, dietary consult, exercise regimen, electrolyte and/or blood glucose abnormalities Decreased Appetite: Discussed the risk of decreased appetite. Recommended eating smaller, more frequent meals. Instructed the patient to contact the clinic if losing weight or having difficulty eating enough to maintain energy level. Electrolyte Abnormalities:  Explained that the oncology therapy may lead to abnormalities in electrolytes, specifically: blood glucose, low bicarbonate, low  calcium, high alkaline phosphatase   Diarrhea: causes, s/s of dehydration, ways to manage, dietary changes, when to call MD Discussed the risk of diarrhea. Instructed patient on use OTC loperamide with diarrhea onset, but emphasized the importance of calling the clinic if 4-6 episodes in 24 hours not relieved by OTC loperamide.   Constipation: causes, ways to manage, dietary changes, when to call MD Provided supplementary handout with instructions for use of docusate and other OTC therapies to manage constipation.  Instructed to call us if medications aren't working.   Nausea/Vomiting: cause, use of antiemetics, dietary changes, when to call MD Emetic risk: Low to Minimal  PRN home meds: Ondansetron 8mg PO every 8 hours PRN nausea / vomiting  Pharmacy home meds sent to: [facility]    Instructed the patient to take a dose of the PRN medication at the first onset of nausea and if it's not working to call us for additional medications.  Also provided non-drug measures to mitigate nausea.   Mouth Sores: causes, oral care, ways to manage Mouth sores can be prevented by making a mouth wash mixture of salt, baking soda, and water. The patient was instructed to swish and spit four times daily after meals and before bedtime.  Use of a soft bristle toothbrush was recommended.  The patient was instructed to avoid alcohol-containing OTC mouthwashes.    Alopecia: cause, ways to manage, resources Discussed the possibility of hair loss with the patient. Informed patient that they could request a prescription for a wig if desired and most of the cost is usually covered by insurance. Recommended covering the head with a hat and/or protecting the skin on the head with SPF 30 or higher.    Nervous System Changes: causes, s/s, neuropathies, cognitive changes, ways to manage PRES: Discussed the rare, but serious risk of PRES and the signs/symptoms.   Pain: causes, ways to manage Discussed muscle and joint aches/pains with therapy, and  recommended the use of OTC pain relief with ibuprofen or acetaminophen if needed.   Skin/Nail Changes: cause, s/s, ways to manage Immunotherapy - Discussed potential for a rash or itchy skin from immunotherapy, offered nonpharmacologic prevention strategies, and instructed patient to call if a rash develops that worsens or gets larger       Organ Toxicities: cause, s/s, need for diagnostic tests, labs, when to notify MD Discussed potential effects on organ systems, monitoring, diagnostic tests, labs, and when to notify their MD. Discussed the signs/symptoms of the following: cardiotoxicity, central neurotoxicity (confusion, vision changes, stiff neck, seizure), delayed wound healing, GI perforation, hepatotoxicity, hormone gland changes (most commonly the thyroid), hypertension - recommended close monitoring of blood pressure, provided BP log, and explained how to track values, lung changes, nephrotoxicity, and skin changes   Infusion-Related Reactions or Injection-Site Reaction: Cause, s/s, anaphylaxis, monitoring, etc. Premeds: None    Discussed the risk of an infusion reaction and symptoms such as: fever, chills, dizziness, itchiness or rash, flushing, trouble breathing, wheezing, sudden back pain, or feeling faint.  Instructed the patient to notify their nurse if they start feeling weird at any point during their infusion.    Reviewed how infusion or injection-site reactions are managed.   Miscellaneous Financial Issues: Copay $43 via [employer]  Lab Draws: On or before day 1 of each cycle, no sooner than 3 days early.  Miscellaneous: Voice changes/hoarseness discussed.   Infertility and Sexuality:  causes, fertility preservation options, sexuality changes, ways to manage, importance of birth control IV Oncology Therapy: Reviewed safe sex practices and the importance of minimizing exposure to body fluids for 48 hours after each dose of IV oncology therapy. Oral Oncology Therapy: Reviewed safe sex practices and the  importance of minimizing exposure to body fluids while on oral oncology therapy.   Home Care: how to manage bodily fluids Counseled on management of soiled linens and proper flush technique.  Discussed how to manage all the side effects at home and advised when to contact the MD office     Adherence and Self-Administration  Adherence related to the patient's specialty therapy was discussed with the patient. The Adherence segment of this outreach has been reviewed and updated.     Is there a concern with patient's ability to self administer the medication correctly and without issue?: No  Were any potential barriers to adherence identified during the initial assessment or patient education?: No  Are there any concerns regarding the patient's understanding of the importance of medication adherence?: No  Methods for Supporting Patient Adherence and/or Self-Administration: Dosing calendar  Expected duration of therapy: Until disease progression or intolerable toxicity  Open Medication Therapy Problems  No medication therapy recommendations to display    Goals of Therapy  Goals related to the patient's specialty therapy were discussed with the patient. The Patient Goals segment of this outreach has been reviewed and updated.   Goals Addressed Today        Specialty Pharmacy General Goal      Clinical goal/therapeutic target: disease control, per the recent oncology clinic notes and imaging.  Patient-identified goal of therapy: Patient will adhere to medication regimen by %    Patient Goals of Therapy:   Consistently take medications as prescribed  Patient will adhere to medication regimen & report any medication side effects to healthcare provider  Clinical Goals  Support patient understanding of medication regimen  Ensure patient knows the pharmacy contact information  Schedule regular follow-up to monitor the treatment serious adverse events, confirm medication adherence, & monitor disease progression or  stability    Date of Enrollment: 6/19/25  Baseline Value:  6/19/25: Patient starting Inlyta + Keytruda for metastatic renal cell carcinoma. Education complete.            Reassessment Plan & Follow-Up  Pharmacist to follow up in approximately 1 week for toxicity check.   Pharmacist to perform regular reassessments no more than (6) months from the previous assessment.  Care Coordinator to follow until patient receives oral specialty medication.     Additional Plans, Therapy Recommendations or Therapy Problems to Be Addressed: none at this time     Attestation  Therapeutic appropriateness: Appropriate   I attest the patient was actively involved in and has agreed to the above plan of care. If the prescribed therapy is at any point deemed not appropriate based on the current or future assessments, a consultation will be initiated with the patient's specialty care provider to determine the best course of action. The revised plan of therapy will be documented along with any required assessments and/or additional patient education provided.     Valorie Garcia PharmD  Oncology Clinical Specialty Pharmacist   6/19/2025  11:10 EDT

## 2025-06-20 LAB
PD-L1 BY 22C3 TISS IMSTN DOC: NEGATIVE
TEMPUS PD-L1 (22C3) COMBINED POSITIVE SCORE: <1
TEMPUS PD-L1 (22C3) TUMOR PROPORTION SCORE: <1 %
TEMPUS PORTAL: NORMAL

## 2025-06-23 ENCOUNTER — SPECIALTY PHARMACY (OUTPATIENT)
Dept: PHARMACY | Facility: HOSPITAL | Age: 70
End: 2025-06-23
Payer: MEDICARE

## 2025-06-23 NOTE — PROGRESS NOTES
Specialty Pharmacy Patient Management Program  Phone Call     Spoke with Paulo Leiva Jr.'s son via the telephone regarding his Inlyta. He stated that he did receive the medication and that Mr. Leiva will be starting once he starts his infusions.    Patient did not have any further questions or concerns at this time.     Irma Acosta [facility]  Oncology Care Coordinator  6/23/2025  09:24 EDT

## 2025-06-25 PROBLEM — Z79.899 ENCOUNTER FOR LONG-TERM (CURRENT) USE OF HIGH-RISK MEDICATION: Status: ACTIVE | Noted: 2025-06-25

## 2025-06-26 ENCOUNTER — HOSPITAL ENCOUNTER (OUTPATIENT)
Dept: ONCOLOGY | Facility: HOSPITAL | Age: 70
Discharge: HOME | End: 2025-06-26
Payer: MEDICARE

## 2025-06-26 VITALS
RESPIRATION RATE: 18 BRPM | HEART RATE: 64 BPM | BODY MASS INDEX: 30.14 KG/M2 | HEIGHT: 69 IN | WEIGHT: 203.48 LBS | SYSTOLIC BLOOD PRESSURE: 192 MMHG | DIASTOLIC BLOOD PRESSURE: 96 MMHG | OXYGEN SATURATION: 96 %

## 2025-06-26 DIAGNOSIS — C64.9 MALIGNANT NEOPLASM OF KIDNEY EXCLUDING RENAL PELVIS, UNSPECIFIED LATERALITY: Primary | ICD-10-CM

## 2025-06-26 DIAGNOSIS — C64.9 METASTATIC RENAL CELL CARCINOMA, UNSPECIFIED LATERALITY: ICD-10-CM

## 2025-06-26 DIAGNOSIS — Z79.899 ENCOUNTER FOR LONG-TERM (CURRENT) USE OF HIGH-RISK MEDICATION: Primary | ICD-10-CM

## 2025-06-26 LAB
ALBUMIN SERPL-MCNC: 3.9 G/DL (ref 3.5–5.2)
ALBUMIN/GLOB SERPL: 1.1 G/DL
ALP SERPL-CCNC: 227 U/L (ref 39–117)
ALT SERPL W P-5'-P-CCNC: 14 U/L (ref 1–41)
ANION GAP SERPL CALCULATED.3IONS-SCNC: 12.2 MMOL/L (ref 5–15)
AST SERPL-CCNC: 18 U/L (ref 1–40)
BASOPHILS # BLD AUTO: 0.1 10*3/MM3 (ref 0–0.2)
BASOPHILS NFR BLD AUTO: 1.1 % (ref 0–1.5)
BILIRUB SERPL-MCNC: 1.3 MG/DL (ref 0–1.2)
BUN SERPL-MCNC: 10.1 MG/DL (ref 8–23)
BUN/CREAT SERPL: 7.4 (ref 7–25)
CALCIUM SPEC-SCNC: 9.8 MG/DL (ref 8.6–10.5)
CHLORIDE SERPL-SCNC: 90 MMOL/L (ref 98–107)
CO2 SERPL-SCNC: 20.8 MMOL/L (ref 22–29)
CREAT SERPL-MCNC: 1.37 MG/DL (ref 0.76–1.27)
DEPRECATED RDW RBC AUTO: 56.2 FL (ref 37–54)
EGFRCR SERPLBLD CKD-EPI 2021: 56.2 ML/MIN/1.73
EOSINOPHIL # BLD AUTO: 0.13 10*3/MM3 (ref 0–0.4)
EOSINOPHIL NFR BLD AUTO: 1.4 % (ref 0.3–6.2)
ERYTHROCYTE [DISTWIDTH] IN BLOOD BY AUTOMATED COUNT: 21.4 % (ref 12.3–15.4)
GLOBULIN UR ELPH-MCNC: 3.5 GM/DL
GLUCOSE SERPL-MCNC: 94 MG/DL (ref 65–99)
HCT VFR BLD AUTO: 58.9 % (ref 37.5–51)
HGB BLD-MCNC: 19.1 G/DL (ref 13–17.7)
IMM GRANULOCYTES # BLD AUTO: 0.08 10*3/MM3 (ref 0–0.05)
IMM GRANULOCYTES NFR BLD AUTO: 0.9 % (ref 0–0.5)
LYMPHOCYTES # BLD AUTO: 1.22 10*3/MM3 (ref 0.7–3.1)
LYMPHOCYTES NFR BLD AUTO: 13.1 % (ref 19.6–45.3)
MCH RBC QN AUTO: 26.2 PG (ref 26.6–33)
MCHC RBC AUTO-ENTMCNC: 32.4 G/DL (ref 31.5–35.7)
MCV RBC AUTO: 80.7 FL (ref 79–97)
MONOCYTES # BLD AUTO: 0.78 10*3/MM3 (ref 0.1–0.9)
MONOCYTES NFR BLD AUTO: 8.4 % (ref 5–12)
NEUTROPHILS NFR BLD AUTO: 6.99 10*3/MM3 (ref 1.7–7)
NEUTROPHILS NFR BLD AUTO: 75.1 % (ref 42.7–76)
NRBC BLD AUTO-RTO: 0 /100 WBC (ref 0–0.2)
PLATELET # BLD AUTO: 219 10*3/MM3 (ref 140–450)
PMV BLD AUTO: 9.2 FL (ref 6–12)
POTASSIUM SERPL-SCNC: 5.7 MMOL/L (ref 3.5–5.2)
PROT SERPL-MCNC: 7.4 G/DL (ref 6–8.5)
RBC # BLD AUTO: 7.3 10*6/MM3 (ref 4.14–5.8)
SODIUM SERPL-SCNC: 123 MMOL/L (ref 136–145)
T4 FREE SERPL-MCNC: 1.61 NG/DL (ref 0.92–1.68)
TSH SERPL DL<=0.05 MIU/L-ACNC: 0.9 UIU/ML (ref 0.27–4.2)
WBC NRBC COR # BLD AUTO: 9.3 10*3/MM3 (ref 3.4–10.8)

## 2025-06-26 PROCEDURE — 84443 ASSAY THYROID STIM HORMONE: CPT | Performed by: INTERNAL MEDICINE

## 2025-06-26 PROCEDURE — 36415 COLL VENOUS BLD VENIPUNCTURE: CPT

## 2025-06-26 PROCEDURE — 84439 ASSAY OF FREE THYROXINE: CPT | Performed by: INTERNAL MEDICINE

## 2025-06-26 PROCEDURE — 80053 COMPREHEN METABOLIC PANEL: CPT | Performed by: INTERNAL MEDICINE

## 2025-06-26 PROCEDURE — 85025 COMPLETE CBC W/AUTO DIFF WBC: CPT | Performed by: INTERNAL MEDICINE

## 2025-06-29 ENCOUNTER — PATIENT MESSAGE (OUTPATIENT)
Dept: FAMILY MEDICINE CLINIC | Facility: CLINIC | Age: 70
End: 2025-06-29
Payer: MEDICARE

## 2025-06-29 DIAGNOSIS — I10 ESSENTIAL HYPERTENSION: ICD-10-CM

## 2025-07-01 ENCOUNTER — HOSPITAL ENCOUNTER (OUTPATIENT)
Dept: ONCOLOGY | Facility: HOSPITAL | Age: 70
Discharge: HOME | End: 2025-07-01
Payer: MEDICARE

## 2025-07-01 VITALS
RESPIRATION RATE: 18 BRPM | HEART RATE: 77 BPM | WEIGHT: 199.4 LBS | HEIGHT: 69 IN | OXYGEN SATURATION: 96 % | TEMPERATURE: 97.9 F | DIASTOLIC BLOOD PRESSURE: 89 MMHG | SYSTOLIC BLOOD PRESSURE: 184 MMHG | BODY MASS INDEX: 29.53 KG/M2

## 2025-07-01 DIAGNOSIS — Z79.899 ENCOUNTER FOR LONG-TERM (CURRENT) USE OF HIGH-RISK MEDICATION: Primary | ICD-10-CM

## 2025-07-01 DIAGNOSIS — Z79.899 ENCOUNTER FOR LONG-TERM (CURRENT) USE OF HIGH-RISK MEDICATION: ICD-10-CM

## 2025-07-01 DIAGNOSIS — C64.9 METASTATIC RENAL CELL CARCINOMA, UNSPECIFIED LATERALITY: ICD-10-CM

## 2025-07-01 LAB
ALBUMIN SERPL-MCNC: 3.4 G/DL (ref 3.5–5.2)
ALBUMIN/GLOB SERPL: 1 G/DL
ALP SERPL-CCNC: 205 U/L (ref 39–117)
ALT SERPL W P-5'-P-CCNC: 14 U/L (ref 1–41)
ANION GAP SERPL CALCULATED.3IONS-SCNC: 11.4 MMOL/L (ref 5–15)
AST SERPL-CCNC: 19 U/L (ref 1–40)
BASOPHILS # BLD AUTO: 0.08 10*3/MM3 (ref 0–0.2)
BASOPHILS NFR BLD AUTO: 0.9 % (ref 0–1.5)
BILIRUB SERPL-MCNC: 1.5 MG/DL (ref 0–1.2)
BUN SERPL-MCNC: 10.1 MG/DL (ref 8–23)
BUN/CREAT SERPL: 7.7 (ref 7–25)
CALCIUM SPEC-SCNC: 9.1 MG/DL (ref 8.6–10.5)
CHLORIDE SERPL-SCNC: 95 MMOL/L (ref 98–107)
CO2 SERPL-SCNC: 19.6 MMOL/L (ref 22–29)
CREAT SERPL-MCNC: 1.32 MG/DL (ref 0.76–1.27)
DEPRECATED RDW RBC AUTO: 57.9 FL (ref 37–54)
EGFRCR SERPLBLD CKD-EPI 2021: 58.8 ML/MIN/1.73
EOSINOPHIL # BLD AUTO: 0.14 10*3/MM3 (ref 0–0.4)
EOSINOPHIL NFR BLD AUTO: 1.6 % (ref 0.3–6.2)
ERYTHROCYTE [DISTWIDTH] IN BLOOD BY AUTOMATED COUNT: 21.9 % (ref 12.3–15.4)
GLOBULIN UR ELPH-MCNC: 3.3 GM/DL
GLUCOSE SERPL-MCNC: 91 MG/DL (ref 65–99)
HCT VFR BLD AUTO: 57.1 % (ref 37.5–51)
HGB BLD-MCNC: 18.6 G/DL (ref 13–17.7)
IMM GRANULOCYTES # BLD AUTO: 0.05 10*3/MM3 (ref 0–0.05)
IMM GRANULOCYTES NFR BLD AUTO: 0.6 % (ref 0–0.5)
LYMPHOCYTES # BLD AUTO: 1.08 10*3/MM3 (ref 0.7–3.1)
LYMPHOCYTES NFR BLD AUTO: 12.6 % (ref 19.6–45.3)
MCH RBC QN AUTO: 26.2 PG (ref 26.6–33)
MCHC RBC AUTO-ENTMCNC: 32.6 G/DL (ref 31.5–35.7)
MCV RBC AUTO: 80.3 FL (ref 79–97)
MONOCYTES # BLD AUTO: 0.87 10*3/MM3 (ref 0.1–0.9)
MONOCYTES NFR BLD AUTO: 10.2 % (ref 5–12)
NEUTROPHILS NFR BLD AUTO: 6.32 10*3/MM3 (ref 1.7–7)
NEUTROPHILS NFR BLD AUTO: 74.1 % (ref 42.7–76)
NRBC BLD AUTO-RTO: 0 /100 WBC (ref 0–0.2)
PLATELET # BLD AUTO: 210 10*3/MM3 (ref 140–450)
PMV BLD AUTO: 9 FL (ref 6–12)
POTASSIUM SERPL-SCNC: 5.8 MMOL/L (ref 3.5–5.2)
PROT SERPL-MCNC: 6.7 G/DL (ref 6–8.5)
RBC # BLD AUTO: 7.11 10*6/MM3 (ref 4.14–5.8)
SODIUM SERPL-SCNC: 126 MMOL/L (ref 136–145)
T4 FREE SERPL-MCNC: 1.67 NG/DL (ref 0.92–1.68)
TSH SERPL DL<=0.05 MIU/L-ACNC: 0.56 UIU/ML (ref 0.27–4.2)
WBC NRBC COR # BLD AUTO: 8.54 10*3/MM3 (ref 3.4–10.8)

## 2025-07-01 PROCEDURE — 84443 ASSAY THYROID STIM HORMONE: CPT | Performed by: INTERNAL MEDICINE

## 2025-07-01 PROCEDURE — 85025 COMPLETE CBC W/AUTO DIFF WBC: CPT | Performed by: INTERNAL MEDICINE

## 2025-07-01 PROCEDURE — 25010000002 PEMBROLIZUMAB 100 MG/4ML SOLUTION 4 ML VIAL: Performed by: INTERNAL MEDICINE

## 2025-07-01 PROCEDURE — 96413 CHEMO IV INFUSION 1 HR: CPT

## 2025-07-01 PROCEDURE — 25810000003 SODIUM CHLORIDE 0.9 % SOLUTION: Performed by: INTERNAL MEDICINE

## 2025-07-01 PROCEDURE — 84439 ASSAY OF FREE THYROXINE: CPT | Performed by: INTERNAL MEDICINE

## 2025-07-01 PROCEDURE — 80053 COMPREHEN METABOLIC PANEL: CPT | Performed by: INTERNAL MEDICINE

## 2025-07-01 RX ORDER — SODIUM CHLORIDE 9 MG/ML
20 INJECTION, SOLUTION INTRAVENOUS ONCE
Status: COMPLETED | OUTPATIENT
Start: 2025-07-01 | End: 2025-07-01

## 2025-07-01 RX ORDER — SODIUM CHLORIDE 9 MG/ML
20 INJECTION, SOLUTION INTRAVENOUS ONCE
Status: CANCELLED | OUTPATIENT
Start: 2025-07-01

## 2025-07-01 RX ADMIN — SODIUM CHLORIDE 200 MG: 9 INJECTION, SOLUTION INTRAVENOUS at 15:00

## 2025-07-01 RX ADMIN — SODIUM CHLORIDE 20 ML/HR: 9 INJECTION, SOLUTION INTRAVENOUS at 14:43

## 2025-07-01 NOTE — PROGRESS NOTES
"Presents for C1D1 of pembrolizumab     Patient was educated on information about each medication including dose, route, frequency, and common adverse effects. Patient was provided both verbal and written counseling. UpToDate Lexidrug adult patient education printed and provided to patient and key information verbally highlighted including: Overview of regimen including but not limited to infusion times; recognition and management of allergic/infusion reactions; \"call your doctor right away\" parameters.     All of the patient's questions were answered and patient expressed verbal understanding    "

## 2025-07-01 NOTE — NURSING NOTE
Chemotherapy therapy regimen discussed: Pembrolizumab    Consent signed: yes    Oriented to facility: Orientated to lobby, registration, nourishment area, restrooms, treatment area.    Teaching: Reviewed typical treatment day process and visitor policy. Discussed importance of continuing previously prescribed medications unless otherwise directed by . Pt informed of clinic resources and contact information. Chemotherapy regimen and side effects discussed.     Materials Given: Written materials provided from [employer]. [employer] [facility] also provided education materials.    Prescriptions and Pharmacy Verified:  All symptom management prescriptions have been reviewed and are at pharmacy. Pt aware.     Upcoming Appointments Reviewed: Pt and family v/u of all education and information provided and deny further questions or concerns. RN advised pt/family to call as needed for any questions or concerns that may arise in the future. Pt and family v/u.

## 2025-07-03 ENCOUNTER — TELEPHONE (OUTPATIENT)
Dept: ONCOLOGY | Facility: HOSPITAL | Age: 70
End: 2025-07-03
Payer: MEDICARE

## 2025-07-03 NOTE — TELEPHONE ENCOUNTER
Paulo Leiva Jr. 1955     Person Who Answered The Call & Spoke To Triage      Symptoms    Does patient have nausea and vomiting? No    Does patient have medications prescribed for N/V? No    Does patient have diarrhea? Yes    Does the patient have diarrhea medications? Yes    Does the patient have pain? No    Is pain medications effective? No    Discharge  Do you have any questions about your discharge instructions? No    Patient Satisfaction with [facility]    Where you satisfied with the care you received? Yes    Pt suggestions for the [facility]    Do you have any suggestions to improve your care? No    Comments

## 2025-07-07 ENCOUNTER — SPECIALTY PHARMACY (OUTPATIENT)
Dept: PHARMACY | Facility: HOSPITAL | Age: 70
End: 2025-07-07
Payer: MEDICARE

## 2025-07-07 NOTE — PROGRESS NOTES
Specialty Pharmacy Patient Management Program  1 Week Toxicity Check     Paulo Leiva Jr. is seen by their provider for RCC (Renal Cell Carcinoma). Patient was previously enrolled in the Oncology Patient Management program offered by [facility].      Patient reports starting Inlyta (axitinib) on 7/1/25. Thus far, patient denies side effects, aside from fatigue, which is manageable and not bothersome to patient. Advised patient to alert office if this changes.  Patient reports no missed doses and no medication changes. He will be seeing his PCP later this week - valsartan has been increased due to rising blood pressure (even before starting the Inlyta) so they will keep a close eye on this. Advised patient to call office with any changes. Patient expressed understanding and had no additional questions at this time.       Valorie Garcia, PharmD  Oncology Clinical Specialty Pharmacist   7/7/2025  12:06 EDT

## 2025-07-10 ENCOUNTER — TELEPHONE (OUTPATIENT)
Dept: ONCOLOGY | Facility: HOSPITAL | Age: 70
End: 2025-07-10
Payer: MEDICARE

## 2025-07-10 ENCOUNTER — OFFICE VISIT (OUTPATIENT)
Dept: FAMILY MEDICINE CLINIC | Facility: CLINIC | Age: 70
End: 2025-07-10
Payer: MEDICARE

## 2025-07-10 VITALS
DIASTOLIC BLOOD PRESSURE: 88 MMHG | HEART RATE: 86 BPM | SYSTOLIC BLOOD PRESSURE: 172 MMHG | HEIGHT: 69 IN | OXYGEN SATURATION: 95 % | WEIGHT: 195.4 LBS | BODY MASS INDEX: 28.94 KG/M2 | TEMPERATURE: 97.8 F

## 2025-07-10 DIAGNOSIS — I10 ESSENTIAL HYPERTENSION: Primary | ICD-10-CM

## 2025-07-10 RX ORDER — AMLODIPINE BESYLATE 5 MG/1
5 TABLET ORAL DAILY
Qty: 90 TABLET | Refills: 0 | Status: ON HOLD | OUTPATIENT
Start: 2025-07-10

## 2025-07-10 RX ORDER — VALSARTAN 160 MG/1
160 TABLET ORAL DAILY
Qty: 90 TABLET | Refills: 1 | Status: ON HOLD | OUTPATIENT
Start: 2025-07-10

## 2025-07-10 NOTE — PROGRESS NOTES
Chief Complaint  Hypertension (Bp has been getting high. )    The PHQ has not been completed during this encounter.       History of Present Illness  Paulo ESTHER Leiva Jr. is a 69 y.o. male who presents to [facility] with a past medical history of  Past Medical History:   Diagnosis Date    Arthritis of back 2024    Benign essential hypertension     Cancer August 1994    Mass on Kidney..    COPD (chronic obstructive pulmonary disease)     Erectile dysfunction Apprx: 5 yrs ago    Generalized convulsive seizures 12/27/2023    Hip arthrosis     Hyperlipidemia 12/14/2018    Hyponatremia     Implantable loop recorder present     Knee swelling     Long term (current) use of non-steroidal anti-inflammatories (nsaid) 04/16/2018    Paroxysmal atrial fibrillation 5/14/2025    Renal insufficiency     Syncope     Syncope and collapse 09/02/2022    Total of 5 episdoes with the last one 10/22    Outside Source Comment: Overview:  Formatting of this note might be different from the original.  Total of 5 episdoes with the last one 10/22  Last Assessment & Plan:  Formatting of this note might be different from the original.  No recurrent episodes Lipitor does not show any significant dysrhythmias at this point continue with monitoring      Vertigo 02/17/2021    Vision loss        HPI     The patient is a 69-year-old male who presents for elevated blood pressure. He is accompanied by his son.    His blood pressure readings have been consistently high, with a recent reading of 182/98. On 07/01/2025, it was 184/89 and on 06/26/2025, it was 192/96. Home readings are about 188. He reports no chest pain or palpitations. He has been experiencing weight loss and a decreased appetite, which he attributes to his current diet. He has been on valsartan, which was increased to 2 pills, and metoprolol 50 mg daily. He was previously on amlodipine and benazepril, but these were discontinued due to low blood  "pressure. He has not experienced any issues with amlodipine, other than low blood pressure. He continues to consume non-alcoholic beer, despite being advised against it during his fluid restriction period. He has made adjustments to his fluid intake, which seemed to help during his last infusion. He has experienced some swelling in the past, but not recently.    He has been diagnosed with additional cancer in his liver, which is believed to have metastasized from his kidney. He is currently undergoing chemotherapy, which includes an oral medication taken twice daily and Keytruda infusions. The side effects of these treatments include diarrhea and fatigue. His last blood work was done on 07/01/2025, and his next infusion is scheduled for 07/22/2025.    His mental health is stable, and he does not feel the need for counseling or an increase in his medication dosage. He is currently taking Seroquel, which aids his sleep, and Celexa for mood regulation.    He reports feeling cold frequently, although this symptom has lessened recently. He also reports feeling weak and tired, and has declined home health physical therapy at this time.    SOCIAL HISTORY  He reports no alcohol use but consumes non-alcoholic beer.       Objective   Vital Signs:   Vitals:    07/10/25 1114 07/10/25 1213   BP: (!) 182/98 172/88   BP Location: Left arm Left arm   Patient Position: Sitting Sitting   Pulse: 86    Temp: 97.8 °F (36.6 °C)    SpO2: 95%    Weight: 88.6 kg (195 lb 6.4 oz)    Height: 174.5 cm (68.7\")    PainSc: 0-No pain      Body mass index is 29.11 kg/m².    Wt Readings from Last 3 Encounters:   07/10/25 88.6 kg (195 lb 6.4 oz)   07/01/25 90.4 kg (199 lb 6.4 oz)   06/26/25 92.3 kg (203 lb 7.8 oz)     BP Readings from Last 3 Encounters:   07/10/25 172/88   07/01/25 (!) 184/89   06/26/25 (!) 192/96       Health Maintenance   Topic Date Due    ZOSTER VACCINE (2 of 2) 02/11/2015    COVID-19 Vaccine (1 - 2024-25 season) 01/17/2026 " (Originally 9/1/2024)    INFLUENZA VACCINE  10/01/2025    ANNUAL WELLNESS VISIT  10/28/2025    LUNG CANCER SCREENING  04/15/2026    LIPID PANEL  05/14/2026    TDAP/TD VACCINES (3 - Td or Tdap) 04/27/2031    COLORECTAL CANCER SCREENING  10/26/2032    HEPATITIS C SCREENING  Completed    Pneumococcal Vaccine 50+  Completed    AAA SCREEN ONCE  Completed       Physical Exam  Vitals reviewed.   Constitutional:       General: He is not in acute distress.     Appearance: Normal appearance. He is well-developed.   HENT:      Head: Normocephalic and atraumatic.      Right Ear: External ear normal.      Left Ear: External ear normal.   Eyes:      Conjunctiva/sclera: Conjunctivae normal.      Pupils: Pupils are equal, round, and reactive to light.   Cardiovascular:      Rate and Rhythm: Normal rate and regular rhythm.      Heart sounds: Normal heart sounds.   Pulmonary:      Effort: Pulmonary effort is normal.      Breath sounds: Normal breath sounds.   Musculoskeletal:      Right lower leg: No edema.      Left lower leg: No edema.      Comments: Unsteady gait; using cane     Skin:     General: Skin is warm and dry.   Neurological:      Mental Status: He is alert and oriented to person, place, and time.   Psychiatric:         Mood and Affect: Mood and affect normal.         Behavior: Behavior normal.         Thought Content: Thought content normal.         Judgment: Judgment normal.            Result Review :  The following data was reviewed by: MELANIA Santiago on 07/10/2025:  Results  Labs   - Sodium level: 07/01/2025, 126     Common labs          6/13/2025    08:15 6/26/2025    12:53 7/1/2025    13:50   Common Labs   Glucose 112  94  91    BUN 12.2  10.1  10.1    Creatinine 1.46  1.37  1.32    Sodium 129  123  126    Potassium 4.7  5.7  5.8    Chloride 98  90  95    Calcium 9.8  9.8  9.1    Albumin 3.5  3.9  3.4    Total Bilirubin 1.1  1.3  1.5    Alkaline Phosphatase 211  227  205    AST (SGOT) 17  18  19    ALT  (SGPT) 12  14  14    WBC 8.48  9.30  8.54    Hemoglobin 19.1  19.1  18.6    Hematocrit 58.8  58.9  57.1    Platelets 276  219  210      TSH          5/14/2025    13:20 6/26/2025    12:53 7/1/2025    13:50   TSH   TSH 0.812  0.895  0.563        Procedures        Assessment and Plan   Diagnoses and all orders for this visit:    1. Essential hypertension (Primary)  -     amLODIPine (NORVASC) 5 MG tablet; Take 1 tablet by mouth Daily.  Dispense: 90 tablet; Refill: 0  -     valsartan (DIOVAN) 160 MG tablet; Take 1 tablet by mouth Daily.  Dispense: 90 tablet; Refill: 1         1. Elevated blood pressure.  - Blood pressure remains elevated despite current medication regimen.  - Current medications include valsartan 160 mg and metoprolol 50 mg daily.  - Amlodipine will be reintroduced into the regimen; monitor for any ankle swelling.  - Prescription for amlodipine sent to pharmacy; advised to maintain a log of blood pressure readings over the next week.    2. Metastatic cancer.  - Undergoing treatment with chemotherapy tablet and Keytruda infusions for metastatic cancer spread to the liver.  - Reports side effects including diarrhea and increased fatigue.  - Advised to continue current treatment regimen and discuss potential addition of steroids with oncologist to help with appetite and energy levels.    3. Mental health management.  - Currently on Celexa for mood stabilization and Seroquel for sleep.  - Reports mental health is stable; does not feel the need for an increase in medication or counseling at this time.  - Discussed potential side effects of increasing Celexa dosage, including risk of worsening depression and suicidal thoughts.  - Advised to monitor mood and energy levels; contact clinic if dosage adjustment is needed.    4. Muscle weakness.  - Appears weaker and more fatigued, possibly due to ongoing cancer treatment.  - Advised to incorporate more physical activity into daily routine, such as standing up and  sitting down every hour while watching TV.  - Suggested use of physical therapy bands for upper body strength.  - Declined offer for home health physical therapy at this time.               FOLLOW UP  No follow-ups on file.    Patient was given instructions and counseling regarding his condition or for health maintenance advice. Please see specific information pulled into the AVS if appropriate.       MELANIA Santiago  07/10/25  13:16 EDT    CURRENT & DISCONTINUED MEDICATIONS  Current Outpatient Medications   Medication Instructions    amLODIPine (NORVASC) 5 mg, Oral, Daily    axitinib (INLYTA) 5 mg, Oral, Every 12 Hours    citalopram (CELEXA) 10 mg, Oral, Daily    Eliquis 5 mg, Oral, 2 Times Daily    ferrous gluconate (FERGON) 324 mg, Oral, Daily With Breakfast    meclizine (ANTIVERT) 50 mg, Oral, Daily    metoprolol succinate XL (TOPROL-XL) 50 mg, Oral, Daily    ondansetron (ZOFRAN) 8 mg, Oral, 3 Times Daily PRN    QUEtiapine (SEROQUEL) 50 mg, Nightly    rivastigmine (EXELON) 1.5 mg, Oral, 2 Times Daily With Meals    simvastatin (ZOCOR) 20 mg, Oral, Daily    sodium chloride 1 g, Oral, 2 Times Daily    Spiriva Respimat 1.25 MCG/ACT aerosol solution inhaler USE 2 INHALATIONS DAILY    thiamine (VITAMIN B1) 100 mg, Oral, Daily    valsartan (DIOVAN) 160 mg, Oral, Daily       Medications Discontinued During This Encounter   Medication Reason    valsartan (DIOVAN) 80 MG tablet Reorder        Patient or patient representative verbalized consent for the use of Ambient Listening during the visit with  MELANIA Santiago for chart documentation. 7/10/2025  11:46 EDT

## 2025-07-10 NOTE — TELEPHONE ENCOUNTER
Regulo called and states that the pt has bad fatigue, anorexia, and has lost 5 more pounds. When questioned Regulo states that the pt has decreased food intake even though they encourage him to eat. Regulo states that the pt had an apt with his PCP today and almost did not go due to his fatigue. Regulo is asking if the pt's Keytruda or Inlyta doses could be adjusted.     Note: PCP's note from today is in the pt's chart.

## 2025-07-11 DIAGNOSIS — C64.9 METASTATIC RENAL CELL CARCINOMA, UNSPECIFIED LATERALITY: Primary | ICD-10-CM

## 2025-07-14 ENCOUNTER — APPOINTMENT (OUTPATIENT)
Dept: CT IMAGING | Facility: HOSPITAL | Age: 70
DRG: 071 | End: 2025-07-14
Payer: MEDICARE

## 2025-07-14 ENCOUNTER — APPOINTMENT (OUTPATIENT)
Dept: GENERAL RADIOLOGY | Facility: HOSPITAL | Age: 70
DRG: 071 | End: 2025-07-14
Payer: MEDICARE

## 2025-07-14 ENCOUNTER — HOSPITAL ENCOUNTER (INPATIENT)
Facility: HOSPITAL | Age: 70
LOS: 5 days | Discharge: NURSING, REHAB OR LONG-TERM CARE | DRG: 071 | End: 2025-07-19
Attending: EMERGENCY MEDICINE | Admitting: STUDENT IN AN ORGANIZED HEALTH CARE EDUCATION/TRAINING PROGRAM
Payer: MEDICARE

## 2025-07-14 ENCOUNTER — TELEPHONE (OUTPATIENT)
Dept: ONCOLOGY | Facility: HOSPITAL | Age: 70
End: 2025-07-14
Payer: MEDICARE

## 2025-07-14 DIAGNOSIS — I10 UNCONTROLLED HYPERTENSION: ICD-10-CM

## 2025-07-14 DIAGNOSIS — R26.2 DIFFICULTY WALKING: ICD-10-CM

## 2025-07-14 DIAGNOSIS — E87.1 HYPONATREMIA: ICD-10-CM

## 2025-07-14 DIAGNOSIS — Z78.9 DECREASED ACTIVITIES OF DAILY LIVING (ADL): ICD-10-CM

## 2025-07-14 DIAGNOSIS — R41.82 ALTERED MENTAL STATUS, UNSPECIFIED ALTERED MENTAL STATUS TYPE: Primary | ICD-10-CM

## 2025-07-14 LAB
ALBUMIN SERPL-MCNC: 3.1 G/DL (ref 3.5–5.2)
ALBUMIN/GLOB SERPL: 0.9 G/DL
ALP SERPL-CCNC: 165 U/L (ref 39–117)
ALT SERPL W P-5'-P-CCNC: 12 U/L (ref 1–41)
AMMONIA BLD-SCNC: 23 UMOL/L (ref 16–60)
ANION GAP SERPL CALCULATED.3IONS-SCNC: 15.6 MMOL/L (ref 5–15)
ANISOCYTOSIS BLD QL: NORMAL
ARTERIAL PATENCY WRIST A: POSITIVE
AST SERPL-CCNC: 20 U/L (ref 1–40)
ATMOSPHERIC PRESS: 743.8 MMHG
BACTERIA UR QL AUTO: ABNORMAL /HPF
BASE EXCESS BLDA CALC-SCNC: -1.8 MMOL/L (ref -2–2)
BASOPHILS # BLD AUTO: 0.08 10*3/MM3 (ref 0–0.2)
BASOPHILS NFR BLD AUTO: 1 % (ref 0–1.5)
BDY SITE: ABNORMAL
BILIRUB SERPL-MCNC: 1.2 MG/DL (ref 0–1.2)
BILIRUB UR QL STRIP: NEGATIVE
BUN SERPL-MCNC: 11.2 MG/DL (ref 8–23)
BUN/CREAT SERPL: 9.2 (ref 7–25)
CALCIUM SPEC-SCNC: 9.6 MG/DL (ref 8.6–10.5)
CHLORIDE SERPL-SCNC: 97 MMOL/L (ref 98–107)
CK SERPL-CCNC: 48 U/L (ref 20–200)
CLARITY UR: CLEAR
CO2 SERPL-SCNC: 18.4 MMOL/L (ref 22–29)
COLOR UR: YELLOW
CREAT SERPL-MCNC: 1.22 MG/DL (ref 0.76–1.27)
DEPRECATED RDW RBC AUTO: 58.4 FL (ref 37–54)
EGFRCR SERPLBLD CKD-EPI 2021: 64.6 ML/MIN/1.73
EOSINOPHIL # BLD AUTO: 0.07 10*3/MM3 (ref 0–0.4)
EOSINOPHIL NFR BLD AUTO: 0.9 % (ref 0.3–6.2)
ERYTHROCYTE [DISTWIDTH] IN BLOOD BY AUTOMATED COUNT: 22.5 % (ref 12.3–15.4)
GEN 5 1HR TROPONIN T REFLEX: 12 NG/L
GLOBULIN UR ELPH-MCNC: 3.3 GM/DL
GLUCOSE BLDC GLUCOMTR-MCNC: 111 MG/DL (ref 70–99)
GLUCOSE SERPL-MCNC: 95 MG/DL (ref 65–99)
GLUCOSE UR STRIP-MCNC: NEGATIVE MG/DL
HCO3 BLDA-SCNC: 22.1 MMOL/L (ref 22–26)
HCT VFR BLD AUTO: 60.7 % (ref 37.5–51)
HCT VFR BLD CALC: 71 % (ref 38–51)
HEMODILUTION: NO
HGB BLD-MCNC: 20 G/DL (ref 13–17.7)
HGB BLDA-MCNC: 24.2 G/DL (ref 12–18)
HGB UR QL STRIP.AUTO: ABNORMAL
HOLD SPECIMEN: NORMAL
HOLD SPECIMEN: NORMAL
HYALINE CASTS UR QL AUTO: ABNORMAL /LPF
IMM GRANULOCYTES # BLD AUTO: 0.03 10*3/MM3 (ref 0–0.05)
IMM GRANULOCYTES NFR BLD AUTO: 0.4 % (ref 0–0.5)
KETONES UR QL STRIP: ABNORMAL
LEUKOCYTE ESTERASE UR QL STRIP.AUTO: NEGATIVE
LYMPHOCYTES # BLD AUTO: 1.09 10*3/MM3 (ref 0.7–3.1)
LYMPHOCYTES NFR BLD AUTO: 14 % (ref 19.6–45.3)
Lab: ABNORMAL
MAGNESIUM SERPL-MCNC: 1.6 MG/DL (ref 1.6–2.4)
MCH RBC QN AUTO: 25.8 PG (ref 26.6–33)
MCHC RBC AUTO-ENTMCNC: 32.9 G/DL (ref 31.5–35.7)
MCV RBC AUTO: 78.4 FL (ref 79–97)
MODALITY: ABNORMAL
MONOCYTES # BLD AUTO: 0.79 10*3/MM3 (ref 0.1–0.9)
MONOCYTES NFR BLD AUTO: 10.1 % (ref 5–12)
NEUTROPHILS NFR BLD AUTO: 5.73 10*3/MM3 (ref 1.7–7)
NEUTROPHILS NFR BLD AUTO: 73.6 % (ref 42.7–76)
NITRITE UR QL STRIP: NEGATIVE
NOTIFIED WHO: ABNORMAL
NRBC BLD AUTO-RTO: 0 /100 WBC (ref 0–0.2)
PCO2 BLDA: 35.8 MM HG (ref 35–45)
PH BLDA: 7.4 PH UNITS (ref 7.35–7.45)
PH UR STRIP.AUTO: 6.5 [PH] (ref 5–8)
PLATELET # BLD AUTO: 271 10*3/MM3 (ref 140–450)
PMV BLD AUTO: 8.7 FL (ref 6–12)
PO2 BLDA: 84.6 MM HG (ref 80–100)
POTASSIUM SERPL-SCNC: 5 MMOL/L (ref 3.5–5.2)
PROCALCITONIN SERPL-MCNC: 0.11 NG/ML (ref 0–0.25)
PROT SERPL-MCNC: 6.4 G/DL (ref 6–8.5)
PROT UR QL STRIP: ABNORMAL
RBC # BLD AUTO: 7.74 10*6/MM3 (ref 4.14–5.8)
RBC # UR STRIP: ABNORMAL /HPF
READ BACK: YES
REF LAB TEST METHOD: ABNORMAL
SAO2 % BLDCOA: 96.4 % (ref 95–99)
SMALL PLATELETS BLD QL SMEAR: ADEQUATE
SODIUM SERPL-SCNC: 131 MMOL/L (ref 136–145)
SP GR UR STRIP: 1.01 (ref 1–1.03)
SQUAMOUS #/AREA URNS HPF: ABNORMAL /HPF
TROPONIN T NUMERIC DELTA: 1 NG/L
TROPONIN T SERPL HS-MCNC: 11 NG/L
UROBILINOGEN UR QL STRIP: ABNORMAL
WBC # UR STRIP: ABNORMAL /HPF
WBC MORPH BLD: NORMAL
WBC NRBC COR # BLD AUTO: 7.79 10*3/MM3 (ref 3.4–10.8)
WHOLE BLOOD HOLD COAG: NORMAL
WHOLE BLOOD HOLD SPECIMEN: NORMAL

## 2025-07-14 PROCEDURE — 83735 ASSAY OF MAGNESIUM: CPT | Performed by: EMERGENCY MEDICINE

## 2025-07-14 PROCEDURE — 99223 1ST HOSP IP/OBS HIGH 75: CPT | Performed by: STUDENT IN AN ORGANIZED HEALTH CARE EDUCATION/TRAINING PROGRAM

## 2025-07-14 PROCEDURE — 85007 BL SMEAR W/DIFF WBC COUNT: CPT | Performed by: EMERGENCY MEDICINE

## 2025-07-14 PROCEDURE — 70450 CT HEAD/BRAIN W/O DYE: CPT

## 2025-07-14 PROCEDURE — 25810000003 LACTATED RINGERS PER 1000 ML: Performed by: STUDENT IN AN ORGANIZED HEALTH CARE EDUCATION/TRAINING PROGRAM

## 2025-07-14 PROCEDURE — 82803 BLOOD GASES ANY COMBINATION: CPT

## 2025-07-14 PROCEDURE — 84145 PROCALCITONIN (PCT): CPT | Performed by: EMERGENCY MEDICINE

## 2025-07-14 PROCEDURE — 93010 ELECTROCARDIOGRAM REPORT: CPT | Performed by: INTERNAL MEDICINE

## 2025-07-14 PROCEDURE — 93005 ELECTROCARDIOGRAM TRACING: CPT | Performed by: EMERGENCY MEDICINE

## 2025-07-14 PROCEDURE — 71250 CT THORAX DX C-: CPT

## 2025-07-14 PROCEDURE — 25010000002 HYDRALAZINE PER 20 MG: Performed by: EMERGENCY MEDICINE

## 2025-07-14 PROCEDURE — 36600 WITHDRAWAL OF ARTERIAL BLOOD: CPT

## 2025-07-14 PROCEDURE — 25010000002 DOXYCYCLINE 100 MG RECONSTITUTED SOLUTION 1 EACH VIAL: Performed by: STUDENT IN AN ORGANIZED HEALTH CARE EDUCATION/TRAINING PROGRAM

## 2025-07-14 PROCEDURE — 80053 COMPREHEN METABOLIC PANEL: CPT | Performed by: EMERGENCY MEDICINE

## 2025-07-14 PROCEDURE — 84484 ASSAY OF TROPONIN QUANT: CPT | Performed by: EMERGENCY MEDICINE

## 2025-07-14 PROCEDURE — 81001 URINALYSIS AUTO W/SCOPE: CPT | Performed by: EMERGENCY MEDICINE

## 2025-07-14 PROCEDURE — 82948 REAGENT STRIP/BLOOD GLUCOSE: CPT | Performed by: EMERGENCY MEDICINE

## 2025-07-14 PROCEDURE — 82550 ASSAY OF CK (CPK): CPT | Performed by: EMERGENCY MEDICINE

## 2025-07-14 PROCEDURE — 71045 X-RAY EXAM CHEST 1 VIEW: CPT

## 2025-07-14 PROCEDURE — 85025 COMPLETE CBC W/AUTO DIFF WBC: CPT | Performed by: EMERGENCY MEDICINE

## 2025-07-14 PROCEDURE — 25010000002 CEFTRIAXONE PER 250 MG: Performed by: STUDENT IN AN ORGANIZED HEALTH CARE EDUCATION/TRAINING PROGRAM

## 2025-07-14 PROCEDURE — 99285 EMERGENCY DEPT VISIT HI MDM: CPT

## 2025-07-14 PROCEDURE — 84443 ASSAY THYROID STIM HORMONE: CPT | Performed by: INTERNAL MEDICINE

## 2025-07-14 PROCEDURE — 36415 COLL VENOUS BLD VENIPUNCTURE: CPT

## 2025-07-14 PROCEDURE — 36415 COLL VENOUS BLD VENIPUNCTURE: CPT | Performed by: STUDENT IN AN ORGANIZED HEALTH CARE EDUCATION/TRAINING PROGRAM

## 2025-07-14 PROCEDURE — 82140 ASSAY OF AMMONIA: CPT | Performed by: EMERGENCY MEDICINE

## 2025-07-14 PROCEDURE — 87040 BLOOD CULTURE FOR BACTERIA: CPT | Performed by: STUDENT IN AN ORGANIZED HEALTH CARE EDUCATION/TRAINING PROGRAM

## 2025-07-14 RX ORDER — POLYETHYLENE GLYCOL 3350 17 G/17G
17 POWDER, FOR SOLUTION ORAL DAILY PRN
Status: DISCONTINUED | OUTPATIENT
Start: 2025-07-14 | End: 2025-07-19 | Stop reason: HOSPADM

## 2025-07-14 RX ORDER — ACETAMINOPHEN 325 MG/1
650 TABLET ORAL EVERY 6 HOURS PRN
Status: DISCONTINUED | OUTPATIENT
Start: 2025-07-14 | End: 2025-07-19 | Stop reason: HOSPADM

## 2025-07-14 RX ORDER — IPRATROPIUM BROMIDE AND ALBUTEROL SULFATE 2.5; .5 MG/3ML; MG/3ML
3 SOLUTION RESPIRATORY (INHALATION) EVERY 6 HOURS PRN
Status: DISCONTINUED | OUTPATIENT
Start: 2025-07-14 | End: 2025-07-19 | Stop reason: HOSPADM

## 2025-07-14 RX ORDER — LABETALOL HYDROCHLORIDE 5 MG/ML
10 INJECTION, SOLUTION INTRAVENOUS ONCE
Status: COMPLETED | OUTPATIENT
Start: 2025-07-14 | End: 2025-07-14

## 2025-07-14 RX ORDER — MULTIVITAMIN WITH IRON
500 TABLET ORAL DAILY
COMMUNITY

## 2025-07-14 RX ORDER — VALSARTAN 80 MG/1
160 TABLET ORAL DAILY
Status: DISCONTINUED | OUTPATIENT
Start: 2025-07-15 | End: 2025-07-19 | Stop reason: HOSPADM

## 2025-07-14 RX ORDER — ATORVASTATIN CALCIUM 10 MG/1
10 TABLET, FILM COATED ORAL DAILY
Status: DISCONTINUED | OUTPATIENT
Start: 2025-07-15 | End: 2025-07-19 | Stop reason: HOSPADM

## 2025-07-14 RX ORDER — SODIUM CHLORIDE 0.9 % (FLUSH) 0.9 %
10 SYRINGE (ML) INJECTION EVERY 12 HOURS SCHEDULED
Status: DISCONTINUED | OUTPATIENT
Start: 2025-07-14 | End: 2025-07-19 | Stop reason: HOSPADM

## 2025-07-14 RX ORDER — SODIUM CHLORIDE 0.9 % (FLUSH) 0.9 %
10 SYRINGE (ML) INJECTION AS NEEDED
Status: DISCONTINUED | OUTPATIENT
Start: 2025-07-14 | End: 2025-07-19 | Stop reason: HOSPADM

## 2025-07-14 RX ORDER — RIVASTIGMINE TARTRATE 1.5 MG/1
1.5 CAPSULE ORAL 2 TIMES DAILY WITH MEALS
Status: DISCONTINUED | OUTPATIENT
Start: 2025-07-15 | End: 2025-07-19 | Stop reason: HOSPADM

## 2025-07-14 RX ORDER — AMOXICILLIN 250 MG
2 CAPSULE ORAL 2 TIMES DAILY PRN
Status: DISCONTINUED | OUTPATIENT
Start: 2025-07-14 | End: 2025-07-19 | Stop reason: HOSPADM

## 2025-07-14 RX ORDER — FERROUS SULFATE 325(65) MG
325 TABLET ORAL
Status: DISCONTINUED | OUTPATIENT
Start: 2025-07-15 | End: 2025-07-19 | Stop reason: HOSPADM

## 2025-07-14 RX ORDER — AMLODIPINE BESYLATE 5 MG/1
5 TABLET ORAL DAILY
Status: DISCONTINUED | OUTPATIENT
Start: 2025-07-15 | End: 2025-07-17

## 2025-07-14 RX ORDER — HYDRALAZINE HYDROCHLORIDE 20 MG/ML
10 INJECTION INTRAMUSCULAR; INTRAVENOUS EVERY 6 HOURS PRN
Status: DISCONTINUED | OUTPATIENT
Start: 2025-07-14 | End: 2025-07-19 | Stop reason: HOSPADM

## 2025-07-14 RX ORDER — VALSARTAN 80 MG/1
80 TABLET ORAL DAILY
Status: ON HOLD | COMMUNITY
End: 2025-07-15

## 2025-07-14 RX ORDER — SODIUM CHLORIDE, SODIUM LACTATE, POTASSIUM CHLORIDE, CALCIUM CHLORIDE 600; 310; 30; 20 MG/100ML; MG/100ML; MG/100ML; MG/100ML
50 INJECTION, SOLUTION INTRAVENOUS CONTINUOUS
Status: DISCONTINUED | OUTPATIENT
Start: 2025-07-14 | End: 2025-07-15

## 2025-07-14 RX ORDER — ONDANSETRON 2 MG/ML
4 INJECTION INTRAMUSCULAR; INTRAVENOUS EVERY 6 HOURS PRN
Status: DISCONTINUED | OUTPATIENT
Start: 2025-07-14 | End: 2025-07-15

## 2025-07-14 RX ORDER — BISACODYL 10 MG
10 SUPPOSITORY, RECTAL RECTAL DAILY PRN
Status: DISCONTINUED | OUTPATIENT
Start: 2025-07-14 | End: 2025-07-19 | Stop reason: HOSPADM

## 2025-07-14 RX ORDER — BISACODYL 5 MG/1
5 TABLET, DELAYED RELEASE ORAL DAILY PRN
Status: DISCONTINUED | OUTPATIENT
Start: 2025-07-14 | End: 2025-07-19 | Stop reason: HOSPADM

## 2025-07-14 RX ORDER — METOPROLOL SUCCINATE 50 MG/1
50 TABLET, EXTENDED RELEASE ORAL DAILY
Status: DISCONTINUED | OUTPATIENT
Start: 2025-07-15 | End: 2025-07-17

## 2025-07-14 RX ORDER — SODIUM CHLORIDE 9 MG/ML
40 INJECTION, SOLUTION INTRAVENOUS AS NEEDED
Status: DISCONTINUED | OUTPATIENT
Start: 2025-07-14 | End: 2025-07-19 | Stop reason: HOSPADM

## 2025-07-14 RX ORDER — HYDRALAZINE HYDROCHLORIDE 20 MG/ML
10 INJECTION INTRAMUSCULAR; INTRAVENOUS ONCE
Status: COMPLETED | OUTPATIENT
Start: 2025-07-14 | End: 2025-07-14

## 2025-07-14 RX ORDER — HYDRALAZINE HYDROCHLORIDE 20 MG/ML
10 INJECTION INTRAMUSCULAR; INTRAVENOUS EVERY 6 HOURS PRN
Status: DISCONTINUED | OUTPATIENT
Start: 2025-07-14 | End: 2025-07-14 | Stop reason: SDUPTHER

## 2025-07-14 RX ADMIN — RIVASTIGMINE TARTRATE 1.5 MG: 1.5 CAPSULE ORAL at 23:50

## 2025-07-14 RX ADMIN — DOXYCYCLINE 100 MG: 100 INJECTION, POWDER, LYOPHILIZED, FOR SOLUTION INTRAVENOUS at 23:52

## 2025-07-14 RX ADMIN — Medication 10 ML: at 22:00

## 2025-07-14 RX ADMIN — HYDRALAZINE HYDROCHLORIDE 10 MG: 20 INJECTION INTRAMUSCULAR; INTRAVENOUS at 19:23

## 2025-07-14 RX ADMIN — APIXABAN 5 MG: 5 TABLET, FILM COATED ORAL at 23:50

## 2025-07-14 RX ADMIN — Medication 10 MG: at 22:49

## 2025-07-14 RX ADMIN — CEFTRIAXONE SODIUM 1000 MG: 1 INJECTION, POWDER, FOR SOLUTION INTRAMUSCULAR; INTRAVENOUS at 22:56

## 2025-07-14 RX ADMIN — SODIUM CHLORIDE, POTASSIUM CHLORIDE, SODIUM LACTATE AND CALCIUM CHLORIDE 50 ML/HR: 600; 310; 30; 20 INJECTION, SOLUTION INTRAVENOUS at 23:30

## 2025-07-14 NOTE — TELEPHONE ENCOUNTER
Regulo called and states that the pt has having memory issues but the last 2 days have been much worse . Regulo states that the pt has not been eating well for a couple weeks. Regulo states that the pt is very fatigued today and has not gotten out of bed today. The pt was fatigued but up and out of bed yesterday. This nurse instructed Regulo to call EMS and take the pt to the ER. Regulo voiced understanding.     Regulo is asking if the Inlyta or Keytruda could be causing the pt's s/s. If so,could there be a change to the pt's POC to reduce the pt's s/s.

## 2025-07-14 NOTE — H&P
[facility]  [facility]: [facility]   Patient Name: Paulo Leiva Jr.  : 1955  MRN: [id number]  Primary Care Physician:  Vilma Rolon APRN  Date of admission: 2025    Subjective   Subjective     Chief Complaint: AMS    HPI:    Paulo Leiva Jr. is a 69 y.o. male with past medical history of renal cancer, COPD, essential hypertension atrial fibrillation presents to the ED due to altered mental status and generalized weakness.  Patient is dealing with some altered mental status nosebleeds for last couple days.  In addition patient states having some intermittent fevers and chills for last couple days.  Patient states he fell yesterday as he has a history of falls due to poor walking.  Denies headaches, blurry vision, chest pain, shortness of breath, abdominal pain or dysuria.  In the ED, patient's vitals were temperature 97.5, heart rate 93 and blood pressure 170/148.  Labs show white blood cell 7.7, hemoglobin 20, platelets 271, sodium 131, bicarb 18.4.  Polycythemia.  Chest x-ray shows concern for bilateral infiltrates.  CT head shows no acute findings.  Patient admitted to floors for further management.    Personal History     Past Medical History:  Past Medical History:   Diagnosis Date    Arthritis of back     Benign essential hypertension     Cancer 1994    Mass on Kidney..    COPD (chronic obstructive pulmonary disease)     Erectile dysfunction Apprx: 5 yrs ago    Generalized convulsive seizures 2023    Hip arthrosis     Hyperlipidemia 2018    Hyponatremia     Implantable loop recorder present     Knee swelling     Long term (current) use of non-steroidal anti-inflammatories (nsaid) 2018    Paroxysmal atrial fibrillation 2025    Renal insufficiency     Syncope     Syncope and collapse 2022    Total of 5 episdoes with the last one 10/22    Outside Source Comment: Overview:  Formatting of this note might be different from  the original.  Total of 5 episdoes with the last one 10/22  Last Assessment & Plan:  Formatting of this note might be different from the original.  No recurrent episodes Lipitor does not show any significant dysrhythmias at this point continue with monitoring      Vertigo 2021    Vision loss        Past Surgical History:  Past Surgical History:   Procedure Laterality Date    CARDIAC CATHETERIZATION N/A 2022    Procedure: Left Heart Cath;  Surgeon: Randell Casiano MD;  Location: [facility] CATH INVASIVE LOCATION;  Service: Cardiovascular;  Laterality: N/A;    CARDIAC ELECTROPHYSIOLOGY PROCEDURE N/A 2022    Procedure: Loop insertion;  Surgeon: Randell Casiano MD;  Location: [facility] CATH INVASIVE LOCATION;  Service: Cardiovascular;  Laterality: N/A;    COLONOSCOPY      COLONOSCOPY N/A 10/26/2022    Procedure: COLONOSCOPY FOR SCREENING;  Surgeon: Roque Whitlock MD;  Location: [facility] ENDOSCOPY;  Service: Gastroenterology;  Laterality: N/A;  NORMAL COLON    NEPHRECTOMY Right 2024       Family History:   Family History   Problem Relation Age of Onset    Dementia Mother     No Known Problems Father     Colon cancer Neg Hx     Malig Hyperthermia Neg Hx        Social History:   Social History     Socioeconomic History    Marital status:    Tobacco Use    Smoking status: Former     Current packs/day: 0.00     Average packs/day: 2.0 packs/day for 36.3 years (72.7 ttl pk-yrs)     Types: Cigarettes     Start date: 6/15/1974     Quit date: 10/20/2010     Years since quittin.7     Passive exposure: Past    Smokeless tobacco: Never    Tobacco comments:     SMOKES MORE THAN 2 PPD FOR 35 YEARS   Vaping Use    Vaping status: Never Used   Substance and Sexual Activity    Alcohol use: Not Currently     Alcohol/week: 6.0 - 8.0 standard drinks of alcohol     Types: 6 - 8 Cans of beer per week     Comment: as of 25 was his last drink 8-14 DRINKS PER WEEK as of 2024, prior to 2024 he  would drink 20 or more beers daily and did so for 15 years    Drug use: Not Currently    Sexual activity: Not Currently     Partners: Female     Birth control/protection: Condom       Home Medications:  QUEtiapine, Tiotropium Bromide Monohydrate, amLODIPine, apixaban, axitinib, citalopram, ferrous gluconate, meclizine, metoprolol succinate XL, ondansetron, rivastigmine, simvastatin, sodium chloride, thiamine, and valsartan    Allergies:  No Known Allergies    Review of Systems   All systems were reviewed and negative except for: Above    Objective   Objective     Vitals:   Temp:  [97.5 °F (36.4 °C)] 97.5 °F (36.4 °C)  Heart Rate:  [67-96] 93  Resp:  [18] 18  BP: (170-203)/() 170/148    Physical Exam    Constitutional: Awake, alert, no acute distress   Eyes: Pupils equal, sclerae anicteric, no conjunctival injection   HENT: NCAT, mucous membranes moist   Neck: Supple, no thyromegaly, no lymphadenopathy, trachea midline   Respiratory: Clear to auscultation bilaterally, nonlabored respirations    Cardiovascular: RRR, no murmurs, rubs, or gallops, palpable pedal pulses bilaterally   Gastrointestinal: Positive bowel sounds, soft, nontender, nondistended   Musculoskeletal: No bilateral ankle edema, no clubbing or cyanosis to extremities   Psychiatric: Appropriate affect, cooperative   Neurologic: Oriented x 3, strength symmetric in all extremities, Cranial Nerves grossly intact to confrontation, speech clear   Skin: No rashes     Result Review    Result Review:  I have personally reviewed the results from the time of this admission to 7/14/2025 19:56 EDT and agree with these findings:  []  Laboratory  []  Microbiology  [x]  Radiology  []  EKG/Telemetry   []  Cardiology/Vascular   []  Pathology  [x]  Old records  []  Other:      Assessment & Plan   Assessment / Plan     Assessment/Plan:     Assessment  Generalized weakness  Acute Encephalopathy likely infectious  Concern for community-acquired pneumonia  Clear cell  renal carcinoma with liver mets on chemo  Polycythemia  COPD  Atrial fibrillation?  On Eliquis   Essential hypertension    [facility]  [facility]  Monitor vitals  CBC BMP  Blood cultures ordered  Procalcitonin ordered  CT brain reviewed  Chest x-ray reviewed  CT chest ordered  Start home meds  Fluids  IV Rocephin doxycycline  Hold home chemo med  PT OT consulted  Oncology consult    VTE Prophylaxis:  Pharmacologic VTE prophylaxis orders are signed & held. Mechanical VTE prophylaxis orders are present.        CODE STATUS:    Code Status (Patient has no pulse and is not breathing): CPR (Attempt to Resuscitate)  Medical Interventions (Patient has pulse or is breathing): Full Support      Admission Status:  I believe this patient meets inpt status.    Electronically signed by Raudel Wolf MD, 07/14/25, 7:56 PM EDT.

## 2025-07-15 LAB
ANION GAP SERPL CALCULATED.3IONS-SCNC: 16.9 MMOL/L (ref 5–15)
ARTERIAL PATENCY WRIST A: POSITIVE
ATMOSPHERIC PRESS: 743.8 MMHG
BASE EXCESS BLDA CALC-SCNC: -2.3 MMOL/L (ref -2–2)
BASOPHILS # BLD AUTO: 0.11 10*3/MM3 (ref 0–0.2)
BASOPHILS NFR BLD AUTO: 1.3 % (ref 0–1.5)
BDY SITE: ABNORMAL
BUN SERPL-MCNC: 11.4 MG/DL (ref 8–23)
BUN/CREAT SERPL: 8.4 (ref 7–25)
CALCIUM SPEC-SCNC: 10 MG/DL (ref 8.6–10.5)
CHLORIDE SERPL-SCNC: 94 MMOL/L (ref 98–107)
CO2 SERPL-SCNC: 21.1 MMOL/L (ref 22–29)
CREAT SERPL-MCNC: 1.35 MG/DL (ref 0.76–1.27)
DEPRECATED RDW RBC AUTO: 60 FL (ref 37–54)
EGFRCR SERPLBLD CKD-EPI 2021: 57.2 ML/MIN/1.73
EOSINOPHIL # BLD AUTO: 0.06 10*3/MM3 (ref 0–0.4)
EOSINOPHIL NFR BLD AUTO: 0.7 % (ref 0.3–6.2)
ERYTHROCYTE [DISTWIDTH] IN BLOOD BY AUTOMATED COUNT: 22.8 % (ref 12.3–15.4)
GLUCOSE SERPL-MCNC: 89 MG/DL (ref 65–99)
HCO3 BLDA-SCNC: 21.1 MMOL/L (ref 22–26)
HCT VFR BLD AUTO: 62 % (ref 37.5–51)
HCT VFR BLD CALC: 69 % (ref 38–51)
HEMODILUTION: NO
HGB BLD-MCNC: 20.5 G/DL (ref 13–17.7)
HGB BLDA-MCNC: 23.5 G/DL (ref 12–18)
IMM GRANULOCYTES # BLD AUTO: 0.03 10*3/MM3 (ref 0–0.05)
IMM GRANULOCYTES NFR BLD AUTO: 0.4 % (ref 0–0.5)
LYMPHOCYTES # BLD AUTO: 1.02 10*3/MM3 (ref 0.7–3.1)
LYMPHOCYTES NFR BLD AUTO: 12 % (ref 19.6–45.3)
Lab: ABNORMAL
MCH RBC QN AUTO: 26.3 PG (ref 26.6–33)
MCHC RBC AUTO-ENTMCNC: 33.1 G/DL (ref 31.5–35.7)
MCV RBC AUTO: 79.5 FL (ref 79–97)
MODALITY: ABNORMAL
MONOCYTES # BLD AUTO: 0.9 10*3/MM3 (ref 0.1–0.9)
MONOCYTES NFR BLD AUTO: 10.6 % (ref 5–12)
NEUTROPHILS NFR BLD AUTO: 6.37 10*3/MM3 (ref 1.7–7)
NEUTROPHILS NFR BLD AUTO: 75 % (ref 42.7–76)
NOTIFIED WHO: ABNORMAL
NRBC BLD AUTO-RTO: 0 /100 WBC (ref 0–0.2)
PCO2 BLDA: 33.6 MM HG (ref 35–45)
PH BLDA: 7.41 PH UNITS (ref 7.35–7.45)
PLATELET # BLD AUTO: 275 10*3/MM3 (ref 140–450)
PMV BLD AUTO: 8.9 FL (ref 6–12)
PO2 BLDA: 79.3 MM HG (ref 80–100)
POTASSIUM SERPL-SCNC: 5 MMOL/L (ref 3.5–5.2)
QT INTERVAL: 392 MS
QTC INTERVAL: 453 MS
RBC # BLD AUTO: 7.8 10*6/MM3 (ref 4.14–5.8)
READ BACK: YES
SAO2 % BLDCOA: 95.8 % (ref 95–99)
SODIUM SERPL-SCNC: 132 MMOL/L (ref 136–145)
TSH SERPL DL<=0.05 MIU/L-ACNC: 0.31 UIU/ML (ref 0.27–4.2)
WBC NRBC COR # BLD AUTO: 8.49 10*3/MM3 (ref 3.4–10.8)

## 2025-07-15 PROCEDURE — 25010000002 HYDRALAZINE PER 20 MG: Performed by: STUDENT IN AN ORGANIZED HEALTH CARE EDUCATION/TRAINING PROGRAM

## 2025-07-15 PROCEDURE — 36600 WITHDRAWAL OF ARTERIAL BLOOD: CPT | Performed by: INTERNAL MEDICINE

## 2025-07-15 PROCEDURE — 80048 BASIC METABOLIC PNL TOTAL CA: CPT | Performed by: STUDENT IN AN ORGANIZED HEALTH CARE EDUCATION/TRAINING PROGRAM

## 2025-07-15 PROCEDURE — 93005 ELECTROCARDIOGRAM TRACING: CPT | Performed by: PHYSICIAN ASSISTANT

## 2025-07-15 PROCEDURE — 25010000002 DOXYCYCLINE 100 MG RECONSTITUTED SOLUTION 1 EACH VIAL: Performed by: STUDENT IN AN ORGANIZED HEALTH CARE EDUCATION/TRAINING PROGRAM

## 2025-07-15 PROCEDURE — 99232 SBSQ HOSP IP/OBS MODERATE 35: CPT | Performed by: INTERNAL MEDICINE

## 2025-07-15 PROCEDURE — 94799 UNLISTED PULMONARY SVC/PX: CPT

## 2025-07-15 PROCEDURE — 97165 OT EVAL LOW COMPLEX 30 MIN: CPT

## 2025-07-15 PROCEDURE — 63710000001 REVEFENACIN 175 MCG/3ML SOLUTION: Performed by: STUDENT IN AN ORGANIZED HEALTH CARE EDUCATION/TRAINING PROGRAM

## 2025-07-15 PROCEDURE — 97161 PT EVAL LOW COMPLEX 20 MIN: CPT

## 2025-07-15 PROCEDURE — 94640 AIRWAY INHALATION TREATMENT: CPT

## 2025-07-15 PROCEDURE — 82803 BLOOD GASES ANY COMBINATION: CPT | Performed by: INTERNAL MEDICINE

## 2025-07-15 PROCEDURE — 93010 ELECTROCARDIOGRAM REPORT: CPT | Performed by: INTERNAL MEDICINE

## 2025-07-15 PROCEDURE — 85025 COMPLETE CBC W/AUTO DIFF WBC: CPT | Performed by: STUDENT IN AN ORGANIZED HEALTH CARE EDUCATION/TRAINING PROGRAM

## 2025-07-15 RX ORDER — MULTIVITAMIN WITH IRON
500 TABLET ORAL DAILY
Status: DISCONTINUED | OUTPATIENT
Start: 2025-07-15 | End: 2025-07-19 | Stop reason: HOSPADM

## 2025-07-15 RX ORDER — LABETALOL HYDROCHLORIDE 5 MG/ML
20 INJECTION, SOLUTION INTRAVENOUS EVERY 6 HOURS PRN
Status: DISCONTINUED | OUTPATIENT
Start: 2025-07-15 | End: 2025-07-19 | Stop reason: HOSPADM

## 2025-07-15 RX ORDER — HALOPERIDOL 5 MG/ML
2 INJECTION INTRAMUSCULAR ONCE
Status: DISCONTINUED | OUTPATIENT
Start: 2025-07-15 | End: 2025-07-18

## 2025-07-15 RX ORDER — LABETALOL HYDROCHLORIDE 5 MG/ML
20 INJECTION, SOLUTION INTRAVENOUS ONCE
Status: COMPLETED | OUTPATIENT
Start: 2025-07-15 | End: 2025-07-15

## 2025-07-15 RX ORDER — SODIUM CHLORIDE 1 G/1
1 TABLET ORAL DAILY
Status: DISCONTINUED | OUTPATIENT
Start: 2025-07-15 | End: 2025-07-19 | Stop reason: HOSPADM

## 2025-07-15 RX ORDER — BENZOCAINE/MENTHOL 6 MG-10 MG
1 LOZENGE MUCOUS MEMBRANE EVERY 8 HOURS SCHEDULED
Status: DISCONTINUED | OUTPATIENT
Start: 2025-07-15 | End: 2025-07-19 | Stop reason: HOSPADM

## 2025-07-15 RX ORDER — QUETIAPINE FUMARATE 25 MG/1
50 TABLET, FILM COATED ORAL NIGHTLY
Status: DISCONTINUED | OUTPATIENT
Start: 2025-07-15 | End: 2025-07-19 | Stop reason: HOSPADM

## 2025-07-15 RX ORDER — CITALOPRAM HYDROBROMIDE 20 MG/1
10 TABLET ORAL DAILY
Status: DISCONTINUED | OUTPATIENT
Start: 2025-07-15 | End: 2025-07-19 | Stop reason: HOSPADM

## 2025-07-15 RX ADMIN — RIVASTIGMINE TARTRATE 1.5 MG: 1.5 CAPSULE ORAL at 07:58

## 2025-07-15 RX ADMIN — DOXYCYCLINE 100 MG: 100 INJECTION, POWDER, LYOPHILIZED, FOR SOLUTION INTRAVENOUS at 08:01

## 2025-07-15 RX ADMIN — HYDROCORTISONE 1 APPLICATION: 10 CREAM TOPICAL at 14:08

## 2025-07-15 RX ADMIN — CYANOCOBALAMIN TAB 500 MCG 500 MCG: 500 TAB at 16:40

## 2025-07-15 RX ADMIN — HYDROCORTISONE 1 APPLICATION: 10 CREAM TOPICAL at 22:05

## 2025-07-15 RX ADMIN — ATORVASTATIN CALCIUM 10 MG: 10 TABLET, FILM COATED ORAL at 08:00

## 2025-07-15 RX ADMIN — QUETIAPINE FUMARATE 50 MG: 25 TABLET ORAL at 20:23

## 2025-07-15 RX ADMIN — Medication 10 ML: at 20:27

## 2025-07-15 RX ADMIN — FERROUS SULFATE TAB 325 MG (65 MG ELEMENTAL FE) 325 MG: 325 (65 FE) TAB at 08:01

## 2025-07-15 RX ADMIN — METOPROLOL SUCCINATE 50 MG: 50 TABLET, EXTENDED RELEASE ORAL at 07:56

## 2025-07-15 RX ADMIN — APIXABAN 5 MG: 5 TABLET, FILM COATED ORAL at 12:06

## 2025-07-15 RX ADMIN — CITALOPRAM HYDROBROMIDE 10 MG: 20 TABLET ORAL at 16:39

## 2025-07-15 RX ADMIN — HYDRALAZINE HYDROCHLORIDE 10 MG: 20 INJECTION INTRAMUSCULAR; INTRAVENOUS at 04:51

## 2025-07-15 RX ADMIN — AMLODIPINE BESYLATE 5 MG: 10 TABLET ORAL at 07:58

## 2025-07-15 RX ADMIN — SODIUM CHLORIDE TAB 1 GM 1 G: 1 TAB at 16:39

## 2025-07-15 RX ADMIN — Medication 20 MG: at 12:06

## 2025-07-15 RX ADMIN — RIVASTIGMINE TARTRATE 1.5 MG: 1.5 CAPSULE ORAL at 20:23

## 2025-07-15 RX ADMIN — Medication 10 ML: at 08:01

## 2025-07-15 RX ADMIN — APIXABAN 5 MG: 5 TABLET, FILM COATED ORAL at 20:23

## 2025-07-15 RX ADMIN — VALSARTAN 160 MG: 80 TABLET, FILM COATED ORAL at 07:56

## 2025-07-15 RX ADMIN — REVEFENACIN 175 MCG: 175 SOLUTION RESPIRATORY (INHALATION) at 08:33

## 2025-07-15 NOTE — PLAN OF CARE
Goal Outcome Evaluation:  Plan of Care Reviewed With: patient, spouse        Progress: no change  Outcome Evaluation: Patient presents with limitations in self-care, functional transfers, balance, and endurance. He would benefit from continued skilled occupational therapy services to maximize independence with ADLs/functional transfers.    Anticipated Discharge Disposition (OT): inpatient rehabilitation facility

## 2025-07-15 NOTE — PROGRESS NOTES
[facility]   Hospitalist Progress Note  Date: 7/15/2025  Patient Name: Paulo Leiva Jr.  : 1955  MRN: [id number]  Date of admission: 2025      Subjective   Subjective     Chief Complaint: AMS    Summary: Paulo Leiva Jr. is a 69 y.o. male with past medical history of renal cancer, COPD, essential hypertension atrial fibrillation presents to the ED due to altered mental status and generalized weakness.  Patient is dealing with some altered mental status nosebleeds for last couple days.  In addition patient states having some intermittent fevers and chills for last couple days.  Patient states he fell yesterday as he has a history of falls due to poor walking.  Denies headaches, blurry vision, chest pain, shortness of breath, abdominal pain or dysuria.  In the ED, patient's vitals were temperature 97.5, heart rate 93 and blood pressure 170/148.  Labs show white blood cell 7.7, hemoglobin 20, platelets 271, sodium 131, bicarb 18.4.  Polycythemia.  Chest x-ray shows concern for bilateral infiltrates.  CT head shows no acute findings.  Patient admitted to floors for further management.     Interval Followup: Patient seen evaluated on this morning.  He voices no complaints at this time.  Nursing staff reports patient with rash on his chest wall developed after his leads were placed for telemetry monitoring.    Review of Systems   All systems were reviewed and negative except for: As noted interval follow-up    Objective   Objective     Vitals:   Temp:  [97.5 °F (36.4 °C)-98.2 °F (36.8 °C)] 98.2 °F (36.8 °C)  Heart Rate:  [] 109  Resp:  [16-18] 18  BP: (124-203)/() 179/106  Physical Exam    Constitutional: Awake, alert, sitting up in chair at bedside no acute distress   Eyes: Pupils equal, sclerae anicteric, no conjunctival injection   HENT: NCAT, mucous membranes moist   Neck: Supple, no thyromegaly, no lymphadenopathy, trachea midline   Respiratory: Clear to auscultation bilaterally,  nonlabored respirations    Cardiovascular: Mildly tachycardic S1-S2, no appreciable murmurs, palpable pedal pulses bilaterally   Gastrointestinal: Positive bowel sounds, soft, nontender, nondistended   Musculoskeletal: No bilateral ankle edema, no clubbing or cyanosis to extremities   Psychiatric: Appropriate affect, cooperative   Neurologic: Partially oriented x 3 (person, place-hospital, year-only), strength symmetric in all extremities, Cranial Nerves grossly intact to confrontation, speech clear   Skin: No rashes     Result Review    Result Review:  I have personally reviewed the results from the time of this admission to 7/15/2025 08:15 EDT and agree with these findings:  [x]  Laboratory  [x]  Microbiology  [x]  Radiology  []  EKG/Telemetry   []  Cardiology/Vascular   []  Pathology  []  Old records  []  Other:    Assessment & Plan   Assessment / Plan     Assessment/Plan:  Assessment  Generalized weakness  Acute Encephalopathy doubt infection suspect more likely a chronic issue such as dementia  Concern for community-acquired pneumonia-ruled out given normal white count, no fever, normal procalcitonin as well as negative CT chest  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  On Eliquis   Essential hypertension-uncontrolled     Plan  Continue to monitor on MedSurg   Discontinue telemetry monitoring  Follow-up blood cultures  Will obtain an MRI of the brain given patient's history of metastatic renal cell carcinoma  Discontinue antibiotics  Hold home chemo med  PT OT consulted  Continue patient's oral antihypertensive and add as needed hydralazine/labetalol.  Adjust as needed.  Other home medications have been reviewed and resumed as clinically indicated.        Discussed plan with RN.    VTE Prophylaxis:  Pharmacologic & mechanical VTE prophylaxis orders are present.        CODE STATUS:   Code Status (Patient has no pulse and is not breathing): CPR (Attempt to  Resuscitate)  Medical Interventions (Patient has pulse or is breathing): Full Support        Electronically signed by Antonina Rivas MD, 07/15/25, 8:15 AM EDT.

## 2025-07-15 NOTE — THERAPY EVALUATION
Patient Name: Paulo Leiva Jr.  : 1955    MRN: [id number]                              Today's Date: 7/15/2025       Admit Date: 2025    Visit Dx:     ICD-10-CM ICD-9-CM   1. Altered mental status, unspecified altered mental status type  R41.82 780.97   2. Uncontrolled hypertension  I10 401.9   3. Difficulty walking  R26.2 719.7   4. Decreased activities of daily living (ADL)  Z78.9 V49.89     Patient Active Problem List   Diagnosis    Chronic obstructive pulmonary disease    Essential hypertension    HLD (hyperlipidemia)    Hyponatremia    Vertigo    Colon cancer screening    Status post placement of implantable loop recorder    Class 1 obesity due to excess calories without serious comorbidity with body mass index (BMI) of 30.0 to 30.9 in adult    Generalized convulsive seizures    Renal mass    AMS (altered mental status)    Benign paroxysmal positional vertigo    Malignant neoplasm of unspecified kidney, except renal pelvis    Stage 3a chronic kidney disease    Generalized tonic-clonic seizure    Vitamin D deficiency    Paroxysmal atrial fibrillation    Metastatic renal cell carcinoma    Encounter for long-term (current) use of high-risk medication     Past Medical History:   Diagnosis Date    Arthritis of back     Benign essential hypertension     Cancer 1994    Mass on Kidney..    COPD (chronic obstructive pulmonary disease)     Erectile dysfunction Apprx: 5 yrs ago    Generalized convulsive seizures 2023    Hip arthrosis     Hyperlipidemia 2018    Hyponatremia     Implantable loop recorder present     Knee swelling     Long term (current) use of non-steroidal anti-inflammatories (nsaid) 2018    Paroxysmal atrial fibrillation 2025    Renal insufficiency     Syncope     Syncope and collapse 2022    Total of 5 episdoes with the last one 10/22    Outside Source Comment: Overview:  Formatting of this note might be different from the original.  Total of 5  episdoes with the last one 10/22  Last Assessment & Plan:  Formatting of this note might be different from the original.  No recurrent episodes Lipitor does not show any significant dysrhythmias at this point continue with monitoring      Vertigo 02/17/2021    Vision loss      Past Surgical History:   Procedure Laterality Date    CARDIAC CATHETERIZATION N/A 09/26/2022    Procedure: Left Heart Cath;  Surgeon: Randell Casiano MD;  Location: [facility] CATH INVASIVE LOCATION;  Service: Cardiovascular;  Laterality: N/A;    CARDIAC ELECTROPHYSIOLOGY PROCEDURE N/A 09/26/2022    Procedure: Loop insertion;  Surgeon: Randell Casiano MD;  Location: [facility] CATH INVASIVE LOCATION;  Service: Cardiovascular;  Laterality: N/A;    COLONOSCOPY  2007    COLONOSCOPY N/A 10/26/2022    Procedure: COLONOSCOPY FOR SCREENING;  Surgeon: Roque Whitlock MD;  Location: [facility] ENDOSCOPY;  Service: Gastroenterology;  Laterality: N/A;  NORMAL COLON    NEPHRECTOMY Right 08/12/2024      General Information       Row Name 07/15/25 1404          OT Time and Intention    Document Type evaluation  -LF     Mode of Treatment individual therapy;occupational therapy  -       Row Name 07/15/25 1404          General Information    Patient Profile Reviewed yes  -LF     Prior Level of Function --  (I) with ADLs, ambulated with a STC or RW PRN, has a step over tub where he stands to shower with shower chair available, elevated commode, stands to groom, drives, and no home O2.  -LF     Existing Precautions/Restrictions fall  -LF     Barriers to Rehab none identified  -       Row Name 07/15/25 1404          Occupational Profile    Reason for Services/Referral (Occupational Profile) Patient is a 69 year old male admitted to [facility] for AMS on July 14th, 2025. Occupational therapy consulted due to recent decline in ADLs/functional transfers. No previous occupational therapy services for current condition.  -       Row Name 07/15/25 1408           Living Environment    Current Living Arrangements home  -     People in Home alone  -       Row Name 07/15/25 1404          Home Main Entrance    Number of Stairs, Main Entrance none  ramp  -       Row Name 07/15/25 1404          Cognition    Orientation Status (Cognition) oriented to;person  -[facility] Name 07/15/25 1404          Safety Issues/Impairments Affecting Functional Mobility    Safety Issues Affecting Function (Mobility) awareness of need for assistance;impulsivity;insight into deficits/self-awareness  -     Impairments Affecting Function (Mobility) balance;cognition;endurance/activity tolerance  -               User Key  (r) = Recorded By, (t) = Taken By, (c) = Cosigned By      Initials Name Provider Type     Roseanne Schmitt OT Occupational Therapist                     Mobility/ADL's       [facility] Name 07/15/25 1407          Bed Mobility    Comment, (Bed Mobility) Pt upright and seated in recliner on arrival.  -[facility] Name 07/15/25 1407          Transfers    Transfers sit-stand transfer;stand-sit transfer  -LF       Row Name 07/15/25 1407          Sit-Stand Transfer    Sit-Stand [place] (Transfers) contact guard  -     Assistive Device (Sit-Stand Transfers) walker, front-wheeled  -[facility] Name 07/15/25 1407          Stand-Sit Transfer    Stand-Sit [place] (Transfers) contact guard  -     Assistive Device (Stand-Sit Transfers) walker, front-wheeled  -[facility] Name 07/15/25 1407          Activities of Daily Living    BADL Assessment/Intervention bathing;upper body dressing;lower body dressing;grooming;feeding;toileting  -[facility] Name 07/15/25 1407          Bathing Assessment/Intervention    [place] Level (Bathing) bathing skills;upper body;standby assist;lower body;moderate assist (50% patient effort)  -[facility] Name 07/15/25 1407          Upper Body Dressing Assessment/Training    [place] Level (Upper Body Dressing) upper body dressing  skills;standby assist  -LF       Row Name 07/15/25 1407          Lower Body Dressing Assessment/Training    [place] Level (Lower Body Dressing) lower body dressing skills;moderate assist (50% patient effort)  -LF       Row Name 07/15/25 1407          Grooming Assessment/Training    [place] Level (Grooming) grooming skills;standby assist  -LF       Row Name 07/15/25 1407          Self-Feeding Assessment/Training    [place] Level (Feeding) feeding skills;set up  -LF       Row Name 07/15/25 1407          Toileting Assessment/Training    [place] Level (Toileting) toileting skills;moderate assist (50% patient effort)  -               User Key  (r) = Recorded By, (t) = Taken By, (c) = Cosigned By      Initials Name Provider Type     Roseanne Schmitt OT Occupational Therapist                   Obj/Interventions       Row Name 07/15/25 1408          Sensory Assessment (Somatosensory)    Sensory Assessment (Somatosensory) UE sensation intact  -LF       Row Name 07/15/25 1408          Vision Assessment/Intervention    Visual Impairment/Limitations WFL;corrective lenses full-time  -LF       Row Name 07/15/25 1408          Range of Motion Comprehensive    General Range of Motion bilateral upper extremity ROM WFL  -LF       Row Name 07/15/25 1408          Strength Comprehensive (MMT)    Comment, General Manual Muscle Testing (MMT) Assessment 4+/5 BUEs  -LF       Row Name 07/15/25 1408          Motor Skills    Motor Skills coordination;functional endurance  -     Coordination bilateral;upper extremity;WFL  -     Functional Endurance Fair/fair-  -LF       Row Name 07/15/25 1408          Balance    Balance Assessment sitting dynamic balance;standing dynamic balance  -     Dynamic Sitting Balance supervision  -     Position, Sitting Balance unsupported;sitting edge of bed  -     Dynamic Standing Balance contact guard  -     Position/Device Used, Standing Balance supported;walker, front-wheeled   -LF               User Key  (r) = Recorded By, (t) = Taken By, (c) = Cosigned By      Initials Name Provider Type    LF Roseanne Schmitt, OT Occupational Therapist                   Goals/Plan       Row Name 07/15/25 1410          Bed Mobility Goal 1 (OT)    Activity/Assistive Device (Bed Mobility Goal 1, OT) bed mobility activities, all  -LF     [place] Level/Cues Needed (Bed Mobility Goal 1, OT) modified independence  -LF     Time Frame (Bed Mobility Goal 1, OT) long term goal (LTG);10 days  -LF       Row Name 07/15/25 1410          Transfer Goal 1 (OT)    Activity/Assistive Device (Transfer Goal 1, OT) transfers, all  -LF     [place] Level/Cues Needed (Transfer Goal 1, OT) modified independence  -LF     Time Frame (Transfer Goal 1, OT) long term goal (LTG);10 days  -LF       Row Name 07/15/25 1410          Bathing Goal 1 (OT)    Activity/Device (Bathing Goal 1, OT) bathing skills, all  -LF     [place] Level/Cues Needed (Bathing Goal 1, OT) modified independence  -LF     Time Frame (Bathing Goal 1, OT) long term goal (LTG);10 days  -LF       Row Name 07/15/25 1410          Dressing Goal 1 (OT)    Activity/Device (Dressing Goal 1, OT) dressing skills, all  -LF     [place]/Cues Needed (Dressing Goal 1, OT) modified independence  -LF     Time Frame (Dressing Goal 1, OT) long term goal (LTG);10 days  -LF       Row Name 07/15/25 1410          Toileting Goal 1 (OT)    Activity/Device (Toileting Goal 1, OT) toileting skills, all  -LF     [place] Level/Cues Needed (Toileting Goal 1, OT) modified independence  -LF     Time Frame (Toileting Goal 1, OT) long term goal (LTG);10 days  -LF       Row Name 07/15/25 1410          Problem Specific Goal 1 (OT)    Problem Specific Goal 1 (OT) Patient will demonstrate good endurance to support ADLs/functional transfers.  -LF     Time Frame (Problem Specific Goal 1, OT) long term goal (LTG);10 days  -LF       Row Name 07/15/25 1410          Therapy  Assessment/Plan (OT)    Planned Therapy Interventions (OT) activity tolerance training;BADL retraining;functional balance retraining;occupation/activity based interventions;patient/caregiver education/training;strengthening exercise;transfer/mobility retraining  -               User Key  (r) = Recorded By, (t) = Taken By, (c) = Cosigned By      Initials Name Provider Type     Roseanne Schmitt, OT Occupational Therapist                   Clinical Impression       Row Name 07/15/25 1409          Pain Assessment    Additional Documentation Pain Scale: FACES Pre/Post-Treatment (Group)  -       Row Name 07/15/25 1409          Pain Scale: FACES Pre/Post-Treatment    Pain: FACES Scale, Pretreatment 0-->no hurt  -     Posttreatment Pain Rating 0-->no hurt  -       Row Name 07/15/25 1409          Plan of Care Review    Plan of Care Reviewed With patient;spouse  -     Progress no change  -     Outcome Evaluation Patient presents with limitations in self-care, functional transfers, balance, and endurance. He would benefit from continued skilled occupational therapy services to maximize independence with ADLs/functional transfers.  -       Row Name 07/15/25 1409          Therapy Assessment/Plan (OT)    Patient/Family Therapy Goal Statement (OT) To maximize independence.  -     Rehab Potential (OT) good  -     Criteria for Skilled Therapeutic Interventions Met (OT) yes;meets criteria;skilled treatment is necessary  -     Therapy Frequency (OT) 5 times/wk  -       Row Name 07/15/25 1409          Therapy Plan Review/Discharge Plan (OT)    Anticipated Discharge Disposition (OT) inpatient rehabilitation facility  -       Row Name 07/15/25 1409          Vital Signs    O2 Delivery Pre Treatment room air  -     O2 Delivery Intra Treatment room air  -     O2 Delivery Post Treatment room air  -       Row Name 07/15/25 1409          Positioning and Restraints    Pre-Treatment Position sitting in  chair/recliner  -LF     Post Treatment Position chair  -LF     In Chair sitting;call light within reach;encouraged to call for assist;with family/caregiver  no alarm active on arrival  -LF               User Key  (r) = Recorded By, (t) = Taken By, (c) = Cosigned By      Initials Name Provider Type    LF Roseanne Schmitt OT Occupational Therapist                   Outcome Measures       Row Name 07/15/25 1410          How much help from another is currently needed...    Putting on and taking off regular lower body clothing? 2  -LF     Bathing (including washing, rinsing, and drying) 2  -LF     Toileting (which includes using toilet bed pan or urinal) 2  -LF     Putting on and taking off regular upper body clothing 3  -LF     Taking care of personal grooming (such as brushing teeth) 3  -LF     Eating meals 4  -LF     AM-PAC 6 Clicks Score (OT) 16  -LF       Row Name 07/15/25 1400 07/15/25 0730       How much help from another person do you currently need...    Turning from your back to your side while in flat bed without using bedrails? 4  -AV 3  -AG    Moving from lying on back to sitting on the side of a flat bed without bedrails? 3  -AV 3  -AG    Moving to and from a bed to a chair (including a wheelchair)? 3  -AV 3  -AG    Standing up from a chair using your arms (e.g., wheelchair, bedside chair)? 3  -AV 3  -AG    Climbing 3-5 steps with a railing? 2  -AV 2  -AG    To walk in hospital room? 3  -AV 3  -AG    AM-PAC 6 Clicks Score (PT) 18  -AV 17  -AG    Highest Level of Mobility Goal Walk 10 Steps or More-6  -AV Stand (1 or More Minutes)-5  -AG      Row Name 07/15/25 1410 07/15/25 1400       Functional Assessment    Outcome Measure Options AM-PAC 6 Clicks Daily Activity (OT);Optimal Instrument  -LF AM-PAC 6 Clicks Basic Mobility (PT)  -AV      Row Name 07/15/25 1410          Optimal Instrument    Optimal Instrument Optimal - 3  -LF     Bending/Stooping 3  -LF     Standing 2  -LF     Reaching 1  -LF     From the  list, choose the 3 activities you would most like to be able to do without any difficulty Bending/stooping;Standing;Reaching  -LF     Total Score Optimal - 3 6  -LF               User Key  (r) = Recorded By, (t) = Taken By, (c) = Cosigned By      Initials Name Provider Type    LF Roseanne Schmitt, OT Occupational Therapist    Scott Dawn, PT Physical Therapist    Lacy Garza, RN Registered Nurse                    Occupational Therapy Education       Title: PT OT SLP Therapies (In Progress)       Topic: Occupational Therapy (In Progress)       Point: ADL training (In Progress)       Learning Progress Summary            Patient Acceptance, E,TB, NR by  at 7/15/2025 1411                      Point: Precautions (In Progress)       Learning Progress Summary            Patient Acceptance, E,TB, NR by  at 7/15/2025 1411                      Point: Body mechanics (In Progress)       Learning Progress Summary            Patient Acceptance, E,TB, NR by  at 7/15/2025 1411                                      User Key       Initials Effective Dates Name Provider Type Discipline     06/16/21 -  Roseanne Schmitt, OT Occupational Therapist OT                  OT Recommendation and Plan  Planned Therapy Interventions (OT): activity tolerance training, BADL retraining, functional balance retraining, occupation/activity based interventions, patient/caregiver education/training, strengthening exercise, transfer/mobility retraining  Therapy Frequency (OT): 5 times/wk  Plan of Care Review  Plan of Care Reviewed With: patient, spouse  Progress: no change  Outcome Evaluation: Patient presents with limitations in self-care, functional transfers, balance, and endurance. He would benefit from continued skilled occupational therapy services to maximize independence with ADLs/functional transfers.     Time Calculation:   Evaluation Complexity (OT)  Review Occupational Profile/Medical/Therapy History Complexity:  brief/low complexity  Assessment, Occupational Performance/Identification of Deficit Complexity: 3-5 performance deficits  Clinical Decision Making Complexity (OT): problem focused assessment/low complexity  Overall Complexity of Evaluation (OT): low complexity     Time Calculation- OT       Row Name 07/15/25 1411             Time Calculation- OT    OT Received On 07/15/25  -LF      OT Goal Re-Cert Due Date 07/24/25  -LF         Untimed Charges    OT Eval/Re-eval Minutes 33  -LF         Total Minutes    Untimed Charges Total Minutes 33  -LF       Total Minutes 33  -LF                User Key  (r) = Recorded By, (t) = Taken By, (c) = Cosigned By      Initials Name Provider Type    LF Roseanne Schmitt, BREANN Occupational Therapist                  Therapy Charges for Today       Code Description Service Date Service Provider Modifiers Qty    [id number] HC OT EVAL LOW COMPLEXITY 3 7/15/2025 Roseanne Schmitt OT GO 1                 Roseanne Schmitt OT  7/15/2025

## 2025-07-15 NOTE — PLAN OF CARE
Goal Outcome Evaluation:  Plan of Care Reviewed With: patient        Progress: no change  Outcome Evaluation: Pt alert to self and place. BP high this shift. One time labetalol push given. Home meds restarted. No complaints of pain. New IV started. Sitter initiated for pts safety due to pt pulling lines and confusion. Tele discontinued and pt transferring to a medr floor. Awaiting MRI of brain.

## 2025-07-15 NOTE — CONSULTS
Nutrition Services    Patient Name: Paulo Leiva Jr.  YOB: 1955  MRN: [id number]  Admission date: 7/14/2025      CLINICAL NUTRITION ASSESSMENT      Reason for Assessment  MST Score 2+     H&P:  Past Medical History:   Diagnosis Date    Arthritis of back 2024    Benign essential hypertension     Cancer August 1994    Mass on Kidney..    COPD (chronic obstructive pulmonary disease)     Erectile dysfunction Apprx: 5 yrs ago    Generalized convulsive seizures 12/27/2023    Hip arthrosis     Hyperlipidemia 12/14/2018    Hyponatremia     Implantable loop recorder present     Knee swelling     Long term (current) use of non-steroidal anti-inflammatories (nsaid) 04/16/2018    Paroxysmal atrial fibrillation 5/14/2025    Renal insufficiency     Syncope     Syncope and collapse 09/02/2022    Total of 5 episdoes with the last one 10/22    Outside Source Comment: Overview:  Formatting of this note might be different from the original.  Total of 5 episdoes with the last one 10/22  Last Assessment & Plan:  Formatting of this note might be different from the original.  No recurrent episodes Lipitor does not show any significant dysrhythmias at this point continue with monitoring      Vertigo 02/17/2021    Vision loss         Current Problems:   Active Hospital Problems    Diagnosis     **AMS (altered mental status)         Nutrition/Diet History         Narrative   Paulo Leiva Jr. is a 69 y.o. male with past medical history of renal cancer, COPD, essential hypertension atrial fibrillation presents to the ED due to altered mental status and generalized weakness.  Patient is dealing with some altered mental status nosebleeds for last couple days.  In addition patient states having some intermittent fevers and chills for last couple days.  Patient states he fell yesterday as he has a history of falls due to poor walking.     Patient is Aa0x3 upon RD visit, NAD. Responsive to name and aware of his surroundings.  "Pt NPO at this time. Denies any NVDC  Due to patient AMS and lack of friends or family present in the room RD unable to obtain additional assessment information.     Anthropometrics        Current Height, Weight Height: 172.7 cm (68\")  Weight: 87.5 kg (192 lb 14.4 oz)   Current BMI Body mass index is 29.33 kg/m².   BMI Classification Overweight   % IBW    Adjusted Body Weight (ABW)    Weight Hx  Wt Readings from Last 30 Encounters:   07/14/25 1600 87.5 kg (192 lb 14.4 oz)   07/10/25 1114 88.6 kg (195 lb 6.4 oz)   07/01/25 1402 90.4 kg (199 lb 6.4 oz)   06/26/25 1355 92.3 kg (203 lb 7.8 oz)   06/13/25 0832 92.3 kg (203 lb 6.4 oz)   06/05/25 0924 94.3 kg (208 lb)   05/23/25 0919 94.3 kg (208 lb)   05/14/25 1336 93.1 kg (205 lb 3.2 oz)   05/06/25 0949 96.2 kg (212 lb 1.3 oz)   04/29/25 1143 96.2 kg (212 lb)   04/15/25 1347 96 kg (211 lb 9.6 oz)   04/09/25 0056 97.2 kg (214 lb 4.6 oz)   04/07/25 1259 97.1 kg (214 lb)   03/20/25 1350 101 kg (223 lb)   03/06/25 0943 102 kg (223 lb 14.4 oz)   01/27/25 1108 99.2 kg (218 lb 9.6 oz)   01/17/25 1651 101 kg (222 lb)   12/04/24 1117 99.8 kg (220 lb 1.6 oz)   11/26/24 1144 98.9 kg (218 lb)   11/13/24 1126 98.2 kg (216 lb 7.9 oz)   11/05/24 1449 96 kg (211 lb 9.6 oz)   10/29/24 0855 95.1 kg (209 lb 9.6 oz)   10/28/24 1444 92.5 kg (204 lb)   10/11/24 1401 93 kg (205 lb)   09/26/24 1505 91.7 kg (202 lb 1.6 oz)   09/09/24 2205 90.8 kg (200 lb 2.8 oz)   09/09/24 1122 90 kg (198 lb 6.6 oz)   08/20/24 0841 89.8 kg (198 lb)   08/07/24 1047 92.4 kg (203 lb 12.8 oz)   07/11/24 1332 94.5 kg (208 lb 6.4 oz)   06/19/24 1304 96.6 kg (213 lb)          Wt Change Observation WNL     Estimated/Assessed Needs  Estimated Needs based on: Adjusted Body Weight 76kg       Energy Requirements 30-35 kcal/kg    EST Needs (kcal/day) 5071-6607 kcals/d       Protein Requirements 1.0-1.2 g/kg   EST Daily Needs (g/day) 76-91 g/d       Fluid Requirements 1 ml/kcal    Estimated Needs (mL/day) 0971-8215  " "    Labs/Medications         Pertinent Labs Reviewed.   Results from last 7 days   Lab Units 07/15/25  0455 07/14/25  1622   SODIUM mmol/L 132* 131*   POTASSIUM mmol/L 5.0 5.0   CHLORIDE mmol/L 94* 97*   CO2 mmol/L 21.1* 18.4*   BUN mg/dL 11.4 11.2   CREATININE mg/dL 1.35* 1.22   CALCIUM mg/dL 10.0 9.6   BILIRUBIN mg/dL  --  1.2   ALK PHOS U/L  --  165*   ALT (SGPT) U/L  --  12   AST (SGOT) U/L  --  20   GLUCOSE mg/dL 89 95     Results from last 7 days   Lab Units 07/15/25  0455 07/14/25  1622   MAGNESIUM mg/dL  --  1.6   HEMOGLOBIN g/dL 20.5* 20.0*   HEMATOCRIT % 62.0* 60.7*     No results found for: \"COVID19\"  Lab Results   Component Value Date    HGBA1C 5.30 03/06/2025         Pertinent Medications Reviewed.     Malnutrition Severity Assessment              Nutrition Diagnosis         Nutrition Dx Problem 1 Inadequate energy Intake related to decreased ability to consume sufficient energy as evidenced by NPO.     Nutrition Intervention           Current Nutrition Orders & Evaluation of Intake       Current PO Diet Diet: Cardiac; Healthy Heart (2-3 Na+); Texture: Regular (IDDSI 7); Fluid Consistency: Thin (IDDSI 0)   Supplement No active supplement orders           Nutrition Intervention/Prescription        Continue patient on Cardiac diet Rx  RD to follow up prn        Medical Nutrition Therapy/Nutrition Education          Learner     Readiness N/A  N/A     Method     Response N/A  N/A     Monitor/Evaluation        Monitor PO intake, Pertinent labs     Nutrition Discharge Plan         To be determined     Electronically signed by:  Martín Hernandez RD  07/15/25 14:08 EDT  "

## 2025-07-15 NOTE — THERAPY EVALUATION
Acute Care - Physical Therapy Initial Evaluation  [facility]     Patient Name: Paulo Leiva Jr.  : 1955  MRN: [id number]  Today's Date: 7/15/2025      Visit Dx:     ICD-10-CM ICD-9-CM   1. Altered mental status, unspecified altered mental status type  R41.82 780.97   2. Uncontrolled hypertension  I10 401.9   3. Difficulty walking  R26.2 719.7     Patient Active Problem List   Diagnosis    Chronic obstructive pulmonary disease    Essential hypertension    HLD (hyperlipidemia)    Hyponatremia    Vertigo    Colon cancer screening    Status post placement of implantable loop recorder    Class 1 obesity due to excess calories without serious comorbidity with body mass index (BMI) of 30.0 to 30.9 in adult    Generalized convulsive seizures    Renal mass    AMS (altered mental status)    Benign paroxysmal positional vertigo    Malignant neoplasm of unspecified kidney, except renal pelvis    Stage 3a chronic kidney disease    Generalized tonic-clonic seizure    Vitamin D deficiency    Paroxysmal atrial fibrillation    Metastatic renal cell carcinoma    Encounter for long-term (current) use of high-risk medication     Past Medical History:   Diagnosis Date    Arthritis of back     Benign essential hypertension     Cancer 1994    Mass on Kidney..    COPD (chronic obstructive pulmonary disease)     Erectile dysfunction Apprx: 5 yrs ago    Generalized convulsive seizures 2023    Hip arthrosis     Hyperlipidemia 2018    Hyponatremia     Implantable loop recorder present     Knee swelling     Long term (current) use of non-steroidal anti-inflammatories (nsaid) 2018    Paroxysmal atrial fibrillation 2025    Renal insufficiency     Syncope     Syncope and collapse 2022    Total of 5 episdoes with the last one 10/22    Outside Source Comment: Overview:  Formatting of this note might be different from the original.  Total of 5 episdoes with the last one 10/22  Last Assessment & Plan:   Formatting of this note might be different from the original.  No recurrent episodes Lipitor does not show any significant dysrhythmias at this point continue with monitoring      Vertigo 02/17/2021    Vision loss      Past Surgical History:   Procedure Laterality Date    CARDIAC CATHETERIZATION N/A 09/26/2022    Procedure: Left Heart Cath;  Surgeon: Randell Casiano MD;  Location: [facility] CATH INVASIVE LOCATION;  Service: Cardiovascular;  Laterality: N/A;    CARDIAC ELECTROPHYSIOLOGY PROCEDURE N/A 09/26/2022    Procedure: Loop insertion;  Surgeon: Randell Casiano MD;  Location: [facility] CATH INVASIVE LOCATION;  Service: Cardiovascular;  Laterality: N/A;    COLONOSCOPY  2007    COLONOSCOPY N/A 10/26/2022    Procedure: COLONOSCOPY FOR SCREENING;  Surgeon: Roque Whitlock MD;  Location: [facility] ENDOSCOPY;  Service: Gastroenterology;  Laterality: N/A;  NORMAL COLON    NEPHRECTOMY Right 08/12/2024     PT Assessment (Last 12 Hours)       PT Evaluation and Treatment       Row Name 07/15/25 1300          Physical Therapy Time and Intention    Document Type evaluation  -AV     Mode of Treatment individual therapy;physical therapy  -AV       Row Name 07/15/25 1300          General Information    Patient Profile Reviewed yes  -AV     Prior Level of Function --  Reports (I) with ADLs. Per EMR, ambulated with STC. No home O2. Recent falls. Reports having difficulty with his memory. Patient oriented to self only at time of evaluation. No family at bedside to verify prior level.  -AV     Equipment Currently Used at Home cane, straight  -AV     Existing Precautions/Restrictions fall  -AV       Row Name 07/15/25 1300          Living Environment    Current Living Arrangements home  -AV     People in Home alone  -AV       Row Name 07/15/25 1300          Pain    Additional Documentation Pain Scale: FACES Pre/Post-Treatment (Group)  -AV       Row Name 07/15/25 1300          Pain Scale: FACES Pre/Post-Treatment    Pain: FACES Scale,  Pretreatment 0-->no hurt  -AV     Posttreatment Pain Rating 0-->no hurt  -AV       Row Name 07/15/25 1300          Cognition    Orientation Status (Cognition) oriented to;person  -AV       Row Name 07/15/25 1300          Range of Motion (ROM)    Range of Motion bilateral lower extremities;ROM is WFL  -AV       [facility] Name 07/15/25 1300          Strength (Manual Muscle Testing)    Strength (Manual Muscle Testing) bilateral lower extremities;strength is WFL  -AV       Row Name 07/15/25 1300          Bed Mobility    Bed Mobility supine-sit  -AV     Supine-Sit [place] (Bed Mobility) standby assist  -AV       Row Name 07/15/25 1300          Transfers    Transfers sit-stand transfer;stand-sit transfer  -AV     Comment, (Transfers) Max cues to safely complete transfer and ambulation to recliner  -AV       [facility] Name 07/15/25 1300          Sit-Stand Transfer    Sit-Stand [place] (Transfers) contact guard  -AV     Assistive Device (Sit-Stand Transfers) walker, front-wheeled  -AV       Row Name 07/15/25 1300          Stand-Sit Transfer    Stand-Sit [place] (Transfers) contact guard  -AV     Assistive Device (Stand-Sit Transfers) walker, front-wheeled  -AV       Row Name 07/15/25 1300          Gait/Stairs (Locomotion)    Gait/Stairs Locomotion gait/ambulation independence;gait/ambulation assistive device;distance ambulated  -AV     [place] Level (Gait) contact guard  -AV     Assistive Device (Gait) walker, front-wheeled  -AV     Distance in Feet (Gait) 5  -AV       Row Name 07/15/25 1300          Safety Issues/Impairments Affecting Functional Mobility    Safety Issues Affecting Function (Mobility) ability to follow commands;awareness of need for assistance;impulsivity;insight into deficits/self-awareness;judgment;problem-solving;safety precaution awareness;sequencing abilities  -AV     Impairments Affecting Function (Mobility) balance;cognition;endurance/activity tolerance;strength  -AV       Row Name 07/15/25  1300          Balance    Balance Assessment standing dynamic balance  -AV     Dynamic Standing Balance contact guard  -AV     Position/Device Used, Standing Balance supported;walker, front-wheeled  -AV       Row Name 07/15/25 1300          Plan of Care Review    Plan of Care Reviewed With patient  -AV     Progress no change  -AV     Outcome Evaluation Patient presents with deficits in balance, endurance, transfers, and ambulation. Patient will benefit from skilled PT services to address these mobility deficits and decrease risk of falls.  -AV       Row Name 07/15/25 1300          Positioning and Restraints    Pre-Treatment Position in bed  -AV     Post Treatment Position chair  -AV     In Chair reclined;call light within reach;encouraged to call for assist;exit alarm on;with other staff  -AV       Row Name 07/15/25 1300          Therapy Assessment/Plan (PT)    Rehab Potential (PT) good  -AV     Criteria for Skilled Interventions Met (PT) yes;meets criteria  -AV     Therapy Frequency (PT) daily  -AV     Predicted Duration of Therapy Intervention (PT) 10 days  -AV     Problem List (PT) problems related to;balance;cognition;mobility;strength  -AV     Activity Limitations Related to Problem List (PT) unable to transfer safely;unable to ambulate safely  -AV       Row Name 07/15/25 1300          PT Evaluation Complexity    History, PT Evaluation Complexity 1-2 personal factors and/or comorbidities  -AV     Examination of Body Systems (PT Eval Complexity) total of 4 or more elements  -AV     Clinical Presentation (PT Evaluation Complexity) stable  -AV     Clinical Decision Making (PT Evaluation Complexity) low complexity  -AV     Overall Complexity (PT Evaluation Complexity) low complexity  -AV       Row Name 07/15/25 1300          Therapy Plan Review/Discharge Plan (PT)    Therapy Plan Review (PT) evaluation/treatment results reviewed;patient  -AV       Row Name 07/15/25 1300          Physical Therapy Goals    Bed  Mobility Goal Selection (PT) bed mobility, PT goal 1  -AV     Transfer Goal Selection (PT) transfer, PT goal 1  -AV     Gait Training Goal Selection (PT) gait training, PT goal 1  -AV       Row Name 07/15/25 1300          Bed Mobility Goal 1 (PT)    Activity/Assistive Device (Bed Mobility Goal 1, PT) sit to supine/supine to sit  -AV     [place] Level/Cues Needed (Bed Mobility Goal 1, PT) supervision required  -AV     Time Frame (Bed Mobility Goal 1, PT) 10 days  -AV       Row Name 07/15/25 1300          Transfer Goal 1 (PT)    Activity/Assistive Device (Transfer Goal 1, PT) sit-to-stand/stand-to-sit;bed-to-chair/chair-to-bed;walker, rolling  -AV     [place] Level/Cues Needed (Transfer Goal 1, PT) supervision required  -AV     Time Frame (Transfer Goal 1, PT) 10 days  -AV       Row Name 07/15/25 1300          Gait Training Goal 1 (PT)    Activity/Assistive Device (Gait Training Goal 1, PT) gait (walking locomotion);assistive device use;walker, rolling  -AV     [place] Level (Gait Training Goal 1, PT) supervision required  -AV     Distance (Gait Training Goal 1, PT) 200  -AV     Time Frame (Gait Training Goal 1, PT) 10 days  -AV               User Key  (r) = Recorded By, (t) = Taken By, (c) = Cosigned By      Initials Name Provider Type    Scott Dawn, PT Physical Therapist                    Physical Therapy Education       Title: PT OT SLP Therapies (In Progress)       Topic: Physical Therapy (In Progress)       Point: Mobility training (In Progress)       Learning Progress Summary            Patient Acceptance, E,D, NR by AV at 7/15/2025 1403     by AV at 7/15/2025 1402                      Point: Home exercise program (Not Started)       Learner Progress:  Not documented in this visit.              Point: Body mechanics (In Progress)       Learning Progress Summary            Patient Acceptance, E,D, NR by AV at 7/15/2025 1403     by AV at 7/15/2025 1402                      Point:  Precautions (In Progress)       Learning Progress Summary            Patient Acceptance, E,D, NR by AV at 7/15/2025 1403     by AV at 7/15/2025 1402                                      User Key       Initials Effective Dates Name Provider Type Discipline     06/11/21 -  Scott Eng, PT Physical Therapist PT                  PT Recommendation and Plan  Anticipated Discharge Disposition (PT): sub acute care setting  Planned Therapy Interventions (PT): balance training, bed mobility training, gait training, home exercise program, neuromuscular re-education, strengthening, transfer training  Therapy Frequency (PT): daily  Plan of Care Reviewed With: patient  Progress: no change  Outcome Evaluation: Patient presents with deficits in balance, endurance, transfers, and ambulation. Patient will benefit from skilled PT services to address these mobility deficits and decrease risk of falls.   Outcome Measures       Row Name 07/15/25 1400             How much help from another person do you currently need...    Turning from your back to your side while in flat bed without using bedrails? 4  -AV      Moving from lying on back to sitting on the side of a flat bed without bedrails? 3  -AV      Moving to and from a bed to a chair (including a wheelchair)? 3  -AV      Standing up from a chair using your arms (e.g., wheelchair, bedside chair)? 3  -AV      Climbing 3-5 steps with a railing? 2  -AV      To walk in hospital room? 3  -AV      AM-PAC 6 Clicks Score (PT) 18  -AV         Functional Assessment    Outcome Measure Options AM-PAC 6 Clicks Basic Mobility (PT)  -AV                User Key  (r) = Recorded By, (t) = Taken By, (c) = Cosigned By      Initials Name Provider Type    AV Scott Eng, PT Physical Therapist                     Time Calculation:    PT Charges       Row Name 07/15/25 1402             Time Calculation    PT Received On 07/15/25  -AV      PT Goal Re-Cert Due Date 07/24/25  -AV         Untimed  Charges    PT Eval/Re-eval Minutes 40  -AV         Total Minutes    Untimed Charges Total Minutes 40  -AV       Total Minutes 40  -AV                User Key  (r) = Recorded By, (t) = Taken By, (c) = Cosigned By      Initials Name Provider Type    AV Scott Eng, PT Physical Therapist                  Therapy Charges for Today       Code Description Service Date Service Provider Modifiers Qty    [id number] HC PT EVAL LOW COMPLEXITY 3 7/15/2025 Scott Eng, PT GP 1            PT G-Codes  Outcome Measure Options: AM-PAC 6 Clicks Basic Mobility (PT)  AM-PAC 6 Clicks Score (PT): 18    Scott Eng, PT  7/15/2025

## 2025-07-16 ENCOUNTER — APPOINTMENT (OUTPATIENT)
Dept: MRI IMAGING | Facility: HOSPITAL | Age: 70
DRG: 071 | End: 2025-07-16
Payer: MEDICARE

## 2025-07-16 LAB
ANION GAP SERPL CALCULATED.3IONS-SCNC: 13.5 MMOL/L (ref 5–15)
BUN SERPL-MCNC: 10.4 MG/DL (ref 8–23)
BUN/CREAT SERPL: 8.3 (ref 7–25)
CALCIUM SPEC-SCNC: 9.8 MG/DL (ref 8.6–10.5)
CHLORIDE SERPL-SCNC: 97 MMOL/L (ref 98–107)
CO2 SERPL-SCNC: 19.5 MMOL/L (ref 22–29)
CREAT SERPL-MCNC: 1.26 MG/DL (ref 0.76–1.27)
EGFRCR SERPLBLD CKD-EPI 2021: 62.1 ML/MIN/1.73
GLUCOSE SERPL-MCNC: 97 MG/DL (ref 65–99)
POTASSIUM SERPL-SCNC: 4.3 MMOL/L (ref 3.5–5.2)
SODIUM SERPL-SCNC: 130 MMOL/L (ref 136–145)
WHOLE BLOOD HOLD SPECIMEN: NORMAL

## 2025-07-16 PROCEDURE — 70553 MRI BRAIN STEM W/O & W/DYE: CPT

## 2025-07-16 PROCEDURE — 25510000001 GADOPICLENOL 0.5 MMOL/ML SOLUTION: Performed by: INTERNAL MEDICINE

## 2025-07-16 PROCEDURE — 94664 DEMO&/EVAL PT USE INHALER: CPT

## 2025-07-16 PROCEDURE — A9573 GADOPICLENOL 0.5 MMOL/ML SOLUTION: HCPCS | Performed by: INTERNAL MEDICINE

## 2025-07-16 PROCEDURE — 94760 N-INVAS EAR/PLS OXIMETRY 1: CPT

## 2025-07-16 PROCEDURE — 25010000002 HYDRALAZINE PER 20 MG: Performed by: STUDENT IN AN ORGANIZED HEALTH CARE EDUCATION/TRAINING PROGRAM

## 2025-07-16 PROCEDURE — 80048 BASIC METABOLIC PNL TOTAL CA: CPT | Performed by: INTERNAL MEDICINE

## 2025-07-16 PROCEDURE — 94799 UNLISTED PULMONARY SVC/PX: CPT

## 2025-07-16 PROCEDURE — 63710000001 REVEFENACIN 175 MCG/3ML SOLUTION: Performed by: STUDENT IN AN ORGANIZED HEALTH CARE EDUCATION/TRAINING PROGRAM

## 2025-07-16 PROCEDURE — 99232 SBSQ HOSP IP/OBS MODERATE 35: CPT | Performed by: INTERNAL MEDICINE

## 2025-07-16 PROCEDURE — 97116 GAIT TRAINING THERAPY: CPT

## 2025-07-16 RX ORDER — DIAZEPAM 10 MG/2ML
2.5 INJECTION, SOLUTION INTRAMUSCULAR; INTRAVENOUS AS NEEDED
Status: COMPLETED | OUTPATIENT
Start: 2025-07-16 | End: 2025-07-18

## 2025-07-16 RX ADMIN — RIVASTIGMINE TARTRATE 1.5 MG: 1.5 CAPSULE ORAL at 08:55

## 2025-07-16 RX ADMIN — APIXABAN 5 MG: 5 TABLET, FILM COATED ORAL at 08:54

## 2025-07-16 RX ADMIN — VALSARTAN 160 MG: 80 TABLET, FILM COATED ORAL at 08:55

## 2025-07-16 RX ADMIN — Medication 10 ML: at 20:41

## 2025-07-16 RX ADMIN — HYDROCORTISONE 1 APPLICATION: 10 CREAM TOPICAL at 08:58

## 2025-07-16 RX ADMIN — AMLODIPINE BESYLATE 5 MG: 10 TABLET ORAL at 08:54

## 2025-07-16 RX ADMIN — HYDROCORTISONE 1 APPLICATION: 10 CREAM TOPICAL at 18:11

## 2025-07-16 RX ADMIN — RIVASTIGMINE TARTRATE 1.5 MG: 1.5 CAPSULE ORAL at 18:11

## 2025-07-16 RX ADMIN — Medication 10 ML: at 08:59

## 2025-07-16 RX ADMIN — SODIUM CHLORIDE TAB 1 GM 1 G: 1 TAB at 08:54

## 2025-07-16 RX ADMIN — QUETIAPINE FUMARATE 50 MG: 25 TABLET ORAL at 20:41

## 2025-07-16 RX ADMIN — METOPROLOL SUCCINATE 50 MG: 50 TABLET, EXTENDED RELEASE ORAL at 08:54

## 2025-07-16 RX ADMIN — CITALOPRAM HYDROBROMIDE 10 MG: 20 TABLET ORAL at 08:55

## 2025-07-16 RX ADMIN — HYDRALAZINE HYDROCHLORIDE 10 MG: 20 INJECTION INTRAMUSCULAR; INTRAVENOUS at 12:31

## 2025-07-16 RX ADMIN — ATORVASTATIN CALCIUM 10 MG: 10 TABLET, FILM COATED ORAL at 08:54

## 2025-07-16 RX ADMIN — HYDROCORTISONE 1 APPLICATION: 10 CREAM TOPICAL at 20:41

## 2025-07-16 RX ADMIN — REVEFENACIN 175 MCG: 175 SOLUTION RESPIRATORY (INHALATION) at 09:51

## 2025-07-16 RX ADMIN — FERROUS SULFATE TAB 325 MG (65 MG ELEMENTAL FE) 325 MG: 325 (65 FE) TAB at 08:24

## 2025-07-16 RX ADMIN — CYANOCOBALAMIN TAB 500 MCG 500 MCG: 500 TAB at 08:54

## 2025-07-16 RX ADMIN — GADOPICLENOL 8 ML: 485.1 INJECTION INTRAVENOUS at 20:21

## 2025-07-16 RX ADMIN — APIXABAN 5 MG: 5 TABLET, FILM COATED ORAL at 20:41

## 2025-07-16 NOTE — THERAPY TREATMENT NOTE
Acute Care - Physical Therapy Treatment Note  KAROLINA Wadsworth     Patient Name: Paulo Leiva Jr.  : 1955  MRN: [id number]  Today's Date: 2025      Visit Dx:     ICD-10-CM ICD-9-CM   1. Altered mental status, unspecified altered mental status type  R41.82 780.97   2. Uncontrolled hypertension  I10 401.9   3. Difficulty walking  R26.2 719.7   4. Decreased activities of daily living (ADL)  Z78.9 V49.89     Patient Active Problem List   Diagnosis    Chronic obstructive pulmonary disease    Essential hypertension    HLD (hyperlipidemia)    Hyponatremia    Vertigo    Colon cancer screening    Status post placement of implantable loop recorder    Class 1 obesity due to excess calories without serious comorbidity with body mass index (BMI) of 30.0 to 30.9 in adult    Generalized convulsive seizures    Renal mass    AMS (altered mental status)    Benign paroxysmal positional vertigo    Malignant neoplasm of unspecified kidney, except renal pelvis    Stage 3a chronic kidney disease    Generalized tonic-clonic seizure    Vitamin D deficiency    Paroxysmal atrial fibrillation    Metastatic renal cell carcinoma    Encounter for long-term (current) use of high-risk medication     Past Medical History:   Diagnosis Date    Arthritis of back     Benign essential hypertension     Cancer 1994    Mass on Kidney..    COPD (chronic obstructive pulmonary disease)     Erectile dysfunction Apprx: 5 yrs ago    Generalized convulsive seizures 2023    Hip arthrosis     Hyperlipidemia 2018    Hyponatremia     Implantable loop recorder present     Knee swelling     Long term (current) use of non-steroidal anti-inflammatories (nsaid) 2018    Paroxysmal atrial fibrillation 2025    Renal insufficiency     Syncope     Syncope and collapse 2022    Total of 5 episdoes with the last one 10/22    Outside Source Comment: Overview:  Formatting of this note might be different from the original.  Total of 5  episdoes with the last one 10/22  Last Assessment & Plan:  Formatting of this note might be different from the original.  No recurrent episodes Lipitor does not show any significant dysrhythmias at this point continue with monitoring      Vertigo 02/17/2021    Vision loss      Past Surgical History:   Procedure Laterality Date    CARDIAC CATHETERIZATION N/A 09/26/2022    Procedure: Left Heart Cath;  Surgeon: Randell Casiano MD;  Location: [facility] CATH INVASIVE LOCATION;  Service: Cardiovascular;  Laterality: N/A;    CARDIAC ELECTROPHYSIOLOGY PROCEDURE N/A 09/26/2022    Procedure: Loop insertion;  Surgeon: Randell Casiano MD;  Location: [facility] ED CATH INVASIVE LOCATION;  Service: Cardiovascular;  Laterality: N/A;    COLONOSCOPY  2007    COLONOSCOPY N/A 10/26/2022    Procedure: COLONOSCOPY FOR SCREENING;  Surgeon: Roque Whitlock MD;  Location: [facility] ED ENDOSCOPY;  Service: Gastroenterology;  Laterality: N/A;  NORMAL COLON    NEPHRECTOMY Right 08/12/2024     PT Assessment (Last 12 Hours)       PT Evaluation and Treatment       Row Name 07/16/25 1100          Physical Therapy Time and Intention    Subjective Information no complaints  -AV (r) LC (t) AV (c)     Document Type therapy note (daily note)  -AV (r) LC (t) AV (c)     Mode of Treatment individual therapy;physical therapy  -AV (r) LC (t) AV (c)     Patient Effort adequate  -AV (r) LC (t) AV (c)     Symptoms Noted During/After Treatment none  -AV (r) LC (t) AV (c)       Row Name 07/16/25 1100          General Information    Patient Profile Reviewed yes  -AV (r) LC (t) AV (c)     Patient Observations cooperative;alert  -AV (r) LC (t) AV (c)     Existing Precautions/Restrictions fall  -AV (r) LC (t) AV (c)       Row Name 07/16/25 1100          Pain    Additional Documentation Pain Scale: FACES Pre/Post-Treatment (Group)  -AV (r) LC (t) AV (c)       Row Name 07/16/25 1100          Pain Scale: FACES Pre/Post-Treatment    Pain: FACES Scale, Pretreatment 0-->no hurt   -AV (r) LC (t) AV (c)     Posttreatment Pain Rating 0-->no hurt  -AV (r) LC (t) AV (c)       Row Name 07/16/25 1100          Cognition    Affect/Mental Status (Cognition) confused  -AV (r) LC (t) AV (c)     Orientation Status (Cognition) --  Patient is alert , confused, and agreeable with therapy. Patient required maximal verbal and tactile cues for safety during ambulation  -AV (r) LC (t) AV (c)     Follows Commands (Cognition) physical/tactile prompts required;repetition of directions required;verbal cues/prompting required  -AV (r) LC (t) AV (c)     Cognitive Function (Cognition) unable/difficult to assess  -AV (r) LC (t) AV (c)     Personal Safety Interventions gait belt;nonskid shoes/slippers when out of bed  -AV (r) LC (t) AV (c)       Row Name 07/16/25 1100          Range of Motion (ROM)    Range of Motion ROM is WFL;bilateral lower extremities  -AV (r) LC (t) AV (c)       Row Name 07/16/25 1100          Bed Mobility    Bed Mobility supine-sit;sit-supine  -AV (r) LC (t) AV (c)     Supine-Sit [place] (Bed Mobility) standby assist  -AV (r) LC (t) AV (c)     Sit-Supine [place] (Bed Mobility) standby assist  -AV (r) LC (t) AV (c)     Assistive Device (Bed Mobility) bed rails;repositioning sheet  -AV (r) LC (t) AV (c)       Row Name 07/16/25 1100          Transfers    Transfers sit-stand transfer;stand-sit transfer  -AV (r) LC (t) AV (c)     Comment, (Transfers) Max cues to safely complete transfer training and ambulation in his room.  -AV (r) LC (t) AV (c)       Row Name 07/16/25 1100          Sit-Stand Transfer    Sit-Stand [place] (Transfers) contact guard  -AV (r) LC (t) AV (c)     Assistive Device (Sit-Stand Transfers) walker, front-wheeled  -AV (r) LC (t) AV (c)       Row Name 07/16/25 1100          Stand-Sit Transfer    Stand-Sit [place] (Transfers) contact guard  -AV (r) LC (t) AV (c)     Assistive Device (Stand-Sit Transfers) walker, front-wheeled  -AV (r) LC (t) AV (c)       Row Name  07/16/25 1100          Gait/Stairs (Locomotion)    Gait/Stairs Locomotion gait/ambulation assistive device;distance ambulated  -AV (r) LC (t) AV (c)     [place] Level (Gait) contact guard  -AV (r) LC (t) AV (c)     Assistive Device (Gait) walker, front-wheeled  -AV (r) LC (t) AV (c)     Distance in Feet (Gait) 30  -AV (r) LC (t) AV (c)     Pattern (Gait) step-to  -AV (r) LC (t) AV (c)     Deviations/Abnormal Patterns (Gait) timoteo decreased;gait speed decreased;stride length decreased  -AV (r) LC (t) AV (c)     Bilateral Gait Deviations forward flexed posture  -AV (r) LC (t) AV (c)     Comment, (Gait/Stairs) Patient ambulated in his room using a rolling walker requiring max cues for initiation, sequencing, and safety. Patient returned to bed after session and sitter remained present in the room.  -AV (r) LC (t) AV (c)       Row Name 07/16/25 1100          Safety Issues/Impairments Affecting Functional Mobility    Safety Issues Affecting Function (Mobility) ability to follow commands;awareness of need for assistance;insight into deficits/self-awareness;sequencing abilities;safety precaution awareness  -AV (r) LC (t) AV (c)     Impairments Affecting Function (Mobility) balance;cognition;endurance/activity tolerance;strength  -AV (r) LC (t) AV (c)       Row Name 07/16/25 1100          Balance    Balance Assessment standing dynamic balance  -AV (r) LC (t) AV (c)     Dynamic Standing Balance contact guard  -AV (r) LC (t) AV (c)     Position/Device Used, Standing Balance walker, front-wheeled;supported  -AV (r) LC (t) AV (c)     Balance Interventions sit to stand  -AV (r) LC (t) AV (c)       Row Name 07/16/25 1100          Vital Signs    O2 Delivery Pre Treatment room air  -AV (r) LC (t) AV (c)     O2 Delivery Intra Treatment room air  -AV (r) LC (t) AV (c)     O2 Delivery Post Treatment room air  -AV (r) LC (t) AV (c)     Pre Patient Position Side Lying  -AV (r) LC (t) AV (c)     Intra Patient Position Standing   -AV (r) LC (t) AV (c)     Post Patient Position Supine  -AV (r) LC (t) AV (c)       Row Name 07/16/25 1100          Positioning and Restraints    Pre-Treatment Position in bed  -AV (r) LC (t) AV (c)     Post Treatment Position bed  -AV (r) LC (t) AV (c)     In Bed supine;call light within reach;encouraged to call for assist;exit alarm on;with other staff  -AV (r) LC (t) AV (c)       Row Name 07/16/25 1100          Progress Summary (PT)    Progress Toward Functional Goals (PT) progress toward functional goals is good  -AV (r) LC (t) AV (c)               User Key  (r) = Recorded By, (t) = Taken By, (c) = Cosigned By      Initials Name Provider Type    AV Scott Eng, PT Physical Therapist     Alexia Celeste, PT Student PT Student                    Physical Therapy Education       Title: PT OT SLP Therapies (In Progress)       Topic: Physical Therapy (Done)       Point: Mobility training (Done)       Learning Progress Summary            Patient Acceptance, E,TB, VU,NR by  at 7/16/2025 1210    Acceptance, E,D, NR by  at 7/15/2025 1403     by  at 7/15/2025 1402                      Point: Home exercise program (Done)       Learning Progress Summary            Patient Acceptance, E,TB, VU,NR by  at 7/16/2025 1210                      Point: Body mechanics (Done)       Learning Progress Summary            Patient Acceptance, E,TB, VU,NR by  at 7/16/2025 1210    Acceptance, E,D, NR by  at 7/15/2025 1403     by  at 7/15/2025 1402                      Point: Precautions (Done)       Learning Progress Summary            Patient Acceptance, E,TB, VU,NR by  at 7/16/2025 1210    Acceptance, E,D, NR by  at 7/15/2025 1403     by  at 7/15/2025 1402                                      User Key       Initials Effective Dates Name Provider Type Discipline     06/11/21 -  Scott Eng, PT Physical Therapist PT     05/28/25 -  Alexia Celeste, PT Student PT Student PT                   PT Recommendation and Plan     Progress Summary (PT)  Progress Toward Functional Goals (PT): progress toward functional goals is good   Outcome Measures       Row Name 07/16/25 1200 07/15/25 1400          How much help from another person do you currently need...    Turning from your back to your side while in flat bed without using bedrails? 4  -AV (r) LC (t) AV (c) 4  -AV     Moving from lying on back to sitting on the side of a flat bed without bedrails? 3  -AV (r) LC (t) AV (c) 3  -AV     Moving to and from a bed to a chair (including a wheelchair)? 3  -AV (r) LC (t) AV (c) 3  -AV     Standing up from a chair using your arms (e.g., wheelchair, bedside chair)? 3  -AV (r) LC (t) AV (c) 3  -AV     Climbing 3-5 steps with a railing? 2  -AV (r) LC (t) AV (c) 2  -AV     To walk in hospital room? 3  -AV (r) LC (t) AV (c) 3  -AV     AM-PAC 6 Clicks Score (PT) 18  -AV (r) LC (t) 18  -AV        Functional Assessment    Outcome Measure Options AM-PAC 6 Clicks Basic Mobility (PT)  -AV (r) LC (t) AV (c) AM-PAC 6 Clicks Basic Mobility (PT)  -AV               User Key  (r) = Recorded By, (t) = Taken By, (c) = Cosigned By      Initials Name Provider Type    AV Scott Eng, PT Physical Therapist    Alexia Theodore, PT Student PT Student                     Time Calculation:    PT Charges       Row Name 07/16/25 1155             Time Calculation    PT Received On 07/16/25  -AV (r) LC (t) AV (c)         Timed Charges    83175 - Gait Training Minutes  15  -AV (r) LC (t) AV (c)         Total Minutes    Timed Charges Total Minutes 15  -AV (r) LC (t)       Total Minutes 15  -AV (r) LC (t)                User Key  (r) = Recorded By, (t) = Taken By, (c) = Cosigned By      Initials Name Provider Type    AV Scott Eng, PT Physical Therapist    Alexia Theodore, PT Student PT Student                      PT G-Codes  Outcome Measure Options: AM-PAC 6 Clicks Basic Mobility (PT)  AM-PAC 6 Clicks Score (PT):  18  AM-PAC 6 Clicks Score (OT): 16    Alexia Patricio, PT Student  7/16/2025

## 2025-07-16 NOTE — NURSING NOTE
Patient transferred to [address] [place] [address] Room 3014 per wheelchair, accompanied by Safety Sitter. Report called to GONSALO Kovacs

## 2025-07-16 NOTE — PLAN OF CARE
Goal Outcome Evaluation:  Plan of Care Reviewed With: patient        Progress: no change       Patient disoriented x4. Can be impulsive at times but easily redirected and pleasantly confused. Sitter at bedside overnight, patient has poor safety awareness and numerous falls prior to admission. Patient slept well overnight.Blood pressures improved from yesterday after restarting home BP meds. Awaiting MRI and transfer to med surg.

## 2025-07-16 NOTE — PROGRESS NOTES
[facility]   Hospitalist Progress Note  Date: 2025  Patient Name: Paulo Leiva Jr.  : 1955  MRN: [id number]  Date of admission: 2025      Subjective   Subjective     Chief Complaint: AMS    Summary: Paulo Leiva Jr. is a 69 y.o. male with past medical history of renal cancer, COPD, essential hypertension atrial fibrillation presents to the ED due to altered mental status and generalized weakness.  Patient is dealing with some altered mental status nosebleeds for last couple days.  In addition patient states having some intermittent fevers and chills for last couple days.  Patient states he fell yesterday as he has a history of falls due to poor walking.  Denies headaches, blurry vision, chest pain, shortness of breath, abdominal pain or dysuria.  In the ED, patient's vitals were temperature 97.5, heart rate 93 and blood pressure 170/148.  Labs show white blood cell 7.7, hemoglobin 20, platelets 271, sodium 131, bicarb 18.4.  Polycythemia.  Chest x-ray shows concern for bilateral infiltrates.  CT head shows no acute findings.  Patient admitted to floors for further management.     Interval Followup: Patient seen and evaluated on this morning.  He is resting comfortably, reportedly he was up overnight.  He does have a sitter at bedside.    Review of Systems   All systems were reviewed and negative except for: As noted interval follow-up    Objective   Objective     Vitals:   Temp:  [97.5 °F (36.4 °C)-97.9 °F (36.6 °C)] 97.9 °F (36.6 °C)  Heart Rate:  [] 106  Resp:  [18-20] 20  BP: (135-174)/() 152/95  Physical Exam    Constitutional: Sleeping, lying in bed no acute distress   Eyes: Pupils equal, sclerae anicteric, no conjunctival injection   HENT: NCAT, mucous membranes moist   Neck: Supple, no thyromegaly, no lymphadenopathy, trachea midline   Respiratory: Clear to auscultation bilaterally, nonlabored respirations    Cardiovascular: Mildly tachycardic S1-S2, no appreciable  murmurs, palpable pedal pulses bilaterally   Gastrointestinal: Positive bowel sounds, soft, nontender, nondistended   Musculoskeletal: No bilateral ankle edema, no clubbing or cyanosis to extremities   Neurologic: Moving extremities spontaneously   Skin: No rashes     Result Review    Result Review:  I have personally reviewed the results from the time of this admission to 7/16/2025 09:44 EDT and agree with these findings:  [x]  Laboratory  [x]  Microbiology  [x]  Radiology  []  EKG/Telemetry   []  Cardiology/Vascular   []  Pathology  []  Old records  []  Other:    Assessment & Plan   Assessment / Plan     Assessment/Plan:  Assessment  Generalized weakness  Acute Encephalopathy doubt infection suspect more likely a chronic issue such as dementia  Concern for community-acquired pneumonia-ruled out given normal white count, no fever, normal procalcitonin as well as negative CT chest  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  On Eliquis   Essential hypertension-uncontrolled     Plan  Continue to monitor on MedSurg   Discontinue telemetry monitoring  Follow-up blood cultures-currently no growth  Will obtain an MRI of the brain given patient's history of metastatic renal cell carcinoma  Discontinue antibiotics  Hold home chemo med  PT OT consulted-recommendations for subacute rehab noted.  Continue patient's oral antihypertensive as well as needed hydralazine/labetalol.  Adjust as needed.  Other home medications have been reviewed and resumed as clinically indicated.        Discussed plan with RN.    VTE Prophylaxis:  Pharmacologic & mechanical VTE prophylaxis orders are present.        CODE STATUS:   Code Status (Patient has no pulse and is not breathing): CPR (Attempt to Resuscitate)  Medical Interventions (Patient has pulse or is breathing): Full Support      Electronically signed by Antonina Rivas MD, 07/16/25, 9:44 AM EDT.

## 2025-07-16 NOTE — PLAN OF CARE
Goal Outcome Evaluation:      Pt transferred from telemetry unit to [facility] this shift, pt has history of sundowners, safety sitter discontinued this shift. Pt has been pleasant and cooperative. Bed alert on and audible, call light within reach

## 2025-07-17 ENCOUNTER — TELEPHONE (OUTPATIENT)
Dept: ONCOLOGY | Facility: HOSPITAL | Age: 70
End: 2025-07-17
Payer: MEDICARE

## 2025-07-17 LAB
QT INTERVAL: 362 MS
QTC INTERVAL: 463 MS

## 2025-07-17 PROCEDURE — 94799 UNLISTED PULMONARY SVC/PX: CPT

## 2025-07-17 PROCEDURE — 99232 SBSQ HOSP IP/OBS MODERATE 35: CPT | Performed by: INTERNAL MEDICINE

## 2025-07-17 PROCEDURE — 97110 THERAPEUTIC EXERCISES: CPT

## 2025-07-17 PROCEDURE — 94664 DEMO&/EVAL PT USE INHALER: CPT

## 2025-07-17 PROCEDURE — 63710000001 REVEFENACIN 175 MCG/3ML SOLUTION: Performed by: STUDENT IN AN ORGANIZED HEALTH CARE EDUCATION/TRAINING PROGRAM

## 2025-07-17 PROCEDURE — 97530 THERAPEUTIC ACTIVITIES: CPT

## 2025-07-17 PROCEDURE — 97116 GAIT TRAINING THERAPY: CPT

## 2025-07-17 RX ORDER — LORAZEPAM 0.5 MG/1
0.5 TABLET ORAL ONCE
Status: COMPLETED | OUTPATIENT
Start: 2025-07-17 | End: 2025-07-17

## 2025-07-17 RX ORDER — AMLODIPINE BESYLATE 5 MG/1
5 TABLET ORAL DAILY
Status: DISCONTINUED | OUTPATIENT
Start: 2025-07-17 | End: 2025-07-19 | Stop reason: HOSPADM

## 2025-07-17 RX ORDER — METOPROLOL SUCCINATE 50 MG/1
100 TABLET, EXTENDED RELEASE ORAL DAILY
Status: DISCONTINUED | OUTPATIENT
Start: 2025-07-17 | End: 2025-07-19 | Stop reason: HOSPADM

## 2025-07-17 RX ORDER — AMLODIPINE BESYLATE 10 MG/1
10 TABLET ORAL DAILY
Status: DISCONTINUED | OUTPATIENT
Start: 2025-07-17 | End: 2025-07-17

## 2025-07-17 RX ADMIN — APIXABAN 5 MG: 5 TABLET, FILM COATED ORAL at 20:53

## 2025-07-17 RX ADMIN — HYDROCORTISONE 1 APPLICATION: 10 CREAM TOPICAL at 14:33

## 2025-07-17 RX ADMIN — QUETIAPINE FUMARATE 50 MG: 25 TABLET ORAL at 20:53

## 2025-07-17 RX ADMIN — HYDROCORTISONE 1 APPLICATION: 10 CREAM TOPICAL at 22:40

## 2025-07-17 RX ADMIN — CYANOCOBALAMIN TAB 500 MCG 500 MCG: 500 TAB at 08:27

## 2025-07-17 RX ADMIN — LORAZEPAM 0.5 MG: 0.5 TABLET ORAL at 17:31

## 2025-07-17 RX ADMIN — REVEFENACIN 175 MCG: 175 SOLUTION RESPIRATORY (INHALATION) at 09:53

## 2025-07-17 RX ADMIN — RIVASTIGMINE TARTRATE 1.5 MG: 1.5 CAPSULE ORAL at 17:31

## 2025-07-17 RX ADMIN — FERROUS SULFATE TAB 325 MG (65 MG ELEMENTAL FE) 325 MG: 325 (65 FE) TAB at 08:26

## 2025-07-17 RX ADMIN — APIXABAN 5 MG: 5 TABLET, FILM COATED ORAL at 08:27

## 2025-07-17 RX ADMIN — ATORVASTATIN CALCIUM 10 MG: 10 TABLET, FILM COATED ORAL at 08:27

## 2025-07-17 RX ADMIN — Medication 10 ML: at 08:28

## 2025-07-17 RX ADMIN — HYDROCORTISONE 1 APPLICATION: 10 CREAM TOPICAL at 08:26

## 2025-07-17 RX ADMIN — SODIUM CHLORIDE TAB 1 GM 1 G: 1 TAB at 08:26

## 2025-07-17 RX ADMIN — Medication 10 ML: at 20:55

## 2025-07-17 RX ADMIN — AMLODIPINE BESYLATE 5 MG: 5 TABLET ORAL at 08:27

## 2025-07-17 RX ADMIN — CITALOPRAM HYDROBROMIDE 10 MG: 20 TABLET ORAL at 08:27

## 2025-07-17 RX ADMIN — METOPROLOL SUCCINATE 100 MG: 50 TABLET, EXTENDED RELEASE ORAL at 08:26

## 2025-07-17 RX ADMIN — RIVASTIGMINE TARTRATE 1.5 MG: 1.5 CAPSULE ORAL at 08:27

## 2025-07-17 RX ADMIN — VALSARTAN 160 MG: 80 TABLET, FILM COATED ORAL at 08:27

## 2025-07-17 NOTE — THERAPY TREATMENT NOTE
Acute Care - Physical Therapy Treatment Note  KAROLINA Wadsworth     Patient Name: Paulo Leiva Jr.  : 1955  MRN: [id number]  Today's Date: 2025      Visit Dx:     ICD-10-CM ICD-9-CM   1. Altered mental status, unspecified altered mental status type  R41.82 780.97   2. Uncontrolled hypertension  I10 401.9   3. Difficulty walking  R26.2 719.7   4. Decreased activities of daily living (ADL)  Z78.9 V49.89     Patient Active Problem List   Diagnosis    Chronic obstructive pulmonary disease    Essential hypertension    HLD (hyperlipidemia)    Hyponatremia    Vertigo    Colon cancer screening    Status post placement of implantable loop recorder    Class 1 obesity due to excess calories without serious comorbidity with body mass index (BMI) of 30.0 to 30.9 in adult    Generalized convulsive seizures    Renal mass    AMS (altered mental status)    Benign paroxysmal positional vertigo    Malignant neoplasm of unspecified kidney, except renal pelvis    Stage 3a chronic kidney disease    Generalized tonic-clonic seizure    Vitamin D deficiency    Paroxysmal atrial fibrillation    Metastatic renal cell carcinoma    Encounter for long-term (current) use of high-risk medication     Past Medical History:   Diagnosis Date    Arthritis of back     Benign essential hypertension     Cancer 1994    Mass on Kidney..    COPD (chronic obstructive pulmonary disease)     Erectile dysfunction Apprx: 5 yrs ago    Generalized convulsive seizures 2023    Hip arthrosis     Hyperlipidemia 2018    Hyponatremia     Implantable loop recorder present     Knee swelling     Long term (current) use of non-steroidal anti-inflammatories (nsaid) 2018    Paroxysmal atrial fibrillation 2025    Renal insufficiency     Syncope     Syncope and collapse 2022    Total of 5 episdoes with the last one 10/22    Outside Source Comment: Overview:  Formatting of this note might be different from the original.  Total of 5  episdoes with the last one 10/22  Last Assessment & Plan:  Formatting of this note might be different from the original.  No recurrent episodes Lipitor does not show any significant dysrhythmias at this point continue with monitoring      Vertigo 02/17/2021    Vision loss      Past Surgical History:   Procedure Laterality Date    CARDIAC CATHETERIZATION N/A 09/26/2022    Procedure: Left Heart Cath;  Surgeon: Randell Casiano MD;  Location: [facility] CATH INVASIVE LOCATION;  Service: Cardiovascular;  Laterality: N/A;    CARDIAC ELECTROPHYSIOLOGY PROCEDURE N/A 09/26/2022    Procedure: Loop insertion;  Surgeon: Randell Casiano MD;  Location: [facility] CATH INVASIVE LOCATION;  Service: Cardiovascular;  Laterality: N/A;    COLONOSCOPY  2007    COLONOSCOPY N/A 10/26/2022    Procedure: COLONOSCOPY FOR SCREENING;  Surgeon: Roque Whitlock MD;  Location: [facility] ENDOSCOPY;  Service: Gastroenterology;  Laterality: N/A;  NORMAL COLON    NEPHRECTOMY Right 08/12/2024     PT Assessment (Last 12 Hours)       PT Evaluation and Treatment       Row Name 07/17/25 1226          Physical Therapy Time and Intention    Subjective Information complains of;weakness;fatigue  -DK     Document Type therapy note (daily note)  -DK     Mode of Treatment individual therapy;physical therapy  -DK     Patient Effort good  -DK     Symptoms Noted During/After Treatment fatigue  -DK     Comment Pt is somewhat confused, forgetful, does not follow cues well/fullly. He was able to ambulate further, but during the final 30' of gait, pt began leaning to the right and posteriorly, requiring additional assistance to prevent a backward fall.  -DK       Row Name 07/17/25 1226          Pain    Pretreatment Pain Rating 0/10 - no pain  -DK     Posttreatment Pain Rating 0/10 - no pain  -DK       Row Name 07/17/25 1226          Cognition    Affect/Mental Status (Cognition) confused;anxious  -DK     Behavioral Issues (Cognition) difficulty managing stress  -DK      Orientation Status (Cognition) oriented to;person;verbal cues/prompts needed for orientation  -DK     Follows Commands (Cognition) physical/tactile prompts required;repetition of directions required;verbal cues/prompting required;visual cue  -DK     Cognitive Function (Cognition) attention deficit;memory deficit  -DK     Attention Deficit (Cognition) minimal deficit;concentration;focused/sustained attention  -DK     Memory Deficit (Cognition) short-term memory;minimal deficit;immediate recall  -DK       Row Name 07/17/25 1226          Transfers    Transfers sit-stand transfer;stand-sit transfer  -DK       Row Name 07/17/25 1226          Sit-Stand Transfer    Sit-Stand [place] (Transfers) contact guard;1 person assist  -DK     Assistive Device (Sit-Stand Transfers) walker, front-wheeled  -DK       Row Name 07/17/25 1226          Stand-Sit Transfer    Stand-Sit [place] (Transfers) minimum assist (75% patient effort);contact guard;1 person assist  -DK     Assistive Device (Stand-Sit Transfers) walker, front-wheeled  -DK     Comment, (Stand-Sit Transfer) Pt was not wanting to let go of the rolling walker, reach back for the chair, or to sit in the recliner, requiring repeated verbal and tactile cues to get pt safely back into the recliner.  -DK       Row Name 07/17/25 1226          Gait/Stairs (Locomotion)    Gait/Stairs Locomotion gait/ambulation independence;gait/ambulation assistive device;distance ambulated;gait pattern  -DK     [place] Level (Gait) contact guard;minimum assist (75% patient effort);1 person assist;moderate assist (50% patient effort)  -DK     Assistive Device (Gait) walker, front-wheeled  -DK     Distance in Feet (Gait) 60  -DK     Pattern (Gait) step-through  -DK     Deviations/Abnormal Patterns (Gait) ataxic;timoteo decreased;festinating/shuffling;gait speed decreased;stride length decreased  -DK     Bilateral Gait Deviations --  posterior lean  -DK     Comment, (Gait/Stairs) Pt  ambulated on room air with a rolling walker.  He did well with only SHIRA to some min assist during the first 30' of gait.  However, pt began to fatigue, and required up to min/mod assist due to a right and posterior lean.  He had some difficulty following cues to get him to sit back down into the recliner also. Pt also required minor assistance to propel the rolling walker forward.  Pt was left in the recliner on alert.  -       Row Name 07/17/25 1226          Safety Issues/Impairments Affecting Functional Mobility    Safety Issues Affecting Function (Mobility) ability to follow commands;awareness of need for assistance;impulsivity;judgment;safety precaution awareness  -DK     Impairments Affecting Function (Mobility) balance;cognition;endurance/activity tolerance;strength  -DK     Cognitive Impairments, Mobility Safety/Performance attention;awareness, need for assistance;impulsivity;judgment;safety precaution awareness  -       Row Name 07/17/25 1226          Balance    Balance Assessment sitting static balance;sitting dynamic balance;standing static balance;standing dynamic balance  -DK     Static Sitting Balance standby assist  -DK     Dynamic Sitting Balance standby assist  -DK     Position, Sitting Balance unsupported;sitting in chair  -DK     Static Standing Balance standby assist;contact guard;1-person assist  -DK     Dynamic Standing Balance minimal assist;moderate assist;1-person assist  -DK     Position/Device Used, Standing Balance walker, front-wheeled  -DK     Balance Interventions standing;dynamic;tandem gait  -       Row Name 07/17/25 1226          Motor Skills    Motor Skills --  therapeutic exercises  -     Coordination WFL  -     Therapeutic Exercise hip;knee;ankle  -     Additional Documentation --  Pt required repeated cues to perform exercises correctly / perform consistently.  -       Row Name 07/17/25 1226          Hip (Therapeutic Exercise)    Hip (Therapeutic Exercise) AROM  (active range of motion)  -DK     Hip AROM (Therapeutic Exercise) bilateral;flexion;extension;aBduction;aDduction;sitting;15 repititions  -DK       Row Name 07/17/25 1226          Knee (Therapeutic Exercise)    Knee (Therapeutic Exercise) AROM (active range of motion)  -DK     Knee AROM (Therapeutic Exercise) bilateral;flexion;extension;LAQ (long arc quad);sitting;15 repititions  -DK       Row Name 07/17/25 1226          Ankle (Therapeutic Exercise)    Ankle (Therapeutic Exercise) AROM (active range of motion)  -DK     Ankle AROM (Therapeutic Exercise) bilateral;dorsiflexion;plantarflexion;sitting;20 repititions  -DK       Row Name 07/17/25 1226          Plan of Care Review    Plan of Care Reviewed With patient  -DK     Progress improving  -       Row Name 07/17/25 1226          Positioning and Restraints    Pre-Treatment Position sitting in chair/recliner  -DK     Post Treatment Position chair  -DK     In Chair sitting;call light within reach;encouraged to call for assist;exit alarm on;legs elevated;heels elevated  -DK               User Key  (r) = Recorded By, (t) = Taken By, (c) = Cosigned By      Initials Name Provider Type    Unique Lara PTA Physical Therapist Assistant                    Physical Therapy Education       Title: PT OT SLP Therapies (In Progress)       Topic: Physical Therapy (Done)       Point: Mobility training (Done)       Learning Progress Summary            Patient Acceptance, E,TB, VU,NR by ODILON at 7/16/2025 1210    Acceptance, E,D, NR by JAYNA at 7/15/2025 1403     by AV at 7/15/2025 1402                      Point: Home exercise program (Done)       Learning Progress Summary            Patient Acceptance, E,TB, VU,NR by ODILON at 7/16/2025 1210                      Point: Body mechanics (Done)       Learning Progress Summary            Patient Acceptance, E,TB, VU,NR by ODILON at 7/16/2025 1210    Acceptance, E,D, NR by AV at 7/15/2025 1403     by AV at 7/15/2025 1402                       Point: Precautions (Done)       Learning Progress Summary            Patient Acceptance, E,TB, VU,NR by  at 7/16/2025 1210    Acceptance, E,D, NR by AV at 7/15/2025 1403     by AV at 7/15/2025 1402                                      User Key       Initials Effective Dates Name Provider Type Discipline    AV 06/11/21 -  Scott Eng, PT Physical Therapist PT     05/28/25 -  Alexia Celeste, PT Student PT Student PT                  PT Recommendation and Plan     Plan of Care Reviewed With: patient  Progress: improving   Outcome Measures       Row Name 07/17/25 1226 07/16/25 1200 07/15/25 1400       How much help from another person do you currently need...    Turning from your back to your side while in flat bed without using bedrails? 4  -DK 4  -AV (r) LC (t) AV (c) 4  -AV    Moving from lying on back to sitting on the side of a flat bed without bedrails? 3  -DK 3  -AV (r) LC (t) AV (c) 3  -AV    Moving to and from a bed to a chair (including a wheelchair)? 3  -DK 3  -AV (r) LC (t) AV (c) 3  -AV    Standing up from a chair using your arms (e.g., wheelchair, bedside chair)? 3  -DK 3  -AV (r) LC (t) AV (c) 3  -AV    Climbing 3-5 steps with a railing? 2  -DK 2  -AV (r) LC (t) AV (c) 2  -AV    To walk in hospital room? 2  -DK 3  -AV (r) LC (t) AV (c) 3  -AV    AM-PAC 6 Clicks Score (PT) 17  -DK 18  -AV (r) LC (t) 18  -AV       Functional Assessment    Outcome Measure Options AM-PAC 6 Clicks Basic Mobility (PT)  -DK AM-PAC 6 Clicks Basic Mobility (PT)  -AV (r) LC (t) AV (c) AM-PAC 6 Clicks Basic Mobility (PT)  -AV              User Key  (r) = Recorded By, (t) = Taken By, (c) = Cosigned By      Initials Name Provider Type    Unique Lara, PTA Physical Therapist Assistant     Scott Eng, PT Physical Therapist     Alexia Celeste, PT Student PT Student                     Time Calculation:    PT Charges       Row Name 07/17/25 1236             Time Calculation    PT Received On  07/17/25  -DK      PT Goal Re-Cert Due Date 07/24/25  -DK         Timed Charges    36459 - PT Therapeutic Exercise Minutes 16  -DK      55796 - Gait Training Minutes  8  -DK      28150 - PT Therapeutic Activity Minutes 14  -DK         Total Minutes    Timed Charges Total Minutes 38  -DK       Total Minutes 38  -DK                User Key  (r) = Recorded By, (t) = Taken By, (c) = Cosigned By      Initials Name Provider Type    Unique Lara PTA Physical Therapist Assistant                  Therapy Charges for Today       Code Description Service Date Service Provider Modifiers Qty    71457018368 HC PT THER PROC EA 15 MIN 7/17/2025 Unique Zavala, PTA GP 1    44402726746 HC GAIT TRAINING EA 15 MIN 7/17/2025 Unique Zavala, PTA GP 1    66567675560 HC PT THERAPEUTIC ACT EA 15 MIN 7/17/2025 Unique Zavala, PTA GP 1            PT G-Codes  Outcome Measure Options: AM-PAC 6 Clicks Basic Mobility (PT)  AM-PAC 6 Clicks Score (PT): 17  AM-PAC 6 Clicks Score (OT): 16    Unique Zavala PTA  7/17/2025

## 2025-07-17 NOTE — PLAN OF CARE
Goal Outcome Evaluation:  Pt alert to self. VSS. Ativan given once this shift for restlessness. Pt has been sitting up in chair this shift and ambulated in hallway. Call light within reach. Bed alarm on and audible. Continue plan of care.

## 2025-07-17 NOTE — PLAN OF CARE
Goal Outcome Evaluation:           Progress: no change  Outcome Evaluation: Pt alert to self, and place, pt rested well during this shift, no complaints of pain, no emergent issues at this time, MRI of brain complete, continue plan of care.

## 2025-07-17 NOTE — TELEPHONE ENCOUNTER
FYI: Regulo called and states that the pt will be dc from [facility] to rehab at [facility].Regulo states that [facility] will be keeping the pt off of his In[facility] while in rehab. This nurse instructed Regulo to call us back when the pt dc's from rehab so he can be scheduled for a f/u. Regulo voiced understanding.

## 2025-07-17 NOTE — PROGRESS NOTES
[facility]   Hospitalist Progress Note  Date: 2025  Patient Name: Paulo Leiva Jr.  : 1955  MRN: [id number]  Date of admission: 2025      Subjective   Subjective     Chief Complaint: AMS    Summary: Paulo Leiva Jr. is a 69 y.o. male with past medical history of renal cancer, COPD, essential hypertension atrial fibrillation presents to the ED due to altered mental status and generalized weakness.  Patient is dealing with some altered mental status nosebleeds for last couple days.  In addition patient states having some intermittent fevers and chills for last couple days.  Patient states he fell yesterday as he has a history of falls due to poor walking.  Denies headaches, blurry vision, chest pain, shortness of breath, abdominal pain or dysuria.  In the ED, patient's vitals were temperature 97.5, heart rate 93 and blood pressure 170/148.  Labs show white blood cell 7.7, hemoglobin 20, platelets 271, sodium 131, bicarb 18.4.  Polycythemia.  Chest x-ray shows concern for bilateral infiltrates.  CT head shows no acute findings.  Patient admitted to floors for further management.     Interval Followup: Patient seen and evaluated on this morning.  Patient is sitting up in the chair at bedside at he is awake and alert and more interactive and appropriate and is conversational today.    Review of Systems   All systems were reviewed and negative except for: As noted interval follow-up    Objective   Objective     Vitals:   Temp:  [97.3 °F (36.3 °C)-98.1 °F (36.7 °C)] 97.3 °F (36.3 °C)  Heart Rate:  [] 100  Resp:  [16-18] 16  BP: (139-185)/() 170/95  Physical Exam    Constitutional: Awake and alert sitting up in the chair at bedside, no acute distress   Eyes: Pupils equal, sclerae anicteric, no conjunctival injection   HENT: NCAT, mucous membranes moist   Neck: Supple, no thyromegaly, no lymphadenopathy, trachea midline   Respiratory: Clear to auscultation bilaterally, nonlabored  respirations    Cardiovascular: Mildly tachycardic S1-S2, no appreciable murmurs, palpable pedal pulses bilaterally   Gastrointestinal: Positive bowel sounds, soft, nontender, nondistended   Musculoskeletal: No bilateral ankle edema, no clubbing or cyanosis to extremities   Neurologic: Moving extremities spontaneously   Skin: No rashes     Result Review    Result Review:  I have personally reviewed the results from the time of this admission to 7/17/2025 07:58 EDT and agree with these findings:  [x]  Laboratory  [x]  Microbiology  [x]  Radiology  []  EKG/Telemetry   []  Cardiology/Vascular   []  Pathology  []  Old records  []  Other:    Assessment & Plan   Assessment / Plan     Assessment/Plan:  Assessment  Generalized weakness  Acute Encephalopathy doubt infection suspect more likely a chronic issue such as dementia.  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  On Eliquis   Essential hypertension-uncontrolled     Plan  Continue to monitor on MedSurg   Discontinued telemetry monitoring  Follow-up blood cultures-currently no growth  MRI brain without any acute intracranial processes.    Discontinued antibiotics  Hold home chemo med  PT OT consulted-recommendations for subacute rehab noted.  Will increase metoprolol from 50 mg  to 100 mg daily.  Other home medications have been reviewed and resumed as clinically indicated.     Patient's son Regulo was contacted via phone on today.  The clinical findings and plan of care have been discussed with him.  PTs recommendations for rehab is also been discussed he is agreeable he is requesting [facility] which is where the patient's wife is.    Discussed plan with RN and case management.    VTE Prophylaxis:  Pharmacologic & mechanical VTE prophylaxis orders are  present.        CODE STATUS:   Code Status (Patient has no pulse and is not breathing): CPR (Attempt to Resuscitate)  Medical Interventions (Patient has pulse or is breathing): Full Support      Electronically signed by Antonina Rivas MD, 07/17/25, 8:00 AM EDT.

## 2025-07-18 PROCEDURE — 99232 SBSQ HOSP IP/OBS MODERATE 35: CPT | Performed by: STUDENT IN AN ORGANIZED HEALTH CARE EDUCATION/TRAINING PROGRAM

## 2025-07-18 PROCEDURE — 63710000001 REVEFENACIN 175 MCG/3ML SOLUTION: Performed by: STUDENT IN AN ORGANIZED HEALTH CARE EDUCATION/TRAINING PROGRAM

## 2025-07-18 PROCEDURE — 94799 UNLISTED PULMONARY SVC/PX: CPT

## 2025-07-18 PROCEDURE — 25010000002 DIAZEPAM PER 5 MG: Performed by: INTERNAL MEDICINE

## 2025-07-18 PROCEDURE — 97530 THERAPEUTIC ACTIVITIES: CPT

## 2025-07-18 PROCEDURE — 94664 DEMO&/EVAL PT USE INHALER: CPT

## 2025-07-18 RX ADMIN — FERROUS SULFATE TAB 325 MG (65 MG ELEMENTAL FE) 325 MG: 325 (65 FE) TAB at 09:57

## 2025-07-18 RX ADMIN — CITALOPRAM HYDROBROMIDE 10 MG: 20 TABLET ORAL at 09:57

## 2025-07-18 RX ADMIN — REVEFENACIN 175 MCG: 175 SOLUTION RESPIRATORY (INHALATION) at 09:48

## 2025-07-18 RX ADMIN — DIAZEPAM 2.5 MG: 10 INJECTION, SOLUTION INTRAMUSCULAR; INTRAVENOUS at 00:33

## 2025-07-18 RX ADMIN — RIVASTIGMINE TARTRATE 1.5 MG: 1.5 CAPSULE ORAL at 10:35

## 2025-07-18 RX ADMIN — QUETIAPINE FUMARATE 50 MG: 25 TABLET ORAL at 20:22

## 2025-07-18 RX ADMIN — VALSARTAN 160 MG: 80 TABLET, FILM COATED ORAL at 09:57

## 2025-07-18 RX ADMIN — Medication 10 ML: at 09:58

## 2025-07-18 RX ADMIN — AMLODIPINE BESYLATE 5 MG: 5 TABLET ORAL at 09:57

## 2025-07-18 RX ADMIN — METOPROLOL SUCCINATE 100 MG: 50 TABLET, EXTENDED RELEASE ORAL at 09:57

## 2025-07-18 RX ADMIN — CYANOCOBALAMIN TAB 500 MCG 500 MCG: 500 TAB at 09:57

## 2025-07-18 RX ADMIN — ATORVASTATIN CALCIUM 10 MG: 10 TABLET, FILM COATED ORAL at 09:57

## 2025-07-18 RX ADMIN — Medication 10 ML: at 20:22

## 2025-07-18 RX ADMIN — APIXABAN 5 MG: 5 TABLET, FILM COATED ORAL at 20:22

## 2025-07-18 RX ADMIN — APIXABAN 5 MG: 5 TABLET, FILM COATED ORAL at 09:57

## 2025-07-18 RX ADMIN — SODIUM CHLORIDE TAB 1 GM 1 G: 1 TAB at 09:57

## 2025-07-18 RX ADMIN — RIVASTIGMINE TARTRATE 1.5 MG: 1.5 CAPSULE ORAL at 17:57

## 2025-07-18 NOTE — PROGRESS NOTES
[facility]   Hospitalist Progress Note  Date: 2025  Patient Name: Paulo Leiva Jr.  : 1955  MRN: [id number]  Date of admission: 2025      Subjective   Subjective     Chief Complaint: AMS    Summary: Paulo Leiva Jr. is a 69 y.o. male with past medical history of renal cancer, COPD, essential hypertension atrial fibrillation presents to the ED due to altered mental status and generalized weakness.  Patient is dealing with some altered mental status nosebleeds for last couple days.  In addition patient states having some intermittent fevers and chills for last couple days.  Patient states he fell yesterday as he has a history of falls due to poor walking.  Denies headaches, blurry vision, chest pain, shortness of breath, abdominal pain or dysuria.  In the ED, patient's vitals were temperature 97.5, heart rate 93 and blood pressure 170/148.  Labs show white blood cell 7.7, hemoglobin 20, platelets 271, sodium 131, bicarb 18.4.  Polycythemia.  Chest x-ray shows concern for bilateral infiltrates.  CT head shows no acute findings.  MRI brain showed no acute abnormality.  Patient admitted to floors for further management.     Interval Followup: No acute events overnight.  Seen patient sitting in the chair.  Able to answer appropriately.  Unable to recall year or month or why he is in the hospital.  Able to tell the current president name.    Review of Systems   All systems were reviewed and negative except for: As noted interval follow-up    Objective   Objective     Vitals:   Temp:  [97.7 °F (36.5 °C)-98.4 °F (36.9 °C)] 97.9 °F (36.6 °C)  Heart Rate:  [81-96] 89  Resp:  [16-18] 18  BP: (130-162)/(86-97) 132/86  Physical Exam    Constitutional: Awake and alert sitting up in the chair at bedside, no acute distress   Eyes: Pupils equal, sclerae anicteric, no conjunctival injection   HENT: NCAT, mucous membranes moist   Respiratory: Clear to auscultation bilaterally, nonlabored respirations     Cardiovascular:  S1-S2, no appreciable murmurs   Gastrointestinal: Positive bowel sounds, soft, nontender, nondistended   Musculoskeletal: No bilateral ankle edema, no clubbing or cyanosis to extremities   Neurologic: Moving extremities spontaneously   Skin: No rashes     Result Review    Result Review:  I have personally reviewed the results from the time of this admission to 7/18/2025 17:08 EDT and agree with these findings:  [x]  Laboratory  [x]  Microbiology  [x]  Radiology  []  EKG/Telemetry   []  Cardiology/Vascular   []  Pathology  []  Old records  []  Other:    Assessment & Plan   Assessment / Plan     Assessment/Plan:  Assessment  Generalized weakness  Acute Encephalopathy doubt infection suspect more likely a chronic issue such as dementia.  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  On Eliquis   Essential hypertension-uncontrolled     Plan  Continue to monitor on MedSurg   Follow-up blood cultures-currently no growth  MRI brain without any acute intracranial processes.    Continue to hold home chemo med  PT OT recommended for subacute rehab  Continue metoprolol 100 mg daily, heart rate improved  Psychiatric consult tomorrow Dr. Carrasco to evaluate for the need to adjust any of his psych medications   Other home medications have been reviewed and resumed as clinically indicated.     Discussed with patient's son Regulo.  States at baseline patient has normal conversation.  There are episodes in the past where patient was altered.  Was started on citalopram, rivastigmine, Seroquel in the past when he had these episodes.  Requested for psychiatric consult.      Anticipated discharge tomorrow    Discussed plan with RN and case management.    VTE Prophylaxis:  Pharmacologic & mechanical VTE prophylaxis  orders are present.        CODE STATUS:   Code Status (Patient has no pulse and is not breathing): CPR (Attempt to Resuscitate)  Medical Interventions (Patient has pulse or is breathing): Full Support    Electronically signed by Awilda De La Cruz MD, 07/18/25, 5:08 PM EDT.

## 2025-07-18 NOTE — PLAN OF CARE
Goal Outcome Evaluation:           Progress: no change  Outcome Evaluation: Pt alert to self only. VSS. Pt restless and agitated, gave Valium per doctors order. Pt has been resting comfortably since anxiety meds. No complaints at this time. Continue plan of care.

## 2025-07-18 NOTE — THERAPY TREATMENT NOTE
Patient Name: Paulo Leiva Jr.  : 1955    MRN: [id number]                              Today's Date: 2025       Admit Date: 2025    Visit Dx:     ICD-10-CM ICD-9-CM   1. Altered mental status, unspecified altered mental status type  R41.82 780.97   2. Uncontrolled hypertension  I10 401.9   3. Difficulty walking  R26.2 719.7   4. Decreased activities of daily living (ADL)  Z78.9 V49.89     Patient Active Problem List   Diagnosis    Chronic obstructive pulmonary disease    Essential hypertension    HLD (hyperlipidemia)    Hyponatremia    Vertigo    Colon cancer screening    Status post placement of implantable loop recorder    Class 1 obesity due to excess calories without serious comorbidity with body mass index (BMI) of 30.0 to 30.9 in adult    Generalized convulsive seizures    Renal mass    AMS (altered mental status)    Benign paroxysmal positional vertigo    Malignant neoplasm of unspecified kidney, except renal pelvis    Stage 3a chronic kidney disease    Generalized tonic-clonic seizure    Vitamin D deficiency    Paroxysmal atrial fibrillation    Metastatic renal cell carcinoma    Encounter for long-term (current) use of high-risk medication     Past Medical History:   Diagnosis Date    Arthritis of back     Benign essential hypertension     Cancer 1994    Mass on Kidney..    COPD (chronic obstructive pulmonary disease)     Erectile dysfunction Apprx: 5 yrs ago    Generalized convulsive seizures 2023    Hip arthrosis     Hyperlipidemia 2018    Hyponatremia     Implantable loop recorder present     Knee swelling     Long term (current) use of non-steroidal anti-inflammatories (nsaid) 2018    Paroxysmal atrial fibrillation 2025    Renal insufficiency     Syncope     Syncope and collapse 2022    Total of 5 episdoes with the last one 10/22    Outside Source Comment: Overview:  Formatting of this note might be different from the original.  Total of 5  episdoes with the last one 10/22  Last Assessment & Plan:  Formatting of this note might be different from the original.  No recurrent episodes Lipitor does not show any significant dysrhythmias at this point continue with monitoring      Vertigo 02/17/2021    Vision loss      Past Surgical History:   Procedure Laterality Date    CARDIAC CATHETERIZATION N/A 09/26/2022    Procedure: Left Heart Cath;  Surgeon: Randell Casiano MD;  Location: [facility] CATH INVASIVE LOCATION;  Service: Cardiovascular;  Laterality: N/A;    CARDIAC ELECTROPHYSIOLOGY PROCEDURE N/A 09/26/2022    Procedure: Loop insertion;  Surgeon: Randell Casiano MD;  Location: [facility] CATH INVASIVE LOCATION;  Service: Cardiovascular;  Laterality: N/A;    COLONOSCOPY  2007    COLONOSCOPY N/A 10/26/2022    Procedure: COLONOSCOPY FOR SCREENING;  Surgeon: Roque Whitlock MD;  Location: [facility] ENDOSCOPY;  Service: Gastroenterology;  Laterality: N/A;  NORMAL COLON    NEPHRECTOMY Right 08/12/2024      General Information       Row Name 07/18/25 1042          OT Time and Intention    Document Type therapy note (daily note)  -PG     Mode of Treatment individual therapy;occupational therapy  -PG               User Key  (r) = Recorded By, (t) = Taken By, (c) = Cosigned By      Initials Name Provider Type    PG Sea Noe OT Occupational Therapist                     Mobility/ADL's       Row Name 07/18/25 1042          Transfers    Transfers bed-chair transfer  -PG       Row Name 07/18/25 1042          Bed-Chair Transfer    Bed-Chair [place] (Transfers) contact guard;minimum assist (75% patient effort);verbal cues  -PG     Assistive Device (Bed-Chair Transfers) walker, front-wheeled  -PG     Comment, (Bed-Chair Transfer) ~75 ft in hallway CG/min and rwx.  Occasionally unsteady with cues needed  -PG               User Key  (r) = Recorded By, (t) = Taken By, (c) = Cosigned By      Initials Name Provider Type    PG Sea Noe OT Occupational Therapist                    Obj/Interventions    No documentation.                  Goals/Plan    No documentation.                  Clinical Impression       Row Name 07/18/25 1044          Plan of Care Review    Plan of Care Reviewed With patient  -PG     Progress no change  -PG               User Key  (r) = Recorded By, (t) = Taken By, (c) = Cosigned By      Initials Name Provider Type    PG Sea Noe, OT Occupational Therapist                   Outcome Measures       Row Name 07/18/25 1044          How much help from another is currently needed...    Putting on and taking off regular lower body clothing? 2  -PG     Bathing (including washing, rinsing, and drying) 2  -PG     Toileting (which includes using toilet bed pan or urinal) 2  -PG     Putting on and taking off regular upper body clothing 3  -PG     Taking care of personal grooming (such as brushing teeth) 3  -PG     Eating meals 4  -PG     AM-PAC 6 Clicks Score (OT) 16  -PG       Row Name 07/18/25 1000          How much help from another person do you currently need...    Turning from your back to your side while in flat bed without using bedrails? 4  -MA     Moving from lying on back to sitting on the side of a flat bed without bedrails? 3  -MA     Moving to and from a bed to a chair (including a wheelchair)? 3  -MA     Standing up from a chair using your arms (e.g., wheelchair, bedside chair)? 3  -MA     Climbing 3-5 steps with a railing? 2  -MA     To walk in hospital room? 2  -MA     AM-PAC 6 Clicks Score (PT) 17  -MA     Highest Level of Mobility Goal Stand (1 or More Minutes)-5  -MA       Row Name 07/18/25 1044          Functional Assessment    Outcome Measure Options AM-PAC 6 Clicks Daily Activity (OT);Optimal Instrument  -PG       Row Name 07/18/25 1044          Optimal Instrument    Optimal Instrument Optimal - 3  -PG     Bending/Stooping 3  -PG     Standing 2  -PG     Reaching 2  -PG               User Key  (r) = Recorded By, (t) = Taken By, (c) =  Cosigned By      Initials Name Provider Type    PG Sea Noe OT Occupational Therapist    Cherry Mott RN Registered Nurse                    Occupational Therapy Education       Title: PT OT SLP Therapies (Done)       Topic: Occupational Therapy (Done)       Point: ADL training (Done)       Learning Progress Summary            Patient Acceptance, E,TB, NR by  at 7/15/2025 1411                      Point: Precautions (Done)       Learning Progress Summary            Patient Acceptance, E,TB, NR by  at 7/15/2025 1411                      Point: Body mechanics (Done)       Learning Progress Summary            Patient Acceptance, E,TB, NR by  at 7/15/2025 1411                                      User Key       Initials Effective Dates Name Provider Type Discipline     06/16/21 -  Roseanne Schmitt OT Occupational Therapist OT                  OT Recommendation and Plan     Plan of Care Review  Plan of Care Reviewed With: patient  Progress: no change     Time Calculation:         Time Calculation- OT       Row Name 07/18/25 1045             Time Calculation- OT    OT Received On 07/18/25  -PG      OT Goal Re-Cert Due Date 07/24/25  -PG         Timed Charges    21419 - OT Therapeutic Activity Minutes 10  -PG         Total Minutes    Timed Charges Total Minutes 10  -PG       Total Minutes 10  -PG                User Key  (r) = Recorded By, (t) = Taken By, (c) = Cosigned By      Initials Name Provider Type    PG Sea Noe OT Occupational Therapist                  Therapy Charges for Today       Code Description Service Date Service Provider Modifiers Qty    [id number] HC OT THERAPEUTIC ACT EA 15 MIN 7/18/2025 Sea Noe OT GO 1                 Sea Noe OT  7/18/2025

## 2025-07-18 NOTE — PLAN OF CARE
Goal Outcome Evaluation:         Pt alert to self only. No c/o pain or discomfort. Pt has been sitting in chair for most of day, and was encouraged to weight shift. Pt son called for an update on pt and would like to be notified before any new meds or decisions are made regarding pt care. Continue plan of care

## 2025-07-19 VITALS
BODY MASS INDEX: 29.24 KG/M2 | DIASTOLIC BLOOD PRESSURE: 86 MMHG | OXYGEN SATURATION: 93 % | WEIGHT: 192.9 LBS | TEMPERATURE: 97.3 F | HEIGHT: 68 IN | HEART RATE: 82 BPM | SYSTOLIC BLOOD PRESSURE: 148 MMHG | RESPIRATION RATE: 18 BRPM

## 2025-07-19 LAB
ALBUMIN SERPL-MCNC: 3.1 G/DL (ref 3.5–5.2)
ALBUMIN/GLOB SERPL: 1 G/DL
ALP SERPL-CCNC: 171 U/L (ref 39–117)
ALT SERPL W P-5'-P-CCNC: 14 U/L (ref 1–41)
ANION GAP SERPL CALCULATED.3IONS-SCNC: 13.3 MMOL/L (ref 5–15)
AST SERPL-CCNC: 19 U/L (ref 1–40)
BACTERIA SPEC AEROBE CULT: NORMAL
BACTERIA SPEC AEROBE CULT: NORMAL
BASOPHILS # BLD AUTO: 0.13 10*3/MM3 (ref 0–0.2)
BASOPHILS NFR BLD AUTO: 1.5 % (ref 0–1.5)
BILIRUB SERPL-MCNC: 1.2 MG/DL (ref 0–1.2)
BUN SERPL-MCNC: 14.9 MG/DL (ref 8–23)
BUN/CREAT SERPL: 11.6 (ref 7–25)
CALCIUM SPEC-SCNC: 10.2 MG/DL (ref 8.6–10.5)
CHLORIDE SERPL-SCNC: 95 MMOL/L (ref 98–107)
CO2 SERPL-SCNC: 19.7 MMOL/L (ref 22–29)
CREAT SERPL-MCNC: 1.29 MG/DL (ref 0.76–1.27)
DEPRECATED RDW RBC AUTO: 59.4 FL (ref 37–54)
EGFRCR SERPLBLD CKD-EPI 2021: 60.4 ML/MIN/1.73
EOSINOPHIL # BLD AUTO: 0.24 10*3/MM3 (ref 0–0.4)
EOSINOPHIL NFR BLD AUTO: 2.7 % (ref 0.3–6.2)
ERYTHROCYTE [DISTWIDTH] IN BLOOD BY AUTOMATED COUNT: 23.1 % (ref 12.3–15.4)
GLOBULIN UR ELPH-MCNC: 3.2 GM/DL
GLUCOSE SERPL-MCNC: 82 MG/DL (ref 65–99)
HCT VFR BLD AUTO: 62.5 % (ref 37.5–51)
HGB BLD-MCNC: 19.9 G/DL (ref 13–17.7)
IMM GRANULOCYTES # BLD AUTO: 0.06 10*3/MM3 (ref 0–0.05)
IMM GRANULOCYTES NFR BLD AUTO: 0.7 % (ref 0–0.5)
LYMPHOCYTES # BLD AUTO: 1.55 10*3/MM3 (ref 0.7–3.1)
LYMPHOCYTES NFR BLD AUTO: 17.5 % (ref 19.6–45.3)
MAGNESIUM SERPL-MCNC: 1.7 MG/DL (ref 1.6–2.4)
MCH RBC QN AUTO: 25.4 PG (ref 26.6–33)
MCHC RBC AUTO-ENTMCNC: 31.8 G/DL (ref 31.5–35.7)
MCV RBC AUTO: 79.6 FL (ref 79–97)
MONOCYTES # BLD AUTO: 1.06 10*3/MM3 (ref 0.1–0.9)
MONOCYTES NFR BLD AUTO: 12 % (ref 5–12)
NEUTROPHILS NFR BLD AUTO: 5.83 10*3/MM3 (ref 1.7–7)
NEUTROPHILS NFR BLD AUTO: 65.6 % (ref 42.7–76)
NRBC BLD AUTO-RTO: 0 /100 WBC (ref 0–0.2)
PHOSPHATE SERPL-MCNC: 3.4 MG/DL (ref 2.5–4.5)
PLATELET # BLD AUTO: 246 10*3/MM3 (ref 140–450)
PMV BLD AUTO: 9.1 FL (ref 6–12)
POTASSIUM SERPL-SCNC: 4.2 MMOL/L (ref 3.5–5.2)
PROT SERPL-MCNC: 6.3 G/DL (ref 6–8.5)
RBC # BLD AUTO: 7.85 10*6/MM3 (ref 4.14–5.8)
SODIUM SERPL-SCNC: 128 MMOL/L (ref 136–145)
WBC NRBC COR # BLD AUTO: 8.87 10*3/MM3 (ref 3.4–10.8)

## 2025-07-19 PROCEDURE — 84100 ASSAY OF PHOSPHORUS: CPT | Performed by: STUDENT IN AN ORGANIZED HEALTH CARE EDUCATION/TRAINING PROGRAM

## 2025-07-19 PROCEDURE — 83735 ASSAY OF MAGNESIUM: CPT | Performed by: STUDENT IN AN ORGANIZED HEALTH CARE EDUCATION/TRAINING PROGRAM

## 2025-07-19 PROCEDURE — 94799 UNLISTED PULMONARY SVC/PX: CPT

## 2025-07-19 PROCEDURE — 63710000001 REVEFENACIN 175 MCG/3ML SOLUTION: Performed by: STUDENT IN AN ORGANIZED HEALTH CARE EDUCATION/TRAINING PROGRAM

## 2025-07-19 PROCEDURE — 80053 COMPREHEN METABOLIC PANEL: CPT | Performed by: STUDENT IN AN ORGANIZED HEALTH CARE EDUCATION/TRAINING PROGRAM

## 2025-07-19 PROCEDURE — 99238 HOSP IP/OBS DSCHRG MGMT 30/<: CPT | Performed by: STUDENT IN AN ORGANIZED HEALTH CARE EDUCATION/TRAINING PROGRAM

## 2025-07-19 PROCEDURE — 85025 COMPLETE CBC W/AUTO DIFF WBC: CPT | Performed by: STUDENT IN AN ORGANIZED HEALTH CARE EDUCATION/TRAINING PROGRAM

## 2025-07-19 RX ORDER — SODIUM CHLORIDE 1 G/1
1 TABLET ORAL DAILY
Start: 2025-07-19

## 2025-07-19 RX ORDER — METOPROLOL SUCCINATE 50 MG/1
100 TABLET, EXTENDED RELEASE ORAL DAILY
Start: 2025-07-19

## 2025-07-19 RX ADMIN — AMLODIPINE BESYLATE 5 MG: 5 TABLET ORAL at 08:53

## 2025-07-19 RX ADMIN — METOPROLOL SUCCINATE 100 MG: 50 TABLET, EXTENDED RELEASE ORAL at 08:53

## 2025-07-19 RX ADMIN — APIXABAN 5 MG: 5 TABLET, FILM COATED ORAL at 08:53

## 2025-07-19 RX ADMIN — VALSARTAN 160 MG: 80 TABLET, FILM COATED ORAL at 08:53

## 2025-07-19 RX ADMIN — FERROUS SULFATE TAB 325 MG (65 MG ELEMENTAL FE) 325 MG: 325 (65 FE) TAB at 08:52

## 2025-07-19 RX ADMIN — HYDROCORTISONE 1 APPLICATION: 10 CREAM TOPICAL at 06:18

## 2025-07-19 RX ADMIN — REVEFENACIN 175 MCG: 175 SOLUTION RESPIRATORY (INHALATION) at 13:14

## 2025-07-19 RX ADMIN — DOCUSATE SODIUM 50 MG AND SENNOSIDES 8.6 MG 2 TABLET: 8.6; 5 TABLET, FILM COATED ORAL at 13:42

## 2025-07-19 RX ADMIN — SODIUM CHLORIDE TAB 1 GM 1 G: 1 TAB at 08:53

## 2025-07-19 RX ADMIN — RIVASTIGMINE TARTRATE 1.5 MG: 1.5 CAPSULE ORAL at 08:52

## 2025-07-19 RX ADMIN — ATORVASTATIN CALCIUM 10 MG: 10 TABLET, FILM COATED ORAL at 08:53

## 2025-07-19 RX ADMIN — CITALOPRAM HYDROBROMIDE 10 MG: 20 TABLET ORAL at 08:53

## 2025-07-19 RX ADMIN — CYANOCOBALAMIN TAB 500 MCG 500 MCG: 500 TAB at 08:53

## 2025-07-19 RX ADMIN — Medication 10 ML: at 08:54

## 2025-07-19 NOTE — DISCHARGE SUMMARY
[facility]         HOSPITALIST  DISCHARGE SUMMARY    Patient Name: Paulo Leiva Jr.  : 1955  MRN: [id number]    Date of Admission: 2025  Date of Discharge: 2025  Primary Care Physician: Vilma Rolon APRN    Consults       Date and Time Order Name Status Description    2025  4:49 PM Inpatient Psychiatrist Consult      2025  7:54 PM Inpatient Oncology Consult      2025  7:10 PM Hospitalist (on-call MD unless specified)            Generalized weakness  Acute Encephalopathy doubt infection suspect more likely a chronic issue such as dementia.  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       Hospital Course     Hospital Course:  Paulo Leiva Jr. is a 69 y.o. male  with past medical history of renal cancer, COPD, essential hypertension atrial fibrillation presents to the ED due to altered mental status and generalized weakness.  Patient is dealing with some altered mental status nosebleeds for last couple days.  In addition patient states having some intermittent fevers and chills for last couple days.  Patient states he fell yesterday as he has a history of falls due to poor walking.  Denies headaches, blurry vision, chest pain, shortness of breath, abdominal pain or dysuria.  In the ED, patient's vitals were temperature 97.5, heart rate 93 and blood pressure 170/148.  Labs show white blood cell 7.7, hemoglobin 20, platelets 271, sodium 131, bicarb 18.4.  Polycythemia.  Chest x-ray shows concern for bilateral infiltrates.  CT head shows no  acute findings.  MRI brain showed no acute abnormality.  Patient is back to his baseline, able to maintain good conversation.  Patient evaluated by psychiatry, recommended no changes to his medications currently but they need to be adjusted and followed up as an outpatient.  Ambulatory referral to psychiatry sent.  Patient is hemodynamically stable to be discharged to rehab today.      DISCHARGE Follow Up Recommendations for labs and diagnostics:   Follow-up with outpatient psychiatry  Follow-up with PCP    Day of Discharge     Vital Signs:  Temp:  [97.3 °F (36.3 °C)-98.4 °F (36.9 °C)] 97.3 °F (36.3 °C)  Heart Rate:  [74-90] 90  Resp:  [16-18] 18  BP: (130-153)/(80-97) 148/86  Physical Exam:     Constitutional: Awake and alert sitting up in the chair at bedside, no acute distress, able to maintain conversation with intermittent confusion              Eyes: Pupils equal, sclerae anicteric, no conjunctival injection              HENT: NCAT, mucous membranes moist              Respiratory: Clear to auscultation bilaterally, nonlabored respirations               Cardiovascular:  S1-S2, no appreciable murmurs              Gastrointestinal: Positive bowel sounds, soft, nontender, nondistended              Musculoskeletal: No bilateral ankle edema, no clubbing or cyanosis to extremities              Neurologic: Moving extremities spontaneously              Skin: No rashes       Discharge Details        Discharge Medications        ASK your doctor about these medications        Instructions Start Date   amLODIPine 5 MG tablet  Commonly known as: NORVASC   5 mg, Oral, Daily      axitinib 5 MG chemo tablet  Commonly known as: INLYTA   5 mg, Oral, Every 12 Hours      citalopram 10 MG tablet  Commonly known as: CeleXA   10 mg, Oral, Daily      Eliquis 5 MG tablet tablet  Generic drug: apixaban   5 mg, Oral, 2 Times Daily      ferrous gluconate 324 MG tablet  Commonly known as: FERGON   324 mg, Oral, Daily With Breakfast       meclizine 25 MG tablet  Commonly known as: ANTIVERT   50 mg, Oral, Daily      metoprolol succinate XL 50 MG 24 hr tablet  Commonly known as: TOPROL-XL   50 mg, Oral, Daily      NON FORMULARY   2 each, 2 Times Daily      ondansetron 8 MG tablet  Commonly known as: ZOFRAN   8 mg, Oral, 3 Times Daily PRN      QUEtiapine 50 MG tablet  Commonly known as: SEROquel   50 mg, Nightly      rivastigmine 1.5 MG capsule  Commonly known as: EXELON   1.5 mg, Oral, 2 Times Daily With Meals      simvastatin 20 MG tablet  Commonly known as: ZOCOR   20 mg, Oral, Daily      sodium chloride 1 g tablet   1 g, Oral, 2 Times Daily      Spiriva Respimat 1.25 MCG/ACT aerosol solution inhaler  Generic drug: Tiotropium Bromide Monohydrate   USE 2 INHALATIONS DAILY      valsartan 160 MG tablet  Commonly known as: DIOVAN  Ask about: Which instructions should I use?   160 mg, Oral, Daily      vitamin B-12 500 MCG tablet  Commonly known as: CYANOCOBALAMIN   500 mcg, Daily               No Known Allergies    Discharge Disposition:      Diet:  Hospital:  Diet Order   Procedures    Diet: Cardiac; Healthy Heart (2-3 Na+); Texture: Regular (IDDSI 7); Fluid Consistency: Thin (IDDSI 0)       Discharge Activity:       CODE STATUS:  Code Status and Medical Interventions: CPR (Attempt to Resuscitate); Full Support   Ordered at: 07/14/25 1952     Code Status (Patient has no pulse and is not breathing):    CPR (Attempt to Resuscitate)     Medical Interventions (Patient has pulse or is breathing):    Full Support         Future Appointments   Date Time Provider Department [place]   7/25/2025  1:15 PM INJ ROOM 01 [facility] OP INFU [facility] OPIF [facility]   7/25/2025  2:00 PM Tatyana Smith APRN [facility] ONC ETWN [facility]   7/31/2025 11:00 AM Randell Landon MD [facility] ORS RING [facility]   8/5/2025 10:00 AM [facility] INFUSION LAB [facility] OPIF [facility]   8/5/2025 10:30 AM Abdon Aguila MD [facility] ONC ETWN [facility]   8/5/2025 11:00 AM CHAIR 05 [facility] OP INFU [facility] OPIF [facility]   11/19/2025  2:00  Randell Dolan [place] [facility]       [unfilled]    Pertinent  and/or Most Recent Results     PROCEDURES:       LAB RESULTS:      Lab 07/19/25  0459 07/15/25  0455 07/14/25  1731 07/14/25  1622   WBC 8.87 8.49  --  7.79   HEMOGLOBIN 19.9* 20.5*  --  20.0*   HEMATOCRIT 62.5* 62.0*  --  60.7*   PLATELETS 246 275  --  271   NEUTROS ABS 5.83 6.37  --  5.73   IMMATURE GRANS (ABS) 0.06* 0.03  --  0.03   LYMPHS ABS 1.55 1.02  --  1.09   MONOS ABS 1.06* 0.90  --  0.79   EOS ABS 0.24 0.06  --  0.07   MCV 79.6 79.5  --  78.4*   PROCALCITONIN  --   --  0.11  --          Lab 07/19/25  0459 07/16/25  0529 07/15/25  0455 07/14/25  1731 07/14/25  1622   SODIUM 128* 130* 132*  --  131*   POTASSIUM 4.2 4.3 5.0  --  5.0   CHLORIDE 95* 97* 94*  --  97*   CO2 19.7* 19.5* 21.1*  --  18.4*   ANION GAP 13.3 13.5 16.9*  --  15.6*   BUN 14.9 10.4 11.4  --  11.2   CREATININE 1.29* 1.26 1.35*  --  1.22   EGFR 60.4 62.1 57.2*  --  64.6   GLUCOSE 82 97 89  --  95   CALCIUM 10.2 9.8 10.0  --  9.6   MAGNESIUM 1.7  --   --   --  1.6   PHOSPHORUS 3.4  --   --   --   --    TSH  --   --   --  0.313  --          Lab 07/19/25 0459 07/14/25  1622   TOTAL PROTEIN 6.3 6.4   ALBUMIN 3.1* 3.1*   GLOBULIN 3.2 3.3   ALT (SGPT) 14 12   AST (SGOT) 19 20   BILIRUBIN 1.2 1.2   ALK PHOS 171* 165*         Lab 07/14/25 1731 07/14/25  1622   HSTROP T 12 11                 Lab 07/15/25  0831 07/14/25  1641   PH, ARTERIAL 7.407 7.399   PCO2, ARTERIAL 33.6* 35.8   PO2 ART 79.3* 84.6   O2 SATURATION ART 95.8 96.4   HCO3 ART 21.1* 22.1   BASE EXCESS ART -2.3* -1.8     Brief Urine Lab Results  (Last result in the past 365 days)        Color   Clarity   Blood   Leuk Est   Nitrite   Protein   CREAT   Urine HCG        07/14/25 1729 Yellow   Clear   Trace   Negative   Negative   30 mg/dL (1+)                 Microbiology Results (last 10 days)       Procedure Component Value - Date/Time    Blood Culture - Blood, Hand, Left [[id number]]  (Normal) Collected: 07/14/25  2124    Lab Status: Preliminary result Specimen: Blood from Hand, Left Updated: 07/18/25 2146     Blood Culture No growth at 4 days    Blood Culture - Blood, Arm, Right [[id number]]  (Normal) Collected: 07/14/25 2123    Lab Status: Preliminary result Specimen: Blood from Arm, Right Updated: 07/18/25 2146     Blood Culture No growth at 4 days            MRI Brain With & Without Contrast  Result Date: 7/16/2025  1. No acute brain abnormality is seen. No acute infarct. No acute intracranial hemorrhage. 2. There may be mild-to-moderate chronic small vessel ischemia/infarction. 3. Motion artifact obscures detail on some sequences. 4. Grossly, no abnormal enhancement is seen. 5. Overall, little interval change is suggested since the 6/2/2025 study, allowing for differences in technique and the degree of motion artifact. 6. The other findings are as detailed above. 7. If symptoms or clinical concerns persist, consider imaging follow-up. Portions of this note were completed with a voice recognition program. Electronically Signed: Franky Da Silva MD  7/16/2025 9:20 PM EDT  Workstation ID: [id number]    CT Chest Without Contrast Diagnostic  Result Date: 7/14/2025  Impression: No acute intrathoracic finding. Emphysema. Hepatic metastatic disease. Electronically Signed: Samson Agustin MD  7/14/2025 9:26 PM EDT  Workstation ID: [id number]    XR Chest 1 View  Result Date: 7/14/2025  Impression: Mild hazy airspace disease along the bilateral lung bases. Correlate for pneumonia. Electronically Signed: Jules Joyce MD  7/14/2025 4:44 PM EDT  Workstation ID: [id number]    CT Head Without Contrast Stroke Protocol  Result Date: 7/14/2025  Impression: No acute intracranial abnormality identified. Electronically Signed: Jules Joyce MD  7/14/2025 4:16 PM EDT  Workstation ID: [id number]       Results for orders placed during the hospital encounter of 04/24/25    Doppler Ankle Brachial Index Single Level CAR 04/24/2025  3:18  PM    Interpretation Summary    Right Conclusion: The right LAZARO is normal. Normal digital pressures.    Left Conclusion: The left LAZARO is normal. Normal digital pressures.    Normal arterial evaluation of both lower extremities.      Results for orders placed during the hospital encounter of 04/24/25    Doppler Ankle Brachial Index Single Level CAR 04/24/2025  3:18 PM    Interpretation Summary    Right Conclusion: The right LAZARO is normal. Normal digital pressures.    Left Conclusion: The left LAZARO is normal. Normal digital pressures.    Normal arterial evaluation of both lower extremities.      Results for orders placed during the hospital encounter of 09/07/22    Adult Transthoracic Echo Complete W/ Cont if Necessary Per Protocol 09/09/2022  2:42 PM    Interpretation Summary  · Calculated left ventricular EF = 52.3% Estimated left ventricular EF was in agreement with the calculated left ventricular EF.  · The left ventricular cavity is mildly dilated.  · Left atrial dilation mild to moderate      Labs Pending at Discharge:  Pending Labs       Order Current Status    Blood Culture - Blood, Arm, Right Preliminary result    Blood Culture - Blood, Hand, Left Preliminary result            The 10-year ASCVD risk score (Malachi DK, et al., 2019) is: 19.4%    Values used to calculate the score:      Age: 69 years      Sex: Male      Is Non- : No      Diabetic: No      Tobacco smoker: No      Systolic Blood Pressure: 148 mmHg      Is BP treated: Yes      HDL Cholesterol: 47 mg/dL      Total Cholesterol: 138 mg/dL     Time spent on Discharge including face to face service:  25 minutes    Electronically signed by Awilda De La Cruz MD, 07/19/25, 11:32 AM EDT.

## 2025-07-19 NOTE — CONSULTS
" [facility]   PSYCHIATRIC CONSULTATION    Patient Name: Paulo Leiva Jr.  : 1955  MRN: [id number]  Primary Care Physician:  Vilma Rolon APRN  Date of admission: 2025    Referring Provider: Dr. Wolf  Reason for Consultation: AMS, evaluate for medication changes.     Subjective   Subjective     Chief Complaint: \"Im ok\"    HPI:     Paulo Leiva Jr. is a 69 y.o. adult male who is admitted to medical floor and psychiatry consulted. Patient presdented 2025 to ED with altered mental status and weakness. He reportedly had recent fall as well. He reported recent fever and chills. CT showed no acute findings but chest x ray showed  bilateral infiltrates. He has history of renal cancer, COPD, HTN, atrial fibrillation. Per primary team   Patient is discharging to a rehab facility today. There was concern for acute delirium which now appears to have resolved per primary team as he is being discharged today.There is also concern for possible demential process.     On my interview, patient is alert and oriented to name, name of hospital and month. He is not agitated. Denies depression but reports anxiety. He states he feels his current regimen is working well. He is on celexa and quetiapine but states he would be open to increasing quetiapine. Declines other changes. He is sitting up in chair and overall minimally engaging and denies acute concerns.     Review of Systems a medical ros was completed and negative aside from pertinent positives in hpi above.    Personal History     Past Medical History:   Diagnosis Date    Arthritis of back     Benign essential hypertension     Cancer 1994    Mass on Kidney..    COPD (chronic obstructive pulmonary disease)     Erectile dysfunction Apprx: 5 yrs ago    Generalized convulsive seizures 2023    Hip arthrosis     Hyperlipidemia 2018    Hyponatremia     Implantable loop recorder present     Knee swelling     Long term (current) use " of non-steroidal anti-inflammatories (nsaid) 04/16/2018    Paroxysmal atrial fibrillation 05/14/2025    Renal insufficiency     Syncope     Syncope and collapse 09/02/2022    Total of 5 episdoes with the last one 10/22    Outside Source Comment: Overview:  Formatting of this note might be different from the original.  Total of 5 episdoes with the last one 10/22  Last Assessment & Plan:  Formatting of this note might be different from the original.  No recurrent episodes Lipitor does not show any significant dysrhythmias at this point continue with monitoring      Vertigo 02/17/2021    Vision loss        Past Surgical History:   Procedure Laterality Date    CARDIAC CATHETERIZATION N/A 09/26/2022    Procedure: Left Heart Cath;  Surgeon: Randell Casiano MD;  Location: [facility] CATH INVASIVE LOCATION;  Service: Cardiovascular;  Laterality: N/A;    CARDIAC ELECTROPHYSIOLOGY PROCEDURE N/A 09/26/2022    Procedure: Loop insertion;  Surgeon: Randell Casiano MD;  Location: [facility] CATH INVASIVE LOCATION;  Service: Cardiovascular;  Laterality: N/A;    COLONOSCOPY  2007    COLONOSCOPY N/A 10/26/2022    Procedure: COLONOSCOPY FOR SCREENING;  Surgeon: Roque Whitlock MD;  Location: [facility] ENDOSCOPY;  Service: Gastroenterology;  Laterality: N/A;  NORMAL COLON    NEPHRECTOMY Right 08/12/2024       Past Psychiatric History:     Psychiatric Hospitalizations: patient denies     Suicide Attempts: patient denies     Prior Treatment and Medications Tried: patient states has been on multiple meds in past but cannot recall names of medications.         Family History: family history includes Dementia in his mother; No Known Problems in his father. Otherwise pertinent FHx was reviewed and not pertinent to current issue.    Social History:     Social History     Socioeconomic History    Marital status:    Tobacco Use    Smoking status: Former     Current packs/day: 0.00     Average packs/day: 2.0 packs/day for 36.3 years (72.7  ttl pk-yrs)     Types: Cigarettes     Start date: 6/15/1974     Quit date: 10/20/2010     Years since quittin.7     Passive exposure: Past    Smokeless tobacco: Never    Tobacco comments:     SMOKES MORE THAN 2 PPD FOR 35 YEARS   Vaping Use    Vaping status: Never Used   Substance and Sexual Activity    Alcohol use: Not Currently     Alcohol/week: 6.0 - 8.0 standard drinks of alcohol     Types: 6 - 8 Cans of beer per week     Comment: as of 25 was his last drink 8-14 DRINKS PER WEEK as of 2024, prior to 2024 he would drink 20 or more beers daily and did so for 15 years    Drug use: Not Currently    Sexual activity: Not Currently     Partners: Female     Birth control/protection: Condom   Reports that he is a  of [employer]. Denies active combat denies mst. States he is active with [employer]. Reports he is retired from [employer]. He is originally from [place] but came to [place] when in service and retired here. Denies history of abuse, denies legal concerns.     Substance Abuse History: reports that he quit smoking about 14 years ago. His smoking use included cigarettes. He started smoking about 51 years ago. He has a 72.7 pack-year smoking history. He has been exposed to tobacco smoke. He has never used smokeless tobacco. He reports that he does not currently use alcohol after a past usage of about 6.0 - 8.0 standard drinks of alcohol per week. He reports that he does not currently use drugs.    Home Medications:  NON FORMULARY, QUEtiapine, Tiotropium Bromide Monohydrate, amLODIPine, apixaban, axitinib, citalopram, ferrous gluconate, meclizine, metoprolol succinate XL, ondansetron, rivastigmine, simvastatin, sodium chloride, valsartan, and vitamin B-12      Allergies:  No Known Allergies    Objective   Objective     Vitals:   Temp:  [97.3 °F (36.3 °C)-98.4 °F (36.9 °C)] 97.3 °F (36.3 °C)  Heart Rate:  [74-90] 82  Resp:  [16-18] 18  BP: (130-153)/(80-97) 148/86  Physical Exam   Per primary team     Mental Status  "Exam:     Hygiene:   in tact  Cooperation:  cooperative but minimally engaging   Eye Contact:  fair  Psychomotor Behavior: no restlessness or agitation or slowing noted  Mood: \"ok\"  Affect:  euthymic, cooperative, no agitation   Speech:  normal rate, pleasant tone, normal volume  Language: english, fluent   Thought Process: mildly disorganized at times   Thought Content: denies si hi and avh, no overt delusional content noted   Suicidal:  denies   Homicidal:  denies   Hallucinations:  denies   Delusion:  none noted   Memory:  impaired due to illness  Orientation:  oriented x 3  Reliability:  limited  Insight:  limited  Judgement:  limited   Impulse Control:  fair during assessment     Result Review    Result Review:  I have personally reviewed the results from the time of this admission to 7/19/2025 13:42 EDT and agree with these findings:  [x]  Laboratory  []  Microbiology  [x]  Radiologymri no acute process, mild to moderate small vessel ischemic changes   [x]  EKG/Telemetry qtc 463  []  Cardiology/Vascular   []  Pathology  []  Old records  []  Other:  Most notable findings include: sodium low at 128, cr elevated     Assessment & Plan   Assessment / Plan       Active Hospital Problems:  Active Hospital Problems    Diagnosis     **AMS (altered mental status)    Neurocognitive disorder, unspecified      Recommendations:  Patient does not appear acutely delirious at time of my assessment and per primary team is being discharged to rehab today. Delirium is typically medical emergency and more acute process. Patient is not agitated but at times disorganized and reports some memory impairment. Based on results of mri may be related to dementia process due to ischemic changes over time. Would recommend consideration be given to neuropsychological testing and cognitive testing such as SLUMs exam. Would recommend neurology consult due to dementia process. Would recommend treatment of medical comorbidities and improvement " in electrolytes such as low sodium as this may also contribute to cognitive symptoms particularly in elderly. Patient also with recent renal cancer diagnosis per his report. Continued medical treatment and management or resolution of medical comorbidities will benefit cognitive status.     Patient denies si hi and avh and reports hope for future.   States he has support. Identifies reasons to live.   He declines interest in psychiatric inpatient treatment and does not meet criteria for legal hold.     With regards to medications, could consider alternative antidepressant given patients cardiac risk factors and citaloprams potential adverse cardiac effects. EKG sinus rhythm, no qtc prolongation noted. Alternatives could include sertraline 25mg once daily with monitoring for adverse effects and titration as indicated and tolerated as sertraline is less anticholinergic. Would first monitor for improvement in hyponatremia as ssri can worsen hyponatremia. Sertraline has less hyponatremia risk compared to citalopram however, further lending to this being safer option. Quetiapine may be effective for mood In elderly and patient adament he would like to continue and states would like to look at titration of dose in future. Given that patient is discharging would recommend these changes be considered by outpatient provider as making changes and not being able to monitor response would not be advised.        Part of this note may be an electronic transcription/translation of spoken language to printed text using the Dragon Dictation System.    Electronically signed by Hiro Carrasco MD, 07/19/25, 1:42 PM EDT.

## 2025-07-19 NOTE — PLAN OF CARE
Goal Outcome Evaluation:           Progress: improving  Outcome Evaluation: Patient A/Ox3. Forgetful at times. No complaints of pain. On room air. Discharging to [facility] for rehab today.

## 2025-07-19 NOTE — PLAN OF CARE
Goal Outcome Evaluation:  Plan of Care Reviewed With: patient           Outcome Evaluation: Patient alert to self only. VSS. Patient did well through out the night. Rested comfortably and no anxiety noted. No concerns or complaints at this time. Continue plan of care.

## 2025-07-22 ENCOUNTER — NURSING HOME (OUTPATIENT)
Dept: INTERNAL MEDICINE | Age: 70
End: 2025-07-22
Payer: MEDICARE

## 2025-07-22 DIAGNOSIS — R56.9 GENERALIZED CONVULSIVE SEIZURES: ICD-10-CM

## 2025-07-22 DIAGNOSIS — J43.2 CENTRILOBULAR EMPHYSEMA: ICD-10-CM

## 2025-07-22 DIAGNOSIS — N18.31 STAGE 3A CHRONIC KIDNEY DISEASE: ICD-10-CM

## 2025-07-22 DIAGNOSIS — G93.41 METABOLIC ENCEPHALOPATHY: Primary | ICD-10-CM

## 2025-07-22 DIAGNOSIS — I10 ESSENTIAL HYPERTENSION: ICD-10-CM

## 2025-07-22 DIAGNOSIS — D75.1 POLYCYTHEMIA: Primary | ICD-10-CM

## 2025-07-22 DIAGNOSIS — R41.82 ALTERED MENTAL STATUS, UNSPECIFIED ALTERED MENTAL STATUS TYPE: ICD-10-CM

## 2025-07-22 DIAGNOSIS — I48.0 PAROXYSMAL ATRIAL FIBRILLATION: ICD-10-CM

## 2025-07-22 DIAGNOSIS — C64.1 RENAL CELL CARCINOMA OF RIGHT KIDNEY METASTATIC TO OTHER SITE: ICD-10-CM

## 2025-07-22 DIAGNOSIS — Z95.818 STATUS POST PLACEMENT OF IMPLANTABLE LOOP RECORDER: ICD-10-CM

## 2025-07-22 DIAGNOSIS — C64.9 MALIGNANT NEOPLASM OF KIDNEY EXCLUDING RENAL PELVIS, UNSPECIFIED LATERALITY: ICD-10-CM

## 2025-07-22 PROCEDURE — 99306 1ST NF CARE HIGH MDM 50: CPT | Performed by: INTERNAL MEDICINE

## 2025-07-22 RX ORDER — SODIUM CHLORIDE 9 MG/ML
250 INJECTION, SOLUTION INTRAVENOUS ONCE
Status: CANCELLED | OUTPATIENT
Start: 2025-07-23

## 2025-07-22 NOTE — TELEPHONE ENCOUNTER
Spoke wish Regulo and he agreed appointments 7-25-25 and 8-5-25 needs to be canceeld, due to patient is still at [facility].

## 2025-07-22 NOTE — PROGRESS NOTES
Nursing Home History and Physical Note      Abdon Noe MD  [x]  [address], [address]  [place] [address]  Phone: [phone]  Fax: [phone]     PATIENT NAME: Paulo Leiva Jr.                                                                          YOB: 1955            DATE OF SERVICE: 07/22/2025  FACILITY:   [x] [facility]   [] [facility]  [] [facility]  [] Other      HISTORY OF PRESENT ILLNESS: Patient is a very pleasant 69-year-old gentleman who was admitted here several days ago, his wife currently resides here, he is slightly confused at baseline follows simple commands, he is lying flat without any respiratory distress and was sleeping when I walked in but arouses easily, he was at the hospital recently.  He does not know exactly why he was there, he says he has trouble walking and his legs are weak, his hospital discharge summary showed he was admitted July 14 discharged July 19, 2025, he had acute encephalopathy on top of underlying dementia, there was some concern about community-acquired pneumonia, he had a CT of the chest that did not show any obvious infiltrates, he is known with clear-cell renal carcinoma with liver mets and is on chemotherapy, he also has COPD atrial fibrillation and hypertension, he is clinically improved but still confused    Hospital course:    Paulo Leiva Jr. is a 69 y.o. male  with past medical history of renal cancer, COPD, essential hypertension atrial fibrillation presents to the ED due to altered mental status and generalized weakness.  Patient is dealing with some altered mental status nosebleeds for last couple days.  In addition patient states having some intermittent fevers and chills for last couple days.  Patient states he fell yesterday as he has a history of falls due to poor walking.  Denies headaches, blurry vision, chest pain, shortness of breath,  abdominal pain or dysuria.  In the ED, patient's vitals were temperature 97.5, heart rate 93 and blood pressure 170/148.  Labs show white blood cell 7.7, hemoglobin 20, platelets 271, sodium 131, bicarb 18.4.  Polycythemia.  Chest x-ray shows concern for bilateral infiltrates.  CT head shows no acute findings.  MRI brain showed no acute abnormality.  Patient is back to his baseline, able to maintain good conversation.  Patient evaluated by psychiatry, recommended no changes to his medications currently but they need to be adjusted and followed up as an outpatient.  Ambulatory referral to psychiatry sent.  Patient is hemodynamically stable to be discharged to rehab today.       PAST MEDICAL & SURGICAL HISTORY:   Past Medical History:   Diagnosis Date    Arthritis of back 2024    Benign essential hypertension     Cancer August 1994    Mass on Kidney..    COPD (chronic obstructive pulmonary disease)     Erectile dysfunction Apprx: 5 yrs ago    Generalized convulsive seizures 12/27/2023    Hip arthrosis     Hyperlipidemia 12/14/2018    Hyponatremia     Implantable loop recorder present     Knee swelling     Long term (current) use of non-steroidal anti-inflammatories (nsaid) 04/16/2018    Paroxysmal atrial fibrillation 05/14/2025    Renal insufficiency     Syncope     Syncope and collapse 09/02/2022    Total of 5 episdoes with the last one 10/22    Outside Source Comment: Overview:  Formatting of this note might be different from the original.  Total of 5 episdoes with the last one 10/22  Last Assessment & Plan:  Formatting of this note might be different from the original.  No recurrent episodes Lipitor does not show any significant dysrhythmias at this point continue with monitoring      Vertigo 02/17/2021    Vision loss       Past Surgical History:   Procedure Laterality Date    CARDIAC CATHETERIZATION N/A 09/26/2022    Procedure: Left Heart Cath;  Surgeon: Randell Casiano MD;  Location: [facility] INVASIVE  LOCATION;  Service: Cardiovascular;  Laterality: N/A;    CARDIAC ELECTROPHYSIOLOGY PROCEDURE N/A 2022    Procedure: Loop insertion;  Surgeon: Randell Casiano MD;  Location: [facility] CATH INVASIVE LOCATION;  Service: Cardiovascular;  Laterality: N/A;    COLONOSCOPY      COLONOSCOPY N/A 10/26/2022    Procedure: COLONOSCOPY FOR SCREENING;  Surgeon: Roque Whitlock MD;  Location: [facility] ENDOSCOPY;  Service: Gastroenterology;  Laterality: N/A;  NORMAL COLON    NEPHRECTOMY Right 2024          MEDICATIONS:  I have reviewed and reconciled the patients medication list in the patients chart at the [facility] nursing [facility] today.      ALLERGIES:  No Known Allergies      SOCIAL HISTORY:  Social History     Socioeconomic History    Marital status:    Tobacco Use    Smoking status: Former     Current packs/day: 0.00     Average packs/day: 2.0 packs/day for 36.3 years (72.7 ttl pk-yrs)     Types: Cigarettes     Start date: 6/15/1974     Quit date: 10/20/2010     Years since quittin.7     Passive exposure: Past    Smokeless tobacco: Never    Tobacco comments:     SMOKES MORE THAN 2 PPD FOR 35 YEARS   Vaping Use    Vaping status: Never Used   Substance and Sexual Activity    Alcohol use: Not Currently     Alcohol/week: 6.0 - 8.0 standard drinks of alcohol     Types: 6 - 8 Cans of beer per week     Comment: as of 25 was his last drink 8-14 DRINKS PER WEEK as of 2024, prior to 2024 he would drink 20 or more beers daily and did so for 15 years    Drug use: Not Currently    Sexual activity: Not Currently     Partners: Female     Birth control/protection: Condom       FAMILY HISTORY:  Family History   Problem Relation Age of Onset    Dementia Mother     No Known Problems Father     Colon cancer Neg Hx     Malig Hyperthermia Neg Hx          PHYSICAL EXAMINATION:   VITAL SIGNS: Weight 186.8 pounds blood pressure 158/88 temperature 98.3 pulse 92 respiratory rate 18, sat 97% on room  air    PHYSICAL EXAM: His neck is supple his throat is clear with dry mucosa sclera nonicteric his abdomen is soft scaphoid nontender lungs are clear laterally and anteriorly cardiac exam reveals a regular rhythm without appreciable murmurs lower extremities show no edema he is 2-3+ DP pulses bilaterally he can lift each leg off the bed independently and he moves his arms and hands to command but again is slightly confused with questioning,      RECORDS REVIEW:   Orders Reviewed.  Labs Reviewed.      ICD-10-CM ICD-9-CM   1. Metabolic encephalopathy  G93.41 348.31   2. Renal cell carcinoma of right kidney metastatic to other site  C64.1 189.0   3. Essential hypertension  I10 401.9   4. Paroxysmal atrial fibrillation  I48.0 427.31   5. Altered mental status, unspecified altered mental status type  R41.82 780.97   6. Status post placement of implantable loop recorder  Z95.818 V45.09   7. Generalized convulsive seizures  R56.9 780.39   8. Stage 3a chronic kidney disease  N18.31 585.3   9. Centrilobular emphysema  J43.2 492.8       ASSESSMENT/PLAN    Diagnoses and all orders for this visit:    1. Metabolic encephalopathy (Primary)    2. Renal cell carcinoma of right kidney metastatic to other site    3. Essential hypertension    4. Paroxysmal atrial fibrillation    5. Altered mental status, unspecified altered mental status type    6. Status post placement of implantable loop recorder    7. Generalized convulsive seizures    8. Stage 3a chronic kidney disease    9. Centrilobular emphysema    Altered mental status, diagnosed with metabolic encephalopathy unclear etiology, infectious cause ruled out,, chest x-ray did show some bilateral infiltrates CT of the head showed no acute findings MRI of the brain showed no acute findings, patient did see psychiatry recommended no change to current medications, continue Seroquel 50 mg at bedtime, rivastigmine 1.5 mg twice a day    Generalized weakness, neurologic gait dysfunction  and recent falls, continue rehab efforts,    Hyponatremia mild, sodium 128 on July 19, 2025 at time of discharge will need a follow-up labs this week, now started on sodium chloride tablets 1 g daily,    Vitamin B12 deficiency continues replacement    CKD stage IIIa creatinine 1.29 July 19, 2025 will follow labs closely    Polycythemia, consider phlebotomy as treatment for possible encephalopathy with severe polycythemia    Metastatic renal cell carcinoma to the liver currently on chemotherapy    COPD, continue Spiriva inhaler 2 puffs daily    Hypertension, continues amlodipine 5 mg daily, metoprolol extended release 50 mg twice a day, valsartan 160 mg daily,    Depression, continue Celexa 10 mg daily,    Paroxysmal atrial fibrillation clinically in sinus rhythm, continues Eliquis 5 mg twice a day,, metoprolol extended release 50 mg twice a day,    Hyperlipidemia continue simvastatin 20 mg daily will stop,    Patient is on medications that should not be given at this point with metastatic cancer, I would recommend stopping the iron for sure given polycythemia and consider phlebotomy, would stop the meclizine if possible, decrease the Seroquel dosing if possible due to cognitive issues, follow labs closely, may be able to cut back on blood pressure medications also, [facility] Cent[facility]un[facility] Noe MD       50 minutes were spent caring for Paulo on this date of service. This time spent by me includes preparing for the visit, reviewing tests, obtaining/reviewing separately obtained history, performing medically appropriate exam/evaluation, counseling/educating the patient/family/caregiver, ordering medications/tests/procedures, documenting information in the medical record, independently interpreting results and communicating that with the patient/family/caregiver and/or care coordination.

## 2025-07-23 ENCOUNTER — HOSPITAL ENCOUNTER (OUTPATIENT)
Dept: INFUSION THERAPY | Facility: HOSPITAL | Age: 70
Discharge: HOME | End: 2025-07-23
Admitting: INTERNAL MEDICINE
Payer: MEDICARE

## 2025-07-23 VITALS
SYSTOLIC BLOOD PRESSURE: 140 MMHG | TEMPERATURE: 98 F | RESPIRATION RATE: 20 BRPM | DIASTOLIC BLOOD PRESSURE: 69 MMHG | OXYGEN SATURATION: 94 % | HEART RATE: 84 BPM

## 2025-07-23 DIAGNOSIS — D75.1 POLYCYTHEMIA: Primary | ICD-10-CM

## 2025-07-23 DIAGNOSIS — C64.9 MALIGNANT NEOPLASM OF KIDNEY EXCLUDING RENAL PELVIS, UNSPECIFIED LATERALITY: ICD-10-CM

## 2025-07-23 DIAGNOSIS — C64.1 RENAL CELL CARCINOMA OF RIGHT KIDNEY METASTATIC TO OTHER SITE: ICD-10-CM

## 2025-07-23 LAB
ANISOCYTOSIS BLD QL: NORMAL
BASOPHILS # BLD AUTO: 0.12 10*3/MM3 (ref 0–0.2)
BASOPHILS NFR BLD AUTO: 1 % (ref 0–1.5)
DEPRECATED RDW RBC AUTO: 59.2 FL (ref 37–54)
EOSINOPHIL # BLD AUTO: 0.11 10*3/MM3 (ref 0–0.4)
EOSINOPHIL NFR BLD AUTO: 0.9 % (ref 0.3–6.2)
ERYTHROCYTE [DISTWIDTH] IN BLOOD BY AUTOMATED COUNT: 23 % (ref 12.3–15.4)
FERRITIN SERPL-MCNC: 65.46 NG/ML (ref 30–400)
HCT VFR BLD AUTO: 61.4 % (ref 37.5–51)
HGB BLD-MCNC: 20.3 G/DL (ref 13–17.7)
IMM GRANULOCYTES # BLD AUTO: 0.1 10*3/MM3 (ref 0–0.05)
IMM GRANULOCYTES NFR BLD AUTO: 0.9 % (ref 0–0.5)
LYMPHOCYTES # BLD AUTO: 1.01 10*3/MM3 (ref 0.7–3.1)
LYMPHOCYTES NFR BLD AUTO: 8.6 % (ref 19.6–45.3)
MCH RBC QN AUTO: 25.9 PG (ref 26.6–33)
MCHC RBC AUTO-ENTMCNC: 33.1 G/DL (ref 31.5–35.7)
MCV RBC AUTO: 78.2 FL (ref 79–97)
MICROCYTES BLD QL: NORMAL
MONOCYTES # BLD AUTO: 1.11 10*3/MM3 (ref 0.1–0.9)
MONOCYTES NFR BLD AUTO: 9.4 % (ref 5–12)
NEUTROPHILS NFR BLD AUTO: 79.2 % (ref 42.7–76)
NEUTROPHILS NFR BLD AUTO: 9.3 10*3/MM3 (ref 1.7–7)
NRBC BLD AUTO-RTO: 0 /100 WBC (ref 0–0.2)
PLATELET # BLD AUTO: 238 10*3/MM3 (ref 140–450)
PMV BLD AUTO: 8.9 FL (ref 6–12)
POIKILOCYTOSIS BLD QL SMEAR: NORMAL
RBC # BLD AUTO: 7.85 10*6/MM3 (ref 4.14–5.8)
SMALL PLATELETS BLD QL SMEAR: ADEQUATE
WBC MORPH BLD: NORMAL
WBC NRBC COR # BLD AUTO: 11.75 10*3/MM3 (ref 3.4–10.8)

## 2025-07-23 PROCEDURE — 85007 BL SMEAR W/DIFF WBC COUNT: CPT | Performed by: INTERNAL MEDICINE

## 2025-07-23 PROCEDURE — 85025 COMPLETE CBC W/AUTO DIFF WBC: CPT | Performed by: INTERNAL MEDICINE

## 2025-07-23 PROCEDURE — 36415 COLL VENOUS BLD VENIPUNCTURE: CPT

## 2025-07-23 PROCEDURE — 82728 ASSAY OF FERRITIN: CPT | Performed by: INTERNAL MEDICINE

## 2025-07-23 PROCEDURE — 99195 PHLEBOTOMY: CPT

## 2025-07-23 RX ORDER — SODIUM CHLORIDE 9 MG/ML
250 INJECTION, SOLUTION INTRAVENOUS ONCE
OUTPATIENT
Start: 2025-07-27

## 2025-07-23 RX ORDER — SODIUM CHLORIDE 9 MG/ML
250 INJECTION, SOLUTION INTRAVENOUS ONCE
Status: DISCONTINUED | OUTPATIENT
Start: 2025-07-23 | End: 2025-07-25 | Stop reason: HOSPADM

## 2025-07-23 NOTE — CODE DOCUMENTATION
Patient presented for phlebotomy, arrived with son Regulo, who is also patients POA. POA signed consent and expressed understanding to all instructions and education that was given. Patient does have some intermittent confusion but does state understanding. Anuja Borges RN

## 2025-07-24 NOTE — ED PROVIDER NOTES
Time: 9:46 PM EDT  Date of encounter:  7/14/2025  Independent Historian/Clinical History and Information was obtained by:   Patient, Family, and EMS  Chief Complaint: Altered mental status, multiple falls    History is limited by: Altered Mental Status    History of Present Illness:  Patient is a 69 y.o. year old male who presents to the emergency department for evaluation of increasing confusion with altered mental status and falls.  Patient lives at home alone.  Patient's son is present and provides most of the information.  Son reports the last couple days she has had increasing confusion.  He has had increasing fatigue and decreased appetite.  Patient is on Eliquis.  Patient has a history of kidney cancer with metastatic disease.  The son reports that he had obviously fallen at some point after he was last seen last night or this morning.  The son reports that his office was disheveled.  The son did however find him on the bed so is not sure how he got back to bed but states that he was very difficult to arouse.  Additionally the son reports that his nighttime medication box was empty where should only have been missing 2 days of medications.  The son does not recall seeing any spilled pills but also cannot verify that the patient took the pills.  The morning medications were appropriate.    Patient Care Team  Primary Care Provider: Vilma Rolon APRN    Past Medical History:     No Known Allergies  Past Medical History:   Diagnosis Date    Arthritis of back 2024    Benign essential hypertension     Cancer August 1994    Mass on Kidney..    COPD (chronic obstructive pulmonary disease)     Erectile dysfunction Apprx: 5 yrs ago    Generalized convulsive seizures 12/27/2023    Hip arthrosis     Hyperlipidemia 12/14/2018    Hyponatremia     Implantable loop recorder present     Knee swelling     Long term (current) use of non-steroidal anti-inflammatories (nsaid) 04/16/2018    Paroxysmal atrial fibrillation  05/14/2025    Renal insufficiency     Syncope     Syncope and collapse 09/02/2022    Total of 5 episdoes with the last one 10/22    Outside Source Comment: Overview:  Formatting of this note might be different from the original.  Total of 5 episdoes with the last one 10/22  Last Assessment & Plan:  Formatting of this note might be different from the original.  No recurrent episodes Lipitor does not show any significant dysrhythmias at this point continue with monitoring      Vertigo 02/17/2021    Vision loss      Past Surgical History:   Procedure Laterality Date    CARDIAC CATHETERIZATION N/A 09/26/2022    Procedure: Left Heart Cath;  Surgeon: Randell Casiano MD;  Location: [facility] CATH INVASIVE LOCATION;  Service: Cardiovascular;  Laterality: N/A;    CARDIAC ELECTROPHYSIOLOGY PROCEDURE N/A 09/26/2022    Procedure: Loop insertion;  Surgeon: Randell Casiano MD;  Location: [facility] CATH INVASIVE LOCATION;  Service: Cardiovascular;  Laterality: N/A;    COLONOSCOPY  2007    COLONOSCOPY N/A 10/26/2022    Procedure: COLONOSCOPY FOR SCREENING;  Surgeon: Roque Whitlock MD;  Location: [facility] ENDOSCOPY;  Service: Gastroenterology;  Laterality: N/A;  NORMAL COLON    NEPHRECTOMY Right 08/12/2024     Family History   Problem Relation Age of Onset    Dementia Mother     No Known Problems Father     Colon cancer Neg Hx     Malig Hyperthermia Neg Hx        Home Medications:  Prior to Admission medications    Medication Sig Start Date End Date Taking? Authorizing Provider   amLODIPine (NORVASC) 5 MG tablet Take 1 tablet by mouth Daily. 7/10/25  Yes Vilma Rolon APRN   axitinib (INLYTA) 5 MG chemo tablet Take 1 tablet by mouth Every 12 (Twelve) Hours. 6/17/25  Yes Nabor Moser MD   citalopram (CeleXA) 10 MG tablet TAKE 1 TABLET DAILY 3/17/25  Yes Vilma Rolon APRN   Eliquis 5 MG tablet tablet TAKE 1 TABLET TWICE A DAY 1/14/25  Yes Randell Casiano MD   ferrous gluconate (FERGON) 324 MG tablet Take 1 tablet by mouth  Daily With Breakfast. 25  Yes Nabor Moser MD   meclizine (ANTIVERT) 25 MG tablet TAKE 2 TABLETS DAILY 6/3/25  Yes Vilma Rolon APRN   metoprolol succinate XL (TOPROL-XL) 50 MG 24 hr tablet Take 2 tablets by mouth Daily. 25  Yes Awilda De La Cruz MD   NON FORMULARY Take 2 each by mouth 2 (Two) Times a Day. Sciati Ease   Yes ProviderKvng MD   ondansetron (ZOFRAN) 8 MG tablet Take 1 tablet by mouth 3 (Three) Times a Day As Needed for Nausea or Vomiting.  Patient taking differently: Take 1 tablet by mouth 2 (Two) Times a Day As Needed for Nausea or Vomiting. 25  Yes Nabor Moser MD   rivastigmine (EXELON) 1.5 MG capsule TAKE 1 CAPSULE TWICE A DAY WITH MEALS 3/27/25  Yes Christian Fox MD   simvastatin (ZOCOR) 20 MG tablet TAKE 1 TABLET DAILY 25  Yes Vilma Rolon APRN   sodium chloride 1 g tablet Take 1 tablet by mouth Daily. 25  Yes Awilda De La Cruz MD   Spiriva Respimat 1.25 MCG/ACT aerosol solution inhaler USE 2 INHALATIONS DAILY  Patient taking differently: Inhale 2 puffs Daily. 25  Yes Vilma Rolon APRN   vitamin B-12 (CYANOCOBALAMIN) 500 MCG tablet Take 1 tablet by mouth Daily.   Yes ProviderKvng MD   QUEtiapine (SEROquel) 50 MG tablet TAKE 1 TABLET EVERY NIGHT 3/27/25   Christian Fox MD   valsartan (DIOVAN) 160 MG tablet Take 1 tablet by mouth Daily. 7/10/25   Vilma Rolon APRN        Social History:   Social History     Tobacco Use    Smoking status: Former     Current packs/day: 0.00     Average packs/day: 2.0 packs/day for 36.3 years (72.7 ttl pk-yrs)     Types: Cigarettes     Start date: 6/15/1974     Quit date: 10/20/2010     Years since quittin.7     Passive exposure: Past    Smokeless tobacco: Never    Tobacco comments:     SMOKES MORE THAN 2 PPD FOR 35 YEARS   Vaping Use    Vaping status: Never Used   Substance Use Topics    Alcohol use: Not Currently     Alcohol/week: 6.0 - 8.0 standard drinks of alcohol      "Types: 6 - 8 Cans of beer per week     Comment: as of 9-8-24 was his last drink 8-14 DRINKS PER WEEK as of June 2024, prior to June 2024 he would drink 20 or more beers daily and did so for 15 years    Drug use: Not Currently         Review of Systems:  Review of Systems   Unable to perform ROS: Mental status change        Physical Exam:  /86 (BP Location: Right arm, Patient Position: Sitting)   Pulse 82   Temp 97.3 °F (36.3 °C) (Oral)   Resp 18   Ht 172.7 cm (68\")   Wt 87.5 kg (192 lb 14.4 oz)   SpO2 93%   BMI 29.33 kg/m²     Physical Exam    Vital signs were reviewed under triage note.  General appearance - Patient appears well-developed and well-nourished.  Patient is in no acute distress.  Head - Normocephalic, atraumatic.  Pupils - Equal, round, reactive to light.  Extraocular muscles are intact.  Conjunctiva is clear.  Nasal - Normal inspection.  No evidence of trauma or epistaxis.  Tympanic membranes - Gray, intact without erythema or retractions.  Oral mucosa - Pink and moist without lesions or erythema.  Uvula is midline.  Chest wall - Atraumatic.  Chest wall is nontender.  There are no vesicular rashes noted.  Neck - Supple.  Trachea was midline.  There is no palpable lymphadenopathy or thyromegaly.  There are no meningeal signs  Lungs - Clear to auscultation and percussion bilaterally.  Heart - Regular rate and rhythm without any murmurs, clicks, or gallops.  Abdomen - Soft.  Bowel sounds are present.  There is no palpable tenderness.  There is no rebound, guarding, or rigidity.  There are no palpable masses.  There are no pulsatile masses.  Back - Spine is straight and midline.  There is no CVA tenderness.  Extremities - Intact x4 with full range of motion.  There is no palpable edema.  Pulses are intact x4 and equal.  Neurologic - Patient is awake and alert.  The patient is pleasantly confused.  Cranial nerves II through XII are grossly intact.  Motor and sensory functions grossly intact.  " Cerebellar function was normal.  Integument - There are no rashes.  There are no petechia or purpura lesions noted.  There are no vesicular lesions noted.           Procedures:  Procedures      Medical Decision Making:      Comorbidities that affect care:    COPD, hyperlipidemia, hypertension, hyponatremia, vertigo, syncope, kidney cancer with metastatic disease to the liver, paroxysmal atrial fibrillation    External Notes reviewed:    Previous Clinic Note: Office visit with MELANIA Sunshine on 7/10/2025 was reviewed by me.      The following orders were placed and all results were independently analyzed by me:  Orders Placed This Encounter   Procedures    Blood Culture - Blood,    Blood Culture - Blood,    XR Chest 1 View    CT Head Without Contrast Stroke Protocol    CT Chest Without Contrast Diagnostic    MRI Brain With & Without Contrast    [place] Draw    Comprehensive Metabolic Panel    High Sensitivity Troponin T    Magnesium    Urinalysis With Microscopic If Indicated (No Culture) - Urine, Clean Catch    CBC Auto Differential    Blood Gas, Arterial -    Ammonia    CK    Scan Slide    Blood Gas, Arterial -    High Sensitivity Troponin T 1Hr    Urinalysis, Microscopic Only - Urine, Clean Catch    Procalcitonin    Basic Metabolic Panel    CBC Auto Differential    Blood Gas, Arterial -    TSH Rfx On Abnormal To Free T4    Basic Metabolic Panel    CBC Auto Differential    Ambulatory Referral to Psychiatry    Undress & Gown    Continuous Pulse Oximetry    Vital Signs    Straight Cath    Discontinue Telemetry Monitoring    Discharge Instructions    Discharge Follow-up with PCP    OT Plan of Care Cert / Re-Cert    PT Plan of Care Cert / Re-Cert    POC Glucose Once    ECG 12 Lead Altered Mental Status    ECG 12 Lead QT Measurement    Inpatient Admission    Transfer Patient    Discharge patient    CBC & Differential    Green Top (Gel)    Lavender Top    Gold Top - SST    Light Blue Top    CBC & Differential     Extra Tubes    Lavender Top       Medications Given in the Emergency Department:  Medications   hydrALAZINE (APRESOLINE) injection 10 mg (10 mg Intravenous Given 7/14/25 1923)   labetalol (NORMODYNE,TRANDATE) injection 10 mg (10 mg Intravenous Given 7/14/25 2249)   labetalol (NORMODYNE,TRANDATE) injection 20 mg (20 mg Intravenous Given 7/15/25 1206)   diazePAM (VALIUM) injection 2.5 mg (2.5 mg Intravenous Given 7/18/25 0033)   Gadopiclenol (VUEWAY) injection 8 mL (8 mL Intravenous Given 7/16/25 2021)   LORazepam (ATIVAN) tablet 0.5 mg (0.5 mg Oral Given 7/17/25 1731)        ED Course:    ED Course as of 07/23/25 2153 Wed Jul 23, 2025 2152 EKG performed at 1621 was interpreted by me to show a normal sinus rhythm with a ventricular rate of 80 bpm.  The HI interval is 127 ms.  P waves are suggestive left atrial enlargement.  QRS intervals normal.  Axis is leftward at -60 degrees.  There is no acute ischemic ST or T wave changes identified.  QT corrected is 453 ms. [TB]      ED Course User Index  [TB] Vaibhav Long DO   The patient was seen and evaluated in the ED by me.  The above history and physical examination was performed as documented.  Diagnostic data was obtained.  Results reviewed.  Findings were discussed with the patient's son.  There were no acute findings noted.  The patient was noted however to be extremely hypertensive.  Patient was given a dose of IV hydralazine which did improve his blood pressure some.  Based on the history of consulted the hospital service for further workup and evaluation.  Dr. Wolf agreed to meet the patient.    Labs:    Lab Results (last 24 hours)       Procedure Component Value Units Date/Time    CBC & Differential [[id number]]  (Abnormal) Collected: 07/23/25 1458    Specimen: Blood Updated: 07/23/25 1600    Narrative:      The following orders were created for panel order CBC & Differential.  Procedure                               Abnormality         Status                      ---------                               -----------         ------                     CBC Auto Differential[[id number]]        Abnormal            Final result               Scan Slide[[id number]]                                       Final result                 Please view results for these tests on the individual orders.    Ferritin [[id number]]  (Normal) Collected: 07/23/25 1458    Specimen: Blood Updated: 07/23/25 1545     Ferritin 65.46 ng/mL     Narrative:      <12 ng/mL usually associated with Iron Deficiency Anemia. Above normal range levels may be due to Hepatic and/or Chronic Inflammatory Disease.  Results may be falsely decreased if patient taking Biotin.      CBC Auto Differential [[id number]]  (Abnormal) Collected: 07/23/25 1458    Specimen: Blood Updated: 07/23/25 1600     WBC 11.75 10*3/mm3      RBC 7.85 10*6/mm3      Hemoglobin 20.3 g/dL      Hematocrit 61.4 %      MCV 78.2 fL      MCH 25.9 pg      MCHC 33.1 g/dL      RDW 23.0 %      RDW-SD 59.2 fl      MPV 8.9 fL      Platelets 238 10*3/mm3      Neutrophil % 79.2 %      Lymphocyte % 8.6 %      Monocyte % 9.4 %      Eosinophil % 0.9 %      Basophil % 1.0 %      Immature Grans % 0.9 %      Neutrophils, Absolute 9.30 10*3/mm3      Lymphocytes, Absolute 1.01 10*3/mm3      Monocytes, Absolute 1.11 10*3/mm3      Eosinophils, Absolute 0.11 10*3/mm3      Basophils, Absolute 0.12 10*3/mm3      Immature Grans, Absolute 0.10 10*3/mm3      nRBC 0.0 /100 WBC     Narrative:      Appended report. These results have been appended to a previously verified report.    Scan Slide [[id number]] Collected: 07/23/25 1458    Specimen: Blood Updated: 07/23/25 1600     Anisocytosis Mod/2+     Microcytes Slight/1+     Poikilocytes Mod/2+     WBC Morphology Normal     Platelet Estimate Adequate             Imaging:    No Radiology Exams Resulted Within Past 24 Hours      Differential Diagnosis and Discussion:    Altered Mental Status: Based on the patient's signs and  symptoms, differential diagnosis includes but is not limited to meningitis, stroke, sepsis, subarachnoid hemorrhage, intracranial bleeding, encephalitis, and metabolic encephalopathy.    Labs were collected in the emergency department and all labs were reviewed and interpreted by me.  X-ray were performed in the emergency department and all X-ray impressions were independently interpreted by me.  An EKG was performed and the EKG was interpreted by me.  CT scan was performed in the emergency department and the CT scan radiology impression was interpreted by me.    MDM           Patient Care Considerations:    SEPSIS was considered but is NOT present in the emergency department as SIRS criteria is not present.      Consultants/Shared Management Plan:    Hospitalist: I have discussed the case with Dr. Griffin who agrees to accept the patient for admission.    Social Determinants of Health:    Patient has presented with family members who are responsible, reliable and will ensure follow up care.      Disposition and Care Coordination:    Admit:   Through independent evaluation of the patient's history, physical, and imperical data, the patient meets criteria for inpatient admission to the hospital.        Final diagnoses:   Altered mental status, unspecified altered mental status type   Uncontrolled hypertension        ED Disposition       ED Disposition   Decision to Admit    Condition   --    Comment   Level of Care: Telemetry [5]   Diagnosis: AMS (altered mental status) [[id number]]   Admitting Physician: HOLGER GRIFFIN [[id number]]   Certification: I Certify That Inpatient Hospital Services Are Medically Necessary For Greater Than 2 Midnights                 This medical record created using voice recognition software.             Vaibhav Long DO  07/23/25 2627

## 2025-08-07 RX ORDER — METOPROLOL SUCCINATE 50 MG/1
50 TABLET, EXTENDED RELEASE ORAL DAILY
Qty: 90 TABLET | Refills: 3 | Status: SHIPPED | OUTPATIENT
Start: 2025-08-07

## 2025-08-27 ENCOUNTER — NURSING HOME (OUTPATIENT)
Dept: INTERNAL MEDICINE | Age: 70
End: 2025-08-27
Payer: MEDICARE

## 2025-08-27 DIAGNOSIS — N18.31 STAGE 3A CHRONIC KIDNEY DISEASE: ICD-10-CM

## 2025-08-27 DIAGNOSIS — C64.1 RENAL CELL CARCINOMA OF RIGHT KIDNEY METASTATIC TO OTHER SITE: ICD-10-CM

## 2025-08-27 DIAGNOSIS — C64.9 MALIGNANT NEOPLASM OF KIDNEY EXCLUDING RENAL PELVIS, UNSPECIFIED LATERALITY: ICD-10-CM

## 2025-08-27 DIAGNOSIS — G40.409 GENERALIZED TONIC-CLONIC SEIZURE: ICD-10-CM

## 2025-08-27 DIAGNOSIS — D75.1 POLYCYTHEMIA: ICD-10-CM

## 2025-08-27 DIAGNOSIS — E78.2 MIXED HYPERLIPIDEMIA: ICD-10-CM

## 2025-08-27 DIAGNOSIS — E87.1 HYPONATREMIA: ICD-10-CM

## 2025-08-27 DIAGNOSIS — I48.0 PAROXYSMAL ATRIAL FIBRILLATION: ICD-10-CM

## 2025-08-27 DIAGNOSIS — R41.82 ALTERED MENTAL STATUS, UNSPECIFIED ALTERED MENTAL STATUS TYPE: Primary | ICD-10-CM

## 2025-08-27 DIAGNOSIS — I10 ESSENTIAL HYPERTENSION: ICD-10-CM

## 2025-08-27 DIAGNOSIS — Z95.818 STATUS POST PLACEMENT OF IMPLANTABLE LOOP RECORDER: ICD-10-CM

## 2025-08-27 DIAGNOSIS — E55.9 VITAMIN D DEFICIENCY: ICD-10-CM

## 2025-08-29 ENCOUNTER — TELEPHONE (OUTPATIENT)
Dept: ONCOLOGY | Facility: HOSPITAL | Age: 70
End: 2025-08-29
Payer: MEDICARE

## (undated) DEVICE — Device

## (undated) DEVICE — Device: Brand: DEFENDO AIR/WATER/SUCTION AND BIOPSY VALVE

## (undated) DEVICE — KT MANIFOLD CATHLAB CUST

## (undated) DEVICE — GLIDESHEATH SLENDER STAINLESS STEEL KIT: Brand: GLIDESHEATH SLENDER

## (undated) DEVICE — CONN JET HYDRA H20 AUXILIARY DISP

## (undated) DEVICE — RADIFOCUS OPTITORQUE ANGIOGRAPHIC CATHETER: Brand: OPTITORQUE

## (undated) DEVICE — CATH F5INF TLPIGST145 110CM6SH: Brand: INFINITI

## (undated) DEVICE — GW FC FLOP/TP .035 260CM 3MM

## (undated) DEVICE — SOLIDIFIER LIQLOC PLS 1500CC BT

## (undated) DEVICE — SOL IRRG H2O PL/BG 1000ML STRL

## (undated) DEVICE — LINER SURG CANSTR SXN S/RIGD 1500CC